# Patient Record
Sex: MALE | Race: ASIAN | Employment: OTHER | URBAN - METROPOLITAN AREA
[De-identification: names, ages, dates, MRNs, and addresses within clinical notes are randomized per-mention and may not be internally consistent; named-entity substitution may affect disease eponyms.]

---

## 2017-08-15 ENCOUNTER — OFFICE VISIT (OUTPATIENT)
Dept: URGENT CARE | Facility: URGENT CARE | Age: 72
End: 2017-08-15
Payer: MEDICARE

## 2017-08-15 ENCOUNTER — TRANSFERRED RECORDS (OUTPATIENT)
Dept: HEALTH INFORMATION MANAGEMENT | Facility: CLINIC | Age: 72
End: 2017-08-15

## 2017-08-15 VITALS
SYSTOLIC BLOOD PRESSURE: 136 MMHG | WEIGHT: 143.6 LBS | BODY MASS INDEX: 24.65 KG/M2 | HEART RATE: 72 BPM | TEMPERATURE: 97.7 F | OXYGEN SATURATION: 97 % | DIASTOLIC BLOOD PRESSURE: 70 MMHG

## 2017-08-15 DIAGNOSIS — R55 SYNCOPE, UNSPECIFIED SYNCOPE TYPE: Primary | ICD-10-CM

## 2017-08-15 PROCEDURE — 93000 ELECTROCARDIOGRAM COMPLETE: CPT | Performed by: FAMILY MEDICINE

## 2017-08-15 PROCEDURE — 99214 OFFICE O/P EST MOD 30 MIN: CPT | Performed by: FAMILY MEDICINE

## 2017-08-15 NOTE — MR AVS SNAPSHOT
After Visit Summary   8/15/2017    Jessica Healy    MRN: [id number]           Patient Information     Date Of Birth          1945        Visit Information        Provider Department      8/15/2017 7:55 PM Harris Linn MD [facility]        Today's Diagnoses     Syncope, unspecified syncope type    -  1       Follow-ups after your visit        Your next 10 appointments already scheduled     Aug 23, 2017  9:40 AM CDT   [facility] Visit with [facility]o Maciel [facility]   [facility]    [address] [place] [id number] [phone]           Bring a current list of meds and any records pertaining to this visit. For Physicals, please bring immunization records and any forms needing to be filled out. Please arrive 10 minutes early to complete paperwork.              Who to contact     If you have questions or need follow up information about today's clinic visit or your schedule please contact [facility] directly at [phone].  Normal or non-critical lab and imaging results will be communicated to you by MyChart, letter or phone within 4 business days after the clinic has received the results. If you do not hear from us within 7 days, please contact the clinic through [employer] or phone. If you have a critical or abnormal lab result, we will notify you by phone as soon as possible.  Submit refill requests through [facility] [facility] or call your pharmacy and they will forward the refill request to us. Please allow 3 business days for your refill to be completed.          Additional Information About Your Visit        MyChart Information     [facility] gives you secure access to your electronic health record. If you see a primary care provider, you can also send messages to your care team and make appointments. If you have questions, please call your primary care clinic.  If you do not have a primary care provider,  please call [phone] and they will assist you.        Care EveryWhere ID     This is your Care EveryWhere ID. This could be used by other organizations to access your [place] medical records  [id number]        Your Vitals Were     Pulse Temperature Pulse Oximetry BMI (Body Mass Index)          72 97.7  F (36.5  C) (Oral) 97% 24.65 kg/m2         Blood Pressure from Last 3 Encounters:   08/15/17 136/70   11/30/16 168/85   10/25/16 117/65    Weight from Last 3 Encounters:   08/15/17 143 lb 9.6 oz (65.1 kg)   11/30/16 142 lb (64.4 kg)   10/25/16 140 lb (63.5 kg)              We Performed the Following     EKG 12-lead complete w/read - Clinics        Primary Care Provider Office Phone # Fax #    Christiano Maciel [phone]       [address]  [facility] [address]        Equal Access to Services     [facility]: Hadii aad ku hadasho Soomaali, waaxda luqadaha, qaybta kaalmada adeegyada, waxay idiin hayaan adeeg kharash la'tona . So [facility] [phone].    ATENCIÓN: Si habla español, tiene a poole disposición servicios gratuitos de asistencia lingüística. Llame al [phone].    We comply with applicable federal civil rights laws and [place] laws. We do not discriminate on the basis of race, color, national origin, age, disability sex, sexual orientation or gender identity.            Thank you!     Thank you for choosing [facility]  for your care. Our goal is always to provide you with excellent care. Hearing back from our patients is one way we can continue to improve our services. Please take a few minutes to complete the written survey that you may receive in the mail after your visit with us. Thank you!             Your Updated Medication List - Protect others around you: Learn how to safely use, store and throw away your medicines at www.disposemymeds.org.          This list is accurate as of: 8/15/17  9:05 PM.  Always use your most recent med list.                   Brand  Name Dispense Instructions for use Diagnosis    acetaminophen 500 MG tablet    TYLENOL     Take 500-1,000 mg by mouth every 6 hours as needed for mild pain        atorvastatin 80 MG tablet    LIPITOR    90 tablet    Take 1 tablet (80 mg) by mouth daily    Hyperlipidemia LDL goal <70       carvedilol 3.125 MG tablet    COREG    180 tablet    Take 1 tablet (3.125 mg) by mouth 2 times daily (with meals)    Essential hypertension with goal blood pressure less than 130/80       clopidogrel 75 MG tablet    PLAVIX    90 tablet    Take 1 tablet (75 mg) by mouth daily    Cerebrovascular accident (CVA), unspecified mechanism (H)       ezetimibe 10 MG tablet    ZETIA    90 tablet    Take 1 tablet (10 mg) by mouth daily    Hyperlipidemia LDL goal <70       FISH OIL PO           isosorbide mononitrate 30 MG 24 hr tablet    IMDUR    360 tablet    Take 4 tablets (120 mg) by mouth daily    Essential hypertension with goal blood pressure less than 130/80       lisinopril 20 MG tablet    PRINIVIL/ZESTRIL    90 tablet    Take 1 tablet (20 mg) by mouth daily    Essential hypertension with goal blood pressure less than 130/80       mometasone 50 MCG/ACT spray    NASONEX    1 Box    Spray 2 sprays into both nostrils daily    Seasonal allergic rhinitis       nitroGLYcerin 0.4 MG sublingual tablet    NITROSTAT    25 tablet    PLACE 1 TABLET UNDER THE TONGUE AT THE ONSET OF CHEST PAIN. MAY REPEAT EVERY 5 MINUTES AS NEEDED FOR A TOTAL OF 3 DOSES.    Coronary artery disease involving coronary bypass graft of native heart with angina pectoris (H)       order for DME     1 each    Home Blood pressure monitor machine    Hypertension goal BP (blood pressure) < 130/80

## 2017-08-16 NOTE — NURSING NOTE
"Initial /70 (BP Location: Left arm, Patient Position: Chair)  Pulse 72  Temp 97.7  F (36.5  C) (Oral)  Wt 143 lb 9.6 oz (65.1 kg)  SpO2 97%  BMI 24.65 kg/m2 Estimated body mass index is 24.65 kg/(m^2) as calculated from the following:    Height as of 10/25/16: 5' 4\" (1.626 m).    Weight as of this encounter: 143 lb 9.6 oz (65.1 kg). .    "

## 2017-08-16 NOTE — PROGRESS NOTES
"    Some of this note was populated by a medical assistant.     SUBJECTIVE:                                                    Jessica Healy is a 72 year old male who presents to clinic today for the following health issues:    Syncope       Duration: x2 today syncopal spells with brief 5 minute period of confusion thereafter.     Description (location/character/radiation): fainted 10am and 12pm    Intensity:  6-7/10      Accompanying signs and symptoms: neck and shoulder pain, dizziness, \"blacks out\"when blood pressure gets too low      History (similar episodes/previous evaluation): similar symptoms with low  BP but no fainting previously     Precipitating or alleviating factors: None    Therapies tried and outcome: None       One other  episode of fainting within the last year. High blood pressure at that time and feeling dizziness prior to that fall.   Denies hx of seizure  Denies recent medication changes.   Denies concussion or head injury.   Did have a TIA years ago   CABG in 2006 denies previous cardiac stent however.   Did take 2 ASA tablets earlier today. Noted he is only plavix as well.     Problem list and histories reviewed & adjusted, as indicated.  Additional history: as documented    Patient Active Problem List   Diagnosis     Advanced directives, counseling/discussion     CAD (coronary artery disease)     Hypertension goal BP (blood pressure) < 130/80     Hyperlipidemia LDL goal <70     Cerebrovascular accident (CVA), unspecified mechanism (H)     CKD (chronic kidney disease) stage 3, GFR 30-59 ml/min     Kidney cyst, acquired     Past Surgical History:   Procedure Laterality Date     SURGICAL HISTORY OF -       CABG 2006       Social History   Substance Use Topics     Smoking status: Never Smoker     Smokeless tobacco: Never Used     Alcohol use No     Family History   Problem Relation Age of Onset     Family History Negative       no known specifics         Current Outpatient Prescriptions "   Medication Sig Dispense Refill     Omega-3 Fatty Acids (FISH OIL PO)        clopidogrel (PLAVIX) 75 MG tablet Take 1 tablet (75 mg) by mouth daily 90 tablet 3     carvedilol (COREG) 3.125 MG tablet Take 1 tablet (3.125 mg) by mouth 2 times daily (with meals) 180 tablet 3     atorvastatin (LIPITOR) 80 MG tablet Take 1 tablet (80 mg) by mouth daily 90 tablet 3     isosorbide mononitrate (IMDUR) 30 MG 24 hr tablet Take 4 tablets (120 mg) by mouth daily 360 tablet 3     lisinopril (PRINIVIL,ZESTRIL) 20 MG tablet Take 1 tablet (20 mg) by mouth daily 90 tablet 3     ezetimibe (ZETIA) 10 MG tablet Take 1 tablet (10 mg) by mouth daily 90 tablet 3     acetaminophen (TYLENOL) 500 MG tablet Take 500-1,000 mg by mouth every 6 hours as needed for mild pain       mometasone (NASONEX) 50 MCG/ACT nasal spray Spray 2 sprays into both nostrils daily 1 Box 1     ORDER FOR [facility] Blood pressure monitor machine 1 each 0     nitroglycerin (NITROSTAT) 0.4 MG SL tablet PLACE 1 TABLET UNDER THE TONGUE AT THE ONSET OF CHEST PAIN. MAY REPEAT EVERY 5 MINUTES AS NEEDED FOR A TOTAL OF 3 DOSES. (Patient not taking: Reported on 8/15/2017) 25 tablet 3     Allergies   Allergen Reactions     Nkda [No Known Drug Allergies]      Seasonal Allergies          Reviewed and updated as needed this visit by clinical staffTobacco  Allergies  Meds       Reviewed and updated as needed this visit by Provider         ROS:  Constitutional, HEENT, cardiovascular, pulmonary, gi and gu systems are negative, except as otherwise noted.      OBJECTIVE:   /70 (BP Location: Left arm, Patient Position: Chair)  Pulse 72  Temp 97.7  F (36.5  C) (Oral)  Wt 143 lb 9.6 oz (65.1 kg)  SpO2 97%  BMI 24.65 kg/m2  Body mass index is 24.65 kg/(m^2).  GENERAL: healthy, alert and no distress  NECK: no adenopathy, no asymmetry, masses, or scars and thyroid normal to palpation  RESP: lungs clear to auscultation - no rales, rhonchi or wheezes  CV: regular rate and rhythm,  normal S1 S2, no S3 or S4, no murmur, click or rub, no peripheral edema and peripheral pulses strong  ABDOMEN: soft, nontender, no hepatosplenomegaly, no masses and bowel sounds normal  MS: no gross musculoskeletal defects noted, no edema    Diagnostic Test Results:  none     ASSESSMENT/PLAN:       ICD-10-CM    1. Syncope, unspecified syncope type R55 EKG 12-lead complete w/read - Clinics    -consider atrial fibrillation related syncope with LAE on EKG.     Advised he go to ER and his family will take him directly to [facility] for further evaluation.   I feel he would benefit from a stat troponin test especially in light of vague new onset neck pain symptoms that may be cardiac in nature.   Exam is reassuring however.   24 observation with echo and cards consult may be a good idea.   Patient educational/instructional material provided including reasons for follow-up   The patient indicates understanding of these issues and agrees with the plan and will take him directly to [facility].   Harris Linn MD      [facility]

## 2017-08-23 ENCOUNTER — OFFICE VISIT (OUTPATIENT)
Dept: FAMILY MEDICINE | Facility: CLINIC | Age: 72
End: 2017-08-23
Payer: MEDICARE

## 2017-08-23 VITALS
DIASTOLIC BLOOD PRESSURE: 80 MMHG | OXYGEN SATURATION: 100 % | WEIGHT: 142.8 LBS | HEIGHT: 64 IN | TEMPERATURE: 97.2 F | SYSTOLIC BLOOD PRESSURE: 165 MMHG | HEART RATE: 65 BPM | BODY MASS INDEX: 24.38 KG/M2

## 2017-08-23 DIAGNOSIS — I10 HYPERTENSION GOAL BP (BLOOD PRESSURE) < 130/80: Primary | ICD-10-CM

## 2017-08-23 PROCEDURE — 99214 OFFICE O/P EST MOD 30 MIN: CPT | Performed by: FAMILY MEDICINE

## 2017-08-23 RX ORDER — LISINOPRIL 10 MG/1
10 TABLET ORAL 2 TIMES DAILY
Qty: 180 TABLET | Refills: 1 | Status: SHIPPED | OUTPATIENT
Start: 2017-08-23 | End: 2019-04-10

## 2017-08-23 NOTE — PATIENT INSTRUCTIONS
How to contact your care team providers:    [facility]  [phone]  Pharmacy [phone]    KATHY Winters Dr., Dr., PA-C, Dr., PA-C    Team RN: Kofi DODD    Clinic hours  M-Th 7am-7pm   Fri 7am-5pm.   Urgent care M-F 11am-9pm,   Sat/sun 9am-5pm.  Pharmacy M-F 8:00am-8pm Sat/sun 9am-5pm.     All password changes, disabled accounts, or ID changes in [facility]MyHealth will be done by our Access Services Department.   If you need help with your account or password, call: [phone]. Clinic staff no longer has the ability to change passwords.

## 2017-08-23 NOTE — MR AVS SNAPSHOT
After Visit Summary   8/23/2017    Jessica Healy    MRN: [id number]           Patient Information     Date Of Birth          1945        Visit Information        Provider Department      8/23/2017 9:40 AM Christiano Maciel MD [facility]        Today's Diagnoses     Hypertension goal BP (blood pressure) < 130/80    -  1      Care Instructions    How to contact your care team providers:    [facility]  [phone]  Pharmacy [phone]    KATHY Winters Dr., Dr., PA-C, Dr., PA-C    Team RN: Kofi DUVALL and Kamilah DODD    Clinic hours  M-Th 7am-7pm   Fri 7am-5pm.   Urgent care M-F 11am-9pm,   Sat/sun 9am-5pm.  Pharmacy M-F 8:00am-8pm Sat/sun 9am-5pm.     All password changes, disabled accounts, or ID changes in [employer]MyHealth will be done by our Access Services Department.   If you need help with your account or password, call: [phone]. Clinic staff no longer has the ability to change passwords.                         Follow-ups after your visit        Who to contact     If you have questions or need follow up information about today's clinic visit or your schedule please contact [facility] directly at [phone].  Normal or non-critical lab and imaging results will be communicated to you by [employer], letter or phone within 4 business days after the clinic has received the results. If you do not hear from us within 7 days, please contact the clinic through [employer] or phone. If you have a critical or abnormal lab result, we will notify you by phone as soon as possible.  Submit refill requests through [employer] or call your pharmacy and they will forward the refill request to us. Please allow 3 business days for your refill to be completed.          Additional Information About Your Visit        [employer]  "Information     [facility] gives you secure access to your electronic health record. If you see a primary care provider, you can also send messages to your care team and make appointments. If you have questions, please call your primary care clinic.  If you do not have a primary care provider, please call [phone] and they will assist you.        Care EveryWhere ID     This is your Care EveryWhere ID. This could be used by other organizations to access your [place] medical records  [id number]        Your Vitals Were     Pulse Temperature Height Pulse Oximetry BMI (Body Mass Index)       65 97.2  F (36.2  C) (Oral) 5' 4\" (1.626 m) 100% 24.51 kg/m2        Blood Pressure from Last 3 Encounters:   08/23/17 165/80   08/15/17 136/70   11/30/16 168/85    Weight from Last 3 Encounters:   08/23/17 142 lb 12.8 oz (64.8 kg)   08/15/17 143 lb 9.6 oz (65.1 kg)   11/30/16 142 lb (64.4 kg)              Today, you had the following     No orders found for display         Today's Medication Changes          These changes are accurate as of: 8/23/17 10:11 AM.  If you have any questions, ask your nurse or doctor.               These medicines have changed or have updated prescriptions.        Dose/Directions    lisinopril 10 MG tablet   Commonly known as:  PRINIVIL/ZESTRIL   This may have changed:    - medication strength  - how much to take  - when to take this   Used for:  Hypertension goal BP (blood pressure) < 130/80   Changed by:  Christiano Maciel MD        Dose:  10 mg   Take 1 tablet (10 mg) by mouth 2 times daily   Quantity:  180 tablet   Refills:  1         Stop taking these medicines if you haven't already. Please contact your care team if you have questions.     mometasone 50 MCG/ACT spray   Commonly known as:  NASONEX   Stopped by:  Christiano Maciel MD                Where to get your medicines      These medications were sent to [facility], [place] - [address] [facility]  [address], [facility] [place] [address] "     Phone:  [phone]     lisinopril 10 MG tablet                Primary Care Provider Office Phone # Fax #    Christiano Maciel [phone]       [address]  [facility] [address]        Equal Access to Services     APRIL BOB : Hadii aad ku hadasho Soomaali, waaxda luqadaha, qaybta kaalmada adeegyada, waxay idiin hayaan adeeg khkelli ladoreensharon dorman. So [facility] [phone].    ATENCIÓN: Si habla español, tiene a poole disposición servicios gratuitos de asistencia lingüística. Llame al [phone].    We comply with applicable federal civil rights laws and [place] laws. We do not discriminate on the basis of race, color, national origin, age, disability sex, sexual orientation or gender identity.            Thank you!     Thank you for choosing [facility]  for your care. Our goal is always to provide you with excellent care. Hearing back from our patients is one way we can continue to improve our services. Please take a few minutes to complete the written survey that you may receive in the mail after your visit with us. Thank you!             Your Updated Medication List - Protect others around you: Learn how to safely use, store and throw away your medicines at www.disposemymeds.org.          This list is accurate as of: 8/23/17 10:11 AM.  Always use your most recent med list.                   Brand Name Dispense Instructions for use Diagnosis    acetaminophen 500 MG tablet    TYLENOL     Take 500-1,000 mg by mouth every 6 hours as needed for mild pain        atorvastatin 80 MG tablet    LIPITOR    90 tablet    Take 1 tablet (80 mg) by mouth daily    Hyperlipidemia LDL goal <70       carvedilol 3.125 MG tablet    COREG    180 tablet    Take 1 tablet (3.125 mg) by mouth 2 times daily (with meals)    Essential hypertension with goal blood pressure less than 130/80       clopidogrel 75 MG tablet    PLAVIX    90 tablet    Take 1 tablet (75 mg) by mouth daily    Cerebrovascular accident  (CVA), unspecified mechanism (H)       ezetimibe 10 MG tablet    ZETIA    90 tablet    Take 1 tablet (10 mg) by mouth daily    Hyperlipidemia LDL goal <70       FISH OIL PO           isosorbide mononitrate 30 MG 24 hr tablet    IMDUR    360 tablet    Take 4 tablets (120 mg) by mouth daily    Essential hypertension with goal blood pressure less than 130/80       lisinopril 10 MG tablet    PRINIVIL/ZESTRIL    180 tablet    Take 1 tablet (10 mg) by mouth 2 times daily    Hypertension goal BP (blood pressure) < 130/80       nitroGLYcerin 0.4 MG sublingual tablet    NITROSTAT    25 tablet    PLACE 1 TABLET UNDER THE TONGUE AT THE ONSET OF CHEST PAIN. MAY REPEAT EVERY 5 MINUTES AS NEEDED FOR A TOTAL OF 3 DOSES.    Coronary artery disease involving coronary bypass graft of native heart with angina pectoris (H)       order for DME     1 each    Home Blood pressure monitor machine    Hypertension goal BP (blood pressure) < 130/80

## 2017-08-23 NOTE — PROGRESS NOTES
"  SUBJECTIVE:   Jessica Healy is a 72 year old male who presents to clinic today for the following health issues:      Hypertension Follow-up      Outpatient blood pressures are being checked at home.  Results are 80's/50's.    Low Salt Diet: low salt      Amount of exercise or physical activity: 3-4 days/week for an average of 45-60 minutes    Problems taking medications regularly: No    Medication side effects: fatigue/headache/shoulder pain - patient states from blood pressure med.    Diet: low salt and low fat/cholesterol      ED/ Followup:    Facility:  [facility]  Date of visit: 8/16  Reason for visit: syncope/ low blood pressure  Current Status: better       Problem list and histories reviewed & adjusted, as indicated.  Additional history: as documented    Patient Active Problem List   Diagnosis     Advanced directives, counseling/discussion     CAD (coronary artery disease)     Hypertension goal BP (blood pressure) < 130/80     Hyperlipidemia LDL goal <70     Cerebrovascular accident (CVA), unspecified mechanism (H)     CKD (chronic kidney disease) stage 3, GFR 30-59 ml/min     Kidney cyst, acquired     Past Surgical History:   Procedure Laterality Date     SURGICAL HISTORY OF -       CABG 2006       Social History   Substance Use Topics     Smoking status: Never Smoker     Smokeless tobacco: Never Used     Alcohol use No     Family History   Problem Relation Age of Onset     Family History Negative Other      no known specifics             Reviewed and updated as needed this visit by clinical staffTobacco  Allergies  Meds  Med Hx  Surg Hx  Fam Hx  Soc Hx      Reviewed and updated as needed this visit by Provider         ROS:  Constitutional, HEENT, cardiovascular, pulmonary, GI, , musculoskeletal, neuro, skin, endocrine and psych systems are negative, except as otherwise noted.      OBJECTIVE:   /80  Pulse 65  Temp 97.2  F (36.2  C) (Oral)  Ht 5' 4\" (1.626 m)  Wt 142 lb 12.8 oz (64.8 kg)  SpO2 " 100%  BMI 24.51 kg/m2  Body mass index is 24.51 kg/(m^2).  GENERAL: healthy, alert and no distress  NECK: no adenopathy, no asymmetry, masses, or scars and thyroid normal to palpation  RESP: lungs clear to auscultation - no rales, rhonchi or wheezes  CV: regular rate and rhythm, normal S1 S2, no S3 or S4, no murmur, click or rub, no peripheral edema and peripheral pulses strong  ABDOMEN: soft, nontender, no hepatosplenomegaly, no masses and bowel sounds normal  MS: no gross musculoskeletal defects noted, no edema    Diagnostic Test Results:  none     ASSESSMENT/PLAN:     1. Hypertension goal BP (blood pressure) < 130/80  Not controlled. Patient stated his bp goes low after taking lisinopril 20 mg daily. Patient has been taking 10 mg daily. bp elevated today, will trial 10 mg twice daily. RTC in 1 month for bp recheck. Patient will monitor bp's.  - lisinopril (PRINIVIL/ZESTRIL) 10 MG tablet; Take 1 tablet (10 mg) by mouth 2 times daily  Dispense: 180 tablet; Refill: 1    Regular exercise  See Patient Instructions    Christiano Maciel MD, MD  [facility]

## 2017-09-06 ENCOUNTER — DOCUMENTATION ONLY (OUTPATIENT)
Dept: VASCULAR SURGERY | Facility: CLINIC | Age: 72
End: 2017-09-06

## 2017-09-06 DIAGNOSIS — Z13.6 ENCOUNTER FOR ABDOMINAL AORTIC ANEURYSM (AAA) SCREENING: Primary | ICD-10-CM

## 2017-10-25 DIAGNOSIS — I10 ESSENTIAL HYPERTENSION WITH GOAL BLOOD PRESSURE LESS THAN 130/80: ICD-10-CM

## 2017-10-25 DIAGNOSIS — I25.709 CORONARY ARTERY DISEASE INVOLVING CORONARY BYPASS GRAFT OF NATIVE HEART WITH ANGINA PECTORIS (H): ICD-10-CM

## 2017-10-25 DIAGNOSIS — E78.5 HYPERLIPIDEMIA LDL GOAL <70: ICD-10-CM

## 2017-10-25 DIAGNOSIS — I63.9 CEREBROVASCULAR ACCIDENT (CVA), UNSPECIFIED MECHANISM (H): ICD-10-CM

## 2017-10-25 NOTE — LETTER
October 30, 2017      Jessica Healy  [address]  [place] [facility] [address]        Dear Jessica Healy,      The refill request made by your pharmacy was received at the clinic.     Signed Prescriptions:                        Disp   Refills    clopidogrel (PLAVIX) 75 MG tablet          30 tab*0        Sig: TAKE 1 TABLET BY MOUTH ONCE DAILY // IB HNUB NOJ IB           LUB  Authorizing Provider: AYO SPARKS  Ordering User: ISSA NIELSEN    carvedilol (COREG) 3.125 MG tablet         60 tab*0        Sig: TAKE 1 TABLET TWICE DAILY WITH MEALS // 1 ZAUG NOJ 1           LUB, 1 HNUB NOJ 2 ZAUG NROG MOV  Authorizing Provider: AYO SPARKS  Ordering User: ISSA NIELSEN    atorvastatin (LIPITOR) 80 MG tablet        30 tab*0        Sig: TAKE 1 TABLET BY MOUTH DAILY FOR CHOLESTEROL // IB           HNUB NOJ 1 LUB SHAHRAM NTSHAV MUAJ ROJ  Authorizing Provider: AYO SPARKS  Ordering User: ISSA NIELSEN    ezetimibe (ZETIA) 10 MG tablet             30 tab*0        Sig: TAKE 1 TABLET BY MOUTH DAILY FOR CHOLESTEROL // IB           HNUB NOJ 1 LUB SHAHRAM NTSHAV MUAJ ROJ  Authorizing Provider: AYO SPARKS  Ordering User: ISSA NIELSEN    isosorbide mononitrate (IMDUR) 30 MG 24 hr*120 ta*0        Sig: TAKE 4 TABLETS (120 MG) BY MOUTH DAILY // IB HNUB NOJ           4 LUB  Authorizing Provider: AYO SPARKS  Ordering User: ISSA NIELSEN      At this time you are due in the clinic for a follow up appointment with fasting labs. A one time jalil refill has been sent to your pharmacy.       Please call your clinic to make an appointment with your provider before you run out of medication. This will prevent a delay in your next month's refill.    [facility] [phone]  [facility]- [phone]  [facility] [phone]    For your convenience we also offer online appointment scheduling at [email or web address] or [email or web address].     We invite you to refill your next prescription at a  [facility] or take advantage of our prescription mail service.      Sincerely,      [facility] with Dr. Maciel

## 2017-10-26 RX ORDER — NITROGLYCERIN 0.4 MG/1
TABLET SUBLINGUAL
Qty: 25 TABLET | Refills: 3 | Status: SHIPPED | OUTPATIENT
Start: 2017-10-26 | End: 2019-04-10

## 2017-10-26 NOTE — TELEPHONE ENCOUNTER
Last Written Prescription Date: 10/25/16  Last Fill Quantity: 25,  # refills: 3   Last Office Visit with [facility] or [facility] prescribing provider: 10/25/16. Next appointment: None, message sent to pharmacy for patient to make an appt                                             Refilled per protocol    Darling Drew RN  Cardiology Care Coordinator  [facility]  Phone: [phone]

## 2017-10-27 RX ORDER — CARVEDILOL 3.12 MG/1
TABLET ORAL
Qty: 60 TABLET | Refills: 0 | Status: SHIPPED | OUTPATIENT
Start: 2017-10-27 | End: 2017-11-21

## 2017-10-27 RX ORDER — ATORVASTATIN CALCIUM 80 MG/1
TABLET, FILM COATED ORAL
Qty: 30 TABLET | Refills: 0 | Status: SHIPPED | OUTPATIENT
Start: 2017-10-27 | End: 2017-11-21

## 2017-10-27 RX ORDER — EZETIMIBE 10 MG/1
TABLET ORAL
Qty: 30 TABLET | Refills: 0 | Status: SHIPPED | OUTPATIENT
Start: 2017-10-27 | End: 2017-11-21

## 2017-10-27 RX ORDER — ISOSORBIDE MONONITRATE 30 MG/1
TABLET, EXTENDED RELEASE ORAL
Qty: 120 TABLET | Refills: 0 | Status: SHIPPED | OUTPATIENT
Start: 2017-10-27 | End: 2017-11-21

## 2017-10-27 RX ORDER — CLOPIDOGREL BISULFATE 75 MG/1
TABLET ORAL
Qty: 30 TABLET | Refills: 0 | Status: SHIPPED | OUTPATIENT
Start: 2017-10-27 | End: 2017-11-21

## 2017-10-27 NOTE — TELEPHONE ENCOUNTER
Medication is being filled for 1 time refill only due to:  Patient needs to be seen because due for fasting labs and ov.   send letter.  Rohini Weiner RN

## 2017-10-30 NOTE — TELEPHONE ENCOUNTER
Letter mailed to pt's home address to call our appt line to schedule an appt with fasting labs.  Ayaz Healy,  [facility]

## 2017-11-21 DIAGNOSIS — I63.9 CEREBROVASCULAR ACCIDENT (CVA), UNSPECIFIED MECHANISM (H): ICD-10-CM

## 2017-11-21 DIAGNOSIS — E78.5 HYPERLIPIDEMIA LDL GOAL <70: ICD-10-CM

## 2017-11-21 DIAGNOSIS — I10 ESSENTIAL HYPERTENSION WITH GOAL BLOOD PRESSURE LESS THAN 130/80: ICD-10-CM

## 2017-11-22 NOTE — TELEPHONE ENCOUNTER
Routing refill request to provider for review/approval because:  Yumiko given x1 and patient did not follow up, please advise  Rohini Weiner RN

## 2017-11-24 RX ORDER — CLOPIDOGREL BISULFATE 75 MG/1
TABLET ORAL
Qty: 30 TABLET | Refills: 0 | Status: SHIPPED | OUTPATIENT
Start: 2017-11-24 | End: 2019-04-10

## 2017-11-24 RX ORDER — CARVEDILOL 3.12 MG/1
TABLET ORAL
Qty: 60 TABLET | Refills: 0 | Status: SHIPPED | OUTPATIENT
Start: 2017-11-24 | End: 2019-04-10

## 2017-11-24 RX ORDER — ISOSORBIDE MONONITRATE 30 MG/1
TABLET, EXTENDED RELEASE ORAL
Qty: 120 TABLET | Refills: 0 | Status: SHIPPED | OUTPATIENT
Start: 2017-11-24 | End: 2019-04-10

## 2017-11-24 RX ORDER — EZETIMIBE 10 MG/1
TABLET ORAL
Qty: 30 TABLET | Refills: 0 | Status: SHIPPED | OUTPATIENT
Start: 2017-11-24 | End: 2019-04-10

## 2017-11-24 RX ORDER — ATORVASTATIN CALCIUM 80 MG/1
TABLET, FILM COATED ORAL
Qty: 30 TABLET | Refills: 0 | Status: SHIPPED | OUTPATIENT
Start: 2017-11-24 | End: 2019-04-10

## 2018-06-23 NOTE — NURSING NOTE
"Chief Complaint   Patient presents with     Hospital F/U     [facility]     Hypertension       Initial /90 (BP Location: Left arm, Patient Position: Chair, Cuff Size: Adult Regular)  Pulse 65  Temp 97.2  F (36.2  C) (Oral)  Ht 5' 4\" (1.626 m)  Wt 142 lb 12.8 oz (64.8 kg)  SpO2 100%  BMI 24.51 kg/m2 Estimated body mass index is 24.51 kg/(m^2) as calculated from the following:    Height as of this encounter: 5' 4\" (1.626 m).    Weight as of this encounter: 142 lb 12.8 oz (64.8 kg).  Medication Reconciliation: complete     Jocelyn Steinberg MA      "
Alert and oriented, no focal deficits, no motor or sensory deficits.

## 2019-04-10 ENCOUNTER — OFFICE VISIT (OUTPATIENT)
Dept: FAMILY MEDICINE | Facility: CLINIC | Age: 74
End: 2019-04-10
Payer: MEDICARE

## 2019-04-10 VITALS
BODY MASS INDEX: 24.41 KG/M2 | WEIGHT: 143 LBS | DIASTOLIC BLOOD PRESSURE: 98 MMHG | HEIGHT: 64 IN | OXYGEN SATURATION: 100 % | RESPIRATION RATE: 16 BRPM | HEART RATE: 67 BPM | TEMPERATURE: 98 F | SYSTOLIC BLOOD PRESSURE: 174 MMHG

## 2019-04-10 DIAGNOSIS — Z12.11 SCREEN FOR COLON CANCER: ICD-10-CM

## 2019-04-10 DIAGNOSIS — I63.9 CEREBROVASCULAR ACCIDENT (CVA), UNSPECIFIED MECHANISM (H): ICD-10-CM

## 2019-04-10 DIAGNOSIS — E78.5 HYPERLIPIDEMIA LDL GOAL <70: ICD-10-CM

## 2019-04-10 DIAGNOSIS — I25.709 CORONARY ARTERY DISEASE INVOLVING CORONARY BYPASS GRAFT OF NATIVE HEART WITH ANGINA PECTORIS (H): ICD-10-CM

## 2019-04-10 DIAGNOSIS — I10 ESSENTIAL HYPERTENSION WITH GOAL BLOOD PRESSURE LESS THAN 130/80: ICD-10-CM

## 2019-04-10 DIAGNOSIS — Z12.5 SCREENING FOR PROSTATE CANCER: ICD-10-CM

## 2019-04-10 DIAGNOSIS — M10.072 ACUTE IDIOPATHIC GOUT OF LEFT FOOT: Primary | ICD-10-CM

## 2019-04-10 PROCEDURE — 99214 OFFICE O/P EST MOD 30 MIN: CPT | Performed by: FAMILY MEDICINE

## 2019-04-10 RX ORDER — CLOPIDOGREL BISULFATE 75 MG/1
TABLET ORAL
Qty: 90 TABLET | Refills: 3 | Status: SHIPPED | OUTPATIENT
Start: 2019-04-10 | End: 2020-07-14

## 2019-04-10 RX ORDER — ATORVASTATIN CALCIUM 80 MG/1
TABLET, FILM COATED ORAL
Qty: 90 TABLET | Refills: 3 | Status: SHIPPED | OUTPATIENT
Start: 2019-04-10 | End: 2020-06-30

## 2019-04-10 RX ORDER — ISOSORBIDE MONONITRATE 30 MG/1
TABLET, EXTENDED RELEASE ORAL
Qty: 360 TABLET | Refills: 1 | Status: SHIPPED | OUTPATIENT
Start: 2019-04-10 | End: 2020-06-30

## 2019-04-10 RX ORDER — CARVEDILOL 3.12 MG/1
TABLET ORAL
Qty: 180 TABLET | Refills: 1 | Status: SHIPPED | OUTPATIENT
Start: 2019-04-10 | End: 2020-03-09

## 2019-04-10 RX ORDER — PREDNISONE 20 MG/1
TABLET ORAL
Qty: 19 TABLET | Refills: 0 | Status: SHIPPED | OUTPATIENT
Start: 2019-04-10 | End: 2019-04-25

## 2019-04-10 RX ORDER — NITROGLYCERIN 0.4 MG/1
TABLET SUBLINGUAL
Qty: 25 TABLET | Refills: 3 | Status: SHIPPED | OUTPATIENT
Start: 2019-04-10 | End: 2020-07-24

## 2019-04-10 RX ORDER — EZETIMIBE 10 MG/1
TABLET ORAL
Qty: 90 TABLET | Refills: 3 | Status: SHIPPED | OUTPATIENT
Start: 2019-04-10 | End: 2020-06-30

## 2019-04-10 RX ORDER — LISINOPRIL 10 MG/1
10 TABLET ORAL 2 TIMES DAILY
Qty: 180 TABLET | Refills: 1 | Status: SHIPPED | OUTPATIENT
Start: 2019-04-10 | End: 2020-07-14

## 2019-04-10 ASSESSMENT — MIFFLIN-ST. JEOR: SCORE: 1314.64

## 2019-04-10 NOTE — PATIENT INSTRUCTIONS
At [facility], we strive to deliver an exceptional experience to you, every time we see you.  If you receive a survey in the mail, please send us back your thoughts. We really do value your feedback.    Based on your medical history, these are the current health maintenance/preventive care services that you are due for (some may have been done at this visit.)  Health Maintenance Due   Topic Date Due     ZOSTER IMMUNIZATION (1 of 2) 06/18/1997     MEDICARE ANNUAL WELLNESS VISIT  12/30/2012     PNEUMOCOCCAL IMMUNIZATION 65+ LOW/MEDIUM RISK (2 of 2 - PCV13) 09/25/2015     ADVANCE DIRECTIVE PLANNING Q5 YRS  12/30/2016     FIT Q1 YR  11/02/2017     FALL RISK ASSESSMENT  08/23/2018     PHQ-2 Q1 YR  08/23/2018     INFLUENZA VACCINE (1) 09/01/2018         Suggested websites for health information:  Www.Pull.Letsmake : Up to date and easily searchable information on multiple topics.  Www.[facility].gov : medication info, interactive tutorials, watch real surgeries online  Www.familydoctor.org : good info from the Academy of Family Physicians  Www.cdc.gov : public health info, travel advisories, epidemics (H1N1)  Www.aap.org : children's health info, normal development, vaccinations  Www.health.[facility].[place].us : [place] dept of health, public health issues in [place], N1N1    Your care team:                            Family Medicine Internal Medicine   MD Gatito Lewis MD Shantel Branch-Fleming, MD Katya Georgiev PA-C Nam Ho, MD Pediatrics   KATHY Cartagena, SHELBIE Cui APRN CNP   MD Cee Dumas MD Deborah Mielke, MD Kim Thein, APRN CNP      Clinic hours: Monday - Thursday 7 am-7 pm; Fridays 7 am-5 pm.   Urgent care: Monday - Friday 11 am-9 pm; Saturday and Sunday 9 am-5 pm.  Pharmacy : Monday -Thursday 8 am-8 pm; Friday 8 am-6 pm; Saturday and Sunday 9 am-5 pm.     Clinic: [phone]   Pharmacy: [phone]

## 2019-04-10 NOTE — PROGRESS NOTES
"  SUBJECTIVE:   Jessica Healy is a 73 year old male who presents to clinic today for the following   health issues:    Gout/ single inflamed joint   Onset: 1 month     Description:   Location: big toe and foot - bilateral  Joint Swelling: YES  Redness: YES  Pain: YES    Intensity: moderate    Progression of Symptoms:  worsening    Accompanying Signs & Symptoms:  Fevers: no     History:   Trauma to the area: no   Previous history of gout: YES   Recent illness:  no     Precipitating factors:   Diet-rich in purine: no  Alcohol use: no   Diuretic use: no     Alleviating factors:  None     Therapies Tried and outcome: tylenol     Additional history: as documented    Reviewed  and updated as needed this visit by clinical staff         Reviewed and updated as needed this visit by Provider         Patient Active Problem List   Diagnosis     Advanced directives, counseling/discussion     CAD (coronary artery disease)     Hypertension goal BP (blood pressure) < 130/80     Hyperlipidemia LDL goal <70     Cerebrovascular accident (CVA), unspecified mechanism (H)     CKD (chronic kidney disease) stage 3, GFR 30-59 ml/min (H)     Kidney cyst, acquired     Past Surgical History:   Procedure Laterality Date     SURGICAL HISTORY OF -       CABG 2006       Social History     Tobacco Use     Smoking status: Never Smoker     Smokeless tobacco: Never Used   Substance Use Topics     Alcohol use: No     Family History   Problem Relation Age of Onset     Family History Negative Other         no known specifics           ROS:  Constitutional, HEENT, cardiovascular, pulmonary, GI, , musculoskeletal, neuro, skin, endocrine and psych systems are negative, except as otherwise noted.    OBJECTIVE:     BP (!) 174/98 (BP Location: Right arm, Patient Position: Chair, Cuff Size: Adult Regular)   Pulse 67   Temp 98  F (36.7  C) (Oral)   Resp 16   Ht 1.626 m (5' 4\")   Wt 64.9 kg (143 lb)   SpO2 100%   BMI 24.55 kg/m    Body mass index is 24.55 " kg/m .  GENERAL: healthy, alert and no distress  NECK: no adenopathy, no asymmetry, masses, or scars and thyroid normal to palpation  RESP: lungs clear to auscultation - no rales, rhonchi or wheezes  CV: regular rate and rhythm, normal S1 S2, no S3 or S4, no murmur, click or rub, no peripheral edema and peripheral pulses strong  ABDOMEN: soft, nontender, no hepatosplenomegaly, no masses and bowel sounds normal  MS: tenderness around left 1st MTP joint with tophi formations  Diagnostic Test Results:  none     ASSESSMENT/PLAN:     1. Acute idiopathic gout of left foot  Severe gout. Treat with oral steroid taper at this time. Will start Allopurinol when able to. Recheck in 2 weeks. Worrisome for renal decline since patient has been not taking his medications for over 2 years.  - predniSONE (DELTASONE) 20 MG tablet; Take 60 mg by mouth daily for 3 days, THEN 40 mg daily for 3 days, THEN 20 mg daily for 3 days, THEN 10 mg daily for 3 days.  Dispense: 19 tablet; Refill: 0    2. Essential hypertension with goal blood pressure less than 130/80  Not controlled. Restart medications. Recheck labs.  - carvedilol (COREG) 3.125 MG tablet; TAKE 1 TABLET TWICE DAILY WITH MEALS // 1 ZAUG NOJ 1 LUB, 1 HNUB NOJ 2 ZAUG NROG MOV PAB SHAHRAM NTSHAV SIAB/LUB PLAWV  Dispense: 180 tablet; Refill: 1  - isosorbide mononitrate (IMDUR) 30 MG 24 hr tablet; TAKE 4 TABLETS (120 MG) BY MOUTH DAILY // IB HNUB NOJ 4 LUB PAB SHAHRAM LUB PLAWV  Dispense: 360 tablet; Refill: 1  - lisinopril (PRINIVIL/ZESTRIL) 10 MG tablet; Take 1 tablet (10 mg) by mouth 2 times daily  Dispense: 180 tablet; Refill: 1  - Comprehensive metabolic panel (BMP + Alb, Alk Phos, ALT, AST, Total. Bili, TP); Future  - Albumin Random Urine Quantitative with Creat Ratio; Future    3. Hyperlipidemia LDL goal <70  Likely not controlled. Restart atorvastatin.  - atorvastatin (LIPITOR) 80 MG tablet; TAKE 1 TABLET BY MOUTH DAILY FOR CHOLESTEROL // IB HNUB NOJ 1 LUB SHAHRAM NTSHAV MUAJ ROJ  Dispense:  90 tablet; Refill: 3  - ezetimibe (ZETIA) 10 MG tablet; TAKE 1 TABLET BY MOUTH DAILY FOR CHOLESTEROL // IB HNUB NOJ 1 LUB SHAHRAM NTSHAV MUAJ ROJ  Dispense: 90 tablet; Refill: 3  - Comprehensive metabolic panel (BMP + Alb, Alk Phos, ALT, AST, Total. Bili, TP); Future  - Lipid Profile (Chol, Trig, HDL, LDL calc); Future    4. Cerebrovascular accident (CVA), unspecified mechanism (H)    - clopidogrel (PLAVIX) 75 MG tablet; TAKE 1 TABLET BY MOUTH ONCE DAILY// IB HNUB NOJ IB LUB PAB KOM NTSHAV TXHOB NYEEM.  Dispense: 90 tablet; Refill: 3    5. 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 MAY REPEAT EVERY 5 MINUTES AS NEEDED FOR A TOTAL OF 3 DOSES.  Dispense: 25 tablet; Refill: 3    6. Screen for colon cancer    - Fecal colorectal cancer screen FIT - Future (S+30); Future    7. Screening for prostate cancer    - PSA, screen; Future    Regular exercise  See Patient Instructions    Christiano Maciel MD, MD  [facility]

## 2019-04-15 DIAGNOSIS — I10 ESSENTIAL HYPERTENSION WITH GOAL BLOOD PRESSURE LESS THAN 130/80: ICD-10-CM

## 2019-04-15 DIAGNOSIS — E78.5 HYPERLIPIDEMIA LDL GOAL <70: ICD-10-CM

## 2019-04-15 DIAGNOSIS — Z12.5 SCREENING FOR PROSTATE CANCER: ICD-10-CM

## 2019-04-15 LAB
ALBUMIN SERPL-MCNC: 3.9 G/DL (ref 3.4–5)
ALP SERPL-CCNC: 70 U/L (ref 40–150)
ALT SERPL W P-5'-P-CCNC: 17 U/L (ref 0–70)
ANION GAP SERPL CALCULATED.3IONS-SCNC: 8 MMOL/L (ref 3–14)
AST SERPL W P-5'-P-CCNC: 18 U/L (ref 0–45)
BILIRUB SERPL-MCNC: 0.7 MG/DL (ref 0.2–1.3)
BUN SERPL-MCNC: 35 MG/DL (ref 7–30)
CALCIUM SERPL-MCNC: 9.6 MG/DL (ref 8.5–10.1)
CHLORIDE SERPL-SCNC: 108 MMOL/L (ref 94–109)
CHOLEST SERPL-MCNC: 373 MG/DL
CO2 SERPL-SCNC: 26 MMOL/L (ref 20–32)
CREAT SERPL-MCNC: 2.04 MG/DL (ref 0.66–1.25)
CREAT UR-MCNC: 222 MG/DL
GFR SERPL CREATININE-BSD FRML MDRD: 32 ML/MIN/{1.73_M2}
GLUCOSE SERPL-MCNC: 83 MG/DL (ref 70–99)
HDLC SERPL-MCNC: 40 MG/DL
LDLC SERPL CALC-MCNC: 305 MG/DL
MICROALBUMIN UR-MCNC: 12 MG/L
MICROALBUMIN/CREAT UR: 5.23 MG/G CR (ref 0–17)
NONHDLC SERPL-MCNC: 333 MG/DL
POTASSIUM SERPL-SCNC: 3.8 MMOL/L (ref 3.4–5.3)
PROT SERPL-MCNC: 7.4 G/DL (ref 6.8–8.8)
PSA SERPL-ACNC: 3.83 UG/L (ref 0–4)
SODIUM SERPL-SCNC: 142 MMOL/L (ref 133–144)
TRIGL SERPL-MCNC: 138 MG/DL

## 2019-04-15 PROCEDURE — 80053 COMPREHEN METABOLIC PANEL: CPT | Performed by: FAMILY MEDICINE

## 2019-04-15 PROCEDURE — 82043 UR ALBUMIN QUANTITATIVE: CPT | Performed by: FAMILY MEDICINE

## 2019-04-15 PROCEDURE — G0103 PSA SCREENING: HCPCS | Performed by: FAMILY MEDICINE

## 2019-04-15 PROCEDURE — 80061 LIPID PANEL: CPT | Performed by: FAMILY MEDICINE

## 2019-04-15 PROCEDURE — 36415 COLL VENOUS BLD VENIPUNCTURE: CPT | Performed by: FAMILY MEDICINE

## 2019-04-15 NOTE — LETTER
April 16, 2019      Jessica Healy  [address] [place] [address]      Dear Jessica,     Your cholesterol is very high and not at goal. Your kidney test is abnormal. Please follow up in 1 month to recheck blood pressure and labs. Please take your current medications.     Sincerely,   Christiano Maciel MD     Resulted Orders   PSA, screen   Result Value Ref Range    PSA 3.83 0 - 4 ug/L      Comment:      Assay Method:  Chemiluminescence using Siemens Vista analyzer   Lipid Profile (Chol, Trig, HDL, LDL calc)   Result Value Ref Range    Cholesterol 373 (H) <200 mg/dL      Comment:      Desirable:       <200 mg/dl    Triglycerides 138 <150 mg/dL      Comment:      Fasting specimen    HDL Cholesterol 40 >39 mg/dL    LDL Cholesterol Calculated 305 (H) <100 mg/dL      Comment:      Above desirable:  100-129 mg/dl  Borderline High:  130-159 mg/dL  High:             160-189 mg/dL  Very high:       >189 mg/dl      Non HDL Cholesterol 333 (H) <130 mg/dL      Comment:      Above Desirable:  130-159 mg/dl  Borderline high:  160-189 mg/dl  High:             190-219 mg/dl  Very high:       >219 mg/dl     Albumin Random Urine Quantitative with Creat Ratio   Result Value Ref Range    Creatinine Urine 222 mg/dL    Albumin Urine mg/L 12 mg/L    Albumin Urine mg/g Cr 5.23 0 - 17 mg/g Cr   Comprehensive metabolic panel (BMP + Alb, Alk Phos, ALT, AST, Total. Bili, TP)   Result Value Ref Range    Sodium 142 133 - 144 mmol/L    Potassium 3.8 3.4 - 5.3 mmol/L    Chloride 108 94 - 109 mmol/L    Carbon Dioxide 26 20 - 32 mmol/L    Anion Gap 8 3 - 14 mmol/L    Glucose 83 70 - 99 mg/dL      Comment:      Fasting specimen    Urea Nitrogen 35 (H) 7 - 30 mg/dL    Creatinine 2.04 (H) 0.66 - 1.25 mg/dL    GFR Estimate 32 (L) >60 mL/min/[1.73_m2]      Comment:      Non  GFR Calc  Starting 12/18/2018, serum creatinine based estimated GFR (eGFR) will be   calculated using the Chronic Kidney Disease Epidemiology Collaboration    (CKD-EPI) equation.      GFR Estimate If Black 37 (L) >60 mL/min/[1.73_m2]      Comment:       GFR Calc  Starting 12/18/2018, serum creatinine based estimated GFR (eGFR) will be   calculated using the Chronic Kidney Disease Epidemiology Collaboration   (CKD-EPI) equation.      Calcium 9.6 8.5 - 10.1 mg/dL    Bilirubin Total 0.7 0.2 - 1.3 mg/dL    Albumin 3.9 3.4 - 5.0 g/dL    Protein Total 7.4 6.8 - 8.8 g/dL    Alkaline Phosphatase 70 40 - 150 U/L    ALT 17 0 - 70 U/L    AST 18 0 - 45 U/L

## 2019-04-25 ENCOUNTER — OFFICE VISIT (OUTPATIENT)
Dept: FAMILY MEDICINE | Facility: CLINIC | Age: 74
End: 2019-04-25
Payer: MEDICARE

## 2019-04-25 VITALS
BODY MASS INDEX: 23.73 KG/M2 | DIASTOLIC BLOOD PRESSURE: 70 MMHG | HEIGHT: 64 IN | TEMPERATURE: 97.7 F | RESPIRATION RATE: 16 BRPM | HEART RATE: 71 BPM | SYSTOLIC BLOOD PRESSURE: 140 MMHG | OXYGEN SATURATION: 96 % | WEIGHT: 139 LBS

## 2019-04-25 DIAGNOSIS — I10 HYPERTENSION GOAL BP (BLOOD PRESSURE) < 130/80: Primary | ICD-10-CM

## 2019-04-25 DIAGNOSIS — E78.5 HYPERLIPIDEMIA LDL GOAL <70: ICD-10-CM

## 2019-04-25 DIAGNOSIS — M10.072 ACUTE IDIOPATHIC GOUT OF LEFT FOOT: ICD-10-CM

## 2019-04-25 PROCEDURE — 99214 OFFICE O/P EST MOD 30 MIN: CPT | Performed by: FAMILY MEDICINE

## 2019-04-25 RX ORDER — ALLOPURINOL 100 MG/1
50 TABLET ORAL DAILY
Qty: 45 TABLET | Refills: 3 | Status: SHIPPED | OUTPATIENT
Start: 2019-04-25 | End: 2020-07-14

## 2019-04-25 ASSESSMENT — PAIN SCALES - GENERAL: PAINLEVEL: MILD PAIN (2)

## 2019-04-25 ASSESSMENT — MIFFLIN-ST. JEOR: SCORE: 1296.5

## 2019-04-25 NOTE — PATIENT INSTRUCTIONS
At [facility], we strive to deliver an exceptional experience to you, every time we see you.  If you receive a survey in the mail, please send us back your thoughts. We really do value your feedback.    Based on your medical history, these are the current health maintenance/preventive care services that you are due for (some may have been done at this visit.)  Health Maintenance Due   Topic Date Due     ZOSTER IMMUNIZATION (1 of 2) 06/18/1997     MEDICARE ANNUAL WELLNESS VISIT  12/30/2012     PNEUMOCOCCAL IMMUNIZATION 65+ LOW/MEDIUM RISK (2 of 2 - PCV13) 09/25/2015     ADVANCE DIRECTIVE PLANNING Q5 YRS  12/30/2016     FIT Q1 YR  11/02/2017     INFLUENZA VACCINE (1) 09/01/2018         Suggested websites for health information:  Www.PaxVax : Up to date and easily searchable information on multiple topics.  Www.[employer].gov : medication info, interactive tutorials, watch real surgeries online  Www.familydoctor.org : good info from the Academy of Family Physicians  Www.cdc.gov : public health info, travel advisories, epidemics (H1N1)  Www.aap.org : children's health info, normal development, vaccinations  Www.health.[facility].[place].us : [place] dept of health, public health issues in [place], N1N1    Your care team:                            Family Medicine Internal Medicine   MD Gatito Lewis MD Shantel Branch-Fleming, MD Katya Georgiev PA-C Nam Ho, MD Pediatrics   KATHY Cartagena, MD Cee Corbett CNP, MD Deborah Mielke, MD Kim Thein, APRN CNP      Clinic hours: Monday - Thursday 7 am-7 pm; Fridays 7 am-5 pm.   Urgent care: Monday - Friday 11 am-9 pm; Saturday and Sunday 9 am-5 pm.  Pharmacy : Monday -Thursday 8 am-8 pm; Friday 8 am-6 pm; Saturday and Sunday 9 am-5 pm.     Clinic: [phone]   Pharmacy: [phone]

## 2019-04-25 NOTE — PROGRESS NOTES
SUBJECTIVE:   Jessica Healy is a 73 year old male who presents to clinic today for the following   health issues:      Hyperlipidemia Follow-Up      Rate your low fat/cholesterol diet?: good    Taking statin?  Yes, muscle aches, possibly from other cause    Other lipid medications/supplements?:  none    Hypertension Follow-up      Outpatient blood pressures are being checked at home.  Results are 140-150/80.    Low Salt Diet: low salt      Amount of exercise or physical activity: 4 days/week for an average of greater than 60 minutes    Problems taking medications regularly: No    Medication side effects: none    Diet: low salt and low fat/cholesterol      Gout  Duration: Follow up   Description   Location: foot - left  Joint Swelling: YES, improving but still has some white spots on big toe.  Redness: YES  Pain intensity:  mild  Accompanying signs and symptoms: white bumps on big toe  History  Previous history of gout: YES   Trauma to the area: no   Precipitating factors:   Alcohol usage: none  Diuretic use: no   Recent illness: no   Therapies tried and outcome: finished prednisone, tylenol        Additional history: as documented    Reviewed  and updated as needed this visit by clinical staff  Tobacco  Allergies  Meds  Med Hx  Surg Hx  Fam Hx  Soc Hx        Reviewed and updated as needed this visit by Provider         Patient Active Problem List   Diagnosis     Advanced directives, counseling/discussion     CAD (coronary artery disease)     Hypertension goal BP (blood pressure) < 130/80     Hyperlipidemia LDL goal <70     Cerebrovascular accident (CVA), unspecified mechanism (H)     CKD (chronic kidney disease) stage 3, GFR 30-59 ml/min (H)     Kidney cyst, acquired     Past Surgical History:   Procedure Laterality Date     SURGICAL HISTORY OF -       CABG 2006       Social History     Tobacco Use     Smoking status: Never Smoker     Smokeless tobacco: Never Used   Substance Use Topics     Alcohol use: No      "Family History   Problem Relation Age of Onset     Family History Negative Other         no known specifics           ROS:  Constitutional, HEENT, cardiovascular, pulmonary, GI, , musculoskeletal, neuro, skin, endocrine and psych systems are negative, except as otherwise noted.    OBJECTIVE:     /70   Pulse 71   Temp 97.7  F (36.5  C) (Oral)   Resp 16   Ht 1.626 m (5' 4\")   Wt 63 kg (139 lb)   SpO2 96%   BMI 23.86 kg/m    Body mass index is 23.86 kg/m .  GENERAL: healthy, alert and no distress  NECK: no adenopathy, no asymmetry, masses, or scars and thyroid normal to palpation  RESP: lungs clear to auscultation - no rales, rhonchi or wheezes  CV: regular rate and rhythm, normal S1 S2, no S3 or S4, no murmur, click or rub, no peripheral edema and peripheral pulses strong  ABDOMEN: soft, nontender, no hepatosplenomegaly, no masses and bowel sounds normal  MS: no gross musculoskeletal defects noted, no edema    Diagnostic Test Results:  none     ASSESSMENT/PLAN:     1. Hypertension goal BP (blood pressure) < 130/80  Improving. Continue with current medications. Recheck in 3 months. No changes today.  - Comprehensive metabolic panel (BMP + Alb, Alk Phos, ALT, AST, Total. Bili, TP); Future    2. Acute idiopathic gout of left foot  Start low dose allopurinol due to renal function. Recheck at next visit.  - Uric acid; Future  - allopurinol (ZYLOPRIM) 100 MG tablet; Take 0.5 tablets (50 mg) by mouth daily For gout.  Dispense: 45 tablet; Refill: 3    3. Hyperlipidemia LDL goal <70  Continue with high-dose atorvastatin. Recheck lab-only visit in 1 month.  - Comprehensive metabolic panel (BMP + Alb, Alk Phos, ALT, AST, Total. Bili, TP); Future  - Lipid Profile (Chol, Trig, HDL, LDL calc); Future    See Patient Instructions    Christiano Maciel MD, MD  [facility]      "

## 2019-04-29 PROCEDURE — 82274 ASSAY TEST FOR BLOOD FECAL: CPT | Performed by: FAMILY MEDICINE

## 2019-05-04 DIAGNOSIS — Z12.11 SCREEN FOR COLON CANCER: ICD-10-CM

## 2019-05-04 LAB — HEMOCCULT STL QL IA: POSITIVE

## 2019-07-03 DIAGNOSIS — E78.5 HYPERLIPIDEMIA LDL GOAL <70: ICD-10-CM

## 2019-07-03 DIAGNOSIS — M10.072 ACUTE IDIOPATHIC GOUT OF LEFT FOOT: ICD-10-CM

## 2019-07-03 DIAGNOSIS — I10 HYPERTENSION GOAL BP (BLOOD PRESSURE) < 130/80: ICD-10-CM

## 2019-07-03 LAB
ALBUMIN SERPL-MCNC: 3.3 G/DL (ref 3.4–5)
ALP SERPL-CCNC: 72 U/L (ref 40–150)
ALT SERPL W P-5'-P-CCNC: 18 U/L (ref 0–70)
ANION GAP SERPL CALCULATED.3IONS-SCNC: 8 MMOL/L (ref 3–14)
AST SERPL W P-5'-P-CCNC: 19 U/L (ref 0–45)
BILIRUB SERPL-MCNC: 0.6 MG/DL (ref 0.2–1.3)
BUN SERPL-MCNC: 50 MG/DL (ref 7–30)
CALCIUM SERPL-MCNC: 8.8 MG/DL (ref 8.5–10.1)
CHLORIDE SERPL-SCNC: 111 MMOL/L (ref 94–109)
CHOLEST SERPL-MCNC: 271 MG/DL
CO2 SERPL-SCNC: 22 MMOL/L (ref 20–32)
CREAT SERPL-MCNC: 2.07 MG/DL (ref 0.66–1.25)
GFR SERPL CREATININE-BSD FRML MDRD: 31 ML/MIN/{1.73_M2}
GLUCOSE SERPL-MCNC: 86 MG/DL (ref 70–99)
HDLC SERPL-MCNC: 51 MG/DL
LDLC SERPL CALC-MCNC: 202 MG/DL
NONHDLC SERPL-MCNC: 220 MG/DL
POTASSIUM SERPL-SCNC: 4 MMOL/L (ref 3.4–5.3)
PROT SERPL-MCNC: 6.4 G/DL (ref 6.8–8.8)
SODIUM SERPL-SCNC: 141 MMOL/L (ref 133–144)
TRIGL SERPL-MCNC: 90 MG/DL
URATE SERPL-MCNC: 8.9 MG/DL (ref 3.5–7.2)

## 2019-07-03 PROCEDURE — 80061 LIPID PANEL: CPT | Performed by: FAMILY MEDICINE

## 2019-07-03 PROCEDURE — 36415 COLL VENOUS BLD VENIPUNCTURE: CPT | Performed by: FAMILY MEDICINE

## 2019-07-03 PROCEDURE — 80053 COMPREHEN METABOLIC PANEL: CPT | Performed by: FAMILY MEDICINE

## 2019-07-03 PROCEDURE — 84550 ASSAY OF BLOOD/URIC ACID: CPT | Performed by: FAMILY MEDICINE

## 2019-07-04 NOTE — RESULT ENCOUNTER NOTE
Dear Jessica    Your test results are attached.     The cholesterol looks better. The kidney test is stable. The blood sugar is normal and you do not have diabetes. The uric acid is high. Dr. Maciel will review these results with you at your next visit.    Please contact me by [employer] if you have any questions about your labs or management.    Zehra Roldan MD

## 2019-08-25 ENCOUNTER — OFFICE VISIT (OUTPATIENT)
Dept: URGENT CARE | Facility: URGENT CARE | Age: 74
End: 2019-08-25
Payer: MEDICARE

## 2019-08-25 VITALS
SYSTOLIC BLOOD PRESSURE: 160 MMHG | TEMPERATURE: 97.7 F | WEIGHT: 142 LBS | DIASTOLIC BLOOD PRESSURE: 92 MMHG | OXYGEN SATURATION: 99 % | HEART RATE: 66 BPM | BODY MASS INDEX: 24.37 KG/M2 | RESPIRATION RATE: 18 BRPM

## 2019-08-25 DIAGNOSIS — B02.9 HERPES ZOSTER WITHOUT COMPLICATION: Primary | ICD-10-CM

## 2019-08-25 PROCEDURE — 99214 OFFICE O/P EST MOD 30 MIN: CPT | Performed by: NURSE PRACTITIONER

## 2019-08-25 RX ORDER — VALACYCLOVIR HYDROCHLORIDE 1 G/1
1000 TABLET, FILM COATED ORAL 3 TIMES DAILY
Qty: 21 TABLET | Refills: 0 | Status: SHIPPED | OUTPATIENT
Start: 2019-08-25 | End: 2019-09-17

## 2019-08-25 RX ORDER — LIDOCAINE 40 MG/G
CREAM TOPICAL
Qty: 30 G | Refills: 0 | Status: SHIPPED | OUTPATIENT
Start: 2019-08-25 | End: 2022-02-03

## 2019-08-25 ASSESSMENT — ENCOUNTER SYMPTOMS
DIARRHEA: 0
BLOOD IN STOOL: 0
CHILLS: 0
MYALGIAS: 1
APPETITE CHANGE: 0
FEVER: 0
FATIGUE: 0
ABDOMINAL DISTENTION: 0
RECTAL PAIN: 1
ANAL BLEEDING: 0
ACTIVITY CHANGE: 0
ABDOMINAL PAIN: 0
DYSURIA: 1
CONSTIPATION: 0

## 2019-08-25 NOTE — PATIENT INSTRUCTIONS
Patient Education     Shingles  Shingles is a viral infection caused by the same virus that causes chicken pox. Anyone who has had chicken pox may get shingles later in life. The virus stays in the body, but remains asleep (dormant). Shingles often occurs in older persons or persons with lowered immunity. But it can affect anyone at any age.  Shingles starts as a tingling patch of skin on one side of the body. Small, painful blisters may then appear. The rash rarely spreads to other parts of the body.  Exposure to shingles can't cause shingles. However, it can cause chicken pox in anyone who has not had chicken pox or has not been vaccinated. The contagious period ends when all blisters have crusted over, generally 1 to 2 weeks after the illness starts.  After the blisters heal, the affected skin may be sensitive or painful for weeks or months, gradually resolving over time. But, sometimes this can last longer and be permanent (called postherpetic neuralgia.)  Shingles vaccines are available. Vaccination can help prevent shingles or make it less painful. It is generally recommended for adults older than 50, even if you've had singles in the past. Talk with your healthcare provider about when to get vaccinated and which vaccine is best for you.  Home care    Medicines may be prescribed to help relieve pain. Take these medicines as directed. Ask your healthcare provider or pharmacist before using over-the-counter medicines for helping treat pain and itching.    In certain cases, antiviral medicines may be prescribed to reduce pain, shorten the illness, and prevent neuralgia. Take these medicines as directed.    Compresses made from a solution of cool water mixed with cornstarch or baking soda may help relieve pain and itching.     Gently wash skin daily with soap and water to help prevent infection. Be certain to rinse off all of the soap, which can be irritating.    Trim fingernails and try not to scratch.  Scratching the sores may leave scars.    Stay home from work or school until all blisters have formed a crust and you are no longer contagious.  Follow-up care  Follow up with your healthcare provider, or as directed.  When to seek medical advice    Fever of 100.4 F (38 C) or higher, or as directed by your healthcare provider    Affected skin is on the face or neck and any of the following occur:  ? Headache  ? Eye pain  ? Changes in vision  ? Sores near the eye  ? Weakness of facial muscles    Blisters occurring on new areas of the body    Pain, redness, or swelling of a joint    Signs of skin infection: colored drainage from the sores, warmth, increasing redness, fever, or increasing pain  Date Last Reviewed: 4/1/2018 2000-2018 [employer]. [address] [place] [address]. All rights reserved. This information is not intended as a substitute for professional medical care. Always follow your healthcare professional's instructions.           Patient Education     Shingles (Herpes Zoster)     Talk to your healthcare provider about the shingles vaccine.     Shingles is also called herpes zoster. It is a painful skin rash caused by the herpes zoster virus. This is the same virus that causes chickenpox. After a person has chickenpox, the virus remains inactive in the nerve cells. Years later, the virus can become active again and travel to the skin. Most people have shingles only once, but it is possible to have it more than once.  What are the risk factors for shingles?  Anyone who has ever had chickenpox can develop shingles. But your risk is greater if you:    Are 50 years of age or older    Have an illness that weakens your immune system, such as HIV/AIDS    Have cancer, especially Hodgkin disease or lymphoma    Take medicines that weaken your immune system  What are the symptoms of shingles?    The first sign of shingles is usually pain, burning, tingling, or itching on one part of your  face or body. You may also feel as if you have the flu, with fever and chills.    A red rash with small blisters appears within a few days. The rash may appear as follows:   ? The blisters can occur anywhere, but they re most common on the back, chest, or abdomen.  ? They usually appear on only one side of the body, spreading along the nerve pathway where the virus was inactive.   ? The rash can also form around an eye, along one side of the face or neck, or in the mouth.  ? In a few people, usually those with weakened immune systems, shingles appear on more than one part of the body at once.    After a few days, the blisters become dry and form a crust. The crust falls off in days to weeks. The blisters generally do not leave scars.  How is shingles treated?  For most people, shingles heals on its own in a few weeks. But treatment is recommended to help relieve pain, speed healing, and reduce the risk of complications. Antiviral medicines are prescribed within the first 72 hours of the appearance of the rash. To lessen symptoms:    Apply ice packs (wrapped in a thin towel) or cool compresses, or soak in a cool bath.    Use calamine lotion to calm itchy skin.    Ask your healthcare provider about over-the-counter pain relievers. If your pain is severe, your healthcare provider may prescribe stronger pain medicines.  What are the complications of shingles?  Shingles often goes away with no lasting effects. But some people have serious problems long after the blisters have healed:    Postherpetic neuralgia. This is the most common complication. It is severe nerve pain at the place where the rash used to be. It can last for months, or even years after you have had shingles. Medicines can be prescribed to help relieve the pain and improve quality of life.    Bacterial infection. Shingles blisters may become infected with bacteria. Antibiotic medicine is used to treat the infection.    Eye problems. A person with  shingles on the face should see his or her healthcare provider right away. Shingles can cause serious problems with vision, and even blindness.  Very rarely shingles can also lead to pneumonia, hearing problems, brain inflammation, or even death.   When to seek medical care  Contact your healthcare provider if you experience any of the following:    Symptoms that don t go away with treatment    A rash or blisters near your eye    Increased drainage, fever, or rash after treatment, or severe pain that doesn t go away   How can shingles be prevented?  You can only get shingles if you have had chickenpox in the past. Those who have never had chickenpox can get the virus from you. Although instead of developing shingles, the person may get chickenpox. Until your blisters form scabs, avoid contact with others, especially the following:    Pregnant women who have never had chickenpox or the vaccine    Infants who were born early (prematurely) or who had low weight at birth    People with weak immune system (for example, people receiving chemotherapy for cancer, people who have had organ transplants, or people with HIV infections)     The shingles vaccine  Shingles vaccines are available to help prevent shingles or make it less painful. Vaccination is recommended for adults 50 and older, even if you've had shingles in the past. Talk with your healthcare provider about the most appropriate time for you to get vaccinated, and which vaccine is best for you.   Date Last Reviewed: 10/1/2016    2708-1711 [employer]. [address] [place] [address], [place] [address]. All rights reserved. This information is not intended as a substitute for professional medical care. Always follow your healthcare professional's instructions.

## 2019-08-25 NOTE — PROGRESS NOTES
SUBJECTIVE:   Jessica Healy is a 74 year old male presenting with a chief complaint of   Chief Complaint   Patient presents with     rectum concern     plus leg concern       He is an established patient of [facility].    Rash    Onset of rash was 5 DAYS ago.   Course of illness is sudden onset and worsening with new lesions  Severity 9/10 on the numeric pain scale  Current and Associated symptoms: itching, burning, painful, red and blistering   Location of the rash: low back, into partial anal area, with pain down RIGHT leg  Previous history of a similar rash? No  Recent exposure history: none known  Denies exposure to: none known  Associated symptoms include: No airway concerns. Reports increased difficulty sitting due to lesions.   Treatment measures tried include: none      Review of Systems   Constitutional: Negative for activity change, appetite change, chills, fatigue and fever.   Gastrointestinal: Positive for rectal pain. Negative for abdominal distention, abdominal pain, anal bleeding, blood in stool, constipation and diarrhea.   Genitourinary: Positive for dysuria.   Musculoskeletal: Positive for myalgias.   Skin: Positive for rash.   All other systems reviewed and are negative.      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 45 tablet 3     atorvastatin (LIPITOR) 80 MG tablet  TAKE 1 TABLET BY MOUTH DAILY FOR CHOLESTEROL // IB HNUB NOJ 1 LUB SHAHRAM NTSHAV MUAJ ROJ 90 tablet 3     carvedilol (COREG) 3.125 MG tablet TAKE 1 TABLET TWICE DAILY WITH MEALS // 1 ZAUG NOJ 1 LUB, 1 HNUB NOJ 2 ZAUG NROG MOV PAB SHAHRAM NTSHAV SIAB/LUB PLAWV 180 tablet 1     clopidogrel (PLAVIX) 75 MG tablet TAKE 1 TABLET BY MOUTH ONCE DAILY// IB HNUB NOJ IB LUB PAB KOM NTSHAV TXHOB NYEEM. 90 tablet 3     ezetimibe (ZETIA) 10 MG tablet TAKE 1 TABLET BY MOUTH DAILY FOR CHOLESTEROL // IB HNUB NOJ 1 LUB SHAHRAM NTSHAV MUAJ ROJ 90 tablet 3     isosorbide mononitrate (IMDUR) 30 MG 24 hr tablet TAKE 4 TABLETS (120 MG) BY MOUTH DAILY // IB HNUB NOJ 4 LUB PAB SHAHRAM LUB PLAWV 360 tablet 1     lidocaine (LMX4) 4 % external cream Apply topically once as needed for mild pain 30 g 0     lisinopril (PRINIVIL/ZESTRIL) 10 MG tablet Take 1 tablet (10 mg) by mouth 2 times daily 180 tablet 1     nitroGLYcerin (NITROSTAT) 0.4 MG sublingual tablet PLACE 1 TABLET UNDER THE TONGUE AT THE ONSET OF CHEST PAIN. MAY REPEAT EVERY 5 MINUTES AS NEEDED FOR A TOTAL OF 3 DOSES. 25 tablet 3     Omega-3 Fatty Acids (FISH OIL PO)        ORDER FOR [facility] Home Blood pressure monitor machine 1 each 0     valACYclovir (VALTREX) 1000 mg tablet Take 1 tablet (1,000 mg) by mouth 3 times daily 21 tablet 0     Social History     Tobacco Use     Smoking status: Never Smoker     Smokeless tobacco: Never Used   Substance Use Topics     Alcohol use: No       OBJECTIVE  BP (!) 167/89 (BP Location: Left arm, Patient Position: Chair, Cuff Size: Adult Regular)   Pulse 66   Temp 97.7  F (36.5  C) (Oral)   Resp 18   Wt 64.4 kg (142 lb)   SpO2 99%   BMI 24.37 kg/m      Physical Exam   Constitutional: He is oriented to person, place, and time. No distress.   Cardiovascular: Normal rate, regular rhythm, normal heart sounds and intact distal pulses. Exam reveals no gallop and no friction rub.   No murmur heard.  Pulmonary/Chest: Effort normal and breath sounds normal. No respiratory  distress. He has no wheezes.   Musculoskeletal: Normal range of motion. He exhibits no edema or deformity.   RIGHT leg 'burning' sensation, full ROM, strength 5/5   Neurological: He is alert and oriented to person, place, and time.   Skin: Skin is warm. Capillary refill takes less than 2 seconds. Rash noted.   S2 dermatome noted with fluid filled, erythemic vesicles, extending along right anal area, with tenderness to light touch. Perineum spared. No lesions or erythema along right leg   Psychiatric: He has a normal mood and affect.       Labs:  No results found for this or any previous visit (from the past 24 hour(s)).      ASSESSMENT:    ICD-10-CM    1. Herpes zoster without complication B02.9 valACYclovir (VALTREX) 1000 mg tablet     lidocaine (LMX4) 4 % external cream        Medical Decision Making:    Differential Diagnosis:  Rash: Contact dermatitis  Zoster    Serious Comorbid Conditions:  Adult:  HTN and CKD    PLAN: Discussed with patient the treatment options and nature of herpes zoster. Advised lesions should be compressed/soaked with saline, topical antibiotic ointment to any infected lesions and monitor closely.     Lidocaine cream or Aloe Vera cream may help minor local pain. Discussed use of antiviral agents within 2-3 days of symptoms. However, given continued lesions present will treat. Prescription for valacyclovir (Valtrex) provided, and advised how it may shorten the course of acute symptoms and reduce the incidence of later neuralgia.    Briefly discussed postherpetic neuralgia is explained; this may occur especially in the elderly despite every attempt at prevention. Strongly encouraged follow up in 1 week with PCP for re-evaluation of symptoms. Education provided.Patient agreed to the plan of care with no further questions or concerns.     VEENA Moore, CNP      Patient Instructions       Patient Education     Shingles  Shingles is a viral infection caused by the same virus that  causes chicken pox. Anyone who has had chicken pox may get shingles later in life. The virus stays in the body, but remains asleep (dormant). Shingles often occurs in older persons or persons with lowered immunity. But it can affect anyone at any age.  Shingles starts as a tingling patch of skin on one side of the body. Small, painful blisters may then appear. The rash rarely spreads to other parts of the body.  Exposure to shingles can't cause shingles. However, it can cause chicken pox in anyone who has not had chicken pox or has not been vaccinated. The contagious period ends when all blisters have crusted over, generally 1 to 2 weeks after the illness starts.  After the blisters heal, the affected skin may be sensitive or painful for weeks or months, gradually resolving over time. But, sometimes this can last longer and be permanent (called postherpetic neuralgia.)  Shingles vaccines are available. Vaccination can help prevent shingles or make it less painful. It is generally recommended for adults older than 50, even if you've had singles in the past. Talk with your healthcare provider about when to get vaccinated and which vaccine is best for you.  Home care    Medicines may be prescribed to help relieve pain. Take these medicines as directed. Ask your healthcare provider or pharmacist before using over-the-counter medicines for helping treat pain and itching.    In certain cases, antiviral medicines may be prescribed to reduce pain, shorten the illness, and prevent neuralgia. Take these medicines as directed.    Compresses made from a solution of cool water mixed with cornstarch or baking soda may help relieve pain and itching.     Gently wash skin daily with soap and water to help prevent infection. Be certain to rinse off all of the soap, which can be irritating.    Trim fingernails and try not to scratch. Scratching the sores may leave scars.    Stay home from work or school until all blisters have formed  a crust and you are no longer contagious.  Follow-up care  Follow up with your healthcare provider, or as directed.  When to seek medical advice    Fever of 100.4 F (38 C) or higher, or as directed by your healthcare provider    Affected skin is on the face or neck and any of the following occur:  ? Headache  ? Eye pain  ? Changes in vision  ? Sores near the eye  ? Weakness of facial muscles    Blisters occurring on new areas of the body    Pain, redness, or swelling of a joint    Signs of skin infection: colored drainage from the sores, warmth, increasing redness, fever, or increasing pain  Date Last Reviewed: 4/1/2018 2000-2018 [employer]. [address] [place] [address], [place] [address]. All rights reserved. This information is not intended as a substitute for professional medical care. Always follow your healthcare professional's instructions.           Patient Education     Shingles (Herpes Zoster)     Talk to your healthcare provider about the shingles vaccine.     Shingles is also called herpes zoster. It is a painful skin rash caused by the herpes zoster virus. This is the same virus that causes chickenpox. After a person has chickenpox, the virus remains inactive in the nerve cells. Years later, the virus can become active again and travel to the skin. Most people have shingles only once, but it is possible to have it more than once.  What are the risk factors for shingles?  Anyone who has ever had chickenpox can develop shingles. But your risk is greater if you:    Are 50 years of age or older    Have an illness that weakens your immune system, such as HIV/AIDS    Have cancer, especially Hodgkin disease or lymphoma    Take medicines that weaken your immune system  What are the symptoms of shingles?    The first sign of shingles is usually pain, burning, tingling, or itching on one part of your face or body. You may also feel as if you have the flu, with fever and chills.    A red rash with small  blisters appears within a few days. The rash may appear as follows:   ? The blisters can occur anywhere, but they re most common on the back, chest, or abdomen.  ? They usually appear on only one side of the body, spreading along the nerve pathway where the virus was inactive.   ? The rash can also form around an eye, along one side of the face or neck, or in the mouth.  ? In a few people, usually those with weakened immune systems, shingles appear on more than one part of the body at once.    After a few days, the blisters become dry and form a crust. The crust falls off in days to weeks. The blisters generally do not leave scars.  How is shingles treated?  For most people, shingles heals on its own in a few weeks. But treatment is recommended to help relieve pain, speed healing, and reduce the risk of complications. Antiviral medicines are prescribed within the first 72 hours of the appearance of the rash. To lessen symptoms:    Apply ice packs (wrapped in a thin towel) or cool compresses, or soak in a cool bath.    Use calamine lotion to calm itchy skin.    Ask your healthcare provider about over-the-counter pain relievers. If your pain is severe, your healthcare provider may prescribe stronger pain medicines.  What are the complications of shingles?  Shingles often goes away with no lasting effects. But some people have serious problems long after the blisters have healed:    Postherpetic neuralgia. This is the most common complication. It is severe nerve pain at the place where the rash used to be. It can last for months, or even years after you have had shingles. Medicines can be prescribed to help relieve the pain and improve quality of life.    Bacterial infection. Shingles blisters may become infected with bacteria. Antibiotic medicine is used to treat the infection.    Eye problems. A person with shingles on the face should see his or her healthcare provider right away. Shingles can cause serious problems  with vision, and even blindness.  Very rarely shingles can also lead to pneumonia, hearing problems, brain inflammation, or even death.   When to seek medical care  Contact your healthcare provider if you experience any of the following:    Symptoms that don t go away with treatment    A rash or blisters near your eye    Increased drainage, fever, or rash after treatment, or severe pain that doesn t go away   How can shingles be prevented?  You can only get shingles if you have had chickenpox in the past. Those who have never had chickenpox can get the virus from you. Although instead of developing shingles, the person may get chickenpox. Until your blisters form scabs, avoid contact with others, especially the following:    Pregnant women who have never had chickenpox or the vaccine    Infants who were born early (prematurely) or who had low weight at birth    People with weak immune system (for example, people receiving chemotherapy for cancer, people who have had organ transplants, or people with HIV infections)     The shingles vaccine  Shingles vaccines are available to help prevent shingles or make it less painful. Vaccination is recommended for adults 50 and older, even if you've had shingles in the past. Talk with your healthcare provider about the most appropriate time for you to get vaccinated, and which vaccine is best for you.   Date Last Reviewed: 10/1/2016    4074-9540 [employer]. [address] [place] [address], [place] [address]. All rights reserved. This information is not intended as a substitute for professional medical care. Always follow your healthcare professional's instructions.

## 2019-08-29 ENCOUNTER — OFFICE VISIT (OUTPATIENT)
Dept: FAMILY MEDICINE | Facility: CLINIC | Age: 74
End: 2019-08-29
Payer: MEDICARE

## 2019-08-29 VITALS
TEMPERATURE: 98.2 F | DIASTOLIC BLOOD PRESSURE: 65 MMHG | WEIGHT: 143 LBS | SYSTOLIC BLOOD PRESSURE: 115 MMHG | HEIGHT: 64 IN | HEART RATE: 86 BPM | BODY MASS INDEX: 24.41 KG/M2 | RESPIRATION RATE: 16 BRPM | OXYGEN SATURATION: 97 %

## 2019-08-29 DIAGNOSIS — B02.9 HERPES ZOSTER WITHOUT COMPLICATION: ICD-10-CM

## 2019-08-29 DIAGNOSIS — I10 ESSENTIAL HYPERTENSION WITH GOAL BLOOD PRESSURE LESS THAN 130/80: Primary | ICD-10-CM

## 2019-08-29 DIAGNOSIS — E78.5 HYPERLIPIDEMIA LDL GOAL <70: ICD-10-CM

## 2019-08-29 PROCEDURE — 99213 OFFICE O/P EST LOW 20 MIN: CPT | Performed by: FAMILY MEDICINE

## 2019-08-29 ASSESSMENT — PAIN SCALES - GENERAL: PAINLEVEL: SEVERE PAIN (6)

## 2019-08-29 ASSESSMENT — MIFFLIN-ST. JEOR: SCORE: 1309.64

## 2019-08-29 NOTE — PATIENT INSTRUCTIONS
At [facility], we strive to deliver an exceptional experience to you, every time we see you.  If you receive a survey in the mail, please send us back your thoughts. We really do value your feedback.    Based on your medical history, these are the current health maintenance/preventive care services that you are due for (some may have been done at this visit.)  Health Maintenance Due   Topic Date Due     ZOSTER IMMUNIZATION (1 of 2) 06/18/1997     MEDICARE ANNUAL WELLNESS VISIT  12/30/2012     PNEUMOCOCCAL IMMUNIZATION 65+ LOW/MEDIUM RISK (2 of 2 - PCV13) 09/25/2015     ADVANCE CARE PLANNING  12/30/2016         Suggested websites for health information:  Www.[email or web address] : Up to date and easily searchable information on multiple topics.  Www.medlineplus.gov : medication info, interactive tutorials, watch real surgeries online  Www.familydoctor.org : good info from the Academy of Family Physicians  Www.cdc.gov : public health info, travel advisories, epidemics (H1N1)  Www.aap.org : children's health info, normal development, vaccinations  Www.health.[facility].[place].us : [place] dept of health, public health issues in [place], N1N1    Your care team:                            Family Medicine Internal Medicine   MD aGtito Lewis MD Shantel Branch-Fleming, MD Katya Georgiev PA-C Nam Ho, MD Pediatrics   KATHY Cartagena, MD Cee Corbett CNP, MD Deborah Mielke, MD Kim Thein, APRN [facility]      Clinic hours: Monday - Thursday 7 am-7 pm; Fridays 7 am-5 pm.   Urgent care: Monday - Friday 11 am-9 pm; Saturday and Sunday 9 am-5 pm.  Pharmacy : Monday -Thursday 8 am-8 pm; Friday 8 am-6 pm; Saturday and Sunday 9 am-5 pm.     Clinic: [phone]   Pharmacy: [phone]

## 2019-08-29 NOTE — PROGRESS NOTES
Subjective     Jessica Healy is a 74 year old male who presents to clinic today for the following health issues:    HPI   Hyperlipidemia Follow-Up      Are you having any of the following symptoms? (Select all that apply)  No complaints of shortness of breath, chest pain or pressure.  No increased sweating or nausea with activity.  No left-sided neck or arm pain.  No complaints of pain in calves when walking 1-2 blocks.    Are you regularly taking any medication or supplement to lower your cholesterol?   Yes- Atorvastatin     Are you having muscle aches or other side effects that you think could be caused by your cholesterol lowering medication?  No      Hypertension Follow-up      Do you check your blood pressure regularly outside of the clinic? Yes     Are you following a low salt diet? Yes    Are your blood pressures ever more than 140 on the top number (systolic) OR more   than 90 on the bottom number (diastolic), for example 140/90? Yes 170-180          Patient Active Problem List   Diagnosis     Advanced directives, counseling/discussion     CAD (coronary artery disease)     Hypertension goal BP (blood pressure) < 130/80     Hyperlipidemia LDL goal <70     Cerebrovascular accident (CVA), unspecified mechanism (H)     CKD (chronic kidney disease) stage 3, GFR 30-59 ml/min (H)     Kidney cyst, acquired     Past Surgical History:   Procedure Laterality Date     SURGICAL HISTORY OF -       CABG 2006       Social History     Tobacco Use     Smoking status: Never Smoker     Smokeless tobacco: Never Used   Substance Use Topics     Alcohol use: No     Family History   Problem Relation Age of Onset     Family History Negative Other         no known specifics           Patient diagnosed with buttock shingles and would like pain medication to help with pain.    Reviewed and updated as needed this visit by Provider         Review of Systems   ROS COMP: Constitutional, HEENT, cardiovascular, pulmonary, GI, ,  "musculoskeletal, neuro, skin, endocrine and psych systems are negative, except as otherwise noted.      Objective    /65 (BP Location: Left arm, Patient Position: Chair, Cuff Size: Adult Regular)   Pulse 86   Temp 98.2  F (36.8  C) (Oral)   Resp 16   Ht 1.626 m (5' 4\")   Wt 64.9 kg (143 lb)   SpO2 97%   BMI 24.55 kg/m    Body mass index is 24.55 kg/m .  Physical Exam   GENERAL: healthy, alert and no distress  NECK: no adenopathy, no asymmetry, masses, or scars and thyroid normal to palpation  RESP: lungs clear to auscultation - no rales, rhonchi or wheezes  CV: regular rate and rhythm, normal S1 S2, no S3 or S4, no murmur, click or rub, no peripheral edema and peripheral pulses strong  ABDOMEN: soft, nontender, no hepatosplenomegaly, no masses and bowel sounds normal  MS: no gross musculoskeletal defects noted, no edema    Diagnostic Test Results:  Labs reviewed in Epic        Assessment & Plan     1. Essential hypertension with goal blood pressure less than 130/80  Controlled.     2. Hyperlipidemia LDL goal <70  Not controlled. Not take both atorvastatin and Zetia daily. Patient will take these daily. Recheck at next visit in 2 months.    3. Herpes zoster without complication    - acetaminophen-codeine (TYLENOL #3) 300-30 MG tablet; Take 1-2 tablets by mouth every 6 hours as needed for severe pain  Dispense: 60 tablet; Refill: 1       See Patient Instructions    Return in about 3 months (around 11/29/2019) for Diabetes.    Christiano Maciel MD, MD  [facility]        "

## 2019-09-07 ENCOUNTER — TRANSFERRED RECORDS (OUTPATIENT)
Dept: HEALTH INFORMATION MANAGEMENT | Facility: CLINIC | Age: 74
End: 2019-09-07

## 2019-09-07 LAB
ALT SERPL-CCNC: <9 U/L (ref 0–45)
AST SERPL-CCNC: 16 U/L (ref 0–40)
CREAT SERPL-MCNC: 1.87 MG/DL (ref 0.7–1.3)
GFR SERPL CREATININE-BSD FRML MDRD: 36 ML/MIN/1.73M2
GLUCOSE SERPL-MCNC: 88 MG/DL (ref 70–125)
POTASSIUM SERPL-SCNC: 4.4 MMOL/L (ref 3.5–5)

## 2019-09-17 ENCOUNTER — OFFICE VISIT (OUTPATIENT)
Dept: FAMILY MEDICINE | Facility: CLINIC | Age: 74
End: 2019-09-17
Payer: MEDICARE

## 2019-09-17 VITALS
WEIGHT: 139 LBS | DIASTOLIC BLOOD PRESSURE: 67 MMHG | HEIGHT: 64 IN | RESPIRATION RATE: 16 BRPM | HEART RATE: 69 BPM | SYSTOLIC BLOOD PRESSURE: 123 MMHG | BODY MASS INDEX: 23.73 KG/M2 | TEMPERATURE: 97.8 F | OXYGEN SATURATION: 96 %

## 2019-09-17 DIAGNOSIS — L03.317 CELLULITIS OF BUTTOCK: ICD-10-CM

## 2019-09-17 DIAGNOSIS — K59.00 CONSTIPATION, UNSPECIFIED CONSTIPATION TYPE: ICD-10-CM

## 2019-09-17 DIAGNOSIS — B02.29 POSTHERPETIC NEURALGIA: Primary | ICD-10-CM

## 2019-09-17 DIAGNOSIS — M10.072 ACUTE IDIOPATHIC GOUT OF LEFT FOOT: ICD-10-CM

## 2019-09-17 PROCEDURE — 99214 OFFICE O/P EST MOD 30 MIN: CPT | Performed by: FAMILY MEDICINE

## 2019-09-17 RX ORDER — MUPIROCIN 20 MG/G
OINTMENT TOPICAL 3 TIMES DAILY
Qty: 22 G | Refills: 3 | Status: SHIPPED | OUTPATIENT
Start: 2019-09-17 | End: 2020-03-09

## 2019-09-17 RX ORDER — SENNOSIDES 8.6 MG
1 TABLET ORAL 2 TIMES DAILY
Qty: 60 TABLET | Refills: 11 | Status: SHIPPED | OUTPATIENT
Start: 2019-09-17 | End: 2022-02-03

## 2019-09-17 RX ORDER — CEPHALEXIN 500 MG/1
500 CAPSULE ORAL 3 TIMES DAILY
Qty: 30 CAPSULE | Refills: 0 | Status: SHIPPED | OUTPATIENT
Start: 2019-09-17 | End: 2020-03-09

## 2019-09-17 RX ORDER — GABAPENTIN 300 MG/1
300 CAPSULE ORAL 2 TIMES DAILY
Qty: 60 CAPSULE | Refills: 11 | Status: SHIPPED | OUTPATIENT
Start: 2019-09-17 | End: 2020-01-07

## 2019-09-17 RX ORDER — PREDNISONE 20 MG/1
TABLET ORAL
Qty: 20 TABLET | Refills: 0 | Status: SHIPPED | OUTPATIENT
Start: 2019-09-17 | End: 2019-10-01

## 2019-09-17 ASSESSMENT — PAIN SCALES - GENERAL: PAINLEVEL: MODERATE PAIN (5)

## 2019-09-17 ASSESSMENT — MIFFLIN-ST. JEOR: SCORE: 1291.5

## 2019-09-17 NOTE — PATIENT INSTRUCTIONS
At [facility], we strive to deliver an exceptional experience to you, every time we see you.  If you receive a survey in the mail, please send us back your thoughts. We really do value your feedback.    Based on your medical history, these are the current health maintenance/preventive care services that you are due for (some may have been done at this visit.)  Health Maintenance Due   Topic Date Due     ZOSTER IMMUNIZATION (1 of 2) 06/18/1997     MEDICARE ANNUAL WELLNESS VISIT  12/30/2012     PNEUMOCOCCAL IMMUNIZATION 65+ LOW/MEDIUM RISK (2 of 2 - PCV13) 09/25/2015     ADVANCE CARE PLANNING  12/30/2016     INFLUENZA VACCINE (1) 09/01/2019         Suggested websites for health information:  Www.[employer].org : Up to date and easily searchable information on multiple topics.  Www.medlineplus.gov : medication info, interactive tutorials, watch real surgeries online  Www.familydoctor.org : good info from the Academy of Family Physicians  Www.cdc.gov : public health info, travel advisories, epidemics (H1N1)  Www.aap.org : children's health info, normal development, vaccinations  Www.health.[facility].[place].us : [place] dept of health, public health issues in [place], N1N1    Your care team:                            Family Medicine Internal Medicine   MD Gatito Lewis MD Shantel Branch-Fleming, MD Katya Georgiev PA-C Nam Ho, MD Pediatrics   KATHY Cartagena, MD Cee Corbett CNP, MD Deborah Mielke, MD Kim Thein, APRN CNP      Clinic hours: Monday - Thursday 7 am-7 pm; Fridays 7 am-5 pm.   Urgent care: Monday - Friday 11 am-9 pm; Saturday and Sunday 9 am-5 pm.  Pharmacy : Monday -Thursday 8 am-8 pm; Friday 8 am-6 pm; Saturday and Sunday 9 am-5 pm.     Clinic: [phone]   Pharmacy: [phone]

## 2019-09-17 NOTE — PROGRESS NOTES
"Subjective     Jessica Healy is a 74 year old male who presents to clinic today for the following health issues:    HPI   Rash      Duration: end of August    Description  Location: rash on right buttock/rectum- recheck shingles  Itching: moderate    Intensity:  Moderate - severe    Accompanying signs and symptoms: pain and itch, numbness and tingle radiating from right hip down right leg    History (similar episodes/previous evaluation): None    Precipitating or alleviating factors:  New exposures:  None  Recent travel: no      Therapies tried and outcome: finished valtrex- no relief. Lidocaine cream- no relief.      Patient Active Problem List   Diagnosis     Advanced directives, counseling/discussion     CAD (coronary artery disease)     Hypertension goal BP (blood pressure) < 130/80     Hyperlipidemia LDL goal <70     Cerebrovascular accident (CVA), unspecified mechanism (H)     CKD (chronic kidney disease) stage 3, GFR 30-59 ml/min (H)     Kidney cyst, acquired     Past Surgical History:   Procedure Laterality Date     SURGICAL HISTORY OF -       CABG 2006       Social History     Tobacco Use     Smoking status: Never Smoker     Smokeless tobacco: Never Used   Substance Use Topics     Alcohol use: No     Family History   Problem Relation Age of Onset     Family History Negative Other         no known specifics           Reviewed and updated as needed this visit by Provider         Review of Systems   ROS COMP: Constitutional, HEENT, cardiovascular, pulmonary, GI, , musculoskeletal, neuro, skin, endocrine and psych systems are negative, except as otherwise noted.      Objective    /67 (BP Location: Left arm, Patient Position: Sitting, Cuff Size: Adult Large)   Pulse 69   Temp 97.8  F (36.6  C) (Oral)   Resp 16   Ht 1.626 m (5' 4\")   Wt 63 kg (139 lb)   SpO2 96%   BMI 23.86 kg/m    Body mass index is 23.86 kg/m .  Physical Exam   GENERAL: healthy, alert and no distress  NECK: no adenopathy, no " asymmetry, masses, or scars and thyroid normal to palpation  RESP: lungs clear to auscultation - no rales, rhonchi or wheezes  CV: regular rate and rhythm, normal S1 S2, no S3 or S4, no murmur, click or rub, no peripheral edema and peripheral pulses strong  ABDOMEN: soft, nontender, no hepatosplenomegaly, no masses and bowel sounds normal  MS: no gross musculoskeletal defects noted, no edema  SKIN: left buttock skin breakdown with weeping and surround redness  Diagnostic Test Results:  Labs reviewed in Epic        Assessment & Plan     1. Postherpetic neuralgia  Treat with gabapentin 300 mg BID due to CKD. Recheck in 2 weeks.  - gabapentin (NEURONTIN) 300 MG capsule; Take 1 capsule (300 mg) by mouth 2 times daily  Dispense: 60 capsule; Refill: 11    2. Cellulitis of buttock  Cover with both topical and oral abxs. RTC in 2 weeks for recheck.  - cephALEXin (KEFLEX) 500 MG capsule; Take 1 capsule (500 mg) by mouth 3 times daily  Dispense: 30 capsule; Refill: 0  - mupirocin (BACTROBAN) 2 % external ointment; Apply topically 3 times daily for 14 days  Dispense: 22 g; Refill: 3    3. Acute idiopathic gout of left foot  Right foot. Prednisone taper given. Recheck in 2 weeks. Start Allopurinol at that time.  - predniSONE (DELTASONE) 20 MG tablet; Take 3 tabs by mouth daily x 3 days, then 2 tabs daily x 3 days, then 1 tab daily x 3 days, then 1/2 tab daily x 3 days.  Dispense: 20 tablet; Refill: 0    4. Constipation, unspecified constipation type    - sennosides (SENOKOT) 8.6 MG tablet; Take 1 tablet by mouth 2 times daily  Dispense: 60 tablet; Refill: 11       Regular exercise  See Patient Instructions    Return in about 2 weeks (around 10/1/2019).    Christiano Maciel MD, MD  [facility]

## 2019-10-01 ENCOUNTER — OFFICE VISIT (OUTPATIENT)
Dept: FAMILY MEDICINE | Facility: CLINIC | Age: 74
End: 2019-10-01
Payer: MEDICARE

## 2019-10-01 VITALS
OXYGEN SATURATION: 98 % | SYSTOLIC BLOOD PRESSURE: 101 MMHG | HEART RATE: 63 BPM | HEIGHT: 64 IN | TEMPERATURE: 98 F | WEIGHT: 143 LBS | BODY MASS INDEX: 24.41 KG/M2 | RESPIRATION RATE: 12 BRPM | DIASTOLIC BLOOD PRESSURE: 63 MMHG

## 2019-10-01 DIAGNOSIS — K62.89 RECTAL PAIN: Primary | ICD-10-CM

## 2019-10-01 DIAGNOSIS — M10.072 ACUTE IDIOPATHIC GOUT OF LEFT FOOT: ICD-10-CM

## 2019-10-01 PROCEDURE — 99213 OFFICE O/P EST LOW 20 MIN: CPT | Performed by: FAMILY MEDICINE

## 2019-10-01 RX ORDER — PREDNISONE 20 MG/1
TABLET ORAL
Qty: 20 TABLET | Refills: 0 | Status: SHIPPED | OUTPATIENT
Start: 2019-10-01 | End: 2020-02-18

## 2019-10-01 ASSESSMENT — MIFFLIN-ST. JEOR: SCORE: 1309.64

## 2019-10-01 ASSESSMENT — PAIN SCALES - GENERAL: PAINLEVEL: MODERATE PAIN (5)

## 2019-10-01 NOTE — PROGRESS NOTES
"Cristina Healy is a 74 year old male who presents to clinic today for the following health issues:    HPI   Follow up shingles      Duration: end of August    Description (location/character/radiation): right buttock and rectum area, patient states buttock is better, but theres a bump/lump on one side of rectum area that is not getting better    Intensity:  moderate    Accompanying signs and symptoms: throbbing, burning, spasm lasting up to 1-2 seconds, comes every 10-20 minutes depending on what he is doing.    History (similar episodes/previous evaluation): None    Precipitating or alleviating factors: patient states pain increases and more frequently when sitting.     Therapies tried and outcome: finished all prescribed meds for shingles.       Patient Active Problem List   Diagnosis     Advanced directives, counseling/discussion     CAD (coronary artery disease)     Hypertension goal BP (blood pressure) < 130/80     Hyperlipidemia LDL goal <70     Cerebrovascular accident (CVA), unspecified mechanism (H)     CKD (chronic kidney disease) stage 3, GFR 30-59 ml/min (H)     Kidney cyst, acquired     Past Surgical History:   Procedure Laterality Date     SURGICAL HISTORY OF -       CABG 2006       Social History     Tobacco Use     Smoking status: Never Smoker     Smokeless tobacco: Never Used   Substance Use Topics     Alcohol use: No     Family History   Problem Relation Age of Onset     Family History Negative Other         no known specifics           Reviewed and updated as needed this visit by Provider         Review of Systems   ROS COMP: Constitutional, HEENT, cardiovascular, pulmonary, GI, , musculoskeletal, neuro, skin, endocrine and psych systems are negative, except as otherwise noted.      Objective    /63 (BP Location: Left arm, Patient Position: Sitting, Cuff Size: Adult Regular)   Pulse 63   Temp 98  F (36.7  C) (Oral)   Resp 12   Ht 1.626 m (5' 4\")   Wt 64.9 kg (143 lb)   " SpO2 98%   BMI 24.55 kg/m    Body mass index is 24.55 kg/m .  Physical Exam   GENERAL: healthy, alert and no distress  NECK: no adenopathy, no asymmetry, masses, or scars and thyroid normal to palpation  RESP: lungs clear to auscultation - no rales, rhonchi or wheezes  CV: regular rate and rhythm, normal S1 S2, no S3 or S4, no murmur, click or rub, no peripheral edema and peripheral pulses strong  ABDOMEN: soft, nontender, no hepatosplenomegaly, no masses and bowel sounds normal  MS: no gross musculoskeletal defects noted, no edema  Rectal: rectal tenderness lateral to rectum at 3'o'clock, possible firm mass?  Diagnostic Test Results:  Labs reviewed in Epic        Assessment & Plan     1. Rectal pain  Unclear etiology. Referred to Colorectal Surgery for further evaluation and recommendations.  - COLORECTAL SURGERY REFERRAL    2. Acute idiopathic gout of left foot  Patient would like refill to use as needed in the future. Okay with this.  - predniSONE (DELTASONE) 20 MG tablet; Take 3 tabs by mouth daily x 3 days, then 2 tabs daily x 3 days, then 1 tab daily x 3 days, then 1/2 tab daily x 3 days.  Dispense: 20 tablet; Refill: 0       See Patient Instructions    Return in about 2 weeks (around 10/15/2019) for as needed.    Christiano Maciel MD, MD  [facility]

## 2019-10-01 NOTE — PATIENT INSTRUCTIONS
At [facility], we strive to deliver an exceptional experience to you, every time we see you.  If you receive a survey in the mail, please send us back your thoughts. We really do value your feedback.    Based on your medical history, these are the current health maintenance/preventive care services that you are due for (some may have been done at this visit.)  Health Maintenance Due   Topic Date Due     ZOSTER IMMUNIZATION (1 of 2) 06/18/1997     MEDICARE ANNUAL WELLNESS VISIT  12/30/2012     PNEUMOCOCCAL IMMUNIZATION 65+ LOW/MEDIUM RISK (2 of 2 - PCV13) 09/25/2015     ADVANCE CARE PLANNING  12/30/2016     INFLUENZA VACCINE (1) 09/01/2019         Suggested websites for health information:  Www.[employer]org : Up to date and easily searchable information on multiple topics.  Www.medlineplus.gov : medication info, interactive tutorials, watch real surgeries online  Www.familydoctor.org : good info from the Academy of Family Physicians  Www.cdc.gov : public health info, travel advisories, epidemics (H1N1)  Www.aap.org : children's health info, normal development, vaccinations  Www.health.[facility].[place].us : [place] dept of health, public health issues in [place], N1N1    Your care team:                            Family Medicine Internal Medicine   MD Gatito Lewis MD Shantel Branch-Fleming, MD Katya Georgiev PA-C Nam Ho, MD Pediatrics   KATHY Cartagena, MD Cee Corbett CNP, MD Deborah Mielke, MD Kim Thein, APRN CNP      Clinic hours: Monday - Thursday 7 am-7 pm; Fridays 7 am-5 pm.   Urgent care: Monday - Friday 11 am-9 pm; Saturday and Sunday 9 am-5 pm.  Pharmacy : Monday -Thursday 8 am-8 pm; Friday 8 am-6 pm; Saturday and Sunday 9 am-5 pm.     Clinic: [phone]   Pharmacy: [phone]

## 2019-10-14 ENCOUNTER — TRANSFERRED RECORDS (OUTPATIENT)
Dept: HEALTH INFORMATION MANAGEMENT | Facility: CLINIC | Age: 74
End: 2019-10-14

## 2019-10-18 DIAGNOSIS — B02.9 HERPES ZOSTER WITHOUT COMPLICATION: ICD-10-CM

## 2019-10-18 NOTE — TELEPHONE ENCOUNTER
Requested Prescriptions   Pending Prescriptions Disp Refills     acetaminophen-codeine (TYLENOL #3) 300-30 MG tablet 60 tablet 1     Sig: Take 1-2 tablets by mouth every 6 hours as needed for severe pain       There is no refill protocol information for this order        Last Written Prescription Date:  8/29/19  Last Fill Quantity: 60,  # refills: 1   Last office visit: 10/1/2019 with prescribing provider:     Future Office Visit:      Routing refill request to provider for review/approval because:  Drug not on the [facility] or [facility] refill protocol or controlled substance

## 2019-10-18 NOTE — TELEPHONE ENCOUNTER
Not given for chronic pain syndrome    Routing refill request to provider for review/approval because:  Drug not on the [facility] refill protocol     Elyse Johnson RN

## 2020-01-07 ENCOUNTER — OFFICE VISIT (OUTPATIENT)
Dept: FAMILY MEDICINE | Facility: CLINIC | Age: 75
End: 2020-01-07
Payer: MEDICARE

## 2020-01-07 VITALS
RESPIRATION RATE: 16 BRPM | SYSTOLIC BLOOD PRESSURE: 152 MMHG | HEART RATE: 62 BPM | HEIGHT: 64 IN | WEIGHT: 136 LBS | DIASTOLIC BLOOD PRESSURE: 85 MMHG | OXYGEN SATURATION: 100 % | TEMPERATURE: 97.6 F | BODY MASS INDEX: 23.22 KG/M2

## 2020-01-07 DIAGNOSIS — B02.29 POSTHERPETIC NEURALGIA: ICD-10-CM

## 2020-01-07 PROCEDURE — 99214 OFFICE O/P EST MOD 30 MIN: CPT | Performed by: FAMILY MEDICINE

## 2020-01-07 RX ORDER — PREGABALIN 50 MG/1
50 CAPSULE ORAL 2 TIMES DAILY
Qty: 180 CAPSULE | Refills: 3 | Status: SHIPPED | OUTPATIENT
Start: 2020-01-07 | End: 2020-03-09

## 2020-01-07 ASSESSMENT — PAIN SCALES - GENERAL: PAINLEVEL: NO PAIN (0)

## 2020-01-07 ASSESSMENT — MIFFLIN-ST. JEOR: SCORE: 1277.89

## 2020-01-07 NOTE — PROGRESS NOTES
Subjective     Jessica Healy is a 74 year old male who presents to clinic today for the following health issues:    HPI       Concern - Follow up Shingles   Onset: end of August     Description:   Patient states that his shingles does not seem to be getting any better. Patient would like to know if there is anything else that he can try or do to improve his symptoms.     Intensity: moderate    Progression of Symptoms:  same    Accompanying Signs & Symptoms:   throbbing, burning, spasm    Previous history of similar problem:   Unable to urinate properly     Precipitating factors:   Worsened by: sitting     Alleviating factors:  Improved by:none     Therapies Tried and outcome: Keflex       Patient Active Problem List   Diagnosis     Advanced directives, counseling/discussion     CAD (coronary artery disease)     Hypertension goal BP (blood pressure) < 130/80     Hyperlipidemia LDL goal <70     Cerebrovascular accident (CVA), unspecified mechanism (H)     CKD (chronic kidney disease) stage 3, GFR 30-59 ml/min (H)     Kidney cyst, acquired     Past Surgical History:   Procedure Laterality Date     SURGICAL HISTORY OF -       CABG 2006       Social History     Tobacco Use     Smoking status: Never Smoker     Smokeless tobacco: Never Used   Substance Use Topics     Alcohol use: No     Family History   Problem Relation Age of Onset     Family History Negative Other         no known specifics         Current Outpatient Medications   Medication Sig Dispense Refill     acetaminophen (TYLENOL) 500 MG tablet Take 500-1,000 mg by mouth every 6 hours as needed for mild pain       acetaminophen-codeine (TYLENOL #3) 300-30 MG tablet Take 1-2 tablets by mouth every 6 hours as needed for severe pain 60 tablet 1     allopurinol (ZYLOPRIM) 100 MG tablet Take 0.5 tablets (50 mg) by mouth daily For gout. 45 tablet 3     atorvastatin (LIPITOR) 80 MG tablet TAKE 1 TABLET BY MOUTH DAILY FOR CHOLESTEROL // IB HNUB NOJ 1 CARMEN WEEMSU NTSHAV MUAJ  ROJ 90 tablet 3     carvedilol (COREG) 3.125 MG tablet TAKE 1 TABLET TWICE DAILY WITH MEALS // 1 ZAUG NOJ 1 LUB, 1 HNUB NOJ 2 ZAUG NROG MOV PAB SHAHRAM NTSHAV SIAB/LUB PLAWV 180 tablet 1     cephALEXin (KEFLEX) 500 MG capsule Take 1 capsule (500 mg) by mouth 3 times daily 30 capsule 0     clopidogrel (PLAVIX) 75 MG tablet TAKE 1 TABLET BY MOUTH ONCE DAILY// IB HNUB NOJ IB LUB PAB KOM NTSHAV TXHOB NYEEM. 90 tablet 3     ezetimibe (ZETIA) 10 MG tablet TAKE 1 TABLET BY MOUTH DAILY FOR CHOLESTEROL // IB HNUB NOJ 1 LUB SHAHRAM NTSHAV MUAJ ROJ 90 tablet 3     isosorbide mononitrate (IMDUR) 30 MG 24 hr tablet TAKE 4 TABLETS (120 MG) BY MOUTH DAILY // IB HNUB NOJ 4 LUB PAB SHAHRAM LUB PLAWV 360 tablet 1     lidocaine (LMX4) 4 % external cream Apply topically once as needed for mild pain 30 g 0     lisinopril (PRINIVIL/ZESTRIL) 10 MG tablet Take 1 tablet (10 mg) by mouth 2 times daily 180 tablet 1     nitroGLYcerin (NITROSTAT) 0.4 MG sublingual tablet PLACE 1 TABLET UNDER THE TONGUE AT THE ONSET OF CHEST PAIN. MAY REPEAT EVERY 5 MINUTES AS NEEDED FOR A TOTAL OF 3 DOSES. 25 tablet 3     Omega-3 Fatty Acids (FISH OIL PO)        ORDER FOR [facility] Home Blood pressure monitor machine 1 each 0     predniSONE (DELTASONE) 20 MG tablet Take 3 tabs by mouth daily x 3 days, then 2 tabs daily x 3 days, then 1 tab daily x 3 days, then 1/2 tab daily x 3 days. 20 tablet 0     pregabalin (LYRICA) 50 MG capsule Take 1 capsule (50 mg) by mouth 2 times daily 180 capsule 3     sennosides (SENOKOT) 8.6 MG tablet Take 1 tablet by mouth 2 times daily 60 tablet 11     BP Readings from Last 3 Encounters:   01/07/20 (!) 152/85   10/01/19 101/63   09/17/19 123/67    Wt Readings from Last 3 Encounters:   01/07/20 61.7 kg (136 lb)   10/01/19 64.9 kg (143 lb)   09/17/19 63 kg (139 lb)                  Reviewed and updated as needed this visit by Provider         Review of Systems   ROS COMP: Constitutional, HEENT, cardiovascular, pulmonary, GI, , musculoskeletal,  "neuro, skin, endocrine and psych systems are negative, except as otherwise noted.      Objective    BP (!) 152/85   Pulse 62   Temp 97.6  F (36.4  C) (Oral)   Resp 16   Ht 1.626 m (5' 4\")   Wt 61.7 kg (136 lb)   SpO2 100%   BMI 23.34 kg/m    Body mass index is 23.34 kg/m .  Physical Exam   GENERAL: healthy, alert and no distress  NECK: no adenopathy, no asymmetry, masses, or scars and thyroid normal to palpation  RESP: lungs clear to auscultation - no rales, rhonchi or wheezes  CV: regular rate and rhythm, normal S1 S2, no S3 or S4, no murmur, click or rub, no peripheral edema and peripheral pulses strong  ABDOMEN: soft, nontender, no hepatosplenomegaly, no masses and bowel sounds normal  MS: no gross musculoskeletal defects noted, no edema    Diagnostic Test Results:  Labs reviewed in Epic        Assessment & Plan     1. Postherpetic neuralgia  Will trial Lyrica to help with pain. Patient stated gabapentin did not help.  - pregabalin (LYRICA) 50 MG capsule; Take 1 capsule (50 mg) by mouth 2 times daily  Dispense: 180 capsule; Refill: 3       See Patient Instructions    Return in about 4 weeks (around 2/4/2020) for pain.    Christiano Maciel MD, MD  [facility]        "

## 2020-01-07 NOTE — PATIENT INSTRUCTIONS
At [facility], we strive to deliver an exceptional experience to you, every time we see you.  If you receive a survey in the mail, please send us back your thoughts. We really do value your feedback.    Based on your medical history, these are the current health maintenance/preventive care services that you are due for (some may have been done at this visit.)  Health Maintenance Due   Topic Date Due     ZOSTER IMMUNIZATION (1 of 2) 06/18/1997     MEDICARE ANNUAL WELLNESS VISIT  12/30/2012     PNEUMOCOCCAL IMMUNIZATION 65+ LOW/MEDIUM RISK (2 of 2 - PCV13) 09/25/2015     ADVANCE CARE PLANNING  12/30/2016     PHQ-2  01/01/2020         Suggested websites for health information:  Www.[employer] : Up to date and easily searchable information on multiple topics.  Www.medlineplus.gov : medication info, interactive tutorials, watch real surgeries online  Www.familydoctor.org : good info from the Academy of Family Physicians  Www.cdc.gov : public health info, travel advisories, epidemics (H1N1)  Www.aap.org : children's health info, normal development, vaccinations  Www.health.[facility].[place].us : [place] dept of health, public health issues in [place], N1N1    Your care team:                            Family Medicine Internal Medicine   MD Gatito Lewis MD Shantel Branch-Fleming, MD Katya Georgiev PA-C Nam Ho, MD Pediatrics   KATHY Cartagena, MD Cee Corbett CNP, MD Deborah Mielke, MD Kim Thein, APRN CNP      Clinic hours: Monday - Thursday 7 am-7 pm; Fridays 7 am-5 pm.   Urgent care: Monday - Friday 11 am-9 pm; Saturday and Sunday 9 am-5 pm.  Pharmacy : Monday -Thursday 8 am-8 pm; Friday 8 am-6 pm; Saturday and Sunday 9 am-5 pm.     Clinic: [phone]   Pharmacy: [phone]

## 2020-02-18 ENCOUNTER — OFFICE VISIT (OUTPATIENT)
Dept: FAMILY MEDICINE | Facility: CLINIC | Age: 75
End: 2020-02-18
Payer: MEDICARE

## 2020-02-18 VITALS
RESPIRATION RATE: 18 BRPM | OXYGEN SATURATION: 100 % | HEIGHT: 64 IN | BODY MASS INDEX: 23.22 KG/M2 | WEIGHT: 136 LBS | TEMPERATURE: 97.9 F | DIASTOLIC BLOOD PRESSURE: 87 MMHG | HEART RATE: 68 BPM | SYSTOLIC BLOOD PRESSURE: 162 MMHG

## 2020-02-18 DIAGNOSIS — I25.709 CORONARY ARTERY DISEASE INVOLVING CORONARY BYPASS GRAFT OF NATIVE HEART WITH ANGINA PECTORIS (H): ICD-10-CM

## 2020-02-18 DIAGNOSIS — R30.0 DYSURIA: ICD-10-CM

## 2020-02-18 DIAGNOSIS — R10.2 PELVIC PAIN IN MALE: Primary | ICD-10-CM

## 2020-02-18 DIAGNOSIS — M10.072 ACUTE IDIOPATHIC GOUT OF LEFT FOOT: ICD-10-CM

## 2020-02-18 DIAGNOSIS — N18.30 CKD (CHRONIC KIDNEY DISEASE) STAGE 3, GFR 30-59 ML/MIN (H): ICD-10-CM

## 2020-02-18 LAB
ANION GAP SERPL CALCULATED.3IONS-SCNC: 6 MMOL/L (ref 3–14)
BUN SERPL-MCNC: 28 MG/DL (ref 7–30)
CALCIUM SERPL-MCNC: 9.3 MG/DL (ref 8.5–10.1)
CHLORIDE SERPL-SCNC: 113 MMOL/L (ref 94–109)
CO2 SERPL-SCNC: 24 MMOL/L (ref 20–32)
CREAT SERPL-MCNC: 1.77 MG/DL (ref 0.66–1.25)
GFR SERPL CREATININE-BSD FRML MDRD: 37 ML/MIN/{1.73_M2}
GLUCOSE SERPL-MCNC: 99 MG/DL (ref 70–99)
POTASSIUM SERPL-SCNC: 3.9 MMOL/L (ref 3.4–5.3)
SODIUM SERPL-SCNC: 143 MMOL/L (ref 133–144)

## 2020-02-18 PROCEDURE — 36415 COLL VENOUS BLD VENIPUNCTURE: CPT | Performed by: FAMILY MEDICINE

## 2020-02-18 PROCEDURE — 99214 OFFICE O/P EST MOD 30 MIN: CPT | Performed by: FAMILY MEDICINE

## 2020-02-18 PROCEDURE — 80048 BASIC METABOLIC PNL TOTAL CA: CPT | Performed by: FAMILY MEDICINE

## 2020-02-18 RX ORDER — PREDNISONE 20 MG/1
TABLET ORAL
Qty: 20 TABLET | Refills: 3 | Status: SHIPPED | OUTPATIENT
Start: 2020-02-18 | End: 2020-11-19

## 2020-02-18 ASSESSMENT — PAIN SCALES - GENERAL: PAINLEVEL: SEVERE PAIN (7)

## 2020-02-18 ASSESSMENT — MIFFLIN-ST. JEOR: SCORE: 1277.89

## 2020-02-18 NOTE — PROGRESS NOTES
Subjective     Jessica Healy is a 74 year old male who presents to clinic today for the following health issues:    HPI   Shingles Follow up - Same pain    Gout - Medication refill, requesting a medication for when flares up he can take prn    Kidneys - Lab results follow up    Patient continues to have burning pain intermittently of right buttock and pelvic area. Lyrica not really helping per patient. No changes to bm's. Patient does c/o dysuria. No fever.     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)     Past Surgical History:   Procedure Laterality Date     SURGICAL HISTORY OF -       CABG 2006       Social History     Tobacco Use     Smoking status: Never Smoker     Smokeless tobacco: Never Used   Substance Use Topics     Alcohol use: No     Family History   Problem Relation Age of Onset     Family History Negative Other         no known specifics         Current Outpatient Medications   Medication Sig Dispense Refill     acetaminophen (TYLENOL) 500 MG tablet Take 500-1,000 mg by mouth every 6 hours as needed for mild pain       acetaminophen-codeine (TYLENOL #3) 300-30 MG tablet Take 1-2 tablets by mouth every 6 hours as needed for severe pain 60 tablet 1     allopurinol (ZYLOPRIM) 100 MG tablet Take 0.5 tablets (50 mg) by mouth daily For gout. 45 tablet 3     atorvastatin (LIPITOR) 80 MG tablet TAKE 1 TABLET BY MOUTH DAILY FOR CHOLESTEROL // IB HNUB NOJ 1 LUB SHAHRAM NTSHAV MUAJ ROJ 90 tablet 3     carvedilol (COREG) 3.125 MG tablet TAKE 1 TABLET TWICE DAILY WITH MEALS // 1 ZAUG NOJ 1 LUB, 1 HNUB NOJ 2 ZAUG  NROG MOV PAB SHAHRAM NTSHAV SIAB/LUB PLAWV 180 tablet 1     cephALEXin (KEFLEX) 500 MG capsule Take 1 capsule (500 mg) by mouth 3 times daily 30 capsule 0     clopidogrel (PLAVIX) 75 MG tablet TAKE 1 TABLET BY MOUTH ONCE DAILY// IB HNUB NOJ IB LUB PAB KOM NTSHAV TXHOB NYEEM. 90 tablet 3     ezetimibe (ZETIA) 10 MG tablet TAKE 1 TABLET BY MOUTH DAILY FOR CHOLESTEROL // IB HNUB NOJ 1 LUB SHAHRAM NTSHAV MUAJ ROJ 90 tablet 3     isosorbide mononitrate (IMDUR) 30 MG 24 hr tablet TAKE 4 TABLETS (120 MG) BY MOUTH DAILY // IB HNUB NOJ 4 LUB PAB SHAHRAM LUB PLAWV 360 tablet 1     lidocaine (LMX4) 4 % external cream Apply topically once as needed for mild pain 30 g 0     lisinopril (PRINIVIL/ZESTRIL) 10 MG tablet Take 1 tablet (10 mg) by mouth 2 times daily 180 tablet 1     nitroGLYcerin (NITROSTAT) 0.4 MG sublingual tablet PLACE 1 TABLET UNDER THE TONGUE AT THE ONSET OF CHEST PAIN. MAY REPEAT EVERY 5 MINUTES AS NEEDED FOR A TOTAL OF 3 DOSES. 25 tablet 3     Omega-3 Fatty Acids (FISH OIL PO)        ORDER FOR [facility] Blood pressure monitor machine 1 each 0     predniSONE (DELTASONE) 20 MG tablet Take 3 tabs by mouth daily x 3 days, then 2 tabs daily x 3 days, then 1 tab daily x 3 days, then 1/2 tab daily x 3 days. 20 tablet 3     pregabalin (LYRICA) 50 MG capsule Take 1 capsule (50 mg) by mouth 2 times daily 180 capsule 3     sennosides (SENOKOT) 8.6 MG tablet Take 1 tablet by mouth 2 times daily 60 tablet 11     Allergies   Allergen Reactions     Nkda [No Known Drug Allergies]      Seasonal Allergies      BP Readings from Last 3 Encounters:   02/18/20 (!) 162/87   01/07/20 (!) 152/85   10/01/19 101/63    Wt Readings from Last 3 Encounters:   02/18/20 61.7 kg (136 lb)   01/07/20 61.7 kg (136 lb)   10/01/19 64.9 kg (143 lb)                    Reviewed and updated as needed this visit by Provider         Review of Systems   ROS COMP: Constitutional, HEENT, cardiovascular, pulmonary, GI, , musculoskeletal, neuro, skin, endocrine and psych  "systems are negative, except as otherwise noted.      Objective    BP (!) 162/87 (BP Location: Left arm, Patient Position: Chair, Cuff Size: Adult Regular)   Pulse 68   Temp 97.9  F (36.6  C) (Oral)   Resp 18   Ht 1.626 m (5' 4\")   Wt 61.7 kg (136 lb)   SpO2 100%   BMI 23.34 kg/m    Body mass index is 23.34 kg/m .  Physical Exam   GENERAL: healthy, alert and no distress  NECK: no adenopathy, no asymmetry, masses, or scars and thyroid normal to palpation  RESP: lungs clear to auscultation - no rales, rhonchi or wheezes  CV: regular rate and rhythm, normal S1 S2, no S3 or S4, no murmur, click or rub, no peripheral edema and peripheral pulses strong  ABDOMEN: soft, nontender, no hepatosplenomegaly, no masses and bowel sounds normal  MS: no gross musculoskeletal defects noted, no edema    Diagnostic Test Results:  Labs reviewed in Epic        Assessment & Plan     1. Pelvic pain in male  Likely from shingles of right buttock but continues to have pain after few months. Patient may have chronic pain from this. Continue with Lyrica. CT scan ordered to make sure we are not missing anything such as occult cancer, abscess.  - CT Abdomen Pelvis w/o Contrast; Future    2. 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    3. CKD (chronic kidney disease) stage 3, GFR 30-59 ml/min (H)  Stable. Recheck. Monitor.  - Basic metabolic panel    4. Acute idiopathic gout of left foot  Prednisone given to use as needed for flares.   - predniSONE (DELTASONE) 20 MG tablet; Take 3 tabs by mouth daily x 3 days, then 2 tabs daily x 3 days, then 1 tab daily x 3 days, then 1/2 tab daily x 3 days.  Dispense: 20 tablet; Refill: 3    5. Dysuria  R/o UTI.  - UA with Microscopic reflex to Culture       Regular exercise  See Patient Instructions    Return in about 6 months (around 8/18/2020) for BP Recheck.    Christiano Maciel MD, MD  [facility]    "

## 2020-02-18 NOTE — PATIENT INSTRUCTIONS
At [facility], we strive to deliver an exceptional experience to you, every time we see you.  If you receive a survey in the mail, please send us back your thoughts. We really do value your feedback.    Based on your medical history, these are the current health maintenance/preventive care services that you are due for (some may have been done at this visit.)  Health Maintenance Due   Topic Date Due     ZOSTER IMMUNIZATION (1 of 2) 06/18/1997     MEDICARE ANNUAL WELLNESS VISIT  12/30/2012     PNEUMOCOCCAL IMMUNIZATION 65+ LOW/MEDIUM RISK (2 of 2 - PCV13) 09/25/2015     ADVANCE CARE PLANNING  12/30/2016     PHQ-2  01/01/2020         Suggested websites for health information:  Www.[employer] : Up to date and easily searchable information on multiple topics.  Www.medlineplus.gov : medication info, interactive tutorials, watch real surgeries online  Www.familydoctor.org : good info from the Academy of Family Physicians  Www.cdc.gov : public health info, travel advisories, epidemics (H1N1)  Www.aap.org : children's health info, normal development, vaccinations  Www.health.[facility].[place].us : [place] dept of health, public health issues in [place], N1N1    Your care team:                            Family Medicine Internal Medicine   MD Gatito Lewis MD Shantel Branch-Fleming, MD Katya Georgiev PA-C Nam Ho, MD Pediatrics   KATHY Cartagena, MD Cee Corbett CNP, MD Deborah Mielke, MD Kim Thein, APRN CNP      Clinic hours: Monday - Thursday 7 am-7 pm; Fridays 7 am-5 pm.   Urgent care: Monday - Friday 11 am-9 pm; Saturday and Sunday 9 am-5 pm.  Pharmacy : Monday -Thursday 8 am-8 pm; Friday 8 am-6 pm; Saturday and Sunday 9 am-5 pm.     Clinic: [phone]   Pharmacy: [phone]

## 2020-02-19 DIAGNOSIS — R30.0 DYSURIA: ICD-10-CM

## 2020-02-19 LAB
ALBUMIN UR-MCNC: NEGATIVE MG/DL
APPEARANCE UR: CLEAR
BACTERIA #/AREA URNS HPF: ABNORMAL /HPF
BILIRUB UR QL STRIP: NEGATIVE
COLOR UR AUTO: YELLOW
GLUCOSE UR STRIP-MCNC: NEGATIVE MG/DL
HGB UR QL STRIP: NEGATIVE
KETONES UR STRIP-MCNC: NEGATIVE MG/DL
LEUKOCYTE ESTERASE UR QL STRIP: NEGATIVE
MUCOUS THREADS #/AREA URNS LPF: PRESENT /LPF
NITRATE UR QL: NEGATIVE
NON-SQ EPI CELLS #/AREA URNS LPF: ABNORMAL /LPF
PH UR STRIP: 6 PH (ref 5–7)
RBC #/AREA URNS AUTO: ABNORMAL /HPF
SOURCE: ABNORMAL
SP GR UR STRIP: 1.01 (ref 1–1.03)
UROBILINOGEN UR STRIP-ACNC: 0.2 EU/DL (ref 0.2–1)
WBC #/AREA URNS AUTO: ABNORMAL /HPF

## 2020-02-19 PROCEDURE — 81001 URINALYSIS AUTO W/SCOPE: CPT | Performed by: FAMILY MEDICINE

## 2020-02-25 ENCOUNTER — ANCILLARY PROCEDURE (OUTPATIENT)
Dept: CT IMAGING | Facility: CLINIC | Age: 75
End: 2020-02-25
Attending: FAMILY MEDICINE
Payer: MEDICARE

## 2020-02-25 DIAGNOSIS — R10.2 PELVIC PAIN IN MALE: ICD-10-CM

## 2020-02-25 PROCEDURE — 74176 CT ABD & PELVIS W/O CONTRAST: CPT | Performed by: RADIOLOGY

## 2020-03-06 LAB — EJECTION FRACTION: 60 %

## 2020-03-07 LAB
CREAT SERPL-MCNC: 1.68 MG/DL (ref 0.7–1.3)
GFR SERPL CREATININE-BSD FRML MDRD: 40 ML/MIN/1.73M2
GLUCOSE SERPL-MCNC: 105 MG/DL (ref 70–125)
INR PPP: 0.98 (ref 0.9–1.1)
POTASSIUM SERPL-SCNC: 4.1 MMOL/L (ref 3.5–5)

## 2020-03-09 ENCOUNTER — ALLIED HEALTH/NURSE VISIT (OUTPATIENT)
Dept: PHARMACY | Facility: CLINIC | Age: 75
End: 2020-03-09
Payer: MEDICAID

## 2020-03-09 DIAGNOSIS — Z79.899 POLYPHARMACY: ICD-10-CM

## 2020-03-09 DIAGNOSIS — I63.9 CEREBROVASCULAR ACCIDENT (CVA), UNSPECIFIED MECHANISM (H): ICD-10-CM

## 2020-03-09 DIAGNOSIS — E78.5 HYPERLIPIDEMIA LDL GOAL <70: ICD-10-CM

## 2020-03-09 DIAGNOSIS — I25.709 CORONARY ARTERY DISEASE INVOLVING CORONARY BYPASS GRAFT OF NATIVE HEART WITH ANGINA PECTORIS (H): ICD-10-CM

## 2020-03-09 DIAGNOSIS — N18.30 CKD (CHRONIC KIDNEY DISEASE) STAGE 3, GFR 30-59 ML/MIN (H): ICD-10-CM

## 2020-03-09 DIAGNOSIS — I10 HYPERTENSION GOAL BP (BLOOD PRESSURE) < 130/80: Primary | ICD-10-CM

## 2020-03-09 DIAGNOSIS — M10.9 GOUT: ICD-10-CM

## 2020-03-09 PROCEDURE — 99605 MTMS BY PHARM NP 15 MIN: CPT | Performed by: PHARMACIST

## 2020-03-09 PROCEDURE — 99607 MTMS BY PHARM ADDL 15 MIN: CPT | Performed by: PHARMACIST

## 2020-03-09 RX ORDER — METOPROLOL TARTRATE 25 MG/1
25 TABLET, FILM COATED ORAL 2 TIMES DAILY
COMMUNITY
End: 2022-02-16

## 2020-03-09 RX ORDER — PREGABALIN 50 MG/1
50 CAPSULE ORAL 2 TIMES DAILY
COMMUNITY
End: 2020-03-19

## 2020-03-09 NOTE — Clinical Note
Suggestions/questions for Dr. Maciel:  -Unsure if he's taking lisinopril or clopidogrel - he doesn't think that he is.   -Unsure if he's taking Tylenol #3, Lyrica, fish oil, lidocaine cream, prednisone, Senna-S    1. Patient may benefit from MTM as there are several medications I'm unsure if he's taking, please order if you feel appropriate going forward (seen today for transitions of care only, but his insurance covers MTM), and remind patient to bring in pill bottles.     2. Instructed patient to bring pill bottles into next appointment with Dr. Maciel.    3. Continue metoprolol 25 mg twice daily - Dr. Maciel can prescribe this going forward.    Thanks!  Safia Veliz, PharmD  Medication Therapy Management Pharmacist  [phone]

## 2020-03-09 NOTE — PROGRESS NOTES
MTM ENCOUNTER  SUBJECTIVE/OBJECTIVE:                           Jessica Healy is a 74 year old male called for a transitions of care visit. He was discharged from [facility] on 3/7/20 for chest pain (ACS ruled out), Hypertensive urgency.  was present during today's visit.     Chief Complaint: none, has new BP medication.     Allergies/ADRs: Reviewed in Epic  Tobacco:  reports that he has never smoked. He has never used smokeless tobacco.  Alcohol: none  PMH: Reviewed in Epic    Medication Adherence/Access:  He only tells me he is taking allopurinol, ezetimibe, (atorvastatin, I think), isisorbide mononitrate, metoprolol tartrate, and acetaminophen as needed. He doesn't think he's taking the rest.     Hypertension/Hx CABG/CAD/Hx Stroke:   metoprolol tartrate 25 mg twice daily.  Isosorbide 120 mg daily   Nitroglycerin SL as needed    Unsure if he's taking lisinopril 10 mg twice daily or clopidogrel - he doesn't think that he is. He has picked up metoprolol.  He wonders if he should continue metoprolol when this prescription is done, it doesn't have refills.   Patient reports no current medication side effects.  Prior to admission, pt hadn't been taking carvedilol d/t dizziness.   BP Readings from Last 3 Encounters:   02/18/20 (!) 162/87   01/07/20 (!) 152/85   10/01/19 101/63     Hyperlipidemia:   Ezetimibe 10 mg daily   Atorvastatin 80 mg daily     Pt reports no significant myalgias or other side effects.  The ASCVD Risk score ([place] MARK Jr., et al., 2013) failed to calculate for the following reasons:    The patient has a prior MI or stroke diagnosis  Recent Labs   Lab Test 07/03/19  0754 04/15/19  0825  10/14/14  0808 12/30/11  1027   CHOL 271* 373*   < > 339* 365*   HDL 51 40   < > 28* 30*   * 305*   < > 252* 306*   TRIG 90 138   < > 296* 150   CHOLHDLRATIO  --   --   --  12.1* 12.3*    < > = values in this interval not displayed.     CKD Stage 3:    GFR Estimate   Date Value Ref Range Status    02/18/2020 37 (L) >60 mL/min/[1.73_m2] Final     Comment:     Non  GFR Calc  Starting 12/18/2018, serum creatinine based estimated GFR (eGFR) will be   calculated using the Chronic Kidney Disease Epidemiology Collaboration   (CKD-EPI) equation.     09/07/2019 36 (A) >60 ml/min/1.73m2 Final   07/03/2019 31 (L) >60 mL/min/[1.73_m2] Final     Comment:     Non  GFR Calc  Starting 12/18/2018, serum creatinine based estimated GFR (eGFR) will be   calculated using the Chronic Kidney Disease Epidemiology Collaboration   (CKD-EPI) equation.       Gout:  Allopurinol 50 mg daily (takes 1/2 of a 100 mg tablet)    Pt reports no current pain concerns. Pt is experiencing the following medication side effects: none.   Uric Acid   Date Value Ref Range Status   07/03/2019 8.9 (H) 3.5 - 7.2 mg/dL Final   ]    Today's Vitals: There were no vitals taken for this visit.-phone visit      ASSESSMENT:                              Medication Adherence: unknown, needs improvement - see plan    Hypertension/Hx CABG/CAD/Hx Stroke: Needs improvement. See plan.  Pt would benefit from being on clopidogrel.     Hyperlipidemia: Stable. I'm fairly confident he is taking atorvastatin. D/t LDL of 202, pt would benefit from both ezetimibe and atorvastatin, as prescribed.     CKD Stage 3:  Stable.    Gout: Needs improvement. Unsure if pt has predisone on hand.    PLAN:                          Post Discharge Medication Reconciliation Status: discharge medications reconciled, continue medications without change.    Suggestions/questions for Dr. Maciel:  -Unsure if he's taking lisinopril or clopidogrel - he doesn't think that he is.   -Unsure if he's taking Tylenol #3, Lyrica, fish oil, lidocaine cream, prednisone, Senna-S    1. Patient may benefit from MTM as there are several medications I'm unsure if he's taking, please order if you feel appropriate going forward (seen today for transitions of care only, but his insurance  covers MTM), and remind patient to bring in pill bottles.     2. Instructed patient to bring pill bottles into next appointment with Dr. Maciel.    3. Continue metoprolol 25 mg twice daily - Dr. Maciel can prescribe this going forward.     I spent 30 minutes with this patient today. All changes were made via collaborative practice agreement with Christiano Maciel. A copy of the visit note was provided to the patient's primary care provider.    Will follow up, per PCP recommendation - he has MTM coverage, ACO.    The patient declined a summary of these recommendations.     Safia Veliz, PharmD  Medication Therapy Management Pharmacist  [phone]

## 2020-03-16 ENCOUNTER — HOSPITAL ENCOUNTER (OUTPATIENT)
Dept: NUCLEAR MEDICINE | Facility: HOSPITAL | Age: 75
Discharge: HOME | End: 2020-03-16
Attending: HOSPITALIST

## 2020-03-16 ENCOUNTER — TRANSFERRED RECORDS (OUTPATIENT)
Dept: HEALTH INFORMATION MANAGEMENT | Facility: CLINIC | Age: 75
End: 2020-03-16

## 2020-03-16 ENCOUNTER — HOSPITAL ENCOUNTER (OUTPATIENT)
Dept: CARDIOLOGY | Facility: HOSPITAL | Age: 75
Discharge: HOME | End: 2020-03-16
Attending: HOSPITALIST

## 2020-03-16 DIAGNOSIS — R07.9 CHEST PAIN, UNSPECIFIED TYPE: ICD-10-CM

## 2020-03-16 DIAGNOSIS — I25.119 CORONARY ARTERY DISEASE INVOLVING NATIVE HEART WITH ANGINA PECTORIS, UNSPECIFIED VESSEL OR LESION TYPE (H): ICD-10-CM

## 2020-03-16 LAB
CV STRESS CURRENT BP HE: NORMAL
CV STRESS CURRENT HR HE: 64
CV STRESS CURRENT HR HE: 67
CV STRESS CURRENT HR HE: 73
CV STRESS CURRENT HR HE: 74
CV STRESS CURRENT HR HE: 74
CV STRESS CURRENT HR HE: 75
CV STRESS CURRENT HR HE: 75
CV STRESS CURRENT HR HE: 77
CV STRESS CURRENT HR HE: 78
CV STRESS CURRENT HR HE: 78
CV STRESS CURRENT HR HE: 79
CV STRESS CURRENT HR HE: 80
CV STRESS CURRENT HR HE: 81
CV STRESS CURRENT HR HE: 81
CV STRESS CURRENT HR HE: 84
CV STRESS CURRENT HR HE: 84
CV STRESS CURRENT HR HE: 85
CV STRESS CURRENT HR HE: 86
CV STRESS CURRENT HR HE: 87
CV STRESS DEVIATION TIME HE: NORMAL
CV STRESS ECHO PERCENT HR HE: NORMAL
CV STRESS EXERCISE STAGE HE: NORMAL
CV STRESS FINAL RESTING BP HE: NORMAL
CV STRESS FINAL RESTING HR HE: 74
CV STRESS MAX HR HE: 87
CV STRESS MAX TREADMILL GRADE HE: 0
CV STRESS MAX TREADMILL SPEED HE: 0
CV STRESS PEAK DIA BP HE: NORMAL
CV STRESS PEAK SYS BP HE: NORMAL
CV STRESS PHASE HE: NORMAL
CV STRESS PROTOCOL HE: NORMAL
CV STRESS RESTING PT POSITION HE: NORMAL
CV STRESS RESTING PT POSITION HE: NORMAL
CV STRESS ST DEVIATION AMOUNT HE: NORMAL
CV STRESS ST DEVIATION ELEVATION HE: NORMAL
CV STRESS ST EVELATION AMOUNT HE: NORMAL
CV STRESS TEST TYPE HE: NORMAL
CV STRESS TOTAL STAGE TIME MIN 1 HE: NORMAL
EJECTION FRACTION: 47 %
NUC REST EJECTION FRACTION: 47 %
RATE PRESSURE PRODUCT: NORMAL
STRESS ECHO BASELINE DIASTOLIC HE: 100
STRESS ECHO BASELINE HR: 64
STRESS ECHO BASELINE SYSTOLIC BP: 201
STRESS ECHO CALCULATED PERCENT HR: 59 %
STRESS ECHO LAST STRESS DIASTOLIC BP: 79
STRESS ECHO LAST STRESS HR: 86
STRESS ECHO LAST STRESS SYSTOLIC BP: 138
STRESS ECHO TARGET HR: 148

## 2020-03-17 ENCOUNTER — COMMUNICATION - HEALTHEAST (OUTPATIENT)
Dept: CARDIOLOGY | Facility: CLINIC | Age: 75
End: 2020-03-17

## 2020-03-19 ENCOUNTER — VIRTUAL VISIT (OUTPATIENT)
Dept: FAMILY MEDICINE | Facility: CLINIC | Age: 75
End: 2020-03-19
Payer: MEDICARE

## 2020-03-19 DIAGNOSIS — B02.29 POSTHERPETIC NEURALGIA: Primary | ICD-10-CM

## 2020-03-19 DIAGNOSIS — R94.39 ABNORMAL CARDIOVASCULAR STRESS TEST: ICD-10-CM

## 2020-03-19 PROCEDURE — 99207 ZZC NO BILLABLE SERVICE THIS VISIT: CPT | Performed by: FAMILY MEDICINE

## 2020-03-19 RX ORDER — AMITRIPTYLINE HYDROCHLORIDE 10 MG/1
10 TABLET ORAL AT BEDTIME
Qty: 90 TABLET | Refills: 3 | Status: SHIPPED | OUTPATIENT
Start: 2020-03-19 | End: 2022-02-16

## 2020-03-19 NOTE — PROGRESS NOTES
"Jessica Healy is a 74 year old male who is being evaluated via a billable telephone visit.      The patient has been notified of following:     \"This telephone visit will be conducted via a call between you and your physician/provider. We have found that certain health care needs can be provided without the need for a physical exam.  This service lets us provide the care you need with a short phone conversation.  If a prescription is necessary we can send it directly to your pharmacy.  If lab work is needed we can place an order for that and you can then stop by our lab to have the test done at a later time.    If during the course of the call the physician/provider feels a telephone visit is not appropriate, you will not be charged for this service.\"     Jessica Healy complains of  No chief complaint on file.      I have reviewed and updated the patient's Past Medical History, Social History, Family History and Medication List.    ALLERGIES  Nkda [no known drug allergies] and Seasonal allergies    Additional provider notes: patient continues to have right buttock pain despite trying gabapentin and Lyrica. Recent CT scan showed no concerning findings where he has pain. Patient has history of CAD and recently see in ER. A cardiac stress test was done and was abnormal. Denies any chest pain at this time.    Assessment/Plan:  1. Postherpetic neuralgia  Trial TCA at bedtime to see if this helps. Discussed potential side effects. Patient will let us know how he is doing in 1 month.  - amitriptyline (ELAVIL) 10 MG tablet; Take 1 tablet (10 mg) by mouth At Bedtime  Dispense: 90 tablet; Refill: 3    2. Abnormal cardiovascular stress test  Referred to Cardiology for evaluation and recommendations.  - CARDIOLOGY EVAL ADULT REFERRAL; Future    Phone call duration:  22 minutes    Christiano Maciel MD, MD      "

## 2020-04-03 ENCOUNTER — COMMUNICATION - HEALTHEAST (OUTPATIENT)
Dept: CARDIOLOGY | Facility: CLINIC | Age: 75
End: 2020-04-03

## 2020-04-06 ENCOUNTER — OFFICE VISIT - HEALTHEAST (OUTPATIENT)
Dept: CARDIOLOGY | Facility: CLINIC | Age: 75
End: 2020-04-06

## 2020-04-06 ENCOUNTER — TRANSFERRED RECORDS (OUTPATIENT)
Dept: HEALTH INFORMATION MANAGEMENT | Facility: CLINIC | Age: 75
End: 2020-04-06

## 2020-04-06 DIAGNOSIS — I25.83 CORONARY ARTERY DISEASE DUE TO LIPID RICH PLAQUE: ICD-10-CM

## 2020-04-06 DIAGNOSIS — E78.5 DYSLIPIDEMIA, GOAL LDL BELOW 70: ICD-10-CM

## 2020-04-06 DIAGNOSIS — I25.10 CORONARY ARTERY DISEASE DUE TO LIPID RICH PLAQUE: ICD-10-CM

## 2020-04-06 DIAGNOSIS — R94.39 ABNORMAL NUCLEAR STRESS TEST: ICD-10-CM

## 2020-04-06 DIAGNOSIS — N18.30 CKD (CHRONIC KIDNEY DISEASE) STAGE 3, GFR 30-59 ML/MIN (H): ICD-10-CM

## 2020-05-15 ENCOUNTER — TELEPHONE (OUTPATIENT)
Dept: CARDIOLOGY | Facility: CLINIC | Age: 75
End: 2020-05-15

## 2020-05-15 NOTE — TELEPHONE ENCOUNTER
Tried to contact pt regarding appt on 6/1 with Dr. Cowart. Pt is new and needs to conver appt to video visit.  unable to leave message to return call due to mail box full.    Maricruz Bliss CMA......May 15, 2020     11:42 AM

## 2020-06-12 DIAGNOSIS — I25.709 CORONARY ARTERY DISEASE INVOLVING CORONARY BYPASS GRAFT OF NATIVE HEART WITH ANGINA PECTORIS (H): Primary | ICD-10-CM

## 2020-06-15 NOTE — TELEPHONE ENCOUNTER
Routing refill request to provider for review/approval because:  Drug not active on patient's medication list  BP fails [facility] refill protocol    Priscilla Harry RN  [facility]

## 2020-06-16 RX ORDER — CARVEDILOL 3.12 MG/1
TABLET ORAL
Qty: 180 TABLET | Refills: 3 | Status: SHIPPED | OUTPATIENT
Start: 2020-06-16 | End: 2020-07-10

## 2020-06-26 DIAGNOSIS — I63.9 CEREBROVASCULAR ACCIDENT (CVA), UNSPECIFIED MECHANISM (H): ICD-10-CM

## 2020-06-26 DIAGNOSIS — I10 ESSENTIAL HYPERTENSION WITH GOAL BLOOD PRESSURE LESS THAN 130/80: ICD-10-CM

## 2020-06-26 DIAGNOSIS — E78.5 HYPERLIPIDEMIA LDL GOAL <70: ICD-10-CM

## 2020-06-30 NOTE — TELEPHONE ENCOUNTER
Team, please contact patient and inform needs visit and labs, for medication check and refills.  Verify if will have enough medications to last till visit.  Route encounter back to refill pool when complete.    Elyse Johnson RN  [facility]

## 2020-06-30 NOTE — TELEPHONE ENCOUNTER
This writer attempted to contact patient on 06/30/20      Reason for call ( see RNs message below) and left message.      If patient calls back:   1st floor [place] Care Team (MA/TC) called. Inform patient that someone from the team will contact them, document that pt called and route to care team.         Jocelyn Steinberg MA

## 2020-07-02 NOTE — TELEPHONE ENCOUNTER
This writer attempted to contact patient on 07/02/20      Reason for call need appointment and left detailed message.      If patient calls back:   1st floor [place] [place] Care Team (MA/TC) called. Inform patient that someone from the team will contact them, document that pt called and route to care team.         Jocelyn Steinberg MA

## 2020-07-08 ENCOUNTER — TELEPHONE (OUTPATIENT)
Dept: PHARMACY | Facility: CLINIC | Age: 75
End: 2020-07-08

## 2020-07-08 NOTE — TELEPHONE ENCOUNTER
Second attempt, busy signal again.    Safia Veliz, PharmD  Medication Therapy Management Pharmacist  [phone]

## 2020-07-08 NOTE — TELEPHONE ENCOUNTER
Called to schedule MTM appointment. Intepreter was used.  No answer/busy signal, unable to leave voicemail.    Safia Veliz, PharmD  Medication Therapy Management Pharmacist  [phone]

## 2020-07-10 DIAGNOSIS — M10.072 ACUTE IDIOPATHIC GOUT OF LEFT FOOT: ICD-10-CM

## 2020-07-10 DIAGNOSIS — I25.709 CORONARY ARTERY DISEASE INVOLVING CORONARY BYPASS GRAFT OF NATIVE HEART WITH ANGINA PECTORIS (H): ICD-10-CM

## 2020-07-10 DIAGNOSIS — I63.9 CEREBROVASCULAR ACCIDENT (CVA), UNSPECIFIED MECHANISM (H): ICD-10-CM

## 2020-07-10 DIAGNOSIS — I10 ESSENTIAL HYPERTENSION WITH GOAL BLOOD PRESSURE LESS THAN 130/80: ICD-10-CM

## 2020-07-10 NOTE — TELEPHONE ENCOUNTER
Reason for Call:  Medication or medication refill:    Do you use a [facility]?  Name of the pharmacy and phone number for the current request:      Name of the medication requested: allopurinol (ZYLOPRIM) 100 MG tablet, carvedilol (COREG) 3.125 MG tablet, clopidogrel (PLAVIX) 75 MG tablet, lisinopril (PRINIVIL/ZESTRIL) 10 MG tablet    Other request: Pt calling for he did remember sending these requests into the Pharmacy a week ago but they did not reach the Provider.  He would like to see if he can get these refilled as soon as possible.    Can we leave a detailed message on this number? YES    Phone number patient can be reached at: Home number on file [phone] (home)    Best Time: anytime    Call taken on 7/10/2020 at 11:15 AM by Nasir Donahue

## 2020-07-14 RX ORDER — CLOPIDOGREL BISULFATE 75 MG/1
TABLET ORAL
Qty: 90 TABLET | Refills: 3 | Status: SHIPPED | OUTPATIENT
Start: 2020-07-14 | End: 2022-02-03

## 2020-07-14 RX ORDER — ALLOPURINOL 100 MG/1
50 TABLET ORAL DAILY
Qty: 45 TABLET | Refills: 3 | Status: SHIPPED | OUTPATIENT
Start: 2020-07-14 | End: 2021-07-25

## 2020-07-14 RX ORDER — LISINOPRIL 10 MG/1
10 TABLET ORAL 2 TIMES DAILY
Qty: 180 TABLET | Refills: 1 | Status: SHIPPED | OUTPATIENT
Start: 2020-07-14 | End: 2020-07-22

## 2020-07-14 RX ORDER — CARVEDILOL 3.12 MG/1
TABLET ORAL
Qty: 180 TABLET | Refills: 3 | Status: SHIPPED | OUTPATIENT
Start: 2020-07-14 | End: 2022-02-16

## 2020-07-14 NOTE — TELEPHONE ENCOUNTER
Coreg- rx sent 6/16/20 - should check with pharmacy for refills.     Routing refill request to provider for review/approval because:  Labs out of range:  Creatinine - Lisinopril   Labs not current:  Uric Acid/ CBC- Allopurinol, Hbg/plt - Plavix      Kamilah May RN  [facility]

## 2020-07-15 DIAGNOSIS — E78.5 HYPERLIPIDEMIA LDL GOAL <70: ICD-10-CM

## 2020-07-15 DIAGNOSIS — I10 ESSENTIAL HYPERTENSION WITH GOAL BLOOD PRESSURE LESS THAN 130/80: ICD-10-CM

## 2020-07-15 RX ORDER — ATORVASTATIN CALCIUM 80 MG/1
TABLET, FILM COATED ORAL
Qty: 90 TABLET | Refills: 0 | Status: SHIPPED | OUTPATIENT
Start: 2020-07-15 | End: 2020-08-24

## 2020-07-15 RX ORDER — EZETIMIBE 10 MG/1
TABLET ORAL
Qty: 90 TABLET | Refills: 0 | Status: SHIPPED | OUTPATIENT
Start: 2020-07-15 | End: 2020-08-24

## 2020-07-15 RX ORDER — CLOPIDOGREL BISULFATE 75 MG/1
TABLET ORAL
Qty: 90 TABLET | Refills: 0 | Status: SHIPPED | OUTPATIENT
Start: 2020-07-15 | End: 2022-02-14

## 2020-07-15 RX ORDER — ISOSORBIDE MONONITRATE 30 MG/1
TABLET, EXTENDED RELEASE ORAL
Qty: 360 TABLET | Refills: 0 | Status: SHIPPED | OUTPATIENT
Start: 2020-07-15 | End: 2020-08-24

## 2020-07-15 RX ORDER — LISINOPRIL 10 MG/1
TABLET ORAL
Qty: 180 TABLET | Refills: 0 | Status: SHIPPED | OUTPATIENT
Start: 2020-07-15 | End: 2021-06-23

## 2020-07-15 NOTE — TELEPHONE ENCOUNTER
This writer attempted to contact patient on 07/15/20      Reason for call need appointment  and left detailed message.      If patient calls back:   Schedule Office Visit appointment within 2 business days with PCP, document that pt called and close encounter         Jocelyn Steinberg MA

## 2020-07-16 RX ORDER — ATORVASTATIN CALCIUM 80 MG/1
TABLET, FILM COATED ORAL
Qty: 90 TABLET | Refills: 0 | OUTPATIENT
Start: 2020-07-16

## 2020-07-16 RX ORDER — EZETIMIBE 10 MG/1
TABLET ORAL
Qty: 90 TABLET | Refills: 0 | OUTPATIENT
Start: 2020-07-16

## 2020-07-16 RX ORDER — ISOSORBIDE MONONITRATE 30 MG/1
TABLET, EXTENDED RELEASE ORAL
Qty: 360 TABLET | Refills: 0 | OUTPATIENT
Start: 2020-07-16

## 2020-07-16 NOTE — TELEPHONE ENCOUNTER
Routing refill request to provider for review/approval because:  Labs not current:  Lipid, LDL  BP fails protocol.    Stacie Richardson RN, [facility] Triage

## 2020-07-22 DIAGNOSIS — I10 ESSENTIAL HYPERTENSION WITH GOAL BLOOD PRESSURE LESS THAN 130/80: ICD-10-CM

## 2020-07-22 DIAGNOSIS — E78.5 HYPERLIPIDEMIA LDL GOAL <70: ICD-10-CM

## 2020-07-22 DIAGNOSIS — I25.709 CORONARY ARTERY DISEASE INVOLVING CORONARY BYPASS GRAFT OF NATIVE HEART WITH ANGINA PECTORIS (H): ICD-10-CM

## 2020-07-22 RX ORDER — EZETIMIBE 10 MG/1
TABLET ORAL
Qty: 90 TABLET | Refills: 0
Start: 2020-07-22

## 2020-07-22 RX ORDER — ISOSORBIDE MONONITRATE 30 MG/1
TABLET, EXTENDED RELEASE ORAL
Qty: 360 TABLET | Refills: 0
Start: 2020-07-22

## 2020-07-22 RX ORDER — LISINOPRIL 10 MG/1
10 TABLET ORAL 2 TIMES DAILY
Qty: 180 TABLET | Refills: 0
Start: 2020-07-22

## 2020-07-22 NOTE — TELEPHONE ENCOUNTER
All medications were sent to pharmacy on 7/15/20 for a 90 day supply except Nitroglycerine.         Kevan Gillespie RN, BSN, PHN

## 2020-07-22 NOTE — TELEPHONE ENCOUNTER
.Reason for call:  Medication   If this is a refill request, has the caller requested the refill from the pharmacy already? No  Will the patient be using a [facility]? No  Name of the pharmacy and phone number for the current request[facility]    Name of the medication requested: isorbide montitrate 30mg, ezetimibe 10mg, lisinopril/lipids, nitroglycerin 0.4 mg    Other request: fill script    Phone number to reach patient:  Home number on file [phone] (home)    Best Time:  anytime    Can we leave a detailed message on this number?  YES    Travel screening: Negative

## 2020-07-24 RX ORDER — NITROGLYCERIN 0.4 MG/1
TABLET SUBLINGUAL
Qty: 25 TABLET | Refills: 3 | Status: SHIPPED | OUTPATIENT
Start: 2020-07-24 | End: 2022-03-29

## 2020-07-26 DIAGNOSIS — E78.5 HYPERLIPIDEMIA LDL GOAL <70: ICD-10-CM

## 2020-07-29 RX ORDER — ATORVASTATIN CALCIUM 80 MG/1
TABLET, FILM COATED ORAL
Qty: 90 TABLET | Refills: 0 | OUTPATIENT
Start: 2020-07-29

## 2020-08-21 ENCOUNTER — TELEPHONE (OUTPATIENT)
Dept: FAMILY MEDICINE | Facility: CLINIC | Age: 75
End: 2020-08-21

## 2020-08-21 DIAGNOSIS — E78.5 HYPERLIPIDEMIA LDL GOAL <70: ICD-10-CM

## 2020-08-21 DIAGNOSIS — I10 ESSENTIAL HYPERTENSION WITH GOAL BLOOD PRESSURE LESS THAN 130/80: ICD-10-CM

## 2020-08-21 RX ORDER — ISOSORBIDE MONONITRATE 30 MG/1
TABLET, EXTENDED RELEASE ORAL
Qty: 360 TABLET | Refills: 0
Start: 2020-08-21

## 2020-08-21 RX ORDER — EZETIMIBE 10 MG/1
TABLET ORAL
Qty: 90 TABLET | Refills: 0
Start: 2020-08-21

## 2020-08-21 RX ORDER — ATORVASTATIN CALCIUM 80 MG/1
TABLET, FILM COATED ORAL
Qty: 90 TABLET | Refills: 0
Start: 2020-08-21

## 2020-08-21 NOTE — TELEPHONE ENCOUNTER
90 day supply with 1 refills sent on 7/15/20. Should have refills on file at pharmacy.      Kevan Gillespie RN, BSN, PHN

## 2020-08-21 NOTE — TELEPHONE ENCOUNTER
Reason for Call:  Medication or medication refill:    Do you use a [facility]?  No  Name of the pharmacy and phone number for the current request:  [id number] - [place], [place] - [address] [place] [address]     Name of the medication requested: atorvastatin (LIPITOR) 80 MG tablet and ezetimibe (ZETIA) 10 MG tablet and isosorbide mononitrate (IMDUR) 30 MG 24 hr tablet          Other request:     Can we leave a detailed message on this number? YES    Phone number patient can be reached at: Cell number on file:    Telephone Information:   Mobile [phone]   Mobile        Best Time: Any    Call taken on 8/21/2020 at 2:28 PM by Modesto Waite

## 2020-08-24 RX ORDER — ATORVASTATIN CALCIUM 80 MG/1
TABLET, FILM COATED ORAL
Qty: 90 TABLET | Refills: 0 | Status: SHIPPED | OUTPATIENT
Start: 2020-08-24 | End: 2022-02-14

## 2020-08-24 RX ORDER — ISOSORBIDE MONONITRATE 30 MG/1
TABLET, EXTENDED RELEASE ORAL
Qty: 360 TABLET | Refills: 0 | Status: SHIPPED | OUTPATIENT
Start: 2020-08-24 | End: 2022-02-03

## 2020-08-24 RX ORDER — EZETIMIBE 10 MG/1
TABLET ORAL
Qty: 90 TABLET | Refills: 0 | Status: SHIPPED | OUTPATIENT
Start: 2020-08-24 | End: 2022-02-14

## 2020-08-24 NOTE — TELEPHONE ENCOUNTER
Routing refill request to provider for review/approval because: Atorvastatin, ezetimibe  No LDL on file in past 12 months     Routing refill request to provider for review/approval because:  isosorbide   Blood pressure not under 140/90 in past 12 months     Sravani Da Silva RN

## 2020-11-17 DIAGNOSIS — M10.072 ACUTE IDIOPATHIC GOUT OF LEFT FOOT: ICD-10-CM

## 2020-11-17 NOTE — TELEPHONE ENCOUNTER
Reason for Call:  Medication or medication refill:    Do you use a [facility]?  Name of the pharmacy and phone number for the current request:  [id number] - [place], [place] - [address] [place] [address]     Name of the medication requested: predniSONE (DELTASONE) 20 MG tablet    Other request: Completely out of medication for a couple weeks     Can we leave a detailed message on this number? YES    Phone number patient can be reached at: Home number on file [phone] (home)    Best Time: Any    Call taken on 11/17/2020 at 9:48 AM by Elmer Whitman

## 2020-11-19 RX ORDER — PREDNISONE 20 MG/1
TABLET ORAL
Qty: 20 TABLET | Refills: 3 | Status: SHIPPED | OUTPATIENT
Start: 2020-11-19 | End: 2021-03-02

## 2020-11-19 NOTE — TELEPHONE ENCOUNTER
Routing refill request to provider for review/approval because:  Drug not on the [facility] refill protocol     Elyse Johnson RN  [facility]

## 2021-01-26 ENCOUNTER — COMMUNICATION - HEALTHEAST (OUTPATIENT)
Dept: CARDIOLOGY | Facility: CLINIC | Age: 76
End: 2021-01-26

## 2021-01-27 ENCOUNTER — AMBULATORY - HEALTHEAST (OUTPATIENT)
Dept: CARDIOLOGY | Facility: CLINIC | Age: 76
End: 2021-01-27

## 2021-01-27 DIAGNOSIS — N18.30 CKD (CHRONIC KIDNEY DISEASE) STAGE 3, GFR 30-59 ML/MIN (H): ICD-10-CM

## 2021-01-27 LAB
ALBUMIN SERPL-MCNC: 3.1 G/DL (ref 3.5–5)
ANION GAP SERPL CALCULATED.3IONS-SCNC: 7 MMOL/L (ref 5–18)
BUN SERPL-MCNC: 24 MG/DL (ref 8–28)
CALCIUM SERPL-MCNC: 8.3 MG/DL (ref 8.5–10.5)
CHLORIDE BLD-SCNC: 109 MMOL/L (ref 98–107)
CO2 SERPL-SCNC: 26 MMOL/L (ref 22–31)
CREAT SERPL-MCNC: 1.66 MG/DL (ref 0.7–1.3)
CREAT SERPL-MCNC: 1.66 MG/DL (ref 0.7–1.3)
GFR SERPL CREATININE-BSD FRML MDRD: 41 ML/MIN/1.73M2
GFR SERPL CREATININE-BSD FRML MDRD: 41 ML/MIN/1.73M2
GLUCOSE BLD-MCNC: 85 MG/DL (ref 70–125)
GLUCOSE SERPL-MCNC: 85 MG/DL (ref 70–125)
PHOSPHATE SERPL-MCNC: 3 MG/DL (ref 2.5–4.5)
POTASSIUM BLD-SCNC: 4.1 MMOL/L (ref 3.5–5)
POTASSIUM SERPL-SCNC: 4.1 MMOL/L (ref 3.5–5)
SODIUM SERPL-SCNC: 142 MMOL/L (ref 136–145)

## 2021-02-02 ENCOUNTER — COMMUNICATION - HEALTHEAST (OUTPATIENT)
Dept: CARDIOLOGY | Facility: CLINIC | Age: 76
End: 2021-02-02

## 2021-02-03 ENCOUNTER — TRANSFERRED RECORDS (OUTPATIENT)
Dept: HEALTH INFORMATION MANAGEMENT | Facility: CLINIC | Age: 76
End: 2021-02-03

## 2021-02-03 ENCOUNTER — OFFICE VISIT - HEALTHEAST (OUTPATIENT)
Dept: CARDIOLOGY | Facility: CLINIC | Age: 76
End: 2021-02-03

## 2021-02-03 DIAGNOSIS — E78.5 DYSLIPIDEMIA, GOAL LDL BELOW 70: ICD-10-CM

## 2021-02-03 DIAGNOSIS — I25.118 CORONARY ARTERY DISEASE OF NATIVE ARTERY OF NATIVE HEART WITH STABLE ANGINA PECTORIS (H): ICD-10-CM

## 2021-02-03 DIAGNOSIS — N18.30 CKD (CHRONIC KIDNEY DISEASE) STAGE 3, GFR 30-59 ML/MIN (H): ICD-10-CM

## 2021-02-03 LAB — LDLC SERPL CALC-MCNC: 166 MG/DL

## 2021-02-03 ASSESSMENT — MIFFLIN-ST. JEOR: SCORE: 1268.36

## 2021-02-05 ENCOUNTER — AMBULATORY - HEALTHEAST (OUTPATIENT)
Dept: CARDIOLOGY | Facility: CLINIC | Age: 76
End: 2021-02-05

## 2021-02-05 DIAGNOSIS — I25.118 CORONARY ARTERY DISEASE OF NATIVE ARTERY OF NATIVE HEART WITH STABLE ANGINA PECTORIS (H): ICD-10-CM

## 2021-02-05 DIAGNOSIS — E78.5 DYSLIPIDEMIA, GOAL LDL BELOW 70: ICD-10-CM

## 2021-03-02 DIAGNOSIS — M10.072 ACUTE IDIOPATHIC GOUT OF LEFT FOOT: ICD-10-CM

## 2021-03-02 RX ORDER — PREDNISONE 20 MG/1
TABLET ORAL
Qty: 20 TABLET | Refills: 3 | Status: SHIPPED | OUTPATIENT
Start: 2021-03-02 | End: 2022-02-16

## 2021-03-02 NOTE — TELEPHONE ENCOUNTER
Pharmacy calling for a response for predniSONE (DELTASONE) 20 MG tablet   . Patient is in pain and asked pharmacy to call again for this refill.  ANA CRISTINA Lovell

## 2021-03-02 NOTE — TELEPHONE ENCOUNTER
Routing refill request to provider for review/approval because:  Drug not on the [facility] refill protocol     Stacie MURPHYN, RN, CPN

## 2021-04-14 ENCOUNTER — TELEPHONE (OUTPATIENT)
Dept: FAMILY MEDICINE | Facility: CLINIC | Age: 76
End: 2021-04-14

## 2021-04-14 NOTE — TELEPHONE ENCOUNTER
Patient Quality Outreach      Summary:    Patient has the following on his problem list/HM:   Hypertension   Last three blood pressure readings:  BP Readings from Last 3 Encounters:   02/18/20 (!) 162/87   01/07/20 (!) 152/85   10/01/19 101/63     Blood pressure: Failed    HTN Guidelines:  ? 139/89     Patient is due/failing the following:   Hypertension follow-up visit    Type of outreach:    na    Questions for provider review:    None                                                                                                                                          Chart routed to Care Team.

## 2021-05-10 NOTE — TELEPHONE ENCOUNTER
A Next [employer] message has been sent for BP follow up, patient voice message is full.  .Raza Adair Patient Registration

## 2021-06-05 VITALS
HEIGHT: 64 IN | SYSTOLIC BLOOD PRESSURE: 110 MMHG | WEIGHT: 135 LBS | BODY MASS INDEX: 23.05 KG/M2 | RESPIRATION RATE: 16 BRPM | OXYGEN SATURATION: 99 % | HEART RATE: 64 BPM | DIASTOLIC BLOOD PRESSURE: 60 MMHG

## 2021-06-06 NOTE — TELEPHONE ENCOUNTER
With the help of [employer]  ID # [id number], call placed to patient.  Unable to reach patient. Via home/mobile number listed. Called EC and left message to have patient return call to discuss to writers' phone number. Also, placed additional call to alternate emergency contact, no answer no capability to leave VM. RENETTA,Rn

## 2021-06-06 NOTE — TELEPHONE ENCOUNTER
----- Message from Chris Gallardo DO sent at 3/17/2020  9:04 AM CDT -----  Regarding: FW: Abnormal Stress Testing  Can we this patient into [facility].  New consultation for high risk abnormal stress test.    ----- Message -----  From: Joan Ferris MD  Sent: 3/17/2020   7:59 AM CDT  To: Chris Gallardo DO  Subject: RE: Abnormal Stress Testing                      Yes, could your schedulers get him in to rapid access clinic if possible?  Thanks!  -Joan  ----- Message -----  From: Chris Gallardo DO  Sent: 3/16/2020   1:37 PM CDT  To: Joan Ferris MD  Subject: Abnormal Stress Testing                          Dr. Ferris,     Patient has a large area of ischemia on stress testing with high risk findings.  Let me know if you need any assistance in getting patient into seeing cardiology.      Thanks,     Pranav

## 2021-06-07 NOTE — TELEPHONE ENCOUNTER
with the help of [facility]  Jocelyn, through language/ phone service, PC placed to patient. Alexander pt phone number and rang both times, without answer and no voicemail. Called 1st E[facility], rang no answer, no VM. CAlled 2nd EC Tea, and able to LM. RENETTA,RN

## 2021-06-07 NOTE — TELEPHONE ENCOUNTER
With the help of [facility]  ID #[id number], call placed to patient and EC's. Left message on EC-Aleena Healy.RENETTA,RN

## 2021-06-07 NOTE — TELEPHONE ENCOUNTER
With the help of [employer]  called all 3 numbers listed for the patient. Search of Care Everywhere for additional numbers, none. Reached daughter Aleena via phone. Informed of recommendation from Cardiology for [facility] appt. Daughter agreeable. Call by 1330 today to schedule. States that her dad is just at home, and doing fine. Gave daughter phone number to the clinic at [phone] if any questions or to speak with a nurse. Also informed daughter that writer has been trying to reach out for a week or more, and sent a letter. So, if letter arrives it was regarding this conversation. Verbalized understanding. RENETTA,RN

## 2021-06-07 NOTE — TELEPHONE ENCOUNTER
Updated Dr. Vazquez in clinic who agreed to complete appt via phone visit which will be initiated by contacting Aleena, who will serve as , and will then conference in her dad.      Return call to Aleena with update - understanding verbalized of plan for phone visit and process.  mg

## 2021-06-07 NOTE — TELEPHONE ENCOUNTER
Left message for patient's daughter Aleena requesting call back to complete Wellness Screen for patient prior to appt today @ 1250.  mg

## 2021-06-07 NOTE — TELEPHONE ENCOUNTER
Attempted calling patient for wellness screen - no answer and no VM - kept getting disconnected.  [phone] number is not reachable.  [phone] not in service  [phone] no answer  [phone] no answer      Per appointment note:  Please call isabel Flood at [phone] with any questions.  Attempted several times - no answer.  Left detailed message with the clinic direct number for return call x3 for wellness screen.

## 2021-06-07 NOTE — PATIENT INSTRUCTIONS - HE
Mr. Healy,    It was a pleasure to participate in a phone visit with you today.    You will see Dr. Vazquez in 4 months after a new lipid profile and renal function blood test.     Please call us if you have any questions or concerns about your heart.      Anthony Vazquez M.D.

## 2021-06-07 NOTE — TELEPHONE ENCOUNTER
Wellness Screening Tool  Symptom Screening:  Do you have one of the following new symptoms:    Fever or reported chills?  No    A new cough (started within the past 14 days)?  No    Shortness of breath (started within the past 14 days) ?  No     Nausea, vomiting, or diarrhea?  No    Within the past 3 weeks, have you been exposed to someone with a known positive illness below:    COVID-19 (known or suspected)?  No    Chicken pox?  No - had shingles 4-5mo ago    Mealses?  No    Pertussis?  No    Patient notified of visitor restrictions: Yes  Patient's appointment status: Aleena now requesting phone visit due to coronavirus pandemic - informed Aleena that Dr. Vazquez would be updated for recommendations - understanding verbalized.  mg

## 2021-06-07 NOTE — TELEPHONE ENCOUNTER
High priority staff message sent to [facility]  to call and arrange appt ASAP. Await to see when scheduled. RENETTA,Rn

## 2021-06-07 NOTE — PROGRESS NOTES
Review of Systems - History obtained from the patient  General ROS: positive for  - weight loss  Psychological ROS: negative  Ophthalmic ROS: negative  ENT ROS: negative  Hematological and Lymphatic ROS: negative  Respiratory ROS: positive for - cough and shortness of breath  Cardiovascular ROS: positive for - palpitations and shortness of breath with activity  Gastrointestinal ROS: negative  Genito-Urinary ROS: positive for - urinary frequency  Musculoskeletal ROS: positive for - muscle pain  Neurological ROS: negative  Dermatological ROS: positive for poor wound healing

## 2021-06-14 NOTE — TELEPHONE ENCOUNTER

## 2021-06-14 NOTE — TELEPHONE ENCOUNTER

## 2021-06-14 NOTE — PATIENT INSTRUCTIONS - HE
Wa,    It was a pleasure to see you today at [facility].    My nurse or I will call the  service with the LDL cholesterol results.    You will see Dr. Vazquez in one year.     Please call us if you have any questions or concerns about your heart.      Anthony Vazquez M.D.  [facility]       Normal

## 2021-06-20 NOTE — LETTER
Letter by Cameron Neff RN at      Author: Cameron Neff RN Service: -- Author Type: --    Filed:  Encounter Date: 3/17/2020 Status: (Other)       Kunal Healy  74 year old male  1945   [address] [place] [address]  [phone] (M)                April 1, 2020     Dear Mr. Kunal Healy,     Good day. Hope you are doing well. I am a nurse at the [facility]. I have been reaching out to you and your Emergency contacts listed to discuss your stress test. I have not been able to reach you, and no return calls. We feel that it is IMPORTANT that we speak with you at the Cardiology Clinic. Your stress test results have been reviewed at the request of your medical doctors office. Your stress test is ABNORMAL and needs URGENT follow-up discussion. We would like to arrange you with an appointment in our clinic as SOON AS POSSIBLE. Please call the clinic at [phone] to arrange.    Look forward to hearing from you,    Cameron Neff, RN    CV Nurse Clinician   [facility]    [phone]

## 2021-06-23 DIAGNOSIS — I10 ESSENTIAL HYPERTENSION WITH GOAL BLOOD PRESSURE LESS THAN 130/80: ICD-10-CM

## 2021-06-23 RX ORDER — LISINOPRIL 10 MG/1
10 TABLET ORAL 2 TIMES DAILY
Qty: 60 TABLET | Refills: 0 | Status: SHIPPED | OUTPATIENT
Start: 2021-06-23 | End: 2021-07-20

## 2021-06-23 NOTE — TELEPHONE ENCOUNTER
"Requested Prescriptions   Pending Prescriptions Disp Refills    lisinopril (ZESTRIL) 10 MG tablet 180 tablet 0     Sig: Take 1 tablet (10 mg) by mouth 2 times daily       ACE Inhibitors (Including Combos) Protocol Failed - 6/23/2021  9:02 AM        Failed - Recent (12 mo) or future (30 days) visit within the authorizing provider's specialty     Patient has had an office visit with the authorizing provider or a provider within the authorizing providers department within the previous 12 mos or has a future within next 30 days. See \"Patient Info\" tab in inbasket, or \"Choose Columns\" in Meds & Orders section of the refill encounter.              Failed - Normal serum creatinine on file in past 12 months     Recent Labs   Lab Test 01/27/21   CR 1.66*       Ok to refill medication if creatinine is low          Passed - Blood pressure under 140/90 in past 12 months     BP Readings from Last 3 Encounters:   02/03/21 110/60   02/18/20 (!) 162/87   01/07/20 (!) 152/85                 Passed - Medication is active on med list        Passed - Patient is age 18 or older        Passed - Normal serum potassium on file in past 12 months     Recent Labs   Lab Test 01/27/21   POTASSIUM 4.1                  "

## 2021-06-28 NOTE — PROGRESS NOTES
"Progress Notes by Kristopher Vazquez MD at 4/6/2020 12:50 PM     Author: Kristopher Vazquez MD Service: -- Author Type: Physician    Filed: 4/6/2020  1:45 PM Encounter Date: 4/6/2020 Status: Signed    : Kristopher Vazquez MD (Physician)           The patient has been notified of following:     \"This telephone visit will be conducted via a call between you and your physician/provider. We have found that certain health care needs can be provided without the need for a physical exam.  This service lets us provide the care you need with a phone conversation.  If a prescription is necessary we can send it directly to your pharmacy.  If lab work is needed we can place an order for that and you can then stop by our lab to have the test done at a later time. If during the course of the call the physician/provider feels a telephone visit is not appropriate, you will not be charged for this service.\" Verbal consent has been obtained for this service by a care team member:         HEART CARE PHONE ENCOUNTER        The patient has chosen to have the visit conducted as a telephone visit, to reduce risk of exposure given the current status of Coronavirus in our community. This telephone visit is being conducted via a call between the patient and physician/provider. Health care needs are being provided without a physical exam.     Assessment/Recommendations   Assessment:      1. Abnormal nuclear stress test     2. Coronary artery disease due to lipid rich plaque     3. Dyslipidemia, goal LDL below 70  Lipid Profile   4. CKD (chronic kidney disease) stage 3, GFR 30-59 ml/min (H)  Renal function panel       Plan:    1.  Continue current cardiovascular medical therapy.  2.  He does not wish to repeat coronary angiography at this time; this is supported by the prior findings of a high risk stress test in 2014 with subsequent angiography (documented below) that showed that his anatomy was not suitable for percutaneous intervention " "at that time.  In addition, the patient has infrequent use of nitroglycerin which he takes for \"fast heart rate\" and not typical angina pectoris.  3.  I do not recommend further stress testing as he has had high risk findings since 2014 and his angiogram then did not reveal any treatable stenoses.   4.  He has not followed up with a cardiologist since 2016 and would like to do so.  His demographic record indicated that he lived in [place] and I offered that he could see you my [facility] physician colleagues at the [place] or [place] locations.  However, the patient and his daughters told me that he moved to [place] in August 2019, therefore, I updated his demographic information and offered to see him at our [facility] clinic in [place] in 4 months.      Follow Up Plan: Follow up in 4 months with Dr. Vazquez after the testing noted above.  I have reviewed the note as documented.  This accurately captures the substance of my conversation with the patient.    Total time of call between patient and provider was 36 minutes     Start Time: 1257   Stop Time: 1333       History of Present Illness/Subjective    Kunal Healy is a 74 y.o. male who is being evaluated via a billable telephone visit.      I have reviewed and updated the patient's Past Medical History, Social History, Family History and Medication List. I have also reviewed the updated Complete Review of Systems, as is documented elsewhere today by our Certified Medical Assistant.    I had a phone visit with the patient today and was assisted by 2 of his daughters as it was his intention to have them provide interpretation and not use a professional , which was offered to him.  We reviewed his history of heart disease going back to his coronary bypass surgery 14 years ago in [facility].      He went to the [facility] after his 2014 angio at the [facility] and they also advised no stents at that time.    He " "states he was getting dizzy from taking carvedilol and stopped using it earlier this year.  His blood pressure became elevated and he noticed that his heart rate was rapid.  He would take nitroglycerin for the rapid heartbeat.  He was hospitalized at [facility] for these symptoms in March.  He does not describe typical angina pectoris, that is, he does not experience chest, throat, jaw or arm tightness or discomfort.  The last time he used nitroglycerin was 3 weeks ago when he took it for a \"rapid heartbeat\".    The patient states that he is as active as he wishes to be and does not feel limited at this time.    We discussed the role of repeat coronary angiography and all of his and his daughter's questions were answered.  I explained to them that potential complications of an angiogram could include kidney failure, stroke, and myocardial infarction.    We discussed the importance of proper lipid maintenance.       Physical Examination not performed given phone encounter Review of Systems                                                Medical History  Surgical History Family History Social History   Past Medical History:   Diagnosis Date   ? Cerebrovascular accident (CVA), unspecified mechanism (H) 10/25/2016   ? CKD (chronic kidney disease) stage 3, GFR 30-59 ml/min (H) 10/26/2016   ? Coronary artery disease due to lipid rich plaque 12/30/2011    he sees a doctor in [place], [place] in 2020   ? Dyslipidemia, goal LDL below 70 4/27/2012   ? Essential hypertension 12/30/2011   ? History of ischemic cardiomyopathy 10/11/2016    preop CABG LV systolic function by LV gram was 45% with inferior akinesis at that time   ? History of stroke 10/25/2016   ? Hypertensive urgency 10/10/2016   ? Kidney cyst, acquired 12/16/2016   ? LUCINA (renal artery stenosis) (H) 01/30/2008    not documented in CareEverywhere notes    Past Surgical History:   Procedure Laterality Date   ? CARDIAC CATHETERIZATION  2006    in [place]   ? CARDIAC " CATHETERIZATION  11/21/2014    by Dr. Sepulveda at [facility], no PCI was done: LMCA 90%, mid %, prox LCX 90%, prox %, SVG's to OM2 and RPDA 100%, LIMA to LAD patent with L to R collaterals   ? CORONARY ARTERY BYPASS GRAFT  12/22/2006    LIMA to LAD, vein graft to OM2, vein graft to PDA. This was complicated by postop afib. Done in [place], [place]    Family History   Problem Relation Age of Onset   ? Kidney disease Mother     Social History     Socioeconomic History   ? Marital status:      Spouse name: Not on file   ? Number of children: 6   ? Years of education: Not on file   ? Highest education level: Not on file   Occupational History     Employer: RETIRED   Social Needs   ? Financial resource strain: Not on file   ? Food insecurity     Worry: Not on file     Inability: Not on file   ? Transportation needs     Medical: Not on file     Non-medical: Not on file   Tobacco Use   ? Smoking status: Never Smoker   ? Smokeless tobacco: Never Used   Substance and Sexual Activity   ? Alcohol use: Not Currently   ? Drug use: Never   ? Sexual activity: Not on file   Lifestyle   ? Physical activity     Days per week: Not on file     Minutes per session: Not on file   ? Stress: Not on file   Relationships   ? Social connections     Talks on phone: Not on file     Gets together: Not on file     Attends [employer] service: Not on file     Active member of club or organization: Not on file     Attends meetings of clubs or organizations: Not on file     Relationship status: Not on file   ? Intimate partner violence     Fear of current or ex partner: Not on file     Emotionally abused: Not on file     Physically abused: Not on file     Forced sexual activity: Not on file   Other Topics Concern   ? Not on file   Social History Narrative    He is .          Medications  Allergies   Current Outpatient Medications   Medication Sig Dispense Refill   ? acetaminophen (TYLENOL) 500 MG tablet Take 500-1,000 mg by  mouth every 6 (six) hours as needed for pain.     ? allopurinoL (ZYLOPRIM) 100 MG tablet Take 50 mg by mouth daily.     ? amitriptyline (ELAVIL) 10 MG tablet Take 10 mg by mouth.     ? atorvastatin (LIPITOR) 80 MG tablet Take 80 mg by mouth daily.     ? clopidogreL (PLAVIX) 75 mg tablet Take 75 mg by mouth daily.     ? ezetimibe (ZETIA) 10 mg tablet Take 10 mg by mouth daily.     ? isosorbide mononitrate (IMDUR) 30 MG 24 hr tablet Take 120 mg by mouth daily.     ? lisinopriL (PRINIVIL,ZESTRIL) 10 MG tablet Take 10 mg by mouth 2 (two) times a day.     ? metoprolol tartrate (LOPRESSOR) 25 MG tablet Take 1 tablet (25 mg total) by mouth 2 (two) times a day. 60 tablet 0   ? acetaminophen-codeine (TYLENOL-CODEINE #3) 300-30 mg per tablet Take 1-2 tablets by mouth every 6 (six) hours as needed for pain.     ? aspirin-calcium carbonate 81 mg-300 mg calcium(777 mg) Tab Take by mouth.     ? docosahexaenoic acid/epa (FISH OIL ORAL) Take 1 capsule by mouth daily.     ? lidocaine (LMX) 4 % cream Apply 1 application topically daily as needed (mild pain).     ? nitroglycerin (NITROSTAT) 0.4 MG SL tablet Place 0.4 mg under the tongue every 5 (five) minutes as needed for chest pain.     ? pregabalin (LYRICA) 50 MG capsule Take 50 mg by mouth 2 (two) times a day.      ? senna (SENOKOT) 8.6 mg tablet Take 1 tablet by mouth 2 (two) times a day.       No current facility-administered medications for this visit.     No Known Allergies     Cardiac Testing:    EKG: Sinus rhythm with prior inferior infarction per my personal review.    Echocardiogram:   Results for orders placed during the hospital encounter of 03/06/20   Echo Complete [ECH10] 03/06/2020    Narrative   No previous study for comparison.    Normal left ventricular size. Mild to moderate left ventricular   hypertrophy with more prominent thickening of the left ventricular septum.    Left ventricle ejection fraction is normal. The calculated left   ventricular ejection fraction  is 60%.    Normal right ventricular size and systolic function.    Mild aortic regurgitation.    Mild mitral regurgitation.    Mild enlargement of the aortic root and ascending aorta.          Cardiac Catheterization at [facility]:     Coronary angiography performed on 11/21/2014 for positive pharmacologic nuclear stress test revealing a large amount of lateral ischemia revealed:     Left main 90% lesion.   LAD chronically occluded in the mid-portion.   Circumflex 90% proximal stenosis.   RCA proximal .   VIKI to LAD patent with collaterals filling the right SOLE and possible OM branches.   SVG to OM2 .   SVG to PDA      Dr. Fritz, who performed the procedure, did not feel that percutaneous revascularization was feasible and that target vessels for repeat bypass were also not available. Maximizing medical therapy was recommended.        Results for orders placed during the hospital encounter of 03/16/20   NM Pharmacologic Stress Test [RER531] 03/16/2020    Narrative ??  The nuclear stress test is abnormal.  ??  There is a medium sized area of moderate ischemia in the inferior and   inferolateral segment(s) of the left ventricle.  ??  There is a large area of moderate ischemia in the entire anterior and   anterolateral segment(s) of the left ventricle.  ??  There is a medium sized area of a moderate degree of nontransmural   infarction in the inferior and inferolateral segment(s) of the left   ventricle.  ??  Left ventricular function is mildly reduced.  ??  The left ventricular ejection fraction at rest is 47%.  ??  The patient is at a high risk of future cardiac ischemic events.  ??  There is no prior study for comparison.         Imaging:    Nm Pharmacologic Stress Test    Result Date: 3/16/2020  ??  The nuclear stress test is abnormal. ??  There is a medium sized area of moderate ischemia in the inferior and inferolateral segment(s) of the left ventricle. ??  There is a large area of moderate ischemia in  the entire anterior and anterolateral segment(s) of the left ventricle. ??  There is a medium sized area of a moderate degree of nontransmural infarction in the inferior and inferolateral segment(s) of the left ventricle. ??  Left ventricular function is mildly reduced. ??  The left ventricular ejection fraction at rest is 47%. ??  The patient is at a high risk of future cardiac ischemic events. ??  There is no prior study for comparison.        Lab Results    Chemistry/lipid CBC Cardiac Enzymes/BNP/TSH/INR   Lab Results   Component Value Date    CREATININE 1.68 (H) 03/07/2020    BUN 21 03/07/2020    K 4.1 03/07/2020     03/07/2020     (H) 03/07/2020    CO2 24 03/07/2020    Lab Results   Component Value Date    WBC 5.9 03/06/2020    HGB 12.9 (L) 03/06/2020    HCT 38.5 (L) 03/06/2020    MCV 92 03/06/2020     03/06/2020    Lab Results   Component Value Date    TROPONINI 0.03 03/06/2020     (H) 03/06/2020    INR 0.98 03/06/2020          Kristopher Vazquez  Noninvasive Cardiologist  [facility]  [phone]

## 2021-06-30 NOTE — PROGRESS NOTES
Progress Notes by Kristopher Vazquez MD at 2/3/2021 11:10 AM     Author: Kristopher Vazquez MD Service: -- Author Type: Physician    Filed: 2/3/2021 12:04 PM Encounter Date: 2/3/2021 Status: Signed    : Kristopher Vazquez MD (Physician)             Assessment:    1. Coronary artery disease of native artery of native heart with stable angina pectoris (H)  clopidogreL (PLAVIX) 75 mg tablet    isosorbide mononitrate (IMDUR) 120 MG 24 hr tablet    nitroglycerin (NITROSTAT) 0.4 MG SL tablet   2. Dyslipidemia, goal LDL below 70  LDL Cholesterol, Direct    atorvastatin (LIPITOR) 80 MG tablet    ezetimibe (ZETIA) 10 mg tablet   3. CKD (chronic kidney disease) stage 3, GFR 30-59 ml/min         Plan:    1. Continue current cardiovascular medical therapy.  2. We will call the  service with the results of today's LDL measurement.  3. Return to see Dr. Vazquez in 1 year.    An After Visit Summary was printed and given to the patient.    Subjective:    Kunal Healy returned for a planned follow up visit.    His prior and first visit with me was a telephone visit last spring. Our visit today in the clinic was facilitated by a professional Scali  on the telephone line.    Wa tells me that he only uses nitroglycerin when he exerts himself strenuously.  This is infrequent and the nitro is always effective.     He asked for refills of all of his cardiovascular medications today and they were provided.  I did change his isosorbide mononitrate from four 30 mg tablets to a single 120 mg tablet each day.    We reviewed how his renal function is stable.  He has not had a lipid profile checked in nearly a year and agreed to have a direct LDL measured today.    He shared that some mornings he may feel weak or lightheaded if his blood pressures between 90 and 110 systolic.  He felt that way this morning and states he drank some juice and then felt better.    Past Medical History:    Patient Active Problem List   Diagnosis    ? Coronary artery disease of native artery of native heart with stable angina pectoris (H)   ? CKD (chronic kidney disease) stage 3, GFR 30-59 ml/min   ? Dyslipidemia, goal LDL below 70   ? Essential hypertension       Past Medical History:   Diagnosis Date   ? Cerebrovascular accident (CVA), unspecified mechanism (H) 10/25/2016   ? CKD (chronic kidney disease) stage 3, GFR 30-59 ml/min 10/26/2016   ? Coronary artery disease due to lipid rich plaque 12/30/2011    he transferred his care from the P team in [facility] to the [facility] team in [facility] 2021   ? Dyslipidemia, goal LDL below 70 04/27/2012   ? Essential hypertension 12/30/2011   ? History of ischemic cardiomyopathy 10/11/2016    preop CABG LV systolic function by LV gram was 45% with inferior akinesis at that time   ? Hypertensive urgency 10/10/2016   ? Kidney cyst, acquired 12/16/2016       Past Surgical History:   Procedure Laterality Date   ? CARDIAC CATHETERIZATION  2006    in [place]   ? CARDIAC CATHETERIZATION  11/21/2014    by Dr. Sepulveda at [facility], no PCI was done: LMCA 90%, mid %, prox LCX 90%, prox %, SVG's to OM2 and RPDA 100%, LIMA to LAD patent with L to R collaterals   ? CORONARY ARTERY BYPASS GRAFT  12/22/2006    LIMA to LAD, vein graft to OM2, vein graft to PDA. This was complicated by postop afib. Done in [place], [place]        reports that he has never smoked. He has never used smokeless tobacco. He reports previous alcohol use. He reports that he does not use drugs.  Social History     Socioeconomic History   ? Marital status:      Spouse name: Not on file   ? Number of children: 6   ? Years of education: Not on file   ? Highest education level: Not on file   Occupational History     Employer: RETIRED   Social Needs   ? Financial resource strain: Not on file   ? Food insecurity     Worry: Not on file     Inability: Not on file   ? Transportation needs     Medical: Not on file     Non-medical: Not on  file   Tobacco Use   ? Smoking status: Never Smoker   ? Smokeless tobacco: Never Used   Substance and Sexual Activity   ? Alcohol use: Not Currently   ? Drug use: Never   ? Sexual activity: Not on file   Lifestyle   ? Physical activity     Days per week: Not on file     Minutes per session: Not on file   ? Stress: Not on file   Relationships   ? Social connections     Talks on phone: Not on file     Gets together: Not on file     Attends [employer] service: Not on file     Active member of club or organization: Not on file     Attends meetings of clubs or organizations: Not on file     Relationship status: Not on file   ? Intimate partner violence     Fear of current or ex partner: Not on file     Emotionally abused: Not on file     Physically abused: Not on file     Forced sexual activity: Not on file   Other Topics Concern   ? Not on file   Social History Narrative    He is .       Family History   Problem Relation Age of Onset   ? Kidney disease Mother        Outpatient Encounter Medications as of 2/3/2021   Medication Sig Dispense Refill   ? acetaminophen (TYLENOL) 500 MG tablet Take 500-1,000 mg by mouth every 6 (six) hours as needed for pain.     ? acetaminophen-codeine (TYLENOL-CODEINE #3) 300-30 mg per tablet Take 1-2 tablets by mouth every 6 (six) hours as needed for pain.     ? allopurinoL (ZYLOPRIM) 100 MG tablet Take 50 mg by mouth daily.     ? aspirin 81 mg chewable tablet Chew 81 mg daily.     ? atorvastatin (LIPITOR) 80 MG tablet Take 1 tablet (80 mg total) by mouth daily. 90 tablet 3   ? carvediloL (COREG) 3.125 MG tablet Take 3.125 mg by mouth 2 (two) times a day with meals.     ? cephalexin (KEFLEX) 500 MG capsule Take 500 mg by mouth 3 (three) times a day.     ? clopidogreL (PLAVIX) 75 mg tablet Take 1 tablet (75 mg total) by mouth daily. 90 tablet 3   ? ezetimibe (ZETIA) 10 mg tablet Take 1 tablet (10 mg total) by mouth daily. 90 tablet 3   ? FLUAD QUAD 2020-21,65Y UP,,PF, 60 mcg (15 mcg x  "4)/0.5 mL Syrg PHARMACY ADMINISTERED     ? gabapentin (NEURONTIN) 300 MG capsule Take 300 mg by mouth 2 (two) times a day.     ? isosorbide mononitrate (IMDUR) 120 MG 24 hr tablet Take 1 tablet (120 mg total) by mouth daily. 90 tablet 3   ? lidocaine (LMX) 4 % cream Apply 1 application topically daily as needed (mild pain).     ? lisinopriL (PRINIVIL,ZESTRIL) 10 MG tablet Take 10 mg by mouth 2 (two) times a day.     ? nitroglycerin (NITROSTAT) 0.4 MG SL tablet Place 1 tablet (0.4 mg total) under the tongue every 5 (five) minutes as needed for chest pain. 25 tablet 2   ? senna (SENOKOT) 8.6 mg tablet Take 1 tablet by mouth 2 (two) times a day.       No facility-administered encounter medications on file as of 2/3/2021.        Review of Systems:     General: Weight Loss  Eyes: WNL  Ears/Nose/Throat: WNL  Lungs: WNL  Heart: Irregular Heartbeat  Stomach: Constipation, Heartburn  Bladder: WNL  Muscle/Joints: WNL  Skin: WNL  Nervous System: WNL  Mental Health: WNL     Blood: WNL    Objective:     /60 (Patient Site: Left Arm, Patient Position: Sitting, Cuff Size: Adult Regular)   Pulse 64   Resp 16   Ht 5' 4\" (1.626 m)   Wt 135 lb (61.2 kg)   SpO2 99%   BMI 23.17 kg/m    Wt Readings from Last 5 Encounters:   02/03/21 135 lb (61.2 kg)   03/07/20 132 lb 9.6 oz (60.1 kg)   09/07/19 142 lb (64.4 kg)        Physical Exam:    GENERAL APPEARANCE: alert, no apparent distress  EYES: no scleral icterus  NECK: no carotid bruits or adenopathy, jugular venous pressure is less than 5 cm  CHEST: symmetric, the lungs are clear to auscultation  CARDIOVASCULAR: regular rhythm without murmur or gallop  EXTREMITIES: no cyanosis, clubbing or edema  SKIN: no xanthelasma  NEUROLOGIC: normal gait and coordination  PSYCHIATRIC: mood and affect are normal    Cardiac Testing:    Echocardiogram:   Results for orders placed during the hospital encounter of 03/06/20   Echo Complete [ECH10] 03/06/2020    Narrative   No previous study for " comparison.    Normal left ventricular size. Mild to moderate left ventricular   hypertrophy with more prominent thickening of the left ventricular septum.    Left ventricle ejection fraction is normal. The calculated left   ventricular ejection fraction is 60%.    Normal right ventricular size and systolic function.    Mild aortic regurgitation.    Mild mitral regurgitation.    Mild enlargement of the aortic root and ascending aorta.           Results for orders placed during the hospital encounter of 03/16/20   NM Pharmacologic Stress Test [ITR103] 03/16/2020    Narrative ??  The nuclear stress test is abnormal.  ??  There is a medium sized area of moderate ischemia in the inferior and   inferolateral segment(s) of the left ventricle.  ??  There is a large area of moderate ischemia in the entire anterior and   anterolateral segment(s) of the left ventricle.  ??  There is a medium sized area of a moderate degree of nontransmural   infarction in the inferior and inferolateral segment(s) of the left   ventricle.  ??  Left ventricular function is mildly reduced.  ??  The left ventricular ejection fraction at rest is 47%.  ??  The patient is at a high risk of future cardiac ischemic events.  ??  There is no prior study for comparison.         Imaging:    No results found.    Lab Results:    Lab Results   Component Value Date    CREATININE 1.66 (H) 01/27/2021    BUN 24 01/27/2021     01/27/2021    K 4.1 01/27/2021     (H) 01/27/2021    CO2 26 01/27/2021     BNP (pg/mL)   Date Value   03/06/2020 104 (H)     Creatinine (mg/dL)   Date Value   01/27/2021 1.66 (H)   03/07/2020 1.68 (H)   03/06/2020 1.63 (H)   09/07/2019 1.87 (H)     Lab Results   Component Value Date    WBC 5.9 03/06/2020    HGB 12.9 (L) 03/06/2020    HCT 38.5 (L) 03/06/2020    MCV 92 03/06/2020     03/06/2020           Much or all of the text in this note was generated through the use of the Dragon Dictate voice-to-text software. Errors  in spelling or words which seem out of context are unintentional. Sound alike errors, in particular, may have escaped editing.

## 2021-07-04 NOTE — ADDENDUM NOTE
Addendum Note by Brianna Thao RN at 2/5/2021  8:18 AM     Author: Brianna Thao RN Service: -- Author Type: Registered Nurse    Filed: 2/5/2021  9:28 AM Encounter Date: 2/5/2021 Status: Signed    : Brianna Thao RN (Registered Nurse)    Addended by: BRIANNA THAO on: 2/5/2021 09:28 AM        Modules accepted: Orders

## 2021-07-20 DIAGNOSIS — I10 ESSENTIAL HYPERTENSION WITH GOAL BLOOD PRESSURE LESS THAN 130/80: ICD-10-CM

## 2021-07-20 RX ORDER — LISINOPRIL 10 MG/1
10 TABLET ORAL 2 TIMES DAILY
Qty: 60 TABLET | Refills: 0 | Status: SHIPPED | OUTPATIENT
Start: 2021-07-20 | End: 2021-08-19

## 2021-07-20 NOTE — TELEPHONE ENCOUNTER
"Requested Prescriptions   Pending Prescriptions Disp Refills     lisinopril (ZESTRIL) 10 MG tablet 60 tablet 0     Sig: Take 1 tablet (10 mg) by mouth 2 times daily Labs due. Please follow up in clinic for next refill.       ACE Inhibitors (Including Combos) Protocol Failed - 7/20/2021  8:04 AM        Failed - Recent (12 mo) or future (30 days) visit within the authorizing provider's specialty     Patient has had an office visit with the authorizing provider or a provider within the authorizing providers department within the previous 12 mos or has a future within next 30 days. See \"Patient Info\" tab in inbasket, or \"Choose Columns\" in Meds & Orders section of the refill encounter.              Failed - Normal serum creatinine on file in past 12 months     Recent Labs   Lab Test 01/27/21  0857   CR 1.66*       Ok to refill medication if creatinine is low          Passed - Blood pressure under 140/90 in past 12 months     BP Readings from Last 3 Encounters:   02/03/21 110/60   02/18/20 (!) 162/87   01/07/20 (!) 152/85                 Passed - Medication is active on med list        Passed - Patient is age 18 or older        Passed - Normal serum potassium on file in past 12 months     Recent Labs   Lab Test 01/27/21  0857   POTASSIUM 4.1                Darling MURPHYN, RN    "

## 2021-07-23 DIAGNOSIS — M10.072 ACUTE IDIOPATHIC GOUT OF LEFT FOOT: ICD-10-CM

## 2021-07-23 NOTE — TELEPHONE ENCOUNTER
"Requested Prescriptions   Pending Prescriptions Disp Refills    allopurinol (ZYLOPRIM) 100 MG tablet 45 tablet 3     Sig: Take 0.5 tablets (50 mg) by mouth daily For gout.        Gout Agents Protocol Failed - 7/23/2021 12:30 PM        Failed - CBC on file in past 12 months     Recent Labs   Lab Test 03/06/20  0821   WBC 5.9   RBC 4.18*   HGB 12.9*   HCT 38.5*                    Failed - ALT on file in past 12 months     Recent Labs   Lab Test 09/07/19  0946   ALT <9             Failed - Has Uric Acid on file in past 12 months and value is less than 6     Recent Labs   Lab Test 07/03/19  0754   URIC 8.9*     If level is 6mg/dL or greater, ok to refill one time and refer to provider.           Failed - Recent (12 mo) or future (30 days) visit within the authorizing provider's specialty     Patient has had an office visit with the authorizing provider or a provider within the authorizing providers department within the previous 12 mos or has a future within next 30 days. See \"Patient Info\" tab in inbasket, or \"Choose Columns\" in Meds & Orders section of the refill encounter.              Failed - Normal serum creatinine on file in the past 12 months     Recent Labs   Lab Test 01/27/21  0857   CR 1.66*       Ok to refill medication if creatinine is low          Passed - Medication is active on med list        Passed - Patient is age 18 or older              "

## 2021-07-25 RX ORDER — ALLOPURINOL 100 MG/1
50 TABLET ORAL DAILY
Qty: 45 TABLET | Refills: 3 | Status: SHIPPED | OUTPATIENT
Start: 2021-07-25 | End: 2022-02-16

## 2021-08-19 DIAGNOSIS — I10 ESSENTIAL HYPERTENSION WITH GOAL BLOOD PRESSURE LESS THAN 130/80: ICD-10-CM

## 2021-08-19 RX ORDER — LISINOPRIL 10 MG/1
10 TABLET ORAL 2 TIMES DAILY
Qty: 60 TABLET | Refills: 0 | Status: SHIPPED | OUTPATIENT
Start: 2021-08-19 | End: 2021-09-24

## 2021-08-19 NOTE — TELEPHONE ENCOUNTER
Routing refill request to provider for review/approval because:  Yumiko given x1 and patient did not follow up, please advise  Rohini Weiner BSN, RN

## 2021-09-24 DIAGNOSIS — I10 ESSENTIAL HYPERTENSION WITH GOAL BLOOD PRESSURE LESS THAN 130/80: ICD-10-CM

## 2021-09-24 RX ORDER — LISINOPRIL 10 MG/1
TABLET ORAL
Qty: 180 TABLET | Refills: 0 | Status: SHIPPED | OUTPATIENT
Start: 2021-09-24 | End: 2022-03-29

## 2021-09-24 NOTE — TELEPHONE ENCOUNTER
Routing refill request to provider for review/approval because:  Labs out of range:  Creatinine    Darling Brewster BSN, RN

## 2022-01-29 ENCOUNTER — LAB (OUTPATIENT)
Dept: LAB | Facility: CLINIC | Age: 77
End: 2022-01-29
Payer: MEDICARE

## 2022-01-29 DIAGNOSIS — I25.118 CORONARY ARTERY DISEASE OF NATIVE ARTERY OF NATIVE HEART WITH STABLE ANGINA PECTORIS (H): ICD-10-CM

## 2022-01-29 DIAGNOSIS — I10 HYPERTENSION GOAL BP (BLOOD PRESSURE) < 130/80: ICD-10-CM

## 2022-01-29 DIAGNOSIS — I25.709 CORONARY ARTERY DISEASE INVOLVING CORONARY BYPASS GRAFT OF NATIVE HEART WITH ANGINA PECTORIS (H): ICD-10-CM

## 2022-01-29 DIAGNOSIS — I25.10 CORONARY ARTERY DISEASE INVOLVING NATIVE CORONARY ARTERY OF NATIVE HEART WITHOUT ANGINA PECTORIS: ICD-10-CM

## 2022-01-29 DIAGNOSIS — E78.5 DYSLIPIDEMIA, GOAL LDL BELOW 70: ICD-10-CM

## 2022-01-29 PROCEDURE — 82248 BILIRUBIN DIRECT: CPT

## 2022-01-29 PROCEDURE — 80061 LIPID PANEL: CPT

## 2022-01-29 PROCEDURE — 36415 COLL VENOUS BLD VENIPUNCTURE: CPT

## 2022-01-29 PROCEDURE — 80053 COMPREHEN METABOLIC PANEL: CPT

## 2022-01-31 LAB
ALBUMIN SERPL-MCNC: 3.7 G/DL (ref 3.4–5)
ALP SERPL-CCNC: 65 U/L (ref 40–150)
ALT SERPL W P-5'-P-CCNC: 25 U/L (ref 0–70)
AST SERPL W P-5'-P-CCNC: 20 U/L (ref 0–45)
BILIRUB DIRECT SERPL-MCNC: 0.1 MG/DL (ref 0–0.2)
BILIRUB SERPL-MCNC: 0.6 MG/DL (ref 0.2–1.3)
CHOLEST SERPL-MCNC: 210 MG/DL
FASTING STATUS PATIENT QL REPORTED: YES
HDLC SERPL-MCNC: 46 MG/DL
LDLC SERPL CALC-MCNC: 145 MG/DL
NONHDLC SERPL-MCNC: 164 MG/DL
PROT SERPL-MCNC: 6.4 G/DL (ref 6.8–8.8)
TRIGL SERPL-MCNC: 94 MG/DL

## 2022-02-03 ENCOUNTER — OFFICE VISIT (OUTPATIENT)
Dept: CARDIOLOGY | Facility: CLINIC | Age: 77
End: 2022-02-03
Payer: MEDICARE

## 2022-02-03 VITALS
DIASTOLIC BLOOD PRESSURE: 72 MMHG | BODY MASS INDEX: 24.03 KG/M2 | SYSTOLIC BLOOD PRESSURE: 138 MMHG | RESPIRATION RATE: 16 BRPM | WEIGHT: 140 LBS | HEART RATE: 58 BPM

## 2022-02-03 DIAGNOSIS — I10 HYPERTENSION GOAL BP (BLOOD PRESSURE) < 130/80: ICD-10-CM

## 2022-02-03 DIAGNOSIS — I25.709 CORONARY ARTERY DISEASE INVOLVING CORONARY BYPASS GRAFT OF NATIVE HEART WITH ANGINA PECTORIS (H): ICD-10-CM

## 2022-02-03 DIAGNOSIS — I25.10 CORONARY ARTERY DISEASE INVOLVING NATIVE CORONARY ARTERY OF NATIVE HEART WITHOUT ANGINA PECTORIS: Primary | ICD-10-CM

## 2022-02-03 LAB
ANION GAP SERPL CALCULATED.3IONS-SCNC: 4 MMOL/L (ref 3–14)
BUN SERPL-MCNC: 40 MG/DL (ref 7–30)
CALCIUM SERPL-MCNC: 9 MG/DL (ref 8.5–10.1)
CHLORIDE BLD-SCNC: 117 MMOL/L (ref 94–109)
CO2 SERPL-SCNC: 22 MMOL/L (ref 20–32)
CREAT SERPL-MCNC: 2.01 MG/DL (ref 0.66–1.25)
GFR SERPL CREATININE-BSD FRML MDRD: 34 ML/MIN/1.73M2
GLUCOSE BLD-MCNC: 93 MG/DL (ref 70–99)
POTASSIUM BLD-SCNC: 4.1 MMOL/L (ref 3.4–5.3)
SODIUM SERPL-SCNC: 143 MMOL/L (ref 133–144)

## 2022-02-03 PROCEDURE — 99213 OFFICE O/P EST LOW 20 MIN: CPT | Performed by: INTERNAL MEDICINE

## 2022-02-03 RX ORDER — ISOSORBIDE MONONITRATE 120 MG/1
120 TABLET, EXTENDED RELEASE ORAL DAILY
COMMUNITY
Start: 2021-02-03 | End: 2022-02-14

## 2022-02-03 NOTE — LETTER
2/3/2022    [place]  [address]  [place] [address]    RE: Jessica Healy       Dear Colleague,     I had the pleasure of seeing Jessica Healy in the [facility].      [facility]  [phone]        Assessment/Recommendations   Patient with known coronary artery disease, status post bypass surgery who is doing well.  He had very high lipids and his LDL cholesterols dropped by more than 50% going from 3 oh 5-145.  He is on a maximal dose of atorvastatin plus Zetia.  His blood pressure is at goal and he takes an antiplatelet agent.    He is not having any anginal symptoms and he remains active.    I have not change him his medications.  His renal function has not been normal so we will not add a basic metabolic panel to his blood that was drawn 5 days ago and call him with results.    He will call if he has any change in his functional capacity or new symptoms and if not we will see him back in 1 year.    Thank you for allowing us to participate in his care.       History of Present Illness/Subjective    Mr. Jessica Healy is a 76 year old male with known coronary artery disease, status post bypass surgery.  He also has known hyperlipidemia but has had dramatic reduction in his LDL cholesterol from 305-145.  He takes our atorvastatin plus Zetia.    He denies chest discomfort, orthopnea, paroxysmal nocturnal dyspnea, and gets mild intermittent peripheral edema.  No chest pains.    .       Physical Examination Review of Systems   /72 (BP Location: Left arm, Patient Position: Sitting, Cuff Size: Adult Regular)   Pulse 58   Resp 16   Wt 63.5 kg (140 lb)   BMI 24.03 kg/m    Body mass index is 24.03 kg/m .  Wt Readings from Last 3 Encounters:   02/03/22 63.5 kg (140 lb)   02/03/21 61.2 kg (135 lb)   02/18/20 61.7 kg (136 lb)     General Appearance:   Alert, cooperative and in no acute distress.   ENT/Mouth: Patient wearing a mask.      EYES:  no scleral icterus,  normal conjunctivae   Neck: JVP normal. No Hepatojugular reflux. Thyroid not visualized.   Chest/Lungs:   Lungs are clear to auscultation, equal chest wall expansion.   Cardiovascular:   S1, S2 with 1/6 systolic murmur , no clicks or rubs. Brachial, radial  pulses are intact and symetric. No carotid bruits noted   Abdomen:  Nontender. BS+.    Extremities: No cyanosis, clubbing or edema   Skin: no xanthelasma, warm.    Neurologic: normal arm movement bilateral, no tremors     Psychiatric: Appropriate affect.      Enc Vitals  BP: 138/72  Pulse: 58  Resp: 16  Weight: 63.5 kg (140 lb) (With Shoes)  Height:  (Not Taken)                                           Medical History  Surgical History Family History Social History   Past Medical History:   Diagnosis Date     CAD (coronary artery disease) 12/30/2011     Cerebrovascular accident (CVA), unspecified mechanism (H) 10/25/2016     CKD (chronic kidney disease) stage 3, GFR 30-59 ml/min (H) 10/26/2016     Coronary artery disease due to lipid rich plaque 12/30/2011    he transferred his care from the P team in [facility] to the [facility] team in 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 He also said he does not like to take too many medications. Our RN spoke with him via  and hopefully has  convinced him to take it daily; we will recheck his lipid profile in 3 months    Past Surgical History:   Procedure Laterality Date     BYPASS GRAFT ARTERY CORONARY  12/22/2006    GARZA to LAD, vein graft to OM2, vein graft to PDA. This was complicated by postop afib. Done in [place], [place]     CARDIAC CATHETERIZATION  2006    in [place]     CARDIAC CATHETERIZATION  11/21/2014    by Dr. Sepulveda at [facility], no PCI was done: LMCA 90%, mid %, prox LCX 90%, prox %, SVG's to OM2 and RPDA 100%, LIMA to LAD patent with L to R collaterals     SURGICAL HISTORY OF -       CABG 2006    Family History   Problem Relation Age of Onset     Family History Negative Other         no known specifics     Kidney Disease Mother     Social History     Socioeconomic History     Marital status:      Spouse name: Not on file     Number of children: 6     Years of education: Not on file     Highest education level: Not on file   Occupational History     Not on file   Tobacco Use     Smoking status: Never Smoker     Smokeless tobacco: Never Used   Substance and Sexual Activity     Alcohol use: Not Currently     Drug use: Never     Sexual activity: Never     Partners: Female   Other Topics Concern     Parent/sibling w/ CABG, MI or angioplasty before 65F 55M? No   Social History Narrative    He is .     Social Determinants of Health     Financial Resource Strain: Not on file   Food Insecurity: Not on file   Transportation Needs: Not on file   Physical Activity: Not on file   Stress: Not on file   Social Connections: Not on file   Intimate Partner Violence: Not on file   Housing Stability: Not on file          Medications  Allergies   Current Outpatient Medications   Medication Sig Dispense Refill     acetaminophen (TYLENOL) 500 MG tablet Take 500-1,000 mg by mouth every 6 hours as needed for mild pain       allopurinol (ZYLOPRIM) 100 MG tablet Take 0.5 tablets (50 mg) by mouth daily For gout. 45 tablet 3      amitriptyline (ELAVIL) 10 MG tablet Take 1 tablet (10 mg) by mouth At Bedtime 90 tablet 3     atorvastatin (LIPITOR) 80 MG tablet TAKE ONE TABLET BY MOUTH ONCE DAILY FOR CHOLESTEROL. 90 tablet 0     carvedilol (COREG) 3.125 MG tablet TAKE 1 TABLET BY MOUTH TWICE DAILY WITH MEALS 180 tablet 3     clopidogrel (PLAVIX) 75 MG tablet TAKE ONE TABLET BY MOUTH ONCE DAILY. 90 tablet 0     ezetimibe (ZETIA) 10 MG tablet TAKE 1 TABLET BY MOUTH DAILY FOR CHOLESTERIK 90 tablet 0     isosorbide mononitrate CR (IMDUR) 120 MG 24 HR ER tablet Take 120 mg by mouth daily       lisinopril (ZESTRIL) 10 MG tablet TAKE 1 TABLET BY MOUTH TWICE DAILY. LABS DUE, PLEASE FOLLOW UP IN CLINIC FOR NEXT REFILL 180 tablet 0     metoprolol tartrate (LOPRESSOR) 25 MG tablet Take 25 mg by mouth 2 times daily       nitroGLYcerin (NITROSTAT) 0.4 MG sublingual tablet PLACE 1 TABLET UNDER THE TONGUE AT THE ONSET OF CHEST PAIN. MAY REPEAT EVERY 5 MINUTES AS NEEDED FOR A TOTAL OF 3 DOSES. 25 tablet 3     ORDER FOR [facility] Home Blood pressure monitor machine 1 each 0     predniSONE (DELTASONE) 20 MG tablet Take 3 tabs by mouth daily x 3 days, then 2 tabs daily x 3 days, then 1 tab daily x 3 days, then 1/2 tab daily x 3 days. 20 tablet 3    Allergies   Allergen Reactions     Nkda [No Known Drug Allergies]      Seasonal Allergies          Lab Results    Chemistry/lipid CBC Cardiac Enzymes/BNP/TSH/INR   Lab Results   Component Value Date    CHOL 210 (H) 01/29/2022    HDL 46 01/29/2022    TRIG 94 01/29/2022    BUN 24 01/27/2021     01/27/2021    CO2 26 01/27/2021    Lab Results   Component Value Date    WBC 5.9 03/06/2020    HGB 12.9 (L) 03/06/2020    HCT 38.5 (L) 03/06/2020    MCV 92 03/06/2020     03/06/2020    Lab Results   Component Value Date    TROPONINI 0.03 03/06/2020     (H) 03/06/2020    TSH 0.60 11/30/2016    INR 0.98 03/07/2020                Thank you for allowing me to participate in the care of your patient.      Sincerely,      Gilberto Cordon MD     [facility]  cc:   No referring provider defined for this encounter.

## 2022-02-03 NOTE — PROGRESS NOTES
[facility]  [phone]        Assessment/Recommendations   Patient with known coronary artery disease, status post bypass surgery who is doing well.  He had very high lipids and his LDL cholesterols dropped by more than 50% going from 3 oh 5-145.  He is on a maximal dose of atorvastatin plus Zetia.  His blood pressure is at goal and he takes an antiplatelet agent.    He is not having any anginal symptoms and he remains active.    I have not change him his medications.  His renal function has not been normal so we will not add a basic metabolic panel to his blood that was drawn 5 days ago and call him with results.    He will call if he has any change in his functional capacity or new symptoms and if not we will see him back in 1 year.    Thank you for allowing us to participate in his care.       History of Present Illness/Subjective    Mr. Jessica Healy is a 76 year old male with known coronary artery disease, status post bypass surgery.  He also has known hyperlipidemia but has had dramatic reduction in his LDL cholesterol from 305-145.  He takes our atorvastatin plus Zetia.    He denies chest discomfort, orthopnea, paroxysmal nocturnal dyspnea, and gets mild intermittent peripheral edema.  No chest pains.    .       Physical Examination Review of Systems   /72 (BP Location: Left arm, Patient Position: Sitting, Cuff Size: Adult Regular)   Pulse 58   Resp 16   Wt 63.5 kg (140 lb)   BMI 24.03 kg/m    Body mass index is 24.03 kg/m .  Wt Readings from Last 3 Encounters:   02/03/22 63.5 kg (140 lb)   02/03/21 61.2 kg (135 lb)   02/18/20 61.7 kg (136 lb)     General Appearance:   Alert, cooperative and in no acute distress.   ENT/Mouth: Patient wearing a mask.      EYES:  no scleral icterus, normal conjunctivae   Neck: JVP normal. No Hepatojugular reflux. Thyroid not visualized.   Chest/Lungs:   Lungs are clear to auscultation, equal chest wall expansion.   Cardiovascular:   S1, S2 with  1/6 systolic murmur , no clicks or rubs. Brachial, radial  pulses are intact and symetric. No carotid bruits noted   Abdomen:  Nontender. BS+.    Extremities: No cyanosis, clubbing or edema   Skin: no xanthelasma, warm.    Neurologic: normal arm movement bilateral, no tremors     Psychiatric: Appropriate affect.      Enc Vitals  BP: 138/72  Pulse: 58  Resp: 16  Weight: 63.5 kg (140 lb) (With Shoes)  Height:  (Not Taken)                                           Medical History  Surgical History Family History Social History   Past Medical History:   Diagnosis Date     CAD (coronary artery disease) 12/30/2011     Cerebrovascular accident (CVA), unspecified mechanism (H) 10/25/2016     CKD (chronic kidney disease) stage 3, GFR 30-59 ml/min (H) 10/26/2016     Coronary artery disease due to lipid rich plaque 12/30/2011    he transferred his care from the P team in [facility] to the [facility] team in [place] and 2021     CVA (cerebral vascular accident) (H) 10/16    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 He also said he does not like to take too many medications. Our RN spoke with him via  and hopefully has convinced him to take it daily; we will recheck his lipid profile in 3 months    Past Surgical History:   Procedure Laterality Date     BYPASS GRAFT ARTERY CORONARY  12/22/2006    GARZA to LAD, vein graft  to OM2, vein graft to PDA. This was complicated by postop afib. Done in [place], [place]     CARDIAC CATHETERIZATION  2006    in [place]     CARDIAC CATHETERIZATION  11/21/2014    by Dr. Sepulveda at [facility], no PCI was done: LMCA 90%, mid %, prox LCX 90%, prox %, SVG's to OM2 and RPDA 100%, LIMA to LAD patent with L to R collaterals     SURGICAL HISTORY OF -       CABG 2006    Family History   Problem Relation Age of Onset     Family History Negative Other         no known specifics     Kidney Disease Mother     Social History     Socioeconomic History     Marital status:      Spouse name: Not on file     Number of children: 6     Years of education: Not on file     Highest education level: Not on file   Occupational History     Not on file   Tobacco Use     Smoking status: Never Smoker     Smokeless tobacco: Never Used   Substance and Sexual Activity     Alcohol use: Not Currently     Drug use: Never     Sexual activity: Never     Partners: Female   Other Topics Concern     Parent/sibling w/ CABG, MI or angioplasty before 65F 55M? No   Social History Narrative    He is .     Social Determinants of Health     Financial Resource Strain: Not on file   Food Insecurity: Not on file   Transportation Needs: Not on file   Physical Activity: Not on file   Stress: Not on file   Social Connections: Not on file   Intimate Partner Violence: Not on file   Housing Stability: Not on file          Medications  Allergies   Current Outpatient Medications   Medication Sig Dispense Refill     acetaminophen (TYLENOL) 500 MG tablet Take 500-1,000 mg by mouth every 6 hours as needed for mild pain       allopurinol (ZYLOPRIM) 100 MG tablet Take 0.5 tablets (50 mg) by mouth daily For gout. 45 tablet 3     amitriptyline (ELAVIL) 10 MG tablet Take 1 tablet (10 mg) by mouth At Bedtime 90 tablet 3     atorvastatin (LIPITOR) 80 MG tablet TAKE ONE TABLET BY MOUTH ONCE DAILY FOR CHOLESTEROL. 90 tablet 0     carvedilol  (COREG) 3.125 MG tablet TAKE 1 TABLET BY MOUTH TWICE DAILY WITH MEALS 180 tablet 3     clopidogrel (PLAVIX) 75 MG tablet TAKE ONE TABLET BY MOUTH ONCE DAILY. 90 tablet 0     ezetimibe (ZETIA) 10 MG tablet TAKE 1 TABLET BY MOUTH DAILY FOR CHOLESTERIK 90 tablet 0     isosorbide mononitrate CR (IMDUR) 120 MG 24 HR ER tablet Take 120 mg by mouth daily       lisinopril (ZESTRIL) 10 MG tablet TAKE 1 TABLET BY MOUTH TWICE DAILY. LABS DUE, PLEASE FOLLOW UP IN CLINIC FOR NEXT REFILL 180 tablet 0     metoprolol tartrate (LOPRESSOR) 25 MG tablet Take 25 mg by mouth 2 times daily       nitroGLYcerin (NITROSTAT) 0.4 MG sublingual tablet PLACE 1 TABLET UNDER THE TONGUE AT THE ONSET OF CHEST PAIN. MAY REPEAT EVERY 5 MINUTES AS NEEDED FOR A TOTAL OF 3 DOSES. 25 tablet 3     ORDER FOR [facility] Home Blood pressure monitor machine 1 each 0     predniSONE (DELTASONE) 20 MG tablet Take 3 tabs by mouth daily x 3 days, then 2 tabs daily x 3 days, then 1 tab daily x 3 days, then 1/2 tab daily x 3 days. 20 tablet 3    Allergies   Allergen Reactions     Nkda [No Known Drug Allergies]      Seasonal Allergies          Lab Results    Chemistry/lipid CBC Cardiac Enzymes/BNP/TSH/INR   Lab Results   Component Value Date    CHOL 210 (H) 01/29/2022    HDL 46 01/29/2022    TRIG 94 01/29/2022    BUN 24 01/27/2021     01/27/2021    CO2 26 01/27/2021    Lab Results   Component Value Date    WBC 5.9 03/06/2020    HGB 12.9 (L) 03/06/2020    HCT 38.5 (L) 03/06/2020    MCV 92 03/06/2020     03/06/2020    Lab Results   Component Value Date    TROPONINI 0.03 03/06/2020     (H) 03/06/2020    TSH 0.60 11/30/2016    INR 0.98 03/07/2020

## 2022-02-04 ENCOUNTER — APPOINTMENT (OUTPATIENT)
Dept: INTERPRETER SERVICES | Facility: CLINIC | Age: 77
End: 2022-02-04
Payer: MEDICARE

## 2022-02-16 DIAGNOSIS — N18.30 CKD (CHRONIC KIDNEY DISEASE) STAGE 3, GFR 30-59 ML/MIN (H): Primary | ICD-10-CM

## 2022-02-16 RX ORDER — TAMSULOSIN HYDROCHLORIDE 0.4 MG/1
0.4 CAPSULE ORAL DAILY
COMMUNITY
End: 2023-02-10

## 2022-03-15 ENCOUNTER — APPOINTMENT (OUTPATIENT)
Dept: MRI IMAGING | Facility: HOSPITAL | Age: 77
DRG: 069 | End: 2022-03-15
Attending: INTERNAL MEDICINE
Payer: MEDICARE

## 2022-03-15 ENCOUNTER — APPOINTMENT (OUTPATIENT)
Dept: CT IMAGING | Facility: HOSPITAL | Age: 77
DRG: 069 | End: 2022-03-15
Attending: STUDENT IN AN ORGANIZED HEALTH CARE EDUCATION/TRAINING PROGRAM
Payer: MEDICARE

## 2022-03-15 ENCOUNTER — HOSPITAL ENCOUNTER (INPATIENT)
Facility: HOSPITAL | Age: 77
LOS: 2 days | Discharge: HOME | DRG: 069 | End: 2022-03-17
Attending: EMERGENCY MEDICINE | Admitting: INTERNAL MEDICINE
Payer: MEDICARE

## 2022-03-15 DIAGNOSIS — G45.9 TIA (TRANSIENT ISCHEMIC ATTACK): Primary | ICD-10-CM

## 2022-03-15 DIAGNOSIS — R29.90 STROKE-LIKE SYMPTOMS: ICD-10-CM

## 2022-03-15 LAB
ANION GAP SERPL CALCULATED.3IONS-SCNC: 7 MMOL/L (ref 5–18)
APTT PPP: 33 SECONDS (ref 22–38)
BASOPHILS # BLD AUTO: 0 10E3/UL (ref 0–0.2)
BASOPHILS NFR BLD AUTO: 1 %
BUN SERPL-MCNC: 29 MG/DL (ref 8–28)
CALCIUM SERPL-MCNC: 8.4 MG/DL (ref 8.5–10.5)
CHLORIDE BLD-SCNC: 106 MMOL/L (ref 98–107)
CHOLEST SERPL-MCNC: 179 MG/DL
CHOLEST SERPL-MCNC: 181 MG/DL
CO2 SERPL-SCNC: 22 MMOL/L (ref 22–31)
CREAT BLD-MCNC: 2.2 MG/DL (ref 0.7–1.3)
CREAT SERPL-MCNC: 2.16 MG/DL (ref 0.7–1.3)
EOSINOPHIL # BLD AUTO: 0.1 10E3/UL (ref 0–0.7)
EOSINOPHIL NFR BLD AUTO: 1 %
ERYTHROCYTE [DISTWIDTH] IN BLOOD BY AUTOMATED COUNT: 12.3 % (ref 10–15)
GFR SERPL CREATININE-BSD FRML MDRD: 31 ML/MIN/1.73M2
GFR SERPL CREATININE-BSD FRML MDRD: 31 ML/MIN/1.73M2
GLUCOSE BLD-MCNC: 199 MG/DL (ref 70–125)
GLUCOSE BLDC GLUCOMTR-MCNC: 207 MG/DL (ref 70–99)
GLUCOSE BLDC GLUCOMTR-MCNC: 89 MG/DL (ref 70–99)
HBA1C MFR BLD: 5.6 %
HCT VFR BLD AUTO: 37.3 % (ref 40–53)
HDLC SERPL-MCNC: 34 MG/DL
HDLC SERPL-MCNC: 34 MG/DL
HGB BLD-MCNC: 12 G/DL (ref 13.3–17.7)
HOLD SPECIMEN: NORMAL
HOLD SPECIMEN: NORMAL
IMM GRANULOCYTES # BLD: 0 10E3/UL
IMM GRANULOCYTES NFR BLD: 0 %
INR PPP: 1.05 (ref 0.85–1.15)
LDLC SERPL CALC-MCNC: 124 MG/DL
LDLC SERPL CALC-MCNC: 126 MG/DL
LYMPHOCYTES # BLD AUTO: 0.8 10E3/UL (ref 0.8–5.3)
LYMPHOCYTES NFR BLD AUTO: 16 %
MCH RBC QN AUTO: 31 PG (ref 26.5–33)
MCHC RBC AUTO-ENTMCNC: 32.2 G/DL (ref 31.5–36.5)
MCV RBC AUTO: 96 FL (ref 78–100)
MONOCYTES # BLD AUTO: 0.3 10E3/UL (ref 0–1.3)
MONOCYTES NFR BLD AUTO: 6 %
NEUTROPHILS # BLD AUTO: 3.8 10E3/UL (ref 1.6–8.3)
NEUTROPHILS NFR BLD AUTO: 76 %
NRBC # BLD AUTO: 0 10E3/UL
NRBC BLD AUTO-RTO: 0 /100
PLATELET # BLD AUTO: 160 10E3/UL (ref 150–450)
POTASSIUM BLD-SCNC: 3.7 MMOL/L (ref 3.5–5)
RBC # BLD AUTO: 3.87 10E6/UL (ref 4.4–5.9)
SARS-COV-2 RNA RESP QL NAA+PROBE: NEGATIVE
SODIUM SERPL-SCNC: 135 MMOL/L (ref 136–145)
TRIGL SERPL-MCNC: 105 MG/DL
TRIGL SERPL-MCNC: 105 MG/DL
TROPONIN I SERPL-MCNC: 0.02 NG/ML (ref 0–0.29)
TROPONIN I SERPL-MCNC: 0.02 NG/ML (ref 0–0.29)
WBC # BLD AUTO: 5 10E3/UL (ref 4–11)

## 2022-03-15 PROCEDURE — 85730 THROMBOPLASTIN TIME PARTIAL: CPT | Performed by: STUDENT IN AN ORGANIZED HEALTH CARE EDUCATION/TRAINING PROGRAM

## 2022-03-15 PROCEDURE — C9803 HOPD COVID-19 SPEC COLLECT: HCPCS

## 2022-03-15 PROCEDURE — 210N000001 HC R&B IMCU HEART CARE

## 2022-03-15 PROCEDURE — 85610 PROTHROMBIN TIME: CPT | Performed by: STUDENT IN AN ORGANIZED HEALTH CARE EDUCATION/TRAINING PROGRAM

## 2022-03-15 PROCEDURE — 99285 EMERGENCY DEPT VISIT HI MDM: CPT | Mod: 25

## 2022-03-15 PROCEDURE — 36415 COLL VENOUS BLD VENIPUNCTURE: CPT | Performed by: STUDENT IN AN ORGANIZED HEALTH CARE EDUCATION/TRAINING PROGRAM

## 2022-03-15 PROCEDURE — 70553 MRI BRAIN STEM W/O & W/DYE: CPT

## 2022-03-15 PROCEDURE — 84484 ASSAY OF TROPONIN QUANT: CPT | Performed by: INTERNAL MEDICINE

## 2022-03-15 PROCEDURE — G0426 INPT/ED TELECONSULT50: HCPCS | Mod: G0 | Performed by: PSYCHIATRY & NEUROLOGY

## 2022-03-15 PROCEDURE — 99223 1ST HOSP IP/OBS HIGH 75: CPT | Mod: AI | Performed by: INTERNAL MEDICINE

## 2022-03-15 PROCEDURE — 250N000013 HC RX MED GY IP 250 OP 250 PS 637: Performed by: INTERNAL MEDICINE

## 2022-03-15 PROCEDURE — 36415 COLL VENOUS BLD VENIPUNCTURE: CPT | Performed by: INTERNAL MEDICINE

## 2022-03-15 PROCEDURE — 83036 HEMOGLOBIN GLYCOSYLATED A1C: CPT | Performed by: EMERGENCY MEDICINE

## 2022-03-15 PROCEDURE — 84484 ASSAY OF TROPONIN QUANT: CPT | Mod: 91 | Performed by: STUDENT IN AN ORGANIZED HEALTH CARE EDUCATION/TRAINING PROGRAM

## 2022-03-15 PROCEDURE — 70496 CT ANGIOGRAPHY HEAD: CPT

## 2022-03-15 PROCEDURE — 82565 ASSAY OF CREATININE: CPT

## 2022-03-15 PROCEDURE — 255N000002 HC RX 255 OP 636: Performed by: INTERNAL MEDICINE

## 2022-03-15 PROCEDURE — 93005 ELECTROCARDIOGRAM TRACING: CPT | Performed by: STUDENT IN AN ORGANIZED HEALTH CARE EDUCATION/TRAINING PROGRAM

## 2022-03-15 PROCEDURE — 250N000011 HC RX IP 250 OP 636: Performed by: EMERGENCY MEDICINE

## 2022-03-15 PROCEDURE — 80048 BASIC METABOLIC PNL TOTAL CA: CPT | Performed by: STUDENT IN AN ORGANIZED HEALTH CARE EDUCATION/TRAINING PROGRAM

## 2022-03-15 PROCEDURE — 80061 LIPID PANEL: CPT | Performed by: EMERGENCY MEDICINE

## 2022-03-15 PROCEDURE — 87635 SARS-COV-2 COVID-19 AMP PRB: CPT | Performed by: EMERGENCY MEDICINE

## 2022-03-15 PROCEDURE — 85025 COMPLETE CBC W/AUTO DIFF WBC: CPT | Performed by: STUDENT IN AN ORGANIZED HEALTH CARE EDUCATION/TRAINING PROGRAM

## 2022-03-15 PROCEDURE — 250N000013 HC RX MED GY IP 250 OP 250 PS 637: Performed by: EMERGENCY MEDICINE

## 2022-03-15 PROCEDURE — A9585 GADOBUTROL INJECTION: HCPCS | Performed by: INTERNAL MEDICINE

## 2022-03-15 RX ORDER — ASPIRIN 81 MG/1
81 TABLET ORAL DAILY
Status: ON HOLD | COMMUNITY
End: 2022-03-17

## 2022-03-15 RX ORDER — ACETAMINOPHEN 325 MG/1
650-975 TABLET ORAL EVERY 6 HOURS PRN
Status: DISCONTINUED | OUTPATIENT
Start: 2022-03-15 | End: 2022-03-17 | Stop reason: HOSPADM

## 2022-03-15 RX ORDER — CLOPIDOGREL BISULFATE 75 MG/1
300 TABLET ORAL ONCE
Status: COMPLETED | OUTPATIENT
Start: 2022-03-15 | End: 2022-03-15

## 2022-03-15 RX ORDER — IOPAMIDOL 755 MG/ML
75 INJECTION, SOLUTION INTRAVASCULAR ONCE
Status: COMPLETED | OUTPATIENT
Start: 2022-03-15 | End: 2022-03-15

## 2022-03-15 RX ORDER — ASPIRIN 325 MG
325 TABLET ORAL ONCE
Status: COMPLETED | OUTPATIENT
Start: 2022-03-15 | End: 2022-03-15

## 2022-03-15 RX ORDER — ATORVASTATIN CALCIUM 40 MG/1
80 TABLET, FILM COATED ORAL DAILY
Status: DISCONTINUED | OUTPATIENT
Start: 2022-03-16 | End: 2022-03-17 | Stop reason: HOSPADM

## 2022-03-15 RX ORDER — HYDRALAZINE HYDROCHLORIDE 20 MG/ML
10 INJECTION INTRAMUSCULAR; INTRAVENOUS EVERY 6 HOURS PRN
Status: DISCONTINUED | OUTPATIENT
Start: 2022-03-15 | End: 2022-03-15

## 2022-03-15 RX ORDER — GADOBUTROL 604.72 MG/ML
7 INJECTION INTRAVENOUS ONCE
Status: COMPLETED | OUTPATIENT
Start: 2022-03-15 | End: 2022-03-15

## 2022-03-15 RX ORDER — ISOSORBIDE MONONITRATE 60 MG/1
120 TABLET, EXTENDED RELEASE ORAL DAILY
Status: DISCONTINUED | OUTPATIENT
Start: 2022-03-16 | End: 2022-03-17 | Stop reason: HOSPADM

## 2022-03-15 RX ORDER — ALLOPURINOL 100 MG/1
50 TABLET ORAL DAILY
COMMUNITY
End: 2023-01-16

## 2022-03-15 RX ORDER — CLOPIDOGREL BISULFATE 75 MG/1
75 TABLET ORAL DAILY
Status: DISCONTINUED | OUTPATIENT
Start: 2022-03-16 | End: 2022-03-17 | Stop reason: HOSPADM

## 2022-03-15 RX ORDER — LIDOCAINE 40 MG/G
CREAM TOPICAL
Status: DISCONTINUED | OUTPATIENT
Start: 2022-03-15 | End: 2022-03-17 | Stop reason: HOSPADM

## 2022-03-15 RX ORDER — EZETIMIBE 10 MG/1
10 TABLET ORAL AT BEDTIME
Status: DISCONTINUED | OUTPATIENT
Start: 2022-03-16 | End: 2022-03-17 | Stop reason: HOSPADM

## 2022-03-15 RX ORDER — HYDRALAZINE HYDROCHLORIDE 20 MG/ML
10 INJECTION INTRAMUSCULAR; INTRAVENOUS EVERY 6 HOURS PRN
Status: DISCONTINUED | OUTPATIENT
Start: 2022-03-15 | End: 2022-03-17 | Stop reason: HOSPADM

## 2022-03-15 RX ORDER — ASPIRIN 81 MG/1
324 TABLET, CHEWABLE ORAL DAILY
Status: DISCONTINUED | OUTPATIENT
Start: 2022-03-16 | End: 2022-03-17 | Stop reason: HOSPADM

## 2022-03-15 RX ORDER — ASPIRIN 81 MG/1
81 TABLET, CHEWABLE ORAL DAILY
Status: DISCONTINUED | OUTPATIENT
Start: 2022-03-16 | End: 2022-03-15

## 2022-03-15 RX ADMIN — IOPAMIDOL 75 ML: 755 INJECTION, SOLUTION INTRAVENOUS at 19:49

## 2022-03-15 RX ADMIN — GADOBUTROL 7 ML: 604.72 INJECTION INTRAVENOUS at 21:25

## 2022-03-15 RX ADMIN — ASPIRIN 325 MG: 325 TABLET ORAL at 21:54

## 2022-03-15 RX ADMIN — CLOPIDOGREL BISULFATE 300 MG: 75 TABLET ORAL at 21:54

## 2022-03-15 RX ADMIN — ACETAMINOPHEN 650 MG: 325 TABLET ORAL at 23:09

## 2022-03-15 ASSESSMENT — ACTIVITIES OF DAILY LIVING (ADL)
ADLS_ACUITY_SCORE: 12

## 2022-03-16 ENCOUNTER — APPOINTMENT (OUTPATIENT)
Dept: PHYSICAL THERAPY | Facility: HOSPITAL | Age: 77
DRG: 069 | End: 2022-03-16
Attending: INTERNAL MEDICINE
Payer: MEDICARE

## 2022-03-16 ENCOUNTER — APPOINTMENT (OUTPATIENT)
Dept: SPEECH THERAPY | Facility: HOSPITAL | Age: 77
DRG: 069 | End: 2022-03-16
Attending: INTERNAL MEDICINE
Payer: MEDICARE

## 2022-03-16 ENCOUNTER — APPOINTMENT (OUTPATIENT)
Dept: OCCUPATIONAL THERAPY | Facility: HOSPITAL | Age: 77
DRG: 069 | End: 2022-03-16
Attending: INTERNAL MEDICINE
Payer: MEDICARE

## 2022-03-16 ENCOUNTER — APPOINTMENT (OUTPATIENT)
Dept: CARDIOLOGY | Facility: HOSPITAL | Age: 77
DRG: 069 | End: 2022-03-16
Attending: INTERNAL MEDICINE
Payer: MEDICARE

## 2022-03-16 LAB
ATRIAL RATE - MUSE: 70 BPM
DIASTOLIC BLOOD PRESSURE - MUSE: 79 MMHG
ERYTHROCYTE [DISTWIDTH] IN BLOOD BY AUTOMATED COUNT: 12.3 % (ref 10–15)
GLUCOSE BLDC GLUCOMTR-MCNC: 112 MG/DL (ref 70–99)
GLUCOSE BLDC GLUCOMTR-MCNC: 75 MG/DL (ref 70–99)
GLUCOSE BLDC GLUCOMTR-MCNC: 78 MG/DL (ref 70–99)
GLUCOSE BLDC GLUCOMTR-MCNC: 96 MG/DL (ref 70–99)
GLUCOSE BLDC GLUCOMTR-MCNC: 99 MG/DL (ref 70–99)
HCT VFR BLD AUTO: 34.9 % (ref 40–53)
HGB BLD-MCNC: 11.7 G/DL (ref 13.3–17.7)
HOLD SPECIMEN: NORMAL
INTERPRETATION ECG - MUSE: NORMAL
LVEF ECHO: NORMAL
MCH RBC QN AUTO: 32.1 PG (ref 26.5–33)
MCHC RBC AUTO-ENTMCNC: 33.5 G/DL (ref 31.5–36.5)
MCV RBC AUTO: 96 FL (ref 78–100)
P AXIS - MUSE: 57 DEGREES
PA ADP BLD-ACNC: 184 PRU
PLATELET # BLD AUTO: 148 10E3/UL (ref 150–450)
PR INTERVAL - MUSE: 228 MS
QRS DURATION - MUSE: 96 MS
QT - MUSE: 410 MS
QTC - MUSE: 442 MS
R AXIS - MUSE: -36 DEGREES
RBC # BLD AUTO: 3.65 10E6/UL (ref 4.4–5.9)
SYSTOLIC BLOOD PRESSURE - MUSE: 155 MMHG
T AXIS - MUSE: 80 DEGREES
VENTRICULAR RATE- MUSE: 70 BPM
WBC # BLD AUTO: 3.7 10E3/UL (ref 4–11)

## 2022-03-16 PROCEDURE — 97535 SELF CARE MNGMENT TRAINING: CPT | Mod: GO

## 2022-03-16 PROCEDURE — 36415 COLL VENOUS BLD VENIPUNCTURE: CPT | Performed by: STUDENT IN AN ORGANIZED HEALTH CARE EDUCATION/TRAINING PROGRAM

## 2022-03-16 PROCEDURE — 36415 COLL VENOUS BLD VENIPUNCTURE: CPT | Performed by: INTERNAL MEDICINE

## 2022-03-16 PROCEDURE — 85027 COMPLETE CBC AUTOMATED: CPT | Performed by: INTERNAL MEDICINE

## 2022-03-16 PROCEDURE — 97530 THERAPEUTIC ACTIVITIES: CPT | Mod: GP

## 2022-03-16 PROCEDURE — 255N000002 HC RX 255 OP 636: Performed by: HOSPITALIST

## 2022-03-16 PROCEDURE — 97165 OT EVAL LOW COMPLEX 30 MIN: CPT | Mod: GO

## 2022-03-16 PROCEDURE — 97161 PT EVAL LOW COMPLEX 20 MIN: CPT | Mod: GP

## 2022-03-16 PROCEDURE — 97116 GAIT TRAINING THERAPY: CPT | Mod: GP

## 2022-03-16 PROCEDURE — 99233 SBSQ HOSP IP/OBS HIGH 50: CPT | Performed by: HOSPITALIST

## 2022-03-16 PROCEDURE — 93306 TTE W/DOPPLER COMPLETE: CPT | Mod: 26 | Performed by: GENERAL ACUTE CARE HOSPITAL

## 2022-03-16 PROCEDURE — 210N000001 HC R&B IMCU HEART CARE

## 2022-03-16 PROCEDURE — 85576 BLOOD PLATELET AGGREGATION: CPT

## 2022-03-16 PROCEDURE — 99223 1ST HOSP IP/OBS HIGH 75: CPT | Performed by: PSYCHIATRY & NEUROLOGY

## 2022-03-16 PROCEDURE — 250N000011 HC RX IP 250 OP 636: Performed by: INTERNAL MEDICINE

## 2022-03-16 PROCEDURE — 999N000226 HC STATISTIC SLP IP EVAL DEFER

## 2022-03-16 PROCEDURE — 250N000013 HC RX MED GY IP 250 OP 250 PS 637: Performed by: INTERNAL MEDICINE

## 2022-03-16 RX ORDER — LANOLIN ALCOHOL/MO/W.PET/CERES
3 CREAM (GRAM) TOPICAL
Status: DISCONTINUED | OUTPATIENT
Start: 2022-03-16 | End: 2022-03-17 | Stop reason: HOSPADM

## 2022-03-16 RX ADMIN — HYDRALAZINE HYDROCHLORIDE 10 MG: 20 INJECTION INTRAMUSCULAR; INTRAVENOUS at 20:07

## 2022-03-16 RX ADMIN — CLOPIDOGREL BISULFATE 75 MG: 75 TABLET ORAL at 09:04

## 2022-03-16 RX ADMIN — ASPIRIN 81 MG CHEWABLE TABLET 324 MG: 81 TABLET CHEWABLE at 09:04

## 2022-03-16 RX ADMIN — ACETAMINOPHEN 650 MG: 325 TABLET ORAL at 23:51

## 2022-03-16 RX ADMIN — HYDRALAZINE HYDROCHLORIDE 10 MG: 20 INJECTION INTRAMUSCULAR; INTRAVENOUS at 13:40

## 2022-03-16 RX ADMIN — EZETIMIBE 10 MG: 10 TABLET ORAL at 23:51

## 2022-03-16 RX ADMIN — ISOSORBIDE MONONITRATE 120 MG: 60 TABLET, EXTENDED RELEASE ORAL at 09:04

## 2022-03-16 RX ADMIN — ATORVASTATIN CALCIUM 80 MG: 40 TABLET, FILM COATED ORAL at 09:04

## 2022-03-16 RX ADMIN — PERFLUTREN 3 ML: 6.52 INJECTION, SUSPENSION INTRAVENOUS at 11:30

## 2022-03-16 ASSESSMENT — ACTIVITIES OF DAILY LIVING (ADL)
DEPENDENT_IADLS:: TRANSPORTATION
ADLS_ACUITY_SCORE: 4
ADLS_ACUITY_SCORE: 4
ADLS_ACUITY_SCORE: 6
ADLS_ACUITY_SCORE: 6
CONCENTRATING,_REMEMBERING_OR_MAKING_DECISIONS_DIFFICULTY: NO
ADLS_ACUITY_SCORE: 12
ADLS_ACUITY_SCORE: 4
ADLS_ACUITY_SCORE: 12
ADLS_ACUITY_SCORE: 6
ADLS_ACUITY_SCORE: 6
ADLS_ACUITY_SCORE: 4
DOING_ERRANDS_INDEPENDENTLY_DIFFICULTY: NO
WEAR_GLASSES_OR_BLIND: YES
ADLS_ACUITY_SCORE: 6
CHANGE_IN_FUNCTIONAL_STATUS_SINCE_ONSET_OF_CURRENT_ILLNESS/INJURY: NO
ADLS_ACUITY_SCORE: 6
ADLS_ACUITY_SCORE: 4
ADLS_ACUITY_SCORE: 4
IADL_COMMENTS: INDEP. ALL ADLS
ADLS_ACUITY_SCORE: 4
WALKING_OR_CLIMBING_STAIRS_DIFFICULTY: NO
VISION_MANAGEMENT: GLASSES
ADLS_ACUITY_SCORE: 4
ADLS_ACUITY_SCORE: 6
ADLS_ACUITY_SCORE: 6
ADLS_ACUITY_SCORE: 4
ADLS_ACUITY_SCORE: 4
FALL_HISTORY_WITHIN_LAST_SIX_MONTHS: NO
ADLS_ACUITY_SCORE: 4
ADLS_ACUITY_SCORE: 6
DRESSING/BATHING_DIFFICULTY: NO
ADLS_ACUITY_SCORE: 6
DIFFICULTY_EATING/SWALLOWING: NO
TOILETING_ISSUES: NO
ADLS_ACUITY_SCORE: 4

## 2022-03-16 NOTE — PROGRESS NOTES
03/16/22 1131   Quick Adds   Type of Visit Initial Occupational Therapy Evaluation       Present   (family available )   Living Environment   People in Home spouse   Current Living Arrangements house   Living Environment Comments 2 level, family working on having pt stay on 1 level    Self-Care   Current Activity Tolerance good   Equipment Currently Used at Home none   Instrumental Activities of Daily Living (IADL)   IADL Comments indep. all ADLs    General Information   Onset of Illness/Injury or Date of Surgery 03/15/22   Patient/Family Therapy Goal Statement (OT) none stated   Cognitive Status Examination   Affect/Mental Status (Cognitive) WFL  (seems WFL,  )   Follows Commands WFL   Visual Perception   Visual Impairment/Limitations   (no glasses during eval. session)   Range of Motion Comprehensive   General Range of Motion no range of motion deficits identified   Strength Comprehensive (MMT)   General Manual Muscle Testing (MMT) Assessment upper extremity strength deficits identified   Upper Extremity (Manual Muscle Testing)   Upper Extremity: Manual Muscle Testing (MMT) right UE strength is WNL;left shoulder strength deficit   Comment, MMT: Upper Extremity slightly weak L shldr   Coordination   Upper Extremity Coordination No deficits were identified   Fine Motor Coordination WFL   Bed Mobility   Bed Mobility supine-sit;sit-supine   Supine-Sit [place] (Bed Mobility) independent   Sit-Supine [place] (Bed Mobility) independent   Transfers   Transfers bed-chair transfer;toilet transfer   Transfer Skill: Bed to Chair/Chair to Bed   Bed-Chair Frio (Transfers) Supervision, amb. In wright SBA/CGA , no walker/cane      Assistive Device (Bed-Chair Transfers)   (none)   Toilet Transfer   Type (Toilet Transfer) sit-stand;stand-sit   [place] Level (Toilet Transfer) modified independence   Assistive Device (Toilet Transfer) grab bars/safety frame   Balance   Balance Assessment  standing balance: dynamic   Balance Comments WFL   Lower Body Dressing Assessment/Training   [place] Level (Lower Body Dressing) independent   Comment, (Lower Body Dressing) socks don/doff   Grooming Assessment/Training   [place] Level (Grooming) set up;supervision   Position (Grooming) sink side   Comment, (Grooming) washed hands   Toileting   [place] Level (Toileting) supervision   Comment, (Toileting) all toileting   Clinical Impression   Criteria for Skilled Therapeutic Interventions Met (OT) Yes, treatment indicated   OT Diagnosis decreased ADLs   OT Problem List-Impairments impacting ADL mobility   Assessment of Occupational Performance 1-3 Performance Deficits   Identified Performance Deficits g/h, drsg, trsfs   Planned Therapy Interventions (OT) ADL retraining;transfer training   Clinical Decision Making Complexity (OT) low complexity   Anticipated Equipment Needs Upon Discharge (OT)   (grab bars in bathroom )   Risk & Benefits of therapy have been explained patient;participants included;evaluation/treatment results reviewed   OT Discharge Planning   OT Discharge Recommendation (DC Rec) home with assist   OT Rationale for DC Rec rec. d/c home w/ assist from family as needed   Total Evaluation Time (Minutes)   Total Evaluation Time (Minutes) 5   OT Goals   Therapy Frequency (OT) One time eval and treatment   OT Predicated Duration/Target Date for Goal Attainment 03/16/22   OT Goals Hygiene/Grooming;Transfers;Lower Body Dressing   OT: Hygiene/Grooming supervision/stand-by assist   OT: Lower Body Dressing Independent   OT: Transfer Supervision/stand-by assist

## 2022-03-16 NOTE — ED NOTES
"[facility] ED Handoff Report    ED Chief Complaint: Stroke symptoms    ED Diagnosis:  (R29.90) Stroke-like symptoms  Comment:   Plan:        PMH:    Past Medical History:   Diagnosis Date     CAD (coronary artery disease) 12/30/2011     Cerebrovascular accident (CVA), unspecified mechanism (H) 10/25/2016     CKD (chronic kidney disease) stage 3, GFR 30-59 ml/min (H) 10/26/2016     Coronary artery disease due to lipid rich plaque 12/30/2011    he transferred his care from the P team in [facility] to the [email or web address] team in [facility] 2021     CVA (cerebral vascular accident) (H) 10/16    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 He also said he does not like to take too many medications. Our RN spoke with him via  and hopefully has convinced him to take it daily; we will recheck his lipid profile in 3 months        Code Status:  Full Code     Falls Risk: Yes Band: Applied    Current Living Situation/Residence: lives with a significant other     Elimination Status: Continent: Yes     Activity Level: SBA w/ walker    Patients Preferred Language:  Other: Hmong     Needed: Yes    Vital Signs:  BP (!) 155/78   Pulse 71   Resp 21   Ht 1.626 m (5' 4\")   Wt 62.6 kg (138 lb)   SpO2 94%   BMI 23.69 kg/m       Cardiac Rhythm: NSR    Pain Score: 0/10    Is the Patient Confused:  No    Last Food " or Drink: 03/15/22    Focused Assessment:  Pt is a/o x4. Pt gets up with 1 assist using cane. Pt in no distress. Pt is on room air. Neuro consult in am. Trop will be sent prior to coming to unit. Denies chest pain.      Tests Performed: Done: Labs and Imaging    Treatments Provided:  CT scan, MRI    Family Dynamics/Concerns: No    Family Updated On Visitor Policy: Yes    Plan of Care Communicated to Family: Yes    Who Was Updated about Plan of Care: Wife    Belongings Checklist Done and Signed by Patient: Yes    Medications sent with patient:     Covid: asymptomatic , pending result    Additional Information:     RN: Rosendo Pettit   3/15/2022 10:18 PM

## 2022-03-16 NOTE — PHARMACY-ADMISSION MEDICATION HISTORY
Pharmacy Note - Admission Medication History    Pertinent Provider Information: Patient does not know the names of his medications that he takes at home, only what they are used for. He states he takes medication for gout, hypertension, cholesterol and his heart. Med list is based on recent fill history      ______________________________________________________________________    Prior To Admission (PTA) med list completed and updated in EMR.       PTA Med List   Medication Sig Note Last Dose     acetaminophen (TYLENOL) 500 MG tablet Take 500-1,000 mg by mouth every 6 hours as needed for mild pain  Unknown at Unknown time     allopurinol (ZYLOPRIM) 100 MG tablet Take 50 mg by mouth daily 3/15/2022: Last fill was 11/9/21 for a 3 month supply Past Week at Unknown time     aspirin 81 MG EC tablet Take 81 mg by mouth daily  3/15/2022 at Unknown time     atorvastatin (LIPITOR) 80 MG tablet TAKE 1 TABLET(80 MG) BY MOUTH DAILY 3/15/2022: Last fill was 11/9/21 for a 3 month supply 3/15/2022 at Unknown time     clopidogrel (PLAVIX) 75 MG tablet TAKE 1 TABLET(75 MG) BY MOUTH DAILY 3/15/2022: Last fill was 11/9/21 for a 3 month supply 3/15/2022 at Unknown time     ezetimibe (ZETIA) 10 MG tablet TAKE 1 TABLET(10 MG) BY MOUTH DAILY 3/15/2022: Last fill was 11/9/21 for a 3 month supply 3/15/2022 at Unknown time     isosorbide mononitrate CR (IMDUR) 120 MG 24 HR ER tablet TAKE 1 TABLET(120 MG) BY MOUTH DAILY 3/15/2022: Last fill was 11/9/21 for a 3 month supply 3/15/2022 at Unknown time     lisinopril (ZESTRIL) 10 MG tablet TAKE 1 TABLET BY MOUTH TWICE DAILY. LABS DUE, PLEASE FOLLOW UP IN CLINIC FOR NEXT REFILL 3/15/2022: Last fill was 8/20/21 for a 3 month supply 3/15/2022 at Unknown time     nitroGLYcerin (NITROSTAT) 0.4 MG sublingual tablet PLACE 1 TABLET UNDER THE TONGUE AT THE ONSET OF CHEST PAIN. MAY REPEAT EVERY 5 MINUTES AS NEEDED FOR A TOTAL OF 3 DOSES.  Unknown at Unknown time     tamsulosin (FLOMAX) 0.4 MG capsule Take  0.4 mg by mouth daily 3/15/2022: Last fill was 11/9/21 for a 3 month supply 3/15/2022 at Unknown time       Information source(s): Patient and CareEverywhere/SureScripts  Method of interview communication: in-person    Summary of Changes to PTA Med List  New: allopurinol, aspirin  Discontinued: none  Changed: none    Patient was asked about OTC/herbal products specifically.  PTA med list reflects this.    In the past week, patient estimated taking medication this percent of the time:  50-90% due to illness and other.    Allergies were reviewed, assessed, and updated with the patient.      Patient does not use any multi-dose medications prior to admission.    The information provided in this note is only as accurate as the sources available at the time of the update(s).    Thank you for the opportunity to participate in the care of this patient.    Cindy Warren  3/15/2022 9:00 PM

## 2022-03-16 NOTE — H&P
[facility]    History and Physical - Hospitalist Service       Date of Admission:  3/15/2022    Assessment & Plan      Jessica Healy is a 76 year old male with a history of prior CVA, CAD status post CABG, hypertension, hyperlipidemia and stage III CKD admitted on 3/15/2022 due to suspected ischemic stroke after presenting with right lower extremity weakness, difficulty walking and right facial weakness.     CT angiogram of the head and neck showed moderate atherosclerotic changes in the ICA on the left, mild to moderate on the right, moderate to marked atherosclerotic changes supraclinoid ICA on the left, mild to moderate on the right, mild to moderate areas of narrowings throughout basilar artery, short segment moderate to marked narrowing at the distal left P2, poorly opacified distal branches of left posterior circulation, less than 50% narrowing proximal ICAs bilaterally as well as moderate to marked narrowing at the origin/proximal aspect of right vertebral artery and Moderate to marked narrowing at the very distal aspect of right vertebral artery.    Thrombolytic therapy was not administered as patient presented outside of therapeutic window.  Stroke neurology service recommended dual antiplatelet therapy with aspirin 325 mg and Plavix 75 mg daily, statin therapy with plan to consider platelet assay due to suspected resistance to antiplatelet therapy, obtain brain MRI and 30-day event monitor on discharge.      Suspected ischemic CVA versus TIA  Recurrent CVA  LDL is 126.  Glucose 199.  Aspirin 324 mg daily  Plavix 300 mg x 1 given in the ER  Atorvastatin 80 mg daily  Ezetimibe 10 mg daily  Follow-up P2Y12 assay   Consider switching to either Brillinta or Cilostazol if P2Y12 assay is also in the therapeutic range.   Frequent neuro checks  Permissive hypertension for now  Follow-up lipid function assay for suspected resistance to antiplatelet therapy  Follow-up hemoglobin A1c  Follow-up  brain MRI  Follow-up echocardiogram   Follow-up neurology consult  PT/OT/speech therapy  30-day cardiac event monitor at discharge    CAD  S/p CABG  Aspirin 81 mg daily  Plavix 75 mg daily  Ezetimibe 10 mg daily  Atorvastatin 80 mg daily  Continue PTA Imdur    Hypertension  Hold PTA lisinopril for now for permissive hypertension  IV hydralazine as needed    Hyperlipidemia  Resume PTA atorvastatin and ezetimibe    Stage III CKD  Creatinine is stable around baseline of 2.01  Monitor BMP  Avoid nephrotoxins       Diet: NPO for Medical/Clinical Reasons Except for: No Exceptions    DVT Prophylaxis: Pneumatic Compression Devices  Ahmadi Catheter: Not present  Central Lines: None  Cardiac Monitoring: ACTIVE order. Indication: Stroke, acute (48 hours)  Code Status: Full Code    Clinically Significant Risk Factors Present on Admission          # Hypocalcemia: Ca = 8.4 mg/dL (Ref range: 8.5 - 10.5 mg/dL) and/or iCa = N/A on admission, will replace as needed      # Platelet Defect: home medication list includes an antiplatelet medication       Disposition Plan   Expected Discharge:    Anticipated discharge location:  Awaiting care coordination huddle  Delays:    Stroke neurology recommendation, brain MRI,       The patient's care was discussed with the Patient and Patient's Family.  [id number] assisted with translation.  Reinaldo Rock MD  Hospitalist Service  [facility]  Securely message with the Vocera Web Console (learn more here)  Text page via [employer] Paging/Directory         ______________________________________________________________________    Chief Complaint   Left lower extremity weakness, difficulty walking and left facial weakness      History is obtained from the patient    History of Present Illness   Jessica Healy is a 76 year old male with a history of prior CVA, CAD status post CABG, hypertension, hyperlipidemia and stage III CKD who presented to the ER due to right lower  extremity weakness, difficulty walking and right facial weakness.     He was in his usual state of health until this afternoon when he developed sudden onset of right-sided leg weakness associated with speech difficulty, difficulty walking and facial weakness.  He denies loss of consciousness, visual changes, nausea, vomiting, vertigo, however, he endorsed gait instability associated with numbness in the right lower and upper extremities.  He was taking Plavix 75 mg daily and atorvastatin 80 mg daily prior to admission.    Initial blood pressure in the ER was 139/68, pulse 73, respiratory rate 18, and oxygen saturation of 98% on room air.  Significant laboratory findings include sodium 135, creatinine 2.16 and hemoglobin 12.0.  He had right facial droop on exam.  CT angiogram of the head and neck showed moderate atherosclerotic changes in the ICA on the left, mild to moderate on the right, moderate to marked atherosclerotic changes supraclinoid ICA on the left, mild to moderate on the right, mild to moderate areas of narrowings throughout basilar artery, short segment moderate to marked narrowing at the distal left P2, poorly opacified distal branches of left posterior circulation, less than 50% narrowing proximal ICAs bilaterally as well as moderate to marked narrowing at the origin/proximal aspect of right vertebral artery and Moderate to marked narrowing at the very distal aspect of right vertebral artery.    Thrombolytics was not administered as patient presented outside of therapeutic window.  Stroke neurology service recommended dual antiplatelet therapy with aspirin 325 mg and Plavix 75 mg daily, statin therapy with plan to obtain brain MRI and 30-day event monitor on discharge.      Review of Systems    The 10 point Review of Systems is negative other than noted in the HPI or here.    Past Medical History    I have reviewed this patient's medical history and updated it with pertinent information if needed.    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 He also said he does not like to take too many medications. Our RN spoke with him via  and hopefully has convinced him to take it daily; we will recheck his lipid profile in 3 months       Past Surgical History   I have reviewed this patient's surgical history and updated it with pertinent information if needed.  Past Surgical History:   Procedure Laterality Date     BYPASS GRAFT ARTERY CORONARY  12/22/2006    GARZA to LAD, vein graft to OM2, vein graft to PDA. This was complicated by postop afib. Done in [place], [place]     CARDIAC CATHETERIZATION  2006    in [place]     CARDIAC CATHETERIZATION  11/21/2014    by Dr. Sepulveda at [facility], no PCI was done: LMCA 90%, mid %, prox LCX 90%, prox %, SVG's to OM2 and RPDA 100%, LIMA to LAD patent with L to R collaterals     SURGICAL HISTORY OF -       CABG 2006        Social History   I have reviewed this patient's social history and updated it with pertinent information if needed.  Social History     Tobacco Use     Smoking status: Never Smoker     Smokeless tobacco: Never Used   Substance Use Topics     Alcohol use: Not Currently     Drug use: Never       Family History   I have reviewed this patient's family history and updated it with pertinent information if needed.  Family History   Problem Relation Age of Onset     Family History Negative Other         no known specifics     Kidney Disease Mother        Prior to Admission Medications   Prior to Admission Medications   Prescriptions Last Dose Informant Patient Reported? Taking?   ORDER FOR DME   No No   Sig: Home Blood pressure monitor machine   acetaminophen (TYLENOL) 500 MG tablet Unknown at Unknown time  Yes Yes   Sig: Take 500-1,000 mg by mouth every 6 hours as needed for mild pain   allopurinol (ZYLOPRIM) 100 MG tablet Past Week at Unknown time  Yes Yes   Sig: Take 50 mg by mouth daily   aspirin 81 MG EC tablet 3/15/2022 at Unknown time  Yes Yes   Sig: Take 81 mg by mouth daily   atorvastatin (LIPITOR) 80 MG tablet 3/15/2022 at Unknown time  No Yes   Sig: TAKE 1 TABLET(80 MG) BY MOUTH DAILY   clopidogrel (PLAVIX) 75 MG tablet 3/15/2022 at Unknown time  No Yes   Sig: TAKE 1 TABLET(75 MG) BY MOUTH DAILY   ezetimibe (ZETIA) 10 MG tablet 3/15/2022 at Unknown time  No Yes   Sig: TAKE 1 TABLET(10 MG) BY MOUTH DAILY   isosorbide mononitrate CR (IMDUR) 120 MG 24 HR ER tablet 3/15/2022 at Unknown time  No Yes   Sig: TAKE 1 TABLET(120 MG) BY MOUTH DAILY   lisinopril (ZESTRIL) 10 MG tablet 3/15/2022 at Unknown time  No Yes   Sig: TAKE 1 TABLET BY MOUTH TWICE DAILY. LABS DUE, PLEASE FOLLOW UP IN CLINIC FOR NEXT REFILL   nitroGLYcerin (NITROSTAT) 0.4 MG sublingual tablet Unknown at Unknown time  No Yes   Sig: PLACE 1 TABLET UNDER THE TONGUE AT THE ONSET OF CHEST PAIN. MAY REPEAT EVERY 5 MINUTES AS NEEDED FOR A TOTAL OF 3  DOSES.   tamsulosin (FLOMAX) 0.4 MG capsule 3/15/2022 at Unknown time  Yes Yes   Sig: Take 0.4 mg by mouth daily      Facility-Administered Medications: None     Allergies   Allergies   Allergen Reactions     Nkda [No Known Drug Allergies]      Seasonal Allergies        Physical Exam   Vital Signs: Temp: 97.8  F (36.6  C) Temp src: Oral BP: (!) 152/76 Pulse: 61   Resp: 18 SpO2: 97 % O2 Device: None (Room air)    Weight: 135 lbs 11.2 oz    Constitutional: awake, alert, cooperative, no apparent distress, and appears stated age  Eyes: Lids and lashes normal, pupils equal, round and reactive to light, extra ocular muscles intact, sclera clear, conjunctiva normal  ENT: Normocephalic, without obvious abnormality, atraumatic, sinuses nontender on palpation, external ears without lesions, oral pharynx with moist mucous membranes, tonsils without erythema or exudates, gums normal and good dentition.  Hematologic / Lymphatic: no cervical lymphadenopathy  Respiratory: No increased work of breathing, good air exchange, clear to auscultation bilaterally, no crackles or wheezing  Cardiovascular: Normal apical impulse, regular rate and rhythm, normal S1 and S2, no S3 or S4, and no murmur noted  GI: No scars, normal bowel sounds, soft, non-distended, non-tender, no masses palpated, no hepatosplenomegally  Skin: no bruising or bleeding  Musculoskeletal: There is no redness, warmth, or swelling of the joints.  Full range of motion noted.  Motor strength is 5 out of 5 all extremities bilaterally.  Tone is normal.  Neurologic: Awake, alert, oriented to name, place and time.  Mild left facial droop.  Motor is 4 out of 5 bilaterally globally.  Dysarthric  Neuropsychiatric: General: normal, calm and normal eye contact    Data   Data reviewed today: I reviewed all medications, new labs and imaging results over the last 24 hours. I personally reviewed the CT angiogram of the head and neck image(s) showing Findings listed above.    Recent  Results (from the past 24 hour(s))   CTA Head Neck with Contrast    Narrative    EXAM: CTA  HEAD NECK WITH CONTRAST  LOCATION: [facility]  DATE/TIME: 3/15/2022 7:29 PM    INDICATION: Speech difficulty, imbalance  COMPARISON: None  CONTRAST: isovue 370 75ml  TECHNIQUE: Head and neck CT angiogram with IV contrast. Noncontrast head CT followed by axial helical CT images of the head and neck vessels obtained during the arterial phase of intravenous contrast administration. Axial 2D reconstructed images and   multiplanar 3D MIP reconstructed images of the head and neck vessels were performed by the technologist. Dose reduction techniques were used. All stenosis measurements made according to NASCET criteria unless otherwise specified.    FINDINGS:   NONCONTRAST HEAD CT:   INTRACRANIAL CONTENTS: No intracranial hemorrhage, extraaxial collection, or mass effect.  Mild to moderate chronic encephalomalacia left occipital lobe. Small chronic infarctions left cerebellum and anterior aspect of basal ganglia bilaterally. Mild   presumed chronic small vessel ischemic changes. Mild generalized volume loss. No hydrocephalus.     VISUALIZED ORBITS/SINUSES/MASTOIDS: No intraorbital abnormality. Mild mucosal thickening scattered about the paranasal sinuses. No middle ear or mastoid effusion.    BONES/SOFT TISSUES: No acute abnormality.    HEAD CTA:  ANTERIOR CIRCULATION: Moderate atherosclerotic changes cavernous ICA on the left, mild to moderate on the right. Moderate to marked atherosclerotic changes supraclinoid ICA on the left, mild to moderate on the right. Hypoplastic left A1. Fetal origin   right posterior cerebral artery.    POSTERIOR CIRCULATION: Mild to moderate areas of narrowings throughout basilar artery. Short segment moderate to marked narrowing at the distal left P2. Poorly opacified distal branches of left posterior circulation. Mild atherosclerotic changes right   posterior circulation.  Balanced vertebral arteries supply a normal basilar artery.     DURAL VENOUS SINUSES: Faintly opacified.    NECK CTA:  RIGHT CAROTID: Less than 50% narrowing.    LEFT CAROTID: Less than 50% narrowing.    VERTEBRAL ARTERIES: Proximal aspect of left vertebral artery is not visualized. The artery is opacified in its mid and distal portion, from approximately C5-C6 level distally. Moderate areas of narrowing in its distal aspect. Moderate to marked narrowing   at the origin/proximal aspect of right vertebral artery. Moderate to marked narrowing at the very distal aspect of right vertebral artery. Balanced vertebral arteries.    AORTIC ARCH: Classic aortic arch anatomy with no significant stenosis at the origin of the great vessels.    NONVASCULAR STRUCTURES: 15 mm nodule left thyroid gland; ultrasound recommended, if not done previously.      Impression    IMPRESSION:   HEAD CT:  1.  No hemorrhage, mass, or mass effect.  2.  Mild to moderate chronic encephalomalacia left occipital lobe. Superimposed small area of subacute ischemia would be difficult to exclude.  3.  Small chronic infarctions left cerebellum and anterior aspect of basal ganglia bilaterally.    HEAD CTA:   1.  Moderate atherosclerotic changes cavernous ICA on the left, mild to moderate on the right.  2.  Moderate to marked atherosclerotic changes supraclinoid ICA on the left, mild to moderate on the right.  3.  Mild to moderate areas of narrowings throughout basilar artery.  4.  Short segment moderate to marked narrowing at the distal left P2.  5.  Poorly opacified distal branches of left posterior circulation.  6.  Mild atherosclerotic changes right posterior circulation.    NECK CTA:  1.  Less than 50% narrowing proximal ICAs bilaterally.  2.  Proximal aspect of left vertebral artery is not visualized. The artery is opacified in its mid and distal portion, from approximately C5-C6 level distally. Moderate areas of narrowing in its distal aspect.  3.   Moderate to marked narrowing at the origin/proximal aspect of right vertebral artery.  4.  Moderate to marked narrowing at the very distal aspect of right vertebral artery.    CT Results were called to Dr. Maurer at 3/15/2022 7:37 PM.  CTA Results were called to Dr. Maurer at 3/15/2022 7:47 PM.   MR Brain w/o & w Contrast    Narrative    EXAM: MR BRAIN W/O and W CONTRAST  LOCATION: [facility]  DATE/TIME: 3/15/2022 9:10 PM    INDICATION: Altered mental status. Left lower extremity weakness. Left facial weakness.  COMPARISON: Head CT same day.  CONTRAST: 7 mL Gadavist.  TECHNIQUE: Routine multiplanar multisequence head MRI without and with intravenous contrast.    FINDINGS:  INTRACRANIAL CONTENTS: No acute or subacute infarct. No mass, acute hemorrhage, or extra-axial fluid collections. Patchy and confluent nonspecific T2/FLAIR hyperintensities within the cerebral white matter most consistent with moderate chronic   microvascular ischemic change. Chronic encephalomalacia in the medial aspect of the left occipital lobe again noted consistent with a chronic ischemic infarct. Mild generalized cerebral atrophy. No hydrocephalus. Normal position of the cerebellar   tonsils. No pathologic contrast enhancement.    SELLA: No abnormality accounting for technique.    OSSEOUS STRUCTURES/SOFT TISSUES: Normal marrow signal. The major intracranial vascular flow voids are maintained.     ORBITS: No abnormality accounting for technique.     SINUSES/MASTOIDS: No paranasal sinus mucosal disease. No middle ear or mastoid effusion.       Impression    IMPRESSION:  1.  Probable chronic ischemic infarct in the left occipital lobe again noted.  2.  No acute intracranial process.  3.  Generalized brain atrophy and presumed microvascular ischemic changes as detailed above.

## 2022-03-16 NOTE — PROGRESS NOTES
Speech-Language Pathology  Received orders for Speech Pathology consult d/t stroke-like symptoms. Chart reviewed and consulted with RN. Per report pt symptoms have improved and did not include dysphagia or aphasia. She stated that he is oriented, no slurred speech, no confusion, and no s/s aspiration or respiratory concerns. Will complete order. If pt has a change in condition please feel free to consult at that time.

## 2022-03-16 NOTE — PROGRESS NOTES
03/16/22 1300       Present yes   Language Hmong   Living Environment   People in Home spouse   Current Living Arrangements apartment   Home Accessibility no concerns   Living Environment Comments Per pt, lives in public housing, elevator available.   Self-Care   Usual Activity Tolerance good   Current Activity Tolerance good   Equipment Currently Used at Home none   Fall history within last six months no   General Information   Onset of Illness/Injury or Date of Surgery 03/15/22   Referring Physician Dr. Joan Ferris.   Patient/Family Therapy Goals Statement (PT) To go home.   Pertinent History of Current Problem (include personal factors and/or comorbidities that impact the POC) From chart: Jessica Healy is a(n) 76 year old male with CAD/CABG, prior strokes (L occipital, bl cerebellar) who presented on 3/15/2022 with new onset dysarthria, gait difficulty. NCCT with old known infarcts, no acute findings; CTA with known L vert origin occlusion, multifocal intracranial stenoses (vs MRA report from 2016). On exam, he had R facial weakness but was able to ambulate with cane (baseline) & no other overt focal neuro deficits. TNK not pursued due to very mild deficits.   Existing Precautions/Restrictions no known precautions/restrictions   Weight-Bearing Status - LLE full weight-bearing   Weight-Bearing Status - RLE full weight-bearing   Heart Disease Risk Factors Medical history   General Observations Male supine in bed.   Cognition   Affect/Mental Status (Cognition) WNL   Orientation Status (Cognition) oriented x 4   Cognitive Status Comments Pleasant, cooperative.   Pain Assessment   Patient Currently in Pain No   Integumentary/Edema   Integumentary/Edema Comments Not formally assessed.   Posture    Posture Not impaired   Range of Motion (ROM)   ROM Comment Grossly WFL   Strength (Manual Muscle Testing)   Strength Comments Grossly WFL, possible mild R-sided weakness compared to L but still  functional.   Bed Mobility   Comment, (Bed Mobility) IND. /86 after sitting, RN informed. Remeasured to 164/80. Per RN, OK to proceed with session.   Transfers   Comment, (Transfers) Sit-stand IND   Gait/Stairs (Locomotion)   Comment, (Gait/Stairs) Ambulated x 250 ft with no AD. Steady gait. Up and down 5 steps with L rail, SBA. Safe and steady. Upon returning, /90. RN immediately informed and session ended.   Balance   Balance no deficits were identified   Sensory Examination   Sensory Perception patient reports no sensory changes   Coordination   Coordination no deficits were identified   Muscle Tone   Muscle Tone no deficits were identified   Clinical Impression   Criteria for Skilled Therapeutic Intervention Yes, treatment indicated   PT Diagnosis (PT) Impaired response to physical activity   Influenced by the following impairments Medical   Functional limitations due to impairments Ambulation, stairs   Clinical Presentation (PT Evaluation Complexity) Stable/Uncomplicated   Clinical Presentation Rationale Clinician judgment   Clinical Decision Making (Complexity) low complexity   Planned Therapy Interventions (PT) gait training   Risk & Benefits of therapy have been explained patient   PT Discharge Planning   PT Discharge Recommendation (DC Rec) home;home with assist   PT Rationale for DC Rec Pt mobilizing at/near baseline, but became extremely hypertensive from moderate activity. Will benefit from one more session to assess vitals response to activity.   PT Brief overview of current status IND with mobility, but BP up to 209/90 with ambulation. RN alerted, session ended.   Physical Therapy Goals   PT Frequency Daily   PT Predicated Duration/Target Date for Goal Attainment 03/23/22   PT Goals Gait;Cardiac Phase 1   PT: Gait Independent;Greater than 200 feet   PT: Perform aerobic activity with stable cardiovascular response continuous;10 minutes;ambulation

## 2022-03-16 NOTE — PLAN OF CARE
Occupational Therapy Discharge Summary    Reason for therapy discharge:    All goals and outcomes met, no further needs identified.    Progress towards therapy goal(s). See goals on Care Plan in [facility] electronic health record for goal details.  Goals met    Therapy recommendation(s):    No further therapy is recommended.    Goal Outcome Evaluation:             Mary Reyes, OTR/KAYLEY  3/16/2022

## 2022-03-16 NOTE — PROVIDER NOTIFICATION
Text paged admitting MD about increasing right sided numbness. @0025    0040 - no response, paged [facility] Hospitalist.     0100 - Dr Ibrahim and Dr Rock both came up to see the pt. No new orders   no

## 2022-03-16 NOTE — PLAN OF CARE
Problem: Adjustment to Illness (Stroke, Ischemic/Transient Ischemic Attack)  Goal: Optimal Coping  Outcome: Ongoing, Progressing     Problem: Cerebral Tissue Perfusion (Stroke, Ischemic/Transient Ischemic Attack)  Goal: Optimal Cerebral Tissue Perfusion  Outcome: Ongoing, Progressing  Intervention: Protect and Optimize Cerebral Perfusion  Flowsheets (Taken 3/16/2022 1415)  Cerebral Perfusion Promotion: blood pressure monitored      Pt. Scored a 1 on their NIHSS today. Pt. Is anxious to go  home. PRN hydralazine was given for a BP of 208/90  BP came down to 163/79 after. Dr. Ferris aware. NSR with 1st degree AV Block.

## 2022-03-16 NOTE — CONSULTS
This is a neurological consultation    76-year-old admitted to hospital 3/15/2022      Chief complaint  Right-sided weakness acute onset  More of an ataxia  MRI scan negative for acute stroke      HPI  76-year-old presented to the ER on 3/15/2022 with symptoms of right lower extremity weakness difficulty walking and some right facial weakness.    Patient denied any loss of consciousness no visual changes did not have any nausea or vomiting or spinning type sensation.    He was already taking Plavix and atorvastatin    Patient was originally evaluated by stroke code team and was outside the window for thrombolytics    Patient was on dual antiplatelet medication aspirin dose increased to 325    Patient stated that he had trouble with walking seem to sway to either side when I interviewed him it seemed like it was not just one side was weak  Had more of an ataxia      Past medical history  CVA  History of left vertebral artery occlusion  History of intracranial stenosis multiple  CAD/CABG  Hypertension  Hyperlipidemia  CKD 3      Habits  Non-smoker  Does not drink alcohol    Family history  Mother with kidney disease      Work-up  MRI head 3/15/2022  1.  Probable chronic ischemic infarct in the left occipital lobe again noted.  2.  No acute intracranial process.  3.  Generalized brain atrophy and presumed microvascular ischemic changes as detailed above.  HEAD CT: 3/15/2022  1.  No hemorrhage, mass, or mass effect.  2.  Mild to moderate chronic encephalomalacia left occipital lobe. Superimposed small area of subacute ischemia would be difficult to exclude.  3.  Small chronic infarctions left cerebellum and anterior aspect of basal ganglia bilaterally.  HEAD CTA: 3/15/2022  1.  Moderate atherosclerotic changes cavernous ICA on the left, mild to moderate on the right.  2.  Moderate to marked atherosclerotic changes supraclinoid ICA on the left, mild to moderate on the right.  3.  Mild to moderate areas of narrowings  throughout basilar artery.  4.  Short segment moderate to marked narrowing at the distal left P2.  5.  Poorly opacified distal branches of left posterior circulation.  6.  Mild atherosclerotic changes right posterior circulation.  NECK CTA: 3/15/2022  1.  Less than 50% narrowing proximal ICAs bilaterally.  2.  Proximal aspect of left vertebral artery is not visualized. The artery is opacified in its mid and distal portion, from approximately C5-C6 level distally. Moderate areas of narrowing in its distal aspect.  3.  Moderate to marked narrowing at the origin/proximal aspect of right vertebral artery.  4.  Moderate to marked narrowing at the very distal aspect of right vertebral artery.  Troponin on admission 0.02  HDL 34/  Hemoglobin A1c 5.6%  COVID-19 negative on admission  Echo 3/16/2022, 65-70% ejection fraction, mild left atrial enlargement,      Labs  Sodium 135 potassium 3.7  BUN 29 creatinine 2.16  Glucose 199  White blood count 3.7, hemoglobin 11.7, platelets 140,000      Exam  Blood pressure 163/79, pulse 81, temperature 98.4  Blood pressure range 125//90  Pulse range 60-73  T-max 98.1    Review of systems pertinent positive negatives  Currently no headache no chest pain or shortness of breath no nausea vomiting no diarrhea no fever chills    No actual diplopia dysarthria dysphagia  [place] that both sides are may be weak in his legs were bad and his balance was bad  Other reports were that it was more right-sided    Seems to be improved since admission    Otherwise review of systems negative      General exam per primary MD  Alert and attentive  HEENT with facial droop but no signs of trauma  Lungs clear  Heart rate regular  Abdomen soft  Symmetrical pulses      Neurologic exam  Alert and attentive  Through family members seems to have no aphasia  No neglect  Memory recall okay    Cranial nerves II through XII  No ophthalmoplegia  No nystagmus  Visual fields intact  Face relatively symmetrical  although his smile is more of a snarl  Speech sounds clear in his own language to family members    Upper extremities   No drift  No tremor  Finger-nose okay    Lower extremities  Raises his legs off the bed wiggles his toes    Gait  Standby assistance was able to ambulate to bathroom and back        Assessment/plan      1.   New increased weakness right face/right leg (ataxia)        MRI scan negative for acute stroke        Significant vascular risk factors below        Question concern for exacerbation of underlying chronic disease by decreased perfusion      2.  Intracranial atherosclerosis       Moderate atherosclerotic changes cavernous ICA on the left, mild to moderate on the right.       Moderate to marked atherosclerotic changes supraclinoid ICA on the left, mild to moderate on the right.       Mild to moderate areas of narrowings throughout basilar artery.       Short segment moderate to marked narrowing at the distal left P2.       Poorly opacified distal branches of left posterior circulation.        3.    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 The artery is opacified in its mid and distal portion, from approximately C5-C6 level distally.           Moderate areas of narrowing in its distal aspect.    4.     Right vertebral artery stenosis multifocal          Moderate to marked narrowing at the origin/proximal aspect of right vertebral artery.          Moderate to marked narrowing at the very distal aspect of right vertebral artery.     5.    Vascular risk factors for stroke         previous stroke/intracranial atherosclerosis/vertebral artery disease in the neck/hypertension/hyperlipidemia/coronary artery disease/CKD      Diagnosis  Fluctuating CNS ischemia  Multiple vascular risk factors as above    Patient may have had decreased perfusion which exacerbated the above vascular difficulties that he has  More of a  prolonged TIA symptom  Try to maintain proper hydration    Plan  Aspirin 325 mg daily  Plavix 75 mg daily  Lipitor 80 mg daily    Risk factor reduction per primary MD      PT OT/swallow eval  Disposition per primary MD    No new neurologic recommendations    70 minutes total care time today greater than 50% face-to-face patient care team discussing and evaluating the above.

## 2022-03-16 NOTE — CONSULTS
[facility]    Stroke Consult Note    Reason for Consult: Stroke Code     Chief Complaint: Stroke Symptoms    HPI  76M HTN, HLD, Ischemic Stroke, CAD s/p CABG. Patient presenting with sudden-onset slurred speech and trouble walking, coodination and impaired balance. In ED was also noted to have a right facial droop.   /68    Imaging Findings  Results for orders placed or performed during the hospital encounter of 03/15/22   CTA Head Neck with Contrast    Impression    IMPRESSION:   HEAD CT:  1.  No hemorrhage, mass, or mass effect.  2.  Mild to moderate chronic encephalomalacia left occipital lobe. Superimposed small area of subacute ischemia would be difficult to exclude.  3.  Small chronic infarctions left cerebellum and anterior aspect of basal ganglia bilaterally.    HEAD CTA:   1.  Moderate atherosclerotic changes cavernous ICA on the left, mild to moderate on the right.  2.  Moderate to marked atherosclerotic changes supraclinoid ICA on the left, mild to moderate on the right.  3.  Mild to moderate areas of narrowings throughout basilar artery.  4.  Short segment moderate to marked narrowing at the distal left P2.  5.  Poorly opacified distal branches of left posterior circulation.  6.  Mild atherosclerotic changes right posterior circulation.    NECK CTA:  1.  Less than 50% narrowing proximal ICAs bilaterally.  2.  Proximal aspect of left vertebral artery is not visualized. The artery is opacified in its mid and distal portion, from approximately C5-C6 level distally. Moderate areas of narrowing in its distal aspect.  3.  Moderate to marked narrowing at the origin/proximal aspect of right vertebral artery.  4.  Moderate to marked narrowing at the very distal aspect of right vertebral artery.    CT Results were called to Dr. Maurer at 3/15/2022 7:37 PM.  CTA Results were called to Dr. Maurer at 3/15/2022 7:47 PM.     Thrombolytic Treatment   Not given due to  "minor/isolated/quickly resolving symptoms.    Endovascular Treatment  Not initiated due to absence of proximal vessel occlusion    Impression   Acute ischemic stroke of Posterior Circulation due to large-artery atherosclerosis    Given extensive hx of CV risk factors, presenting symptoms are concerning for posterior circulation stroke. However, NIHSS is low and current symptoms are not dramatically different from patient's baseline functional status. TNK was therefore not administered.      Recommendations  - Neurochecks and Vital Signs every Q4H   - Daily aspirin 325 mg for secondary stroke prevention  - Plavix (clopidogrel) 300 mg PO loading dose x 1 --> Load once now   - Check P2Y12 assay in the AM (ordered)   - If not therapeutic then may consider switching to either Brillinta or Cilostazol.   - Continue DAPT for 21 days  - Statin: Home Dose, if not on then start Lipitor 40mg daily  - MRI Brain with and without contrast  - TTE (with Bubble Study if age 60 yrs or less)  - 24-hour Telemetry  - Bedside Glucose Monitoring  - A1c, Lipid Panel  - PT/OT/SLP  - Stroke Education  - Euthermia, Euglycemia  - 30d Cardiac Event Monitor at discharge.     Patient Follow-up      Thank you for this consult. We will continue to follow.     The Stroke Staff is Dr. Huntley.    Jessee Hernandez MD  Vascular Neurology Fellow  To page me or covering stroke neurology team member, click here: AMCOM   Choose \"On Call\" tab at top, then search dropdown box for \"Neurology Adult\", select location, press Enter, then look for stroke/neuro ICU/telestroke.    ______________________________________________________    Clinically Significant Risk Factors Present on Admission          # Hypocalcemia: Ca = 8.4 mg/dL (Ref range: 8.5 - 10.5 mg/dL) and/or iCa = N/A on admission, will replace as needed     # Platelet Defect: home medication list includes an antiplatelet medication    # CKD, Stage 3a (GFR 45-59): Will monitor and treat as " appropriate  - last Cr =  2.16 mg/dL (Ref range: 0.70 - 1.30 mg/dL); 2.2 mg/dL (Ref range: 0.7 - 1.3 mg/dL)  - last GFR = 31 mL/min/1.73m2 (Ref range: >60 mL/min/1.73m2); 31 mL/min/1.73m2 (Ref range: >60 mL/min/1.73m2)     Past Medical History   Past Medical History:   Diagnosis Date     CAD (coronary artery disease) 12/30/2011     Cerebrovascular accident (CVA), unspecified mechanism (H) 10/25/2016     CKD (chronic kidney disease) stage 3, GFR 30-59 ml/min (H) 10/26/2016     Coronary artery disease due to lipid rich plaque 12/30/2011    he transferred his care from the [facility] team in [facility] to the [facility] team in [place] and 2021     CVA (cerebral vascular accident) (H) 10/16    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 He also said he does not like to take too many medications. Our RN spoke with him via  and hopefully has convinced him to take it daily; we will recheck his lipid profile in 3 months     Past Surgical History   Past Surgical History:   Procedure Laterality Date     BYPASS GRAFT ARTERY CORONARY  12/22/2006    GARZA to LAD, vein graft to OM2, vein graft to PDA. This was complicated by postop afib. Done in [place], [place]     CARDIAC CATHETERIZATION  2006    in [place]     CARDIAC CATHETERIZATION  11/21/2014    by Dr. Sepulveda at [facility], no PCI was done: LMCA 90%, mid %, prox LCX  90%, prox %, SVG's to OM2 and RPDA 100%, LIMA to LAD patent with L to R collaterals     SURGICAL HISTORY OF -       CABG 2006     Medications   Home Meds  Prior to Admission medications    Medication Sig Start Date End Date Taking? Authorizing Provider   acetaminophen (TYLENOL) 500 MG tablet Take 500-1,000 mg by mouth every 6 hours as needed for mild pain    Reported, Patient   atorvastatin (LIPITOR) 80 MG tablet TAKE 1 TABLET(80 MG) BY MOUTH DAILY 2/14/22   Gilberto Cordon MD   clopidogrel (PLAVIX) 75 MG tablet TAKE 1 TABLET(75 MG) BY MOUTH DAILY 2/14/22   Gilberto Cordon MD   ezetimibe (ZETIA) 10 MG tablet TAKE 1 TABLET(10 MG) BY MOUTH DAILY 2/14/22   Gilberto Cordon MD   isosorbide mononitrate CR (IMDUR) 120 MG 24 HR ER tablet TAKE 1 TABLET(120 MG) BY MOUTH DAILY 2/14/22   Gilberto Cordon MD   lisinopril (ZESTRIL) 10 MG tablet TAKE 1 TABLET BY MOUTH TWICE DAILY. LABS DUE, PLEASE FOLLOW UP IN CLINIC FOR NEXT REFILL 9/24/21   Christiano Maciel MD   nitroGLYcerin (NITROSTAT) 0.4 MG sublingual tablet PLACE 1 TABLET UNDER THE TONGUE AT THE ONSET OF CHEST PAIN. MAY REPEAT EVERY 5 MINUTES AS NEEDED FOR A TOTAL OF 3 DOSES. 7/24/20   Christiano Maciel MD   ORDER FOR [facility] Home Blood pressure monitor machine 4/27/12   Christiano Maciel MD   tamsulosin (FLOMAX) 0.4 MG capsule Take 0.4 mg by mouth daily    Reported, Patient       Scheduled Meds      Infusion Meds      PRN Meds      Allergies   Allergies   Allergen Reactions     Nkda [No Known Drug Allergies]      Seasonal Allergies      Family History   Family History   Problem Relation Age of Onset     Family History Negative Other         no known specifics     Kidney Disease Mother      Social History   Social History     Tobacco Use     Smoking status: Never Smoker     Smokeless tobacco: Never Used   Substance Use Topics     Alcohol use: Not Currently     Drug use: Never       Review of Systems   The 5 point Review of Systems is negative other than noted in the HPI or here.         PHYSICAL EXAMINATION  Pulse:  [73] 73  Resp:  [18] 18  BP: (139)/(68) 139/68  SpO2:  [98 %] 98 %     General Exam  General:  patient lying in bed with flat affect    HEENT:  normocephalic/atraumatic  Cardio:  RRR  Pulmonary:  no respiratory distress  Abdomen:  soft, non-tender, non-distended  Extremities:  no edema  Skin:  intact     Neuro Exam  Mental Status:  alert, oriented x 3, follows commands, speech clear and fluent, naming and repetition normal  Cranial Nerves:  visual fields intact (tested by nurse), EOMI with normal smooth pursuit, facial sensation intact and, hearing not formally tested but intact to conversation, Right facial droop. No dysarthria  Motor:  able to move all limbs antigravity spontaneously with no signs of hemiparesis observed, no pronator drift, Mild right UE drift   Reflexes:  unable to test (telestroke)  Sensory:  light touch sensation intact and symmetric throughout upper and lower extremities (assessed by nurse), no extinction on double simultaneous stimulation (assessed by nurse)  Coordination:  rapid alternating movements symmetric, mild ataxia on RUE  Station/Gait:  walks with a cota, narrow steps, able to turn    Dysphagia Screen  Per Nursing    Stroke Scales    NIHSS  Interval baseline (03/15/22 1945)   Interval Comments     1a. Level of Consciousness 0-->Alert, keenly responsive   1b. LOC Questions 0-->Answers both questions correctly   1c. LOC Commands 0-->Performs both tasks correctly   2.   Best Gaze 0-->Normal   3.   Visual 0-->No visual loss   4.   Facial Palsy 1-->Minor paralysis (flattened nasolabial fold, asymmetry on smiling)   5a. Motor Arm, Left 0-->No drift, limb holds 90 (or 45) degrees for full 10 secs   5b. Motor Arm, Right 1-->Drift, limb holds 90 (or 45) degrees, but drifts down before full 10 secs, does not hit bed or other support   6a. Motor Leg, Left 0-->No drift, leg holds 30 degree position for full 5 secs   6b. Motor Leg, right 0-->No drift, leg  holds 30 degree position for full 5 secs   7.   Limb Ataxia 1-->Present in one limb   8.   Sensory 0-->Normal, no sensory loss   9.   Best Language 0-->No aphasia, normal   10. Dysarthria 0-->Normal   11. Extinction and Inattention  0-->No abnormality   Total 3 (03/15/22 1945)       Modified Cornelius Score (Pre-morbid)  2-Slight disability; unable to carry out all previous activities, but able to look after own affairs    Imaging  I personally reviewed all imaging; relevant findings per HPI.     Lab Results Data   CBC  Recent Labs   Lab 03/15/22  1937   WBC 5.0   RBC 3.87*   HGB 12.0*   HCT 37.3*        Basic Metabolic Panel    Recent Labs   Lab 03/15/22  1937 03/15/22  1922   *  --    POTASSIUM 3.7  --    CHLORIDE 106  --    CO2 22  --    BUN 29*  --    CR 2.16*  2.2*  --    * 207*   ANISH 8.4*  --      Liver Panel  No results for input(s): PROTTOTAL, ALBUMIN, BILITOTAL, ALKPHOS, AST, ALT, BILIDIRECT in the last 168 hours.  INR    Recent Labs   Lab Test 03/15/22  1937 03/07/20  0000 03/06/20  0821   INR 1.05 0.98 0.98      Lipid Profile    Recent Labs   Lab Test 01/29/22  1207 02/03/21  1203 07/03/19  0754 04/15/19  0825 01/19/16  1026 10/14/14  0808   CHOL 210*  --  271* 373*   < > 339*   HDL 46  --  51 40   < > 28*   * 166* 202* 305*   < > 252*   TRIG 94  --  90 138   < > 296*   CHOLHDLRATIO  --   --   --   --   --  12.1*    < > = values in this interval not displayed.     A1C  No lab results found.  Troponin I  No results for input(s): TROPONIN, GHTROP in the last 168 hours.       Stroke Code Data Data   Stroke Code Data  (for stroke code with tele)  Stroke code activated 03/15/22   1923   First stroke provider response 03/15/22   1923   Video start time 03/15/22   1935   Video end time 03/15/22   2004   Last known normal 03/15/22   1600   Time of discovery  (or onset of symptoms)  03/15/22   1800   Head CT read by Stroke Neuro Dr/Provider 03/15/22       Was stroke code de-escalated? Yes  03/15/22 2005           Telestroke Service Details  Type of service telemedicine diagnostic assessment of acute neurological changes   Reason telemedicine is appropriate patient requires assessment with a specialist for diagnosis and treatment of neurological symptoms   Mode of transmission secure interactive audio and video communication per Caroline   Originating site (patient location) [facility]    Distant site (provider location) [facility]

## 2022-03-16 NOTE — PROGRESS NOTES
[facility]    Medicine Progress Note - Hospitalist Service       Date of Admission:  3/15/2022    Assessment & Plan            Jessica Healy is a 76 year old male admitted on 3/15/2022. He has history of CVA, CAD status post CABG, hypertension, hyperlipidemia and stage III CKD and presented for evaluation of right-sided weakness and difficulty walking. Hospital Day: 2     #Right sided weakness  #Likely TIA  Patient presented with acute onset of right lower extremity and facial weakness and difficulty walking.   CT and MRI showed no change.  He does have some encephalomalacia of the left medial occipital lobe from a chronic infarct and difficult to exclude that there could be an acute component within this.  Symptoms have resolved here.  Could be TIA, has burden of intracranial vascular disease and microvascular ischemic changes.  Continue stroke protocol  Permissive hypertension  Blood pressure spike during activity today concerning that symptoms may continue to fluctuate  Home baby aspirin increased to 325 mg daily  Continue home Plavix and max dose atorvastatin  Cleared by PT, OT, and speech.  Patient wants to continue outpatient PT which he has been participating in.  Echo unremarkable.  Some discussion of 30-day event monitor however without clear new ischemia on MRI, no evidence of embolic phenomena, evident intracranial vascular disease to explain stroke and TIA, I feel this might be low yield.  Checking P2Y12 assay to rule out Plavix resistance. This result likely will not come back during hospital stay and can be followed up as outpatient.    #Hx CAD s/p CABG  Home ASA (dose increased), statin, Plavix, Zetia, Imdur    #Dyslipidemia  , HDL 34 despite maximum dose atorvastatin and Zetia  Outpatient followup.    #HTN  Permissive hypertension now given here for stroke concerns  Continue home Imdur  Holding home lisinopril, patient tells me he only takes this as needed at home  anyway    #CKD 3  Cr at his baseline  Got contrast yesterday, follow Cr    #BPH, holding home Flomax  #Elevated random blood glucose, A1c checked 5.6 normal       Diet: Room Service  Combination Diet Low Saturated Fat Na <2400mg Diet    DVT Prophylaxis: Moderate risk. SCDs   Ahmadi Catheter: Not present  Central Lines: None  Code Status: Full Code      Disposition Plan   Disposition: Home tomorrow  Discharge barriers: fluctuating BP, monitor Cr  Medically ready to discharge today: No  Estimated discharge date: 03/18/2022     The patient's care was discussed with the Patient and Patient's Family.    Joan Ferris MD  Hospitalist Service  [facility]  Text page via [employer] Paging/Directory      Clinically Significant Risk Factors Present on Admission          # Hypocalcemia: Ca = 8.4 mg/dL (Ref range: 8.5 - 10.5 mg/dL) and/or iCa = N/A on admission, will replace as needed      # Platelet Defect: home medication list includes an antiplatelet medication       ____________        Physical Exam   Vital Signs: Temp: 98.4  F (36.9  C) Temp src: Oral BP: (!) 163/79 Pulse: 61   Resp: 20 SpO2: 96 % O2 Device: None (Room air)    Weight: 137 lbs 12.8 oz  General: in no apparent distress, non-toxic and alert male lying in hospital bed oriented x3  HEENT: Head normocephalic atraumatic, oral mucosa moist. Sclerae anicteric  CV: Regular rhythm, normal rate, no murmurs  Resp: No wheezes, no rales or rhonchi, no focal consolidations  GI: Belly soft, nondistended, nontender, bowel sounds present  Skin: No rashes or lesions  Extremities: No peripheral edema  Psych: Normal affect, mood euthymic  Neuro: Strength 5/5 BUE and BLE, cranial nerves CNII-XII intact, subtle decreased R forehead crease at rest but symmetric when he lifts eyebrows      Data   Recent Results (from the past 24 hour(s))   Glucose by meter    Collection Time: 03/15/22  7:22 PM   Result Value Ref Range    GLUCOSE BY METER POCT 207 (H) 70 - 99 mg/dL    Basic metabolic panel    Collection Time: 03/15/22  7:37 PM   Result Value Ref Range    Sodium 135 (L) 136 - 145 mmol/L    Potassium 3.7 3.5 - 5.0 mmol/L    Chloride 106 98 - 107 mmol/L    Carbon Dioxide (CO2) 22 22 - 31 mmol/L    Anion Gap 7 5 - 18 mmol/L    Urea Nitrogen 29 (H) 8 - 28 mg/dL    Creatinine 2.16 (H) 0.70 - 1.30 mg/dL    Calcium 8.4 (L) 8.5 - 10.5 mg/dL    Glucose 199 (H) 70 - 125 mg/dL    GFR Estimate 31 (L) >60 mL/min/1.73m2   INR    Collection Time: 03/15/22  7:37 PM   Result Value Ref Range    INR 1.05 0.85 - 1.15   Partial thromboplastin time    Collection Time: 03/15/22  7:37 PM   Result Value Ref Range    aPTT 33 22 - 38 Seconds   Troponin I    Collection Time: 03/15/22  7:37 PM   Result Value Ref Range    Troponin I 0.02 0.00 - 0.29 ng/mL   CBC with platelets and differential    Collection Time: 03/15/22  7:37 PM   Result Value Ref Range    WBC Count 5.0 4.0 - 11.0 10e3/uL    RBC Count 3.87 (L) 4.40 - 5.90 10e6/uL    Hemoglobin 12.0 (L) 13.3 - 17.7 g/dL    Hematocrit 37.3 (L) 40.0 - 53.0 %    MCV 96 78 - 100 fL    MCH 31.0 26.5 - 33.0 pg    MCHC 32.2 31.5 - 36.5 g/dL    RDW 12.3 10.0 - 15.0 %    Platelet Count 160 150 - 450 10e3/uL    % Neutrophils 76 %    % Lymphocytes 16 %    % Monocytes 6 %    % Eosinophils 1 %    % Basophils 1 %    % Immature Granulocytes 0 %    NRBCs per 100 WBC 0 <1 /100    Absolute Neutrophils 3.8 1.6 - 8.3 10e3/uL    Absolute Lymphocytes 0.8 0.8 - 5.3 10e3/uL    Absolute Monocytes 0.3 0.0 - 1.3 10e3/uL    Absolute Eosinophils 0.1 0.0 - 0.7 10e3/uL    Absolute Basophils 0.0 0.0 - 0.2 10e3/uL    Absolute Immature Granulocytes 0.0 <=0.4 10e3/uL    Absolute NRBCs 0.0 10e3/uL   Extra Red Top Tube    Collection Time: 03/15/22  7:37 PM   Result Value Ref Range    Hold Specimen JIC    Creatinine POCT    Collection Time: 03/15/22  7:37 PM   Result Value Ref Range    Creatinine POCT 2.2 (H) 0.7 - 1.3 mg/dL    GFR, ESTIMATED POCT 31 (L) >60 mL/min/1.73m2   Extra Green Top  (Lithium Heparin) Tube    Collection Time: 03/15/22  7:37 PM   Result Value Ref Range    Hold Specimen JIC    Hemoglobin A1c    Collection Time: 03/15/22  7:37 PM   Result Value Ref Range    Hemoglobin A1C 5.6 <=5.6 %   Lipid Profile    Collection Time: 03/15/22  7:37 PM   Result Value Ref Range    Cholesterol 181 <=199 mg/dL    Triglycerides 105 <=149 mg/dL    Direct Measure HDL 34 (L) >=40 mg/dL    LDL Cholesterol Calculated 126 <=129 mg/dL   Lipid panel reflex to direct LDL: Non-fasting    Collection Time: 03/15/22  7:37 PM   Result Value Ref Range    Cholesterol 179 <=199 mg/dL    Triglycerides 105 <=149 mg/dL    Direct Measure HDL 34 (L) >=40 mg/dL    LDL Cholesterol Calculated 124 <=129 mg/dL   ECG 12-LEAD WITH MUSE (LHE)    Collection Time: 03/15/22  8:23 PM   Result Value Ref Range    Systolic Blood Pressure 155 mmHg    Diastolic Blood Pressure 79 mmHg    Ventricular Rate 70 BPM    Atrial Rate 70 BPM    SD Interval 228 ms    QRS Duration 96 ms     ms    QTc 442 ms    P Axis 57 degrees    R AXIS -36 degrees    T Axis 80 degrees    Interpretation ECG       Sinus rhythm with 1st degree A-V block  Left axis deviation  Incomplete right bundle branch block  Inferior infarct , age undetermined  Abnormal ECG  When compared with ECG of 06-MAR-2020 15:10,  Incomplete right bundle branch block is now Present  Inferior infarct is now Present  Confirmed by SEE ED PROVIDER NOTE FOR, ECG INTERPRETATION ([id number]),  CAMRON QUISPE ([id number]) on 3/16/2022 2:36:05 PM     Asymptomatic COVID-19 Virus (Coronavirus) by PCR Nasopharyngeal    Collection Time: 03/15/22  8:25 PM    Specimen: Nasopharyngeal; Swab   Result Value Ref Range    SARS CoV2 PCR Negative Negative   Troponin I    Collection Time: 03/15/22 10:26 PM   Result Value Ref Range    Troponin I 0.02 0.00 - 0.29 ng/mL   Glucose by meter    Collection Time: 03/15/22 11:06 PM   Result Value Ref Range    GLUCOSE BY METER POCT 89 70 - 99 mg/dL   CBC with platelets     Collection Time: 03/16/22  5:23 AM   Result Value Ref Range    WBC Count 3.7 (L) 4.0 - 11.0 10e3/uL    RBC Count 3.65 (L) 4.40 - 5.90 10e6/uL    Hemoglobin 11.7 (L) 13.3 - 17.7 g/dL    Hematocrit 34.9 (L) 40.0 - 53.0 %    MCV 96 78 - 100 fL    MCH 32.1 26.5 - 33.0 pg    MCHC 33.5 31.5 - 36.5 g/dL    RDW 12.3 10.0 - 15.0 %    Platelet Count 148 (L) 150 - 450 10e3/uL   Glucose by meter    Collection Time: 03/16/22  6:55 AM   Result Value Ref Range    GLUCOSE BY METER POCT 75 70 - 99 mg/dL   Glucose by meter    Collection Time: 03/16/22  8:38 AM   Result Value Ref Range    GLUCOSE BY METER POCT 78 70 - 99 mg/dL   Echocardiogram Complete    Collection Time: 03/16/22 11:20 AM   Result Value Ref Range    LVEF  65-70%    Glucose by meter    Collection Time: 03/16/22 11:30 AM   Result Value Ref Range    GLUCOSE BY METER POCT 112 (H) 70 - 99 mg/dL     ____________  Interval History   Data reviewed today: I reviewed all medications, new labs and imaging results over the last 24 hours. I personally reviewed no images or EKG's today.  Patient states symptoms have resolved, asking to go home  Await Neuro, PT, OT input  Potentially home later today  I dont feel strongly that he needs Holter monitor with lack of acute stroke on MRI, but defer to Neuro    Update, patient had severe elevation of blood pressure shortly after working with PT, discussed with patient and agreeable to being monitored another night.  Concerned he is at risk for recurrent symptoms and would end up being readmitted anyway.

## 2022-03-16 NOTE — CONSULTS
Care Management Initial Consult    General Information  Assessment completed with: Children, Patient, Daughter Ed  Type of CM/SW Visit: Initial Assessment    Primary Care Provider verified and updated as needed: Yes   Readmission within the last 30 days: no previous admission in last 30 days      Reason for Consult: discharge planning  Advance Care Planning: Advance Care Planning Reviewed: no concerns identified          Communication Assessment  Patient's communication style: spoken language (non-English) (Hmong Speaking)    Hearing Difficulty or Deaf: no   Wear Glasses or Blind: yes    Cognitive  Cognitive/Neuro/Behavioral: WDL  Level of Consciousness: alert  Arousal Level: opens eyes spontaneously  Orientation: oriented x 4  Mood/Behavior: cooperative, calm  Best Language: 0 - No aphasia  Speech: fluent, clear, logical    Living Environment:   People in home: child(miguel), adult     Current living Arrangements: house      Able to return to prior arrangements: yes       Family/Social Support:  Care provided by: self  Provides care for: no one  Marital Status:   Children          Description of Support System: Supportive, Involved    Support Assessment: Adequate family and caregiver support    Current Resources:   Patient receiving home care services: No     Community Resources: None  Equipment currently used at home: walker, rolling  Supplies currently used at home: None    Employment/Financial:  Employment Status: retired     Employment/ Comments: No  Financial Concerns: No concerns identified   Referral to Financial Worker: No       Lifestyle & Psychosocial Needs:  Social Determinants of Health     Tobacco Use: Low Risk      Smoking Tobacco Use: Never Smoker     Smokeless Tobacco Use: Never Used   Alcohol Use: Not on file   Financial Resource Strain: Not on file   Food Insecurity: Not on file   Transportation Needs: Not on file   Physical Activity: Not on file   Stress: Not on file    Social Connections: Not on file   Intimate Partner Violence: Not on file   Depression: Not at risk     PHQ-2 Score: 0   Housing Stability: Not on file       Functional Status:  Prior to admission patient needed assistance:   Dependent ADLs:: Independent  Dependent IADLs:: Transportation       Mental Health Status:  Mental Health Status: No Current Concerns       Chemical Dependency Status:  Chemical Dependency Status: No Current Concerns             Values/Beliefs:  Spiritual, Cultural Beliefs, Druze Practices, Values that affect care: no               Additional Information:  Met with patient and daughter in room to introduce care manager role and discuss discharge planning. Daughter translated for patient. From home with son in single family home; independent at baseline except for transport. Has walker available for use at home. Goal to return home at discharge. Family to transport. Care manager to follow.     Pema Kearns RN

## 2022-03-16 NOTE — ED PROVIDER NOTES
EMERGENCY DEPARTMENT ENCOUNTER      NAME: Jessica Healy  AGE: 76 year old male  YOB: 1945  MRN: [id number]  EVALUATION DATE & TIME: 3/15/2022  7:25 PM    PCP: Christiano Maciel    ED PROVIDER: Dimitrios Maurer M.D.      Chief Complaint   Patient presents with     Stroke Symptoms       FINAL IMPRESSION:  1. Stroke-like symptoms        ED COURSE & MEDICAL DECISION MAKIN year old male presents to the Emergency Department for evaluation of ataxia, speech difficulty.  He was assessed in triage and stroke code was initiated.  Spoke immediately with the on-call consultant at the [facility].  Patient was expedited to CT head and neck imaging.  Return to the emergency department.  There is full evaluation revealed an NIH score of 3.  Given that he was at about 4 hours at that point with NIH of 3, was recommended against thrombolytics and stroke code was deescalated.   stroke evaluation was continued.  He was given full dose aspirin and Plavix.  Additional labs so far generally unremarkable.  Will be sent for MRI with and without contrast.  Patient will be admitted for continued stroke evaluation.  Discussed case with hospitalist.    7:18 PM Provider called to triage for neuro assessment.   7:19 PM I met with the patient to gather history and to perform my initial exam. We discussed plans for the ED course, including diagnostic testing and treatment. PPE: N95 mask  7:21 PM Tier 1 stroke code initiated.   7:22 PM POC blood glucose 207.  7:25 PM Spoke with Dr. Hernandez from stroke neurology.   7:34 PM Spoke with [facility] and the non contrast CT was negative.  7:52 PM Spoke with [facility] about the contrast CT.   7:58 PM Reassessed the patient. Tele-stroke neuro is on the phone with the patient. Neuro recommends against TNK administration. They are recommending admission and MRI. Will de-escalate stroke code.   8:18 PM I spoke with Dr. Hernandez from neurology who recommends full dose  aspirin and platelet assay scheduled for tomorrow.   8:27 PM Spoke to Dr. Rock, hospitalist.   At the conclusion of the encounter I discussed the results of all of the tests and the disposition. The questions were answered. The patient or family acknowledged understanding and was agreeable with the care plan.     Critical Care  Performed by: Dimitrios Maurer MD  Authorized by: Dimitrios Maurer MD    Total critical care time: 30 minutes  Criticalcare time was exclusive of separately billable procedures and treating other patients.  Critical care was necessary to treat or prevent imminent or life-threatening deterioration of the following conditions: Acute stroke code within window for thrombolytic intervention  Critical care was time spent personally by me on the following activities: development of treatment plan with patient or surrogate, discussions with consultants, examination of patient, evaluation of patient's response totreatment, obtaining history from patient or surrogate, ordering and performing treatments and interventions, ordering and review of laboratory studies, ordering and review of radiographic studies and re-evaluation ofpatient's condition, this excludes any separately billable procedures.      MEDICATIONS GIVEN IN THE EMERGENCY:  Medications   aspirin (ASA) chewable tablet 81 mg (has no administration in time range)   acetaminophen (TYLENOL) tablet 650-975 mg (650 mg Oral Given 3/15/22 2309)   atorvastatin (LIPITOR) tablet 80 mg (has no administration in time range)   clopidogrel (PLAVIX) tablet 75 mg (has no administration in time range)   ezetimibe (ZETIA) tablet 10 mg (has no administration in time range)   isosorbide mononitrate (IMDUR) 24 hr tablet 120 mg (has no administration in time range)   lidocaine 1 % 0.1-1 mL (has no administration in time range)   lidocaine (LMX4) cream (has no administration in time range)   sodium chloride (PF) 0.9% PF flush 3 mL (3 mLs Intracatheter Given  "3/15/22 2309)   sodium chloride (PF) 0.9% PF flush 3 mL (has no administration in time range)   Medication Instructions - Avoid dextrose in IV solutions. (has no administration in time range)   medication instruction - No oral meds if patient didn't pass dysphagia screen (has no administration in time range)   iopamidol (ISOVUE-370) solution 75 mL (75 mLs Intravenous Given 3/15/22 1949)   clopidogrel (PLAVIX) tablet 300 mg (300 mg Oral Given 3/15/22 2154)   aspirin (ASA) tablet 325 mg (325 mg Oral Given 3/15/22 2154)   gadobutrol (GADAVIST) injection 7 mL (7 mLs Intravenous Given 3/15/22 2125)       NEW PRESCRIPTIONS STARTED AT TODAY'S ER VISIT  Current Discharge Medication List             =================================================================    HPI    Patient information was obtained from: patient     Use of : Yes ( Phone) - Language: Lauren Healy is a 76 year old male with a pertinent history of prior CVA, ischemic cardiomyopathy, CAD s/p CABG, HTN, HLD, CKD3, who presents to this ED by car for evaluation of slurred speech and bilateral leg weakness.     Patient presents with slurred speech which began around 4 pm today (3/15/2022; ~3 hours ago prior to initial presentation). The patient describes it as stumbling over his words. Wife noted the speech difficulty, as well as bilateral leg weakness, stumbling while waking. Wife states the patient's \"tongue was not flexible\".     REVIEW OF SYSTEMS   All systems reviewed and negative except as noted in HPI.    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 He also said he does not like to take too many medications. Our RN spoke with him via  and hopefully has convinced him to take it daily; we will recheck his lipid profile in 3 months       PAST SURGICAL HISTORY:  Past Surgical History:   Procedure Laterality Date     BYPASS GRAFT ARTERY CORONARY  12/22/2006    GARZA to LAD, vein graft to OM2, vein graft to PDA. This was complicated by postop afib. Done in [place], [place]     CARDIAC CATHETERIZATION  2006    in [place]     CARDIAC CATHETERIZATION  11/21/2014    by Dr. Sepulveda at [facility], no PCI was done: LMCA 90%, mid %, prox LCX 90%, prox %, SVG's to OM2 and RPDA 100%, LIMA to LAD patent with L to R collaterals     SURGICAL HISTORY OF -       CABG 2006           CURRENT MEDICATIONS:    Current Facility-Administered Medications   Medication     acetaminophen (TYLENOL) tablet 650-975 mg     [START ON 3/16/2022] aspirin (ASA) chewable tablet 81 mg     [START ON 3/16/2022] atorvastatin (LIPITOR) tablet 80 mg     [START ON 3/16/2022] clopidogrel (PLAVIX) tablet 75 mg     [START ON 3/16/2022] ezetimibe (ZETIA) tablet 10 mg     [START ON 3/16/2022] isosorbide mononitrate (IMDUR) 24 hr tablet 120 mg     lidocaine (LMX4) cream     lidocaine 1 % 0.1-1 mL     medication  "instruction - No oral meds if patient didn't pass dysphagia screen     Medication Instructions - Avoid dextrose in IV solutions.     sodium chloride (PF) 0.9% PF flush 3 mL     sodium chloride (PF) 0.9% PF flush 3 mL     Facility-Administered Medications Ordered in Other Encounters   Medication     technetium Tc 99m tetrofosmin 2UD study (MYOVIEW) radioisotope injection 3-42 mCi         ALLERGIES:  Allergies   Allergen Reactions     Nkda [No Known Drug Allergies]      Seasonal Allergies        FAMILY HISTORY:  Family History   Problem Relation Age of Onset     Family History Negative Other         no known specifics     Kidney Disease Mother        SOCIAL HISTORY:   Social History     Socioeconomic History     Marital status:      Spouse name: Not on file     Number of children: 6     Years of education: Not on file     Highest education level: Not on file   Occupational History     Not on file   Tobacco Use     Smoking status: Never Smoker     Smokeless tobacco: Never Used   Substance and Sexual Activity     Alcohol use: Not Currently     Drug use: Never     Sexual activity: Never     Partners: Female   Other Topics Concern     Parent/sibling w/ CABG, MI or angioplasty before 65F 55M? No   Social History Narrative    He is .     Social Determinants of Health     Financial Resource Strain: Not on file   Food Insecurity: Not on file   Transportation Needs: Not on file   Physical Activity: Not on file   Stress: Not on file   Social Connections: Not on file   Intimate Partner Violence: Not on file   Housing Stability: Not on file       VITALS:  BP (!) 161/77 (BP Location: Right arm)   Pulse 66   Temp 98.1  F (36.7  C) (Oral)   Resp 18   Ht 1.626 m (5' 4\")   Wt 61.6 kg (135 lb 11.2 oz)   SpO2 98%   BMI 23.29 kg/m      PHYSICAL EXAM    Constitutional: Well developed, Well nourished, NAD.  HENT: Normocephalic, Atraumatic. Neck Supple.  Eyes: EOMI, Conjunctiva normal.  Respiratory: Breathing " comfortably on room air. Speaks full sentences easily. Lungs clear to ascultation.  Cardiovascular: Normal heart rate, Regular rhythm. No peripheral edema.  Abdomen: Soft  Musculoskeletal: Good range of motion in all major joints. No major deformities noted.  Integument: Warm, Dry.  Neurologic: See below  Psychiatric: Cooperative. Affect appropriate.    National Institutes of Health Stroke Scale  Exam Interval: Baseline   Score    Level of consciousness: (0)   Alert, keenly responsive    LOC questions: (0)   Answers both questions correctly    LOC commands: (0)   Performs both tasks correctly    Best gaze: (0)   Normal    Visual: (0)   No visual loss    Facial palsy: (1)   Minor paralysis    Motor arm (left): (0)   No drift    Motor arm (right): (1)   Minor drift    Motor leg (left): (0)   No drift    Motor leg (right): (0)   No drift    Limb ataxia: (1)   Present in one limb    Sensory: (0)   Normal- no sensory loss    Best language: (0)   Normal- no aphasia    Dysarthria: (0)   Normal    Extinction and inattention: (0)   No abnormality        Total Score:  3        LAB:  All pertinent labs reviewed and interpreted.  Labs Ordered and Resulted from Time of ED Arrival to Time of ED Departure   GLUCOSE BY METER - Abnormal       Result Value    GLUCOSE BY METER POCT 207 (*)    BASIC METABOLIC PANEL - Abnormal    Sodium 135 (*)     Potassium 3.7      Chloride 106      Carbon Dioxide (CO2) 22      Anion Gap 7      Urea Nitrogen 29 (*)     Creatinine 2.16 (*)     Calcium 8.4 (*)     Glucose 199 (*)     GFR Estimate 31 (*)    CBC WITH PLATELETS AND DIFFERENTIAL - Abnormal    WBC Count 5.0      RBC Count 3.87 (*)     Hemoglobin 12.0 (*)     Hematocrit 37.3 (*)     MCV 96      MCH 31.0      MCHC 32.2      RDW 12.3      Platelet Count 160      % Neutrophils 76      % Lymphocytes 16      % Monocytes 6      % Eosinophils 1      % Basophils 1      % Immature Granulocytes 0      NRBCs per 100 WBC 0      Absolute Neutrophils 3.8       Absolute Lymphocytes 0.8      Absolute Monocytes 0.3      Absolute Eosinophils 0.1      Absolute Basophils 0.0      Absolute Immature Granulocytes 0.0      Absolute NRBCs 0.0     ISTAT CREATININE POCT - Abnormal    Creatinine POCT 2.2 (*)     GFR, ESTIMATED POCT 31 (*)    LIPID PROFILE - Abnormal    Cholesterol 181      Triglycerides 105      Direct Measure HDL 34 (*)     LDL Cholesterol Calculated 126     LIPID PROFILE - Abnormal    Cholesterol 179      Triglycerides 105      Direct Measure HDL 34 (*)     LDL Cholesterol Calculated 124     INR - Normal    INR 1.05     PARTIAL THROMBOPLASTIN TIME - Normal    aPTT 33     TROPONIN I - Normal    Troponin I 0.02     HEMOGLOBIN A1C - Normal    Hemoglobin A1C 5.6     COVID-19 VIRUS (CORONAVIRUS) BY PCR - Normal    SARS CoV2 PCR Negative     GLUCOSE MONITOR NURSING POCT   GLUCOSE MONITOR NURSING POCT   GLUCOSE MONITOR NURSING POCT       RADIOLOGY:  Reviewed all pertinent imaging. Please see official radiology report.  MR Brain w/o & w Contrast   Final Result   IMPRESSION:   1.  Probable chronic ischemic infarct in the left occipital lobe again noted.   2.  No acute intracranial process.   3.  Generalized brain atrophy and presumed microvascular ischemic changes as detailed above.      CTA Head Neck with Contrast   Final Result   IMPRESSION:    HEAD CT:   1.  No hemorrhage, mass, or mass effect.   2.  Mild to moderate chronic encephalomalacia left occipital lobe. Superimposed small area of subacute ischemia would be difficult to exclude.   3.  Small chronic infarctions left cerebellum and anterior aspect of basal ganglia bilaterally.      HEAD CTA:    1.  Moderate atherosclerotic changes cavernous ICA on the left, mild to moderate on the right.   2.  Moderate to marked atherosclerotic changes supraclinoid ICA on the left, mild to moderate on the right.   3.  Mild to moderate areas of narrowings throughout basilar artery.   4.  Short segment moderate to marked narrowing  at the distal left P2.   5.  Poorly opacified distal branches of left posterior circulation.   6.  Mild atherosclerotic changes right posterior circulation.      NECK CTA:   1.  Less than 50% narrowing proximal ICAs bilaterally.   2.  Proximal aspect of left vertebral artery is not visualized. The artery is opacified in its mid and distal portion, from approximately C5-C6 level distally. Moderate areas of narrowing in its distal aspect.   3.  Moderate to marked narrowing at the origin/proximal aspect of right vertebral artery.   4.  Moderate to marked narrowing at the very distal aspect of right vertebral artery.      CT Results were called to Dr. Maurer at 3/15/2022 7:37 PM.   CTA Results were called to Dr. Maurer at 3/15/2022 7:47 PM.           EKG:    Performed at: 2023    Impression: Sinus rhythm with first-degree AV block, left axis deviation, age-indeterminate inferior infarct pattern    Rate: 70  Rhythm: Sinus  Axis: Leftward  WV Interval: 228  QRS Interval: 96  QTc Interval: 442  ST Changes: None significant  Comparison: When compared to March 6, 2020, inferior infarct pattern is now present.    I have independently reviewed and interpreted the EKG(s) documented above.      I, Vinayak Rick, am serving as a scribe to document services personally performed by Dr. Dimitrios Maurer, based on my observation and the provider's statements to me. I, Dimitrios Maurer MD attest that Vinayak Rick is acting in a scribe capacity, has observed my performance of the services and has documented them in accordance with my direction.    Dimitrios Maurer M.D.  Emergency Medicine  [facility] EMERGENCY DEPARTMENT  [address] [place] [id number] [phone]  Dept: [phone]     Dimitrios Maurer MD  03/15/22 2702

## 2022-03-16 NOTE — ED NOTES
"Pt is a/o x4. Per pt, his speech has improved since the time he came to the hospital. Wife does notice some right sided facial droop/ changes. During neuro/tele assessment, pt was able to take a few steps using the cane. Wife stated that she carcamo noticed that pt is not able to take normal steps as he used to before, currently he is able to take small steps due to \"having heavy feet.\" Pt does not notice any changes in sensation in his extremities. Wife does noticed that pt is weaken since the symptoms started today. Pt also stated that now he does recall having a stroke about 3 years ago.   "

## 2022-03-16 NOTE — PLAN OF CARE
Goal Outcome Evaluation:    Plan of Care Reviewed With: patient     Overall Patient Progress: improving    Outcome Evaluation: pt with fluctuating numbness to the right side of the body.    Pt reporting mild pain/numbness to the right leg on admission to the floor. PRN tylenol given and effective. Using the urinal independently. Having fluctuating numbness to the entire right side of his body. MD seen and no new orders. NIHSS was a 2 for some right sided facial and right leg numbness. VSS on room air. Poor sleep overnight due to frequent checks and changes in condition. NPO but passed dysphagia screen, could possible upgrade diet today    Problem: Risk for Delirium  Goal: Improved Sleep  Outcome: Ongoing, Not Progressing     Problem: Plan of Care - These are the overarching goals to be used throughout the patient stay.    Goal: Plan of Care Review/Shift Note  Description: The Plan of Care Review/Shift note should be completed every shift.  The Outcome Evaluation is a brief statement about your assessment that the patient is improving, declining, or no change.  This information will be displayed automatically on your shift note.  Outcome: Ongoing, Progressing  Flowsheets (Taken 3/16/2022 0354)  Plan of Care Reviewed With: patient  Outcome Evaluation: pt with fluctuating numbness to the right side of the body.  Overall Patient Progress: improving  Goal: Readiness for Transition of Care  Outcome: Ongoing, Progressing  Intervention: Mutually Develop Transition Plan  Recent Flowsheet Documentation  Taken 3/16/2022 0100 by Padmini Alva, RN  Equipment Currently Used at Home: none     Problem: Adjustment to Illness (Stroke, Ischemic/Transient Ischemic Attack)  Goal: Optimal Coping  Outcome: Ongoing, Progressing     Problem: Cognitive Impairment (Stroke, Ischemic/Transient Ischemic Attack)  Goal: Optimal Cognitive Function  Outcome: Ongoing, Progressing     Problem: Communication Impairment (Stroke, Ischemic/Transient  Ischemic Attack)  Goal: Improved Communication Skills  Outcome: Ongoing, Progressing     Problem: Functional Ability Impaired (Stroke, Ischemic/Transient Ischemic Attack)  Goal: Optimal Functional Ability  Outcome: Ongoing, Progressing  Intervention: Optimize Functional Ability  Recent Flowsheet Documentation  Taken 3/16/2022 0305 by Padmini Alva, RN  Activity Management: activity adjusted per tolerance  Taken 3/15/2022 2305 by Padmini Alva, RN  Activity Management: activity adjusted per tolerance     Problem: Respiratory Compromise (Stroke, Ischemic/Transient Ischemic Attack)  Goal: Effective Oxygenation and Ventilation  Outcome: Ongoing, Progressing     Problem: Swallowing Impairment (Stroke, Ischemic/Transient Ischemic Attack)  Goal: Optimal Eating and Swallowing without Aspiration  Outcome: Ongoing, Progressing     Problem: Urinary Elimination Impaired (Stroke, Ischemic/Transient Ischemic Attack)  Goal: Effective Urinary Elimination  Outcome: Ongoing, Progressing

## 2022-03-17 ENCOUNTER — APPOINTMENT (OUTPATIENT)
Dept: PHYSICAL THERAPY | Facility: HOSPITAL | Age: 77
DRG: 069 | End: 2022-03-17
Payer: MEDICARE

## 2022-03-17 VITALS
WEIGHT: 130.6 LBS | HEIGHT: 64 IN | HEART RATE: 70 BPM | BODY MASS INDEX: 22.3 KG/M2 | RESPIRATION RATE: 20 BRPM | SYSTOLIC BLOOD PRESSURE: 137 MMHG | OXYGEN SATURATION: 97 % | TEMPERATURE: 98.5 F | DIASTOLIC BLOOD PRESSURE: 74 MMHG

## 2022-03-17 LAB
ANION GAP SERPL CALCULATED.3IONS-SCNC: 8 MMOL/L (ref 5–18)
BUN SERPL-MCNC: 15 MG/DL (ref 8–28)
CALCIUM SERPL-MCNC: 8.9 MG/DL (ref 8.5–10.5)
CHLORIDE BLD-SCNC: 112 MMOL/L (ref 98–107)
CO2 SERPL-SCNC: 22 MMOL/L (ref 22–31)
CREAT SERPL-MCNC: 1.32 MG/DL (ref 0.7–1.3)
GFR SERPL CREATININE-BSD FRML MDRD: 57 ML/MIN/1.73M2
GLUCOSE BLD-MCNC: 89 MG/DL (ref 70–125)
POTASSIUM BLD-SCNC: 3.7 MMOL/L (ref 3.5–5)
SODIUM SERPL-SCNC: 142 MMOL/L (ref 136–145)

## 2022-03-17 PROCEDURE — 99239 HOSP IP/OBS DSCHRG MGMT >30: CPT | Performed by: HOSPITALIST

## 2022-03-17 PROCEDURE — 80048 BASIC METABOLIC PNL TOTAL CA: CPT | Performed by: HOSPITALIST

## 2022-03-17 PROCEDURE — 97116 GAIT TRAINING THERAPY: CPT | Mod: GP

## 2022-03-17 PROCEDURE — 36415 COLL VENOUS BLD VENIPUNCTURE: CPT | Performed by: HOSPITALIST

## 2022-03-17 PROCEDURE — 250N000013 HC RX MED GY IP 250 OP 250 PS 637: Performed by: INTERNAL MEDICINE

## 2022-03-17 PROCEDURE — 250N000013 HC RX MED GY IP 250 OP 250 PS 637: Performed by: HOSPITALIST

## 2022-03-17 PROCEDURE — 99232 SBSQ HOSP IP/OBS MODERATE 35: CPT | Performed by: PSYCHIATRY & NEUROLOGY

## 2022-03-17 RX ORDER — ASPIRIN 325 MG
325 TABLET, DELAYED RELEASE (ENTERIC COATED) ORAL DAILY
Qty: 30 TABLET | Refills: 11 | Status: SHIPPED | OUTPATIENT
Start: 2022-03-17 | End: 2023-07-07

## 2022-03-17 RX ADMIN — ATORVASTATIN CALCIUM 80 MG: 40 TABLET, FILM COATED ORAL at 08:29

## 2022-03-17 RX ADMIN — ISOSORBIDE MONONITRATE 120 MG: 60 TABLET, EXTENDED RELEASE ORAL at 08:30

## 2022-03-17 RX ADMIN — CLOPIDOGREL BISULFATE 75 MG: 75 TABLET ORAL at 08:29

## 2022-03-17 RX ADMIN — MELATONIN TAB 3 MG 3 MG: 3 TAB at 04:42

## 2022-03-17 RX ADMIN — ASPIRIN 81 MG CHEWABLE TABLET 324 MG: 81 TABLET CHEWABLE at 08:29

## 2022-03-17 ASSESSMENT — ACTIVITIES OF DAILY LIVING (ADL)
ADLS_ACUITY_SCORE: 6

## 2022-03-17 NOTE — DISCHARGE SUMMARY
[facility] MEDICINE  DISCHARGE SUMMARY     Primary Care Physician: Christiano Maciel MD  Admission Date: 3/15/2022   Discharge Provider: Joan Ferris Discharge Date: 3/17/2022   Diet:   Active Diet and Nourishment Order   Procedures     Room Service     Combination Diet Low Saturated Fat Na <2400mg Diet     Diet       Code Status: Full Code   Activity: DCACTIVITY: Activity as tolerated        Condition at Discharge: Stable     REASON FOR PRESENTATION(See Admission Note for Details)   Right sided weakness    PRINCIPAL & ACTIVE DISCHARGE DIAGNOSES     Active Problems:    CAD (coronary artery disease)    Hypertension goal BP (blood pressure) < 130/80    Dyslipidemia    CKD (chronic kidney disease) stage 3, GFR 30-59 ml/min (H)    Stroke-like symptoms      PENDING LABS     Unresulted Labs Ordered in the Past 30 Days of this Admission     No orders found from 2/13/2022 to 3/16/2022.          PROCEDURES ( this hospitalization only)      none    RECOMMENDATIONS TO OUTPATIENT PROVIDER FOR F/U VISIT     Follow-up Appointments     Follow-up and recommended labs and tests      Follow up with primary care provider, Christiano Maciel MD, within 7 days for   hospital follow- up.  They can consider a blood pressure monitor for you   (ambulatory blood pressure monitor) to see if there are any blood pressure   patterns that need to be addressed.  Follow up with Neurology in about 3 months.             DISPOSITION     Home    SUMMARY OF HOSPITAL COURSE:      Jessica Healy is a 76 year old male admitted on 3/15/2022. He has history of CVA, CAD status post CABG, hypertension, hyperlipidemia and stage III CKD and presented for evaluation of right-sided weakness and difficulty walking. Hospital Day: 3     #Right sided weakness  #Likely TIA  Patient presented with acute onset of right lower extremity and facial weakness and difficulty walking.   CT and MRI showed no change.  He does have some encephalomalacia of the  left medial occipital lobe from a chronic infarct and difficult to exclude that there could be an acute component within this.  Symptoms resolved here.  Could be TIA, has burden of intracranial vascular disease and microvascular ischemic changes.  Home baby aspirin increased to 325 mg daily.  Continue home Plavix and max dose atorvastatin.  Echo unremarkable.  Checked P2Y12 assay to rule out Plavix resistance, within defined limits for patient on Plavix.  No evidence of A fib or flutter on 48 hours tele monitoring here. Some discussion of 30 day cardiac monitor, this could be pursued as outpatient if desired. I think it might be more immediately important to address his very labile blood pressures, see next problem.     #HTN  #Very labile blood pressures  Patient had BPs up to 208/90 after working with PT, and rapidly dropped to 163/79 with rest. Generally running 130s-160s/70s-80s however, so in the setting of likely acute stroke I don't think we should increase his BP meds. We have been holding his lisinopril and he can resume this at home. He already holds this as needed if his BP is running low, and should continue to do so. I think it might be useful to do ambulatory blood pressure monitoring on him, identify any trends that may help guide therapy.  I think the lability is due to his rather extensive intracerebral vascular disease.    #Dyslipidemia  , HDL 34 despite maximum dose atorvastatin and Zetia  Outpatient followup.     #CKD 3  Cr better than his baseline     Discharge Medications with Med changes:     Current Discharge Medication List      CONTINUE these medications which have CHANGED    Details   aspirin (ASA) 325 MG EC tablet Take 1 tablet (325 mg) by mouth daily  Qty: 30 tablet, Refills: 11    Associated Diagnoses: TIA (transient ischemic attack)         CONTINUE these medications which have NOT CHANGED    Details   acetaminophen (TYLENOL) 500 MG tablet Take 500-1,000 mg by mouth every 6 hours  as needed for mild pain      allopurinol (ZYLOPRIM) 100 MG tablet Take 50 mg by mouth daily      atorvastatin (LIPITOR) 80 MG tablet TAKE 1 TABLET(80 MG) BY MOUTH DAILY  Qty: 90 tablet, Refills: 1    Associated Diagnoses: Hyperlipidemia LDL goal <70      clopidogrel (PLAVIX) 75 MG tablet TAKE 1 TABLET(75 MG) BY MOUTH DAILY  Qty: 90 tablet, Refills: 0    Associated Diagnoses: Cerebrovascular accident (CVA), unspecified mechanism (H)      ezetimibe (ZETIA) 10 MG tablet TAKE 1 TABLET(10 MG) BY MOUTH DAILY  Qty: 90 tablet, Refills: 0    Associated Diagnoses: Hyperlipidemia LDL goal <70      isosorbide mononitrate CR (IMDUR) 120 MG 24 HR ER tablet TAKE 1 TABLET(120 MG) BY MOUTH DAILY  Qty: 90 tablet, Refills: 1    Associated Diagnoses: Coronary artery disease involving coronary bypass graft of native heart with angina pectoris (H)      lisinopril (ZESTRIL) 10 MG tablet TAKE 1 TABLET BY MOUTH TWICE DAILY. LABS DUE, PLEASE FOLLOW UP IN CLINIC FOR NEXT REFILL  Qty: 180 tablet, Refills: 0    Comments: **Patient requests 90 days supply**  Associated Diagnoses: Essential hypertension with goal blood pressure less than 130/80      nitroGLYcerin (NITROSTAT) 0.4 MG sublingual tablet PLACE 1 TABLET UNDER THE TONGUE AT THE ONSET OF CHEST PAIN. MAY REPEAT EVERY 5 MINUTES AS NEEDED FOR A TOTAL OF 3 DOSES.  Qty: 25 tablet, Refills: 3    Associated Diagnoses: Coronary artery disease involving coronary bypass graft of native heart with angina pectoris (H)      tamsulosin (FLOMAX) 0.4 MG capsule Take 0.4 mg by mouth daily      ORDER FOR [facility] [place] [place] Home Blood pressure monitor machine  Qty: 1 each, Refills: 0    Associated Diagnoses: Hypertension goal BP (blood pressure) < 130/80               Consults     NEUROLOGY IP CONSULT  SPEECH LANGUAGE PATH ADULT IP CONSULT  PHARMACY IP CONSULT  PHARMACY IP CONSULT  PHARMACY IP CONSULT  PHYSICAL THERAPY ADULT IP CONSULT  OCCUPATIONAL THERAPY ADULT IP CONSULT  REHAB ADMISSIONS LIAISON IP CONSULT  CARE  MANAGEMENT / SOCIAL WORK IP CONSULT  SMOKING CESSATION PROGRAM IP CONSULT      SIGNIFICANT IMAGING FINDINGS     Results for orders placed or performed during the hospital encounter of 03/15/22   CTA Head Neck with Contrast    Impression    IMPRESSION:   HEAD CT:  1.  No hemorrhage, mass, or mass effect.  2.  Mild to moderate chronic encephalomalacia left occipital lobe. Superimposed small area of subacute ischemia would be difficult to exclude.  3.  Small chronic infarctions left cerebellum and anterior aspect of basal ganglia bilaterally.    HEAD CTA:   1.  Moderate atherosclerotic changes cavernous ICA on the left, mild to moderate on the right.  2.  Moderate to marked atherosclerotic changes supraclinoid ICA on the left, mild to moderate on the right.  3.  Mild to moderate areas of narrowings throughout basilar artery.  4.  Short segment moderate to marked narrowing at the distal left P2.  5.  Poorly opacified distal branches of left posterior circulation.  6.  Mild atherosclerotic changes right posterior circulation.    NECK CTA:  1.  Less than 50% narrowing proximal ICAs bilaterally.  2.  Proximal aspect of left vertebral artery is not visualized. The artery is opacified in its mid and distal portion, from approximately C5-C6 level distally. Moderate areas of narrowing in its distal aspect.  3.  Moderate to marked narrowing at the origin/proximal aspect of right vertebral artery.  4.  Moderate to marked narrowing at the very distal aspect of right vertebral artery.    CT Results were called to Dr. Maurer at 3/15/2022 7:37 PM.  CTA Results were called to Dr. Maurer at 3/15/2022 7:47 PM.   MR Brain w/o & w Contrast    Impression    IMPRESSION:  1.  Probable chronic ischemic infarct in the left occipital lobe again noted.  2.  No acute intracranial process.  3.  Generalized brain atrophy and presumed microvascular ischemic changes as detailed above.   Echocardiogram Complete   Result Value Ref Range    LVEF   65-70%        SIGNIFICANT LABORATORY FINDINGS     See emr    Discharge Orders        Reason for your hospital stay    TIA (transient ischemic attack, or mini-stroke)     Follow-up and recommended labs and tests    Follow up with primary care provider, Christiano Maciel MD, within 7 days for hospital follow- up.  They can consider a blood pressure monitor for you (ambulatory blood pressure monitor) to see if there are any blood pressure patterns that need to be addressed.  Follow up with Neurology in about 3 months.     Activity    Your activity upon discharge: activity as tolerated     When to contact your care team    Call your primary doctor if you have any of the following: chest pain, shortness of breath, fever, chills, fainting, dizziness, or vomiting, or any other symptoms that are new or concerning to you.     Diet    Follow this diet upon discharge: Resume your regular diet       Examination   Physical Exam   Temp:  [98.4  F (36.9  C)-98.5  F (36.9  C)] 98.5  F (36.9  C)  Pulse:  [61-86] 70  Resp:  [18-20] 20  BP: (137-212)/(69-97) 137/74  SpO2:  [96 %-97 %] 97 %  Wt Readings from Last 1 Encounters:   03/17/22 59.2 kg (130 lb 9.6 oz)       General: in no apparent distress, non-toxic and alert male lying in hospital bed oriented x3  HEENT: Head normocephalic atraumatic, oral mucosa moist. Sclerae anicteric  Skin: No rashes or lesions  Extremities: No peripheral edema  Psych: Normal affect, mood euthymic  Neuro: Strength 5/5 BUE and BLE, cranial nerves CNII-XII intact, subtle decreased R forehead crease at rest but symmetric when he lifts eyebrows    Please see EMR for more detailed significant labs, imaging, consultant notes etc.    I, Joan Ferris MD, personally saw the patient today and spent greater than 30 minutes discharging this patient.    MD DEL [facility]    CC:Ho, Nam M

## 2022-03-17 NOTE — PLAN OF CARE
"  Problem: Plan of Care - These are the overarching goals to be used throughout the patient stay.    Goal: Plan of Care Review/Shift Note  Description: The Plan of Care Review/Shift note should be completed every shift.  The Outcome Evaluation is a brief statement about your assessment that the patient is improving, declining, or no change.  This information will be displayed automatically on your shift note.  Outcome: Ongoing, Progressing  Goal: Patient-Specific Goal (Individualized)  Description: You can add care plan individualizations to a care plan. Examples of Individualization might be:  \"Parent requests to be called daily at 9am for status\", \"I have a hard time hearing out of my right ear\", or \"Do not touch me to wake me up as it startles me\".  Outcome: Ongoing, Progressing  Goal: Absence of Hospital-Acquired Illness or Injury  Outcome: Ongoing, Progressing  Goal: Optimal Comfort and Wellbeing  Outcome: Ongoing, Progressing  Goal: Readiness for Transition of Care  Outcome: Ongoing, Progressing     Problem: Risk for Delirium  Goal: Optimal Coping  Outcome: Ongoing, Progressing  Goal: Improved Behavioral Control  Outcome: Ongoing, Progressing  Goal: Improved Attention and Thought Clarity  Outcome: Ongoing, Progressing  Goal: Improved Sleep  Outcome: Ongoing, Progressing     Problem: Adjustment to Illness (Stroke, Ischemic/Transient Ischemic Attack)  Goal: Optimal Coping  Outcome: Ongoing, Progressing     Problem: Bowel Elimination Impaired (Stroke, Ischemic/Transient Ischemic Attack)  Goal: Effective Bowel Elimination  Outcome: Ongoing, Progressing     Problem: Cerebral Tissue Perfusion (Stroke, Ischemic/Transient Ischemic Attack)  Goal: Optimal Cerebral Tissue Perfusion  Outcome: Ongoing, Progressing     Problem: Cognitive Impairment (Stroke, Ischemic/Transient Ischemic Attack)  Goal: Optimal Cognitive Function  Outcome: Ongoing, Progressing     Problem: Communication Impairment (Stroke, Ischemic/Transient " Ischemic Attack)  Goal: Improved Communication Skills  Outcome: Ongoing, Progressing     Problem: Functional Ability Impaired (Stroke, Ischemic/Transient Ischemic Attack)  Goal: Optimal Functional Ability  Outcome: Ongoing, Progressing     Problem: Respiratory Compromise (Stroke, Ischemic/Transient Ischemic Attack)  Goal: Effective Oxygenation and Ventilation  Outcome: Ongoing, Progressing     Problem: Sensorimotor Impairment (Stroke, Ischemic/Transient Ischemic Attack)  Goal: Improved Sensorimotor Function  Outcome: Ongoing, Progressing     Problem: Swallowing Impairment (Stroke, Ischemic/Transient Ischemic Attack)  Goal: Optimal Eating and Swallowing without Aspiration  Outcome: Ongoing, Progressing     Problem: Urinary Elimination Impaired (Stroke, Ischemic/Transient Ischemic Attack)  Goal: Effective Urinary Elimination  Outcome: Ongoing, Progressing   VS stable.  He denies any SOB and chest pain/ alert and oriented x 4.NIH score was 0.  Pt discharged to home, discharge instruction medication, and follow up appointment discussed with pt and family members. They denies any concerns and question about discharge. All belongs send with him. He transported by w/c.

## 2022-03-17 NOTE — PROGRESS NOTES
Care Management Discharge Note    Discharge Date: 03/17/2022     Discharge Disposition: Home    Discharge Services: None    Discharge DME: None    Discharge Transportation:  (Family)    Private pay costs discussed: Not applicable    PAS Confirmation Code:  (NA)  Patient/family educated on Medicare website which has current facility and service quality ratings: no    Education Provided on the Discharge Plan:    Persons Notified of Discharge Plans: Nursing;   Patient/Family in Agreement with the Plan: yes    Handoff Referral Completed: No    Additional Information:  Discharge to home with family support. No care management needs identified.     Basilia Diehl RN

## 2022-03-17 NOTE — PLAN OF CARE
Problem: Cognitive Impairment (Stroke, Ischemic/Transient Ischemic Attack)  Goal: Optimal Cognitive Function  Outcome: Ongoing, Progressing     Goal Outcome Evaluation: NIHSS scores of 0 every 4 hours. Pt endorses some pain in his head. PRN tylenol given with some relief. SB/NSR with HR in upper 50s low 60s. PRN hydralazine given x1 for high BP-came down nicely throughout the night with latest BP at 146/84. A/O. VSS. Will continue to monitor and notify MD of any changes.

## 2022-03-17 NOTE — PHARMACY-CONSULT NOTE
Pharmacy Consult to evaluate for medication related stroke core measures    Jessica Healy, 76 year old male admitted for Right sided weakness on 3/15/2022.    Thrombolytic was not given because of Time from onset contraindications, Clinical contraindications    VTE Prophylaxis SCDs /PCDs ordered on 3/15/22, as appropriate prior to end of hospital day 2. They were not documented as placed.    Antithrombotic: aspirin and clopidogrel started on 3/15/22, as appropriate by end of hospital day 2. Continue antithrombotic therapy on discharge to meet quality measures, unless contraindicated.    Anticoagulation if history of A-fib/flutter: Patient does not have history of A-fib/flutter - anticoagulation not required for medication related stroke core measures.     LDL Cholesterol Calculated   Date Value Ref Range Status   03/15/2022 126 <=129 mg/dL Final   03/15/2022 124 <=129 mg/dL Final   07/03/2019 202 (H) <100 mg/dL Final     Comment:     Above desirable:  100-129 mg/dl  Borderline High:  130-159 mg/dL  High:             160-189 mg/dL  Very high:       >189 mg/dl       LDL Cholesterol Direct   Date Value Ref Range Status   02/03/2021 166 (H) <=129 mg/dl Final       Patient's home statin, Lipitor (atorvastatin) restarted; continue statin on discharge to meet quality measures, unless contraindicated.     Recommendations: None at this time    Thank you for the consult.    Zonia Jeter, [facility] 3/17/2022 9:55 AM

## 2022-03-17 NOTE — PROGRESS NOTES
This is a neurological follow-up note    76-year-old admitted to hospital 3/15/2022      Chief complaint  Right-sided weakness acute onset  More of an ataxia  MRI scan negative for acute stroke (prolonged TIA)      HPI  76-year-old presented to the ER on 3/15/2022 with symptoms of right lower extremity weakness difficulty walking and some right facial weakness.    Patient denied any loss of consciousness no visual changes did not have any nausea or vomiting or spinning type sensation.    He was already taking Plavix and atorvastatin    Patient was originally evaluated by stroke code team and was outside the window for thrombolytics    Patient was on dual antiplatelet medication aspirin dose increased to 325    Patient stated that he had trouble with walking seem to sway to either side when I interviewed him it seemed like it was not just one side was weak  Had more of an ataxia      Past medical history  CVA  History of left vertebral artery occlusion  History of intracranial stenosis multiple  CAD/CABG  Hypertension  Hyperlipidemia  CKD 3      Habits  Non-smoker  Does not drink alcohol    Family history  Mother with kidney disease      Work-up  MRI head 3/15/2022  1.  Probable chronic ischemic infarct in the left occipital lobe again noted.  2.  No acute intracranial process.  3.  Generalized brain atrophy and presumed microvascular ischemic changes as detailed above.  HEAD CT: 3/15/2022  1.  No hemorrhage, mass, or mass effect.  2.  Mild to moderate chronic encephalomalacia left occipital lobe. Superimposed small area of subacute ischemia would be difficult to exclude.  3.  Small chronic infarctions left cerebellum and anterior aspect of basal ganglia bilaterally.  HEAD CTA: 3/15/2022  1.  Moderate atherosclerotic changes cavernous ICA on the left, mild to moderate on the right.  2.  Moderate to marked atherosclerotic changes supraclinoid ICA on the left, mild to moderate on the right.  3.  Mild to moderate areas  of narrowings throughout basilar artery.  4.  Short segment moderate to marked narrowing at the distal left P2.  5.  Poorly opacified distal branches of left posterior circulation.  6.  Mild atherosclerotic changes right posterior circulation.  NECK CTA: 3/15/2022  1.  Less than 50% narrowing proximal ICAs bilaterally.  2.  Proximal aspect of left vertebral artery is not visualized. The artery is opacified in its mid and distal portion, from approximately C5-C6 level distally. Moderate areas of narrowing in its distal aspect.  3.  Moderate to marked narrowing at the origin/proximal aspect of right vertebral artery.  4.  Moderate to marked narrowing at the very distal aspect of right vertebral artery.  Troponin on admission 0.02  HDL 34/  Hemoglobin A1c 5.6%  COVID-19 negative on admission  Echo 3/16/2022, 65-70% ejection fraction, mild left atrial enlargement,      Labs  Sodium 142 potassium 3.7  BUN 15 creatinine 1.32  Glucose 89  White blood count 3.7, hemoglobin 11.7, platelets 140,000-184,000      Exam  Blood pressure 137/74, pulse 70, temperature 98.5  Blood pressure range 146//97  Pulse range 60-86  T-max 98.5      Review of systems pertinent positive negatives  Currently no headache no chest pain or shortness of breath no nausea vomiting no diarrhea no fever chills    No actual diplopia dysarthria dysphagia  [place] that both sides are may be weak in his legs were bad and his balance was bad  Other reports were that it was more right-sided    Seems to be improved since admission    Otherwise review of systems negative      General exam per primary MD  Alert and attentive  HEENT with facial droop but no signs of trauma  Lungs clear  Heart rate regular  Abdomen soft  Symmetrical pulses      Neurologic exam  Alert and attentive  Through family members seems to have no aphasia  No neglect  Memory recall okay    Cranial nerves II through XII  No ophthalmoplegia  No nystagmus  Visual fields intact  Face  relatively symmetrical although his smile is more of a snarl  Speech sounds clear in his own language to family members    Upper extremities   No drift  No tremor  Finger-nose okay    Lower extremities  Raises his legs off the bed wiggles his toes    Gait  Standby assistance was able to ambulate to bathroom and back 3/16/2022  Was able to ambulate in the hallway today 3/17/2022  Neuro exam stable    Assessment/plan      1.   New increased weakness right face/right leg (ataxia)        MRI scan negative for acute stroke        Significant vascular risk factors below        Question concern for exacerbation of underlying chronic disease by decreased perfusion        (Question prolonged TIA)    2.  Intracranial atherosclerosis       Moderate atherosclerotic changes cavernous ICA on the left, mild to moderate on the right.       Moderate to marked atherosclerotic changes supraclinoid ICA on the left, mild to moderate on the right.       Mild to moderate areas of narrowings throughout basilar artery.       Short segment moderate to marked narrowing at the distal left P2.       Poorly opacified distal branches of left posterior circulation.        3.    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 The artery is opacified in its mid and distal portion, from approximately C5-C6 level distally.           Moderate areas of narrowing in its distal aspect.    4.     Right vertebral artery stenosis multifocal          Moderate to marked narrowing at the origin/proximal aspect of right vertebral artery.          Moderate to marked narrowing at the very distal aspect of right vertebral artery.     5.    Vascular risk factors for stroke         previous stroke/intracranial atherosclerosis/vertebral artery disease in the neck/hypertension/hyperlipidemia/coronary artery disease/CKD      Diagnosis  Fluctuating CNS ischemia (prolonged TIA)  Multiple  vascular risk factors as above    Patient may have had decreased perfusion which exacerbated the above vascular difficulties that he has  More of a prolonged TIA symptom  Try to maintain proper hydration    Plan  Aspirin 325 mg daily (previously on 81 mg)  Plavix 75 mg daily  Lipitor 80 mg daily    Risk factor reduction per primary MD  Outpatient Holter monitor looking for atrial fibrillation    Continue treatment above    Disposition per primary MD    30 minutes total care time today greater than 50% face-to-face patient care team discussing and evaluating the above.

## 2022-03-17 NOTE — PROGRESS NOTES
Care Management Follow Up    Length of Stay (days): 2    Expected Discharge Date: 03/17/2022       Concerns to be Addressed:   High blood pressure noted last PM  Patient plan of care discussed at interdisciplinary rounds: Yes    Anticipated Discharge Disposition:  Home      Anticipated Discharge Services:  None  Anticipated Discharge DME:  Has a walker at home.    Patient/family educated on Medicare website which has current facility and service quality ratings:  NA  Education Provided on the Discharge Plan:  Per team  Patient/Family in Agreement with the Plan:  yes    Referrals Placed by CM/SW:  None  Private pay costs discussed: Not applicable     Additional Information:  Patient home with his son in single family home. He is independent at baseline except for transportation. He has walker available for use at home. Goal to return home at discharge, family to transport. Therapy agrees with a home discharge, family support, but care manager to follow along.     Basilia Diehl RN

## 2022-03-18 ENCOUNTER — PATIENT OUTREACH (OUTPATIENT)
Dept: CARE COORDINATION | Facility: CLINIC | Age: 77
End: 2022-03-18
Payer: MEDICARE

## 2022-03-18 NOTE — PROGRESS NOTES
Clinic Care Coordination Contact  [facility]    Clinical Data: IP hand off for patient. Initial Care Coordinator Outreach due to TIA.    Outreach attempted x 1.  CC RN [facility]  services at [phone], option # 1, then #3.    Spoke with [facility]  who called patients phone number. There was no answer. Then CC RN got disconnected from first ong . CC RN called back and spoke to another [facility] , again there was no answer from patient and not able to leave a VM.     Plan: Care Coordinator will try to reach patient again in 1-2 business days.    Rosie Dennis RN, BSN, PHN Care Coordinator  [place], [place], and [facility] [place]   Phone: [phone]

## 2022-03-18 NOTE — LETTER
Dear Jessica,    I am a clinic care coordinator who works with Christiano Maciel MD, MD at [place]. I recently tried to call and was unable to reach you. Below is a description of clinic care coordination and how I can further assist you.      The clinic care coordination team is made up of a registered nurse,  and community health worker who understand the health care system. The goal of clinic care coordination is to help you manage your health and improve access to the health care system in the most efficient manner. The team can assist you in meeting your health care goals by providing education, coordinating services, strengthening the communication among your providers and supporting you with any resource needs.    Please feel free to contact the Community Health Worker at [phone]  with any questions or concerns. We are focused on providing you with the highest-quality healthcare experience possible and that all starts with you.     Sincerely,     Rosie Dennis RN, BSN, PHN Care Coordinator  [place], [place], and [place]

## 2022-03-18 NOTE — PLAN OF CARE
Physical Therapy Discharge Summary    Reason for therapy discharge:    Discharged to home.    Progress towards therapy goal(s). See goals on Care Plan in [facility] electronic health record for goal details.  Goals met    Therapy recommendation(s):    No further therapy is recommended.

## 2022-03-21 NOTE — PROGRESS NOTES
Clinic Care Coordination Contact  [facility]/Voicemail       Clinical Data: Care Coordinator Outreach  Outreach attempted x 2.  Left message on patient's voicemail with call back information and requested return call.  Plan: Care Coordinator will send care coordination introduction letter with care coordinator contact information and explanation of care coordination services via [employer]. Care Coordinator will do no further outreaches at this time.    Rosie Dennis RN, BSN, PHN Care Coordinator  [place], [place], and [place]   Phone: [phone]

## 2022-03-29 ENCOUNTER — OFFICE VISIT (OUTPATIENT)
Dept: FAMILY MEDICINE | Facility: CLINIC | Age: 77
End: 2022-03-29
Payer: MEDICARE

## 2022-03-29 ENCOUNTER — TELEPHONE (OUTPATIENT)
Dept: FAMILY MEDICINE | Facility: CLINIC | Age: 77
End: 2022-03-29

## 2022-03-29 ENCOUNTER — TELEPHONE (OUTPATIENT)
Dept: NURSING | Facility: CLINIC | Age: 77
End: 2022-03-29

## 2022-03-29 VITALS
HEART RATE: 62 BPM | WEIGHT: 134 LBS | BODY MASS INDEX: 22.88 KG/M2 | SYSTOLIC BLOOD PRESSURE: 138 MMHG | HEIGHT: 64 IN | TEMPERATURE: 97.4 F | DIASTOLIC BLOOD PRESSURE: 80 MMHG | RESPIRATION RATE: 18 BRPM | OXYGEN SATURATION: 99 %

## 2022-03-29 DIAGNOSIS — N18.30 STAGE 3 CHRONIC KIDNEY DISEASE, UNSPECIFIED WHETHER STAGE 3A OR 3B CKD (H): ICD-10-CM

## 2022-03-29 DIAGNOSIS — I10 ESSENTIAL HYPERTENSION WITH GOAL BLOOD PRESSURE LESS THAN 130/80: ICD-10-CM

## 2022-03-29 DIAGNOSIS — I10 HYPERTENSION GOAL BP (BLOOD PRESSURE) < 130/80: ICD-10-CM

## 2022-03-29 DIAGNOSIS — Z86.73 HISTORY OF CVA (CEREBROVASCULAR ACCIDENT): Primary | ICD-10-CM

## 2022-03-29 DIAGNOSIS — I25.709 CORONARY ARTERY DISEASE INVOLVING CORONARY BYPASS GRAFT OF NATIVE HEART WITH ANGINA PECTORIS (H): ICD-10-CM

## 2022-03-29 DIAGNOSIS — B02.29 POST HERPETIC NEURALGIA: ICD-10-CM

## 2022-03-29 DIAGNOSIS — E78.5 HYPERLIPIDEMIA LDL GOAL <70: ICD-10-CM

## 2022-03-29 DIAGNOSIS — G45.9 TIA (TRANSIENT ISCHEMIC ATTACK): ICD-10-CM

## 2022-03-29 PROCEDURE — 99495 TRANSJ CARE MGMT MOD F2F 14D: CPT | Performed by: NURSE PRACTITIONER

## 2022-03-29 RX ORDER — LISINOPRIL 10 MG/1
10 TABLET ORAL 2 TIMES DAILY
Qty: 180 TABLET | Refills: 3 | Status: SHIPPED | OUTPATIENT
Start: 2022-03-29 | End: 2023-02-10

## 2022-03-29 RX ORDER — EZETIMIBE 10 MG/1
TABLET ORAL
Qty: 90 TABLET | Refills: 3 | Status: SHIPPED | OUTPATIENT
Start: 2022-03-29 | End: 2023-02-10

## 2022-03-29 RX ORDER — GABAPENTIN 300 MG/1
300 CAPSULE ORAL 3 TIMES DAILY
Qty: 90 CAPSULE | Refills: 1 | Status: SHIPPED | OUTPATIENT
Start: 2022-03-29 | End: 2022-06-01

## 2022-03-29 RX ORDER — LIDOCAINE 50 MG/G
1 PATCH TOPICAL EVERY 24 HOURS
Qty: 30 PATCH | Refills: 3 | Status: SHIPPED | OUTPATIENT
Start: 2022-03-29 | End: 2023-02-10

## 2022-03-29 RX ORDER — NITROGLYCERIN 0.4 MG/1
TABLET SUBLINGUAL
Qty: 25 TABLET | Refills: 3 | Status: SHIPPED | OUTPATIENT
Start: 2022-03-29 | End: 2024-09-25

## 2022-03-29 RX ORDER — CLOPIDOGREL BISULFATE 75 MG/1
TABLET ORAL
Qty: 90 TABLET | Refills: 3 | Status: SHIPPED | OUTPATIENT
Start: 2022-03-29 | End: 2023-02-10

## 2022-03-29 ASSESSMENT — PAIN SCALES - GENERAL: PAINLEVEL: NO PAIN (0)

## 2022-03-29 NOTE — Clinical Note
Hi there,  See my note on this patient.  Can you please reach out to him this afternoon to do a medication reconciliation (with [facility] ).  He did not know what medications he was taking.  In particular, he reports one medication makes him feel faint but couldn't tell me which one.  I don't know how or if he is taking his lisinopril or Imdur.  Can you report back to me how he is taking his medications?  Thanks!  Luanne

## 2022-03-29 NOTE — PROGRESS NOTES
"  Assessment & Plan     TIA (transient ischemic attack)  Hospital follow up for this.    -blood pressure not well controlled per home readings and patient reports feeling faint after taking one of his medications.  He does not have the medication with him today and is unsure which one it is other than it is small and white and is meant for blood pressure, but only for as needed use.  I will have RNs reach out to patient this afternoon to clarify his medication regimen.  He shouldn't be on any \"as needed\" blood pressure medication and it is important he has consistent control of his blood pressure to avoid future cardiac or stroke events.  We did discuss regular exercise, healthy diet.  I offered physical therapy and occupational therapy referrals but he declined these services for now.     Hypertension goal BP (blood pressure) < 130/80  See above.  Further plan pending medication reconciliation when patient gets home.   - Home Blood Pressure Monitor Order for DME - ONLY FOR DME    Essential hypertension with goal blood pressure less than 130/80  As above.   - lisinopril (ZESTRIL) 10 MG tablet; Take 1 tablet (10 mg) by mouth 2 times daily    Post herpetic neuralgia  Trial of gabapentin for pain.  Had shingles in march of 2020 and still with pain.    - gabapentin (NEURONTIN) 300 MG capsule; Take 1 capsule (300 mg) by mouth 3 times daily  - lidocaine (LIDODERM) 5 % patch; Place 1 patch onto the skin every 24 hours To prevent lidocaine toxicity, patient should be patch free for 12 hrs daily.    Stage 3 chronic kidney disease, unspecified whether stage 3a or 3b CKD (H)  Stable as of last check.     History of CVA (cerebrovascular accident)  As above.   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  Refills for below " provided.  Patient does not use.   - nitroGLYcerin (NITROSTAT) 0.4 MG sublingual tablet; PLACE 1 TABLET UNDER THE TONGUE AT THE ONSET OF CHEST PAIN. MAY REPEAT EVERY 5 MINUTES AS NEEDED FOR A TOTAL OF 3 DOSES.    Hyperlipidemia LDL goal <70  Refilled.   - ezetimibe (ZETIA) 10 MG tablet; TAKE 1 TABLET(10 MG) BY MOUTH DAILY    Review of external notes as documented elsewhere in note  Diagnosis or treatment significantly limited by social determinants of health -    Ordering of each unique test  Prescription drug management  35 minutes spent on the date of the encounter doing chart review, history and exam, documentation and further activities per the note       Patient Instructions   -schedule with neurology.  You should see them in the next 2-3 months.           Return in about 2 weeks (around 4/12/2022) for Follow up, blood pressure, using a phone visit.    VEENA Michel CNP  [facility] ROBER Lopez is a 76 year old who presents for the following health issues  accompanied by his spouse and .over the phone    [facility]       Hospital Follow-up Visit:    Hospital/Nursing Home/ Rehab Facility: [facility]  Date of Admission: 3/15/22  Date of Discharge: 3/17/22  Reason(s) for Admission: TIA      Was your hospitalization related to COVID-19? No   Problems taking medications regularly:  None  Medication changes since discharge: None  Problems adhering to non-medication therapy:  None    Summary of hospitalization:  [facility] discharge summary reviewed  Diagnostic Tests/Treatments reviewed.  Follow up needed: neurology  Other Healthcare Providers Involved in Patient s Care:         Specialist appointment - neurology  Update since discharge: improved. Post Discharge Medication Reconciliation: unable to reconcile discharge medications due to pt unsure what he is taking at home.  .  Plan of care communicated with patient and  "family        States home readings are 170s SBP.    Denies headache, chest pain, dizziness, shortness of breath, weakness, or vision changes.    Lisinopril he is taking twice a day - tolerates this fine.   States a small white pill makes him feel like he is going to faint. States it is \"as needed\" for blood pressure.  Unsure what it is but states he will bring it next time.      History of strokes first diagnosed in 2016.  Did not have neurology follow up at that time.      Reports mild weakness.  Denies physical therapy referral.      Recent cardiology follow up in march 2022    Reports having had his COVID vaccine and booster.  Had done at a pharmacy.      Review of Systems   Constitutional, HEENT, cardiovascular, pulmonary, GI, , musculoskeletal, neuro, skin, endocrine and psych systems are negative, except as otherwise noted.      Objective    /80   Pulse 62   Temp 97.4  F (36.3  C) (Tympanic)   Resp 18   Ht 1.626 m (5' 4\")   Wt 60.8 kg (134 lb)   SpO2 99%   BMI 23.00 kg/m    Body mass index is 23 kg/m .  Physical Exam   GENERAL: healthy, alert and no distress  EYES: Eyes grossly normal to inspection, PERRL and conjunctivae and sclerae normal  HENT: ear canals and TM's normal, nose and mouth without ulcers or lesions  NECK: no adenopathy, no asymmetry, masses, or scars and thyroid normal to palpation  RESP: lungs clear to auscultation - no rales, rhonchi or wheezes  CV: regular rate and rhythm, normal S1 S2, no S3 or S4, no murmur, click or rub, no peripheral edema and peripheral pulses strong  ABDOMEN: soft, nontender, no hepatosplenomegaly, no masses and bowel sounds normal  MS: no gross musculoskeletal defects noted, no edema    Admission on 03/15/2022, Discharged on 03/17/2022   Component Date Value Ref Range Status     GLUCOSE BY METER POCT 03/15/2022 207 (A) 70 - 99 mg/dL Final     Sodium 03/15/2022 135 (A) 136 - 145 mmol/L Final     Potassium 03/15/2022 3.7  3.5 - 5.0 mmol/L Final     " Chloride 03/15/2022 106  98 - 107 mmol/L Final     Carbon Dioxide (CO2) 03/15/2022 22  22 - 31 mmol/L Final     Anion Gap 03/15/2022 7  5 - 18 mmol/L Final     Urea Nitrogen 03/15/2022 29 (A) 8 - 28 mg/dL Final     Creatinine 03/15/2022 2.16 (A) 0.70 - 1.30 mg/dL Final     Calcium 03/15/2022 8.4 (A) 8.5 - 10.5 mg/dL Final     Glucose 03/15/2022 199 (A) 70 - 125 mg/dL Final     GFR Estimate 03/15/2022 31 (A) >60 mL/min/1.73m2 Final    Effective December 21, 2021 eGFRcr in adults is calculated using the 2021 CKD-EPI creatinine equation which includes age and gender (Michael sands al., [employer], DOI: 10.1056/NTDIqz6308094)     INR 03/15/2022 1.05  0.85 - 1.15 Final     aPTT 03/15/2022 33  22 - 38 Seconds Final     Troponin I 03/15/2022 0.02  0.00 - 0.29 ng/mL Final     Systolic Blood Pressure 03/15/2022 155  mmHg Final     Diastolic Blood Pressure 03/15/2022 79  mmHg Final     Ventricular Rate 03/15/2022 70  BPM Final     Atrial Rate 03/15/2022 70  BPM Final     PA Interval 03/15/2022 228  ms Final     QRS Duration 03/15/2022 96  ms Final     QT 03/15/2022 410  ms Final     QTc 03/15/2022 442  ms Final     P Axis 03/15/2022 57  degrees Final     R AXIS 03/15/2022 -36  degrees Final     T Axis 03/15/2022 80  degrees Final     Interpretation ECG 03/15/2022    Final                    Value:Sinus rhythm with 1st degree A-V block  Left axis deviation  Incomplete right bundle branch block  Inferior infarct , age undetermined  Abnormal ECG  When compared with ECG of 06-MAR-2020 15:10,  Incomplete right bundle branch block is now Present  Inferior infarct is now Present  Confirmed by SEE ED PROVIDER NOTE FOR, ECG INTERPRETATION ([id number]),  CAMRON QUISPE ([id number]) on 3/16/2022 2:36:05 PM       WBC Count 03/15/2022 5.0  4.0 - 11.0 10e3/uL Final     RBC Count 03/15/2022 3.87 (A) 4.40 - 5.90 10e6/uL Final     Hemoglobin 03/15/2022 12.0 (A) 13.3 - 17.7 g/dL Final     Hematocrit 03/15/2022 37.3 (A) 40.0 - 53.0 % Final     MCV 03/15/2022  96  78 - 100 fL Final     MCH 03/15/2022 31.0  26.5 - 33.0 pg Final     MCHC 03/15/2022 32.2  31.5 - 36.5 g/dL Final     RDW 03/15/2022 12.3  10.0 - 15.0 % Final     Platelet Count 03/15/2022 160  150 - 450 10e3/uL Final     % Neutrophils 03/15/2022 76  % Final     % Lymphocytes 03/15/2022 16  % Final     % Monocytes 03/15/2022 6  % Final     % Eosinophils 03/15/2022 1  % Final     % Basophils 03/15/2022 1  % Final     % Immature Granulocytes 03/15/2022 0  % Final     NRBCs per 100 WBC 03/15/2022 0  <1 /100 Final     Absolute Neutrophils 03/15/2022 3.8  1.6 - 8.3 10e3/uL Final     Absolute Lymphocytes 03/15/2022 0.8  0.8 - 5.3 10e3/uL Final     Absolute Monocytes 03/15/2022 0.3  0.0 - 1.3 10e3/uL Final     Absolute Eosinophils 03/15/2022 0.1  0.0 - 0.7 10e3/uL Final     Absolute Basophils 03/15/2022 0.0  0.0 - 0.2 10e3/uL Final     Absolute Immature Granulocytes 03/15/2022 0.0  <=0.4 10e3/uL Final     Absolute NRBCs 03/15/2022 0.0  10e3/uL Final     Hold Specimen 03/15/2022 [facility]   Final     Creatinine POCT 03/15/2022 2.2 (A) 0.7 - 1.3 mg/dL Final     GFR, ESTIMATED POCT 03/15/2022 31 (A) >60 mL/min/1.73m2 Final     Hold Specimen 03/15/2022 [facility]   Final     Hemoglobin A1C 03/15/2022 5.6  <=5.6 % Final      Prediabetes: 5.7 to 6.4%        Diabetes:  >=6.5%     Patients with Hgb F >5%, total bilirubin >10.0 mg/dL, abnormal red cell turnover, severe renal or hepatic disease or malignancy should not have this A1C method used to diagnose or monitor diabetes.      Cholesterol 03/15/2022 181  <=199 mg/dL Final     Triglycerides 03/15/2022 105  <=149 mg/dL Final     Direct Measure HDL 03/15/2022 34 (A) >=40 mg/dL Final    HDL Cholesterol Reference Range:     0-2 years:   No reference ranges established for patients under 2 years old  at [facility] for lipid analytes.    2-8 years:  Greater than 45 mg/dL     18 years and older:   Female: Greater than or equal to 50 mg/dL   Male:   Greater than or equal to 40 mg/dL      LDL Cholesterol Calculated 03/15/2022 126  <=129 mg/dL Final     SARS CoV2 PCR 03/15/2022 Negative  Negative Final    NEGATIVE: SARS-CoV-2 (COVID-19) RNA not detected, presumed negative.     Troponin I 03/15/2022 0.02  0.00 - 0.29 ng/mL Final     Cholesterol 03/15/2022 179  <=199 mg/dL Final     Triglycerides 03/15/2022 105  <=149 mg/dL Final     Direct Measure HDL 03/15/2022 34 (A) >=40 mg/dL Final    HDL Cholesterol Reference Range:     0-2 years:   No reference ranges established for patients under 2 years old  at [facility] for lipid analytes.    2-8 years:  Greater than 45 mg/dL     18 years and older:   Female: Greater than or equal to 50 mg/dL   Male:   Greater than or equal to 40 mg/dL     LDL Cholesterol Calculated 03/15/2022 124  <=129 mg/dL Final     GLUCOSE BY METER POCT 03/15/2022 89  70 - 99 mg/dL Final     LVEF  03/16/2022 65-70%   Final     WBC Count 03/16/2022 3.7 (A) 4.0 - 11.0 10e3/uL Final     RBC Count 03/16/2022 3.65 (A) 4.40 - 5.90 10e6/uL Final     Hemoglobin 03/16/2022 11.7 (A) 13.3 - 17.7 g/dL Final     Hematocrit 03/16/2022 34.9 (A) 40.0 - 53.0 % Final     MCV 03/16/2022 96  78 - 100 fL Final     MCH 03/16/2022 32.1  26.5 - 33.0 pg Final     MCHC 03/16/2022 33.5  31.5 - 36.5 g/dL Final     RDW 03/16/2022 12.3  10.0 - 15.0 % Final     Platelet Count 03/16/2022 148 (A) 150 - 450 10e3/uL Final     GLUCOSE BY METER POCT 03/16/2022 75  70 - 99 mg/dL Final     GLUCOSE BY METER POCT 03/16/2022 78  70 - 99 mg/dL Final     Platelet Function P2Y12 03/16/2022 184  PRU Final     GLUCOSE BY METER POCT 03/16/2022 112 (A) 70 - 99 mg/dL Final     Hold Specimen 03/16/2022 [facility]   Final     GLUCOSE BY METER POCT 03/16/2022 96  70 - 99 mg/dL Final     GLUCOSE BY METER POCT 03/16/2022 99  70 - 99 mg/dL Final     Sodium 03/17/2022 142  136 - 145 mmol/L Final     Potassium 03/17/2022 3.7  3.5 - 5.0 mmol/L Final     Chloride 03/17/2022 112 (A) 98 - 107 mmol/L Final     Carbon Dioxide (CO2)  03/17/2022 22  22 - 31 mmol/L Final     Anion Gap 03/17/2022 8  5 - 18 mmol/L Final     Urea Nitrogen 03/17/2022 15  8 - 28 mg/dL Final     Creatinine 03/17/2022 1.32 (A) 0.70 - 1.30 mg/dL Final     Calcium 03/17/2022 8.9  8.5 - 10.5 mg/dL Final     Glucose 03/17/2022 89  70 - 125 mg/dL Final     GFR Estimate 03/17/2022 57 (A) >60 mL/min/1.73m2 Final    Effective December 21, 2021 eGFRcr in adults is calculated using the 2021 CKD-EPI creatinine equation which includes age and gender (Michael et al., NEJM, DOI: 10.1056/HKWPtl0067342)

## 2022-03-29 NOTE — TELEPHONE ENCOUNTER
Writer attempted to call the patient to conduct medication reconciliation via [facility]  per provider request.     Patient does not have a voicemail box, writer was unable to leave message at this time. RN team will call the patient back.    Pema Iraheta RN  [facility]

## 2022-03-29 NOTE — TELEPHONE ENCOUNTER
Telephone Call:     Pt is calling to give his new phone number:  [phone]    He stated that someone from his care team was going to call him and that he forgot to give them the new phone number.     Writer is routing a message to patient's care team so that they know that he has a new number.     Jennifer Kim RN  [facility] Nurse Advisor 3:08 PM 3/29/2022

## 2022-03-29 NOTE — LETTER
Jessica Healy  [address]  [place] [facility] [place] [place] [address]          04/01/22      Dear Jessica Healy        At [facility] we care about your health and are committed to providing quality patient care. Regular appointments are a vital part of the care and management of your health and can help prevent many of the complications that can occur.      It has come to our attention that your Dr would like you to contact our RN Team at [facility] at [phone] to discuss your home blood pressure medication regimen. Please clarify medication name, dosage, and frequency with reconciliation.           Sincerely,      [facility] Care Team

## 2022-03-30 NOTE — TELEPHONE ENCOUNTER
Attempted to call patient via [employer] . Patient was unavailable and mobile number does not have voicemail available, home phone number continues to ring with no voicemail set up. Therefore unable to leave patient a message.    RN: If patient returns call,  Patient saw VEENA Dominguez CNP on 3/29, per visit note, patient was unsure of the name of the medication he was taking for his blood pressure, and provider wanted RN's to reach out and clarify mediation name, dosage, and frequency with patient. Patient is needing medication reconciliation.    Liz Salas RN  [facility]

## 2022-03-31 ENCOUNTER — TELEPHONE (OUTPATIENT)
Dept: FAMILY MEDICINE | Facility: CLINIC | Age: 77
End: 2022-03-31
Payer: MEDICARE

## 2022-03-31 NOTE — TELEPHONE ENCOUNTER
Writer attempted to contact patient via [employer]  regarding follow up of message below. Writer attempted to contact mobile and home phone number and mobile does not have a voicemail box set up yet and the home phone number just continues to ring. Writer was unable to leave message.     If patient calls back please address message below regarding blood pressure medication and addressing that patient needs a medication reconciliation done.     Taya Pierce RN, BSN  [facility]

## 2022-03-31 NOTE — TELEPHONE ENCOUNTER
[employer] Drug Coverage  Request Form received via fax, Placed in providers box for completion.  Cami Thomas  [facility]  2nd Floor  Primary Care

## 2022-04-01 NOTE — TELEPHONE ENCOUNTER
Created, printed and mailed letter.  Trish Branch MA  [facility]  2nd Floor  Primary Care

## 2022-04-01 NOTE — TELEPHONE ENCOUNTER
Please send letter advising patient to call clinic and talk to RNs about home blood pressure medication regimen.    Thanks,  VEENA Michel CNP

## 2022-04-05 NOTE — TELEPHONE ENCOUNTER
Form faxed to [facility] [phone], copy placed in abstract and original in tc bin

## 2022-05-14 ENCOUNTER — HEALTH MAINTENANCE LETTER (OUTPATIENT)
Age: 77
End: 2022-05-14

## 2022-05-31 DIAGNOSIS — B02.29 POST HERPETIC NEURALGIA: ICD-10-CM

## 2022-06-01 RX ORDER — GABAPENTIN 300 MG/1
300 CAPSULE ORAL 3 TIMES DAILY
Qty: 90 CAPSULE | Refills: 1 | Status: SHIPPED | OUTPATIENT
Start: 2022-06-01 | End: 2023-02-10

## 2022-06-01 NOTE — TELEPHONE ENCOUNTER
Routing refill request to provider for review/approval because:  Drug not on the [facility] refill protocol     Elyse Johnson RN  [facility]

## 2022-08-29 ENCOUNTER — OFFICE VISIT (OUTPATIENT)
Dept: URGENT CARE | Facility: URGENT CARE | Age: 77
End: 2022-08-29
Payer: MEDICARE

## 2022-08-29 VITALS
OXYGEN SATURATION: 99 % | WEIGHT: 134.2 LBS | SYSTOLIC BLOOD PRESSURE: 134 MMHG | DIASTOLIC BLOOD PRESSURE: 81 MMHG | BODY MASS INDEX: 23.04 KG/M2 | TEMPERATURE: 97.1 F | HEART RATE: 69 BPM

## 2022-08-29 DIAGNOSIS — M1A.9XX1 TOPHACEOUS GOUT OF JOINT: Primary | ICD-10-CM

## 2022-08-29 PROCEDURE — 99214 OFFICE O/P EST MOD 30 MIN: CPT | Performed by: STUDENT IN AN ORGANIZED HEALTH CARE EDUCATION/TRAINING PROGRAM

## 2022-08-29 RX ORDER — PREDNISONE 20 MG/1
TABLET ORAL
Qty: 20 TABLET | Refills: 0 | Status: SHIPPED | OUTPATIENT
Start: 2022-08-29 | End: 2023-02-10

## 2022-08-29 NOTE — PROGRESS NOTES
"Urgent Care Visit    Assessment & Plan   1. Tophaceous gout of joint  He has CKD, thus will avoid colchicine or indomethacin. He has had many gout flares and he is not taking his allopurinol daily as prescribed. He needs to follow up with primary and possibly specialist if he is not reducing flares with allopurinol.   - predniSONE (DELTASONE) 20 MG tablet; Take 3 tabs by mouth daily x 3 days, then 2 tabs daily x 3 days, then 1 tab daily x 3 days, then 1/2 tab daily x 3 days.  Dispense: 20 tablet; Refill: 0       Options for treatment and follow-up care were reviewed with the patient who was engaged and actively involved in the decision making process, verbalized understanding of the options discussed, and satisfied with the final plan.      Ryann Culp MD    Cristina Healy is a 77 year old male with a history including CKD, CVA, who presents for \"gout.\"    He has had issues with gout for 2 years. It waxes and wanes in intensity. He uses a needle to poke the sites. It is constantly painful, but lately has been more red and swollen to the point he cannot wear shoes.     Per chart review, he is supposed to be on allopurinol 50mg daily. He takes this only when the pain is worse.     He also uses a muscle relaxer and tylenol.       Patient Active Problem List    Diagnosis Date Noted     Post herpetic neuralgia 03/29/2022     Priority: Medium     Stroke-like symptoms 03/15/2022     Priority: Medium     Coronary artery disease involving coronary bypass graft of native heart with angina pectoris (H) 02/18/2020     Priority: Medium     Kidney cyst, acquired 12/16/2016     Priority: Medium     CKD (chronic kidney disease) stage 3, GFR 30-59 ml/min (H) 10/26/2016     Priority: Medium     Cerebrovascular accident (CVA), unspecified mechanism (H) 10/25/2016     Priority: Medium     Dyslipidemia 04/27/2012     Priority: Medium     Advanced directives, counseling/discussion 12/30/2011     Priority: Medium     " Advance Directive Problem List Overview:   Name Relationship Phone    Primary Health Care Agent            Alternative Health Care Agent          Discussed advance care planning with patient; information given to patient to review. 12/30/2011   Jeni PHAM         CAD (coronary artery disease) 12/30/2011     Priority: Medium     Hypertension goal BP (blood pressure) < 130/80 12/30/2011     Priority: Medium       Social: He reports that he has never smoked. He has never used smokeless tobacco. He reports previous alcohol use. He reports that he does not use drugs.    There are no exam notes on file for this visit.    Objective     Vitals:    08/29/22 1134   BP: 134/81   BP Location: Left arm   Patient Position: Sitting   Cuff Size: Adult Small   Pulse: 69   Temp: 97.1  F (36.2  C)   TempSrc: Tympanic   SpO2: 99%   Weight: 60.9 kg (134 lb 3.2 oz)     Body mass index is 23.04 kg/m .    GEN: NAD, healthy, alert  Constitutional: Awake, alert, cooperative, no acute distress, and appears stated age.  HEENT: NC/AT, EOMI, normal conjunctivae/sclerae, mask on  Lungs: No increased WOB. 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 It is warm, very tender to palpation, and edematous. He has tophi present on other joints in the left and right hand.   Neurologic: A&Ox3.    Skin: No other visible rashes, erythema, pallor, petechia or purpura.

## 2022-09-03 ENCOUNTER — HEALTH MAINTENANCE LETTER (OUTPATIENT)
Age: 77
End: 2022-09-03

## 2022-11-16 ENCOUNTER — OFFICE VISIT (OUTPATIENT)
Dept: PODIATRY | Facility: CLINIC | Age: 77
End: 2022-11-16
Payer: MEDICARE

## 2022-11-16 VITALS
HEART RATE: 62 BPM | BODY MASS INDEX: 23 KG/M2 | DIASTOLIC BLOOD PRESSURE: 72 MMHG | SYSTOLIC BLOOD PRESSURE: 142 MMHG | WEIGHT: 134 LBS

## 2022-11-16 DIAGNOSIS — M1A.09X0 CHRONIC GOUT OF MULTIPLE SITES, UNSPECIFIED CAUSE: ICD-10-CM

## 2022-11-16 DIAGNOSIS — M1A.09X0 CHRONIC GOUT OF MULTIPLE SITES, UNSPECIFIED CAUSE: Primary | ICD-10-CM

## 2022-11-16 PROCEDURE — 99204 OFFICE O/P NEW MOD 45 MIN: CPT | Performed by: PODIATRIST

## 2022-11-16 RX ORDER — ALLOPURINOL 100 MG/1
50 TABLET ORAL DAILY
Qty: 30 TABLET | Refills: 0 | Status: SHIPPED | OUTPATIENT
Start: 2022-11-16 | End: 2023-01-16

## 2022-11-16 NOTE — PROGRESS NOTES
Subjective:    Pt is seen today as a new pt referral with the c/c of painful feet.  This has been symptomatic for years.  Points to  first metatarsal phalangeal joint and other parts of his foot..  Has pain w/ ambulation and shoewear and is relieved by rest.  Patient has prescription for allopurinol but sounds as though he is not taking this.  Also points to gouty tophi on hands and other parts of his body.  Denies erythema edema or drainage.    ROS:  See above       Allergies   Allergen Reactions     Nkda [No Known Drug Allergies]      Seasonal Allergies        Current Outpatient Medications   Medication Sig Dispense Refill     aspirin (ASA) 325 MG EC tablet Take 1 tablet (325 mg) by mouth daily 30 tablet 11     acetaminophen (TYLENOL) 500 MG tablet Take 500-1,000 mg by mouth every 6 hours as needed for mild pain (Patient not taking: Reported on 8/29/2022)       allopurinol (ZYLOPRIM) 100 MG tablet Take 50 mg by mouth daily (Patient not taking: Reported on 8/29/2022)       clopidogrel (PLAVIX) 75 MG tablet TAKE 1 TABLET(75 MG) BY MOUTH DAILY 90 tablet 3     ezetimibe (ZETIA) 10 MG tablet TAKE 1 TABLET(10 MG) BY MOUTH DAILY 90 tablet 3     gabapentin (NEURONTIN) 300 MG capsule Take 1 capsule (300 mg) by mouth 3 times daily 90 capsule 1     lidocaine (LIDODERM) 5 % patch Place 1 patch onto the skin every 24 hours To prevent lidocaine toxicity, patient should be patch free for 12 hrs daily. 30 patch 3     lisinopril (ZESTRIL) 10 MG tablet Take 1 tablet (10 mg) by mouth 2 times daily 180 tablet 3     nitroGLYcerin (NITROSTAT) 0.4 MG sublingual tablet PLACE 1 TABLET UNDER THE TONGUE AT THE ONSET OF CHEST PAIN. MAY REPEAT EVERY 5 MINUTES AS NEEDED FOR A TOTAL OF 3 DOSES. 25 tablet 3     ORDER FOR [facility] Blood pressure monitor machine 1 each 0     predniSONE (DELTASONE) 20 MG tablet Take 3 tabs by mouth daily x 3 days, then 2 tabs daily x 3 days, then 1 tab daily x 3 days, then 1/2 tab daily x 3 days. 20 tablet 0      tamsulosin (FLOMAX) 0.4 MG capsule Take 0.4 mg by mouth daily (Patient not taking: Reported on 8/29/2022)         Patient Active Problem List   Diagnosis     Advanced directives, counseling/discussion     CAD (coronary artery disease)     Hypertension goal BP (blood pressure) < 130/80     Dyslipidemia     Cerebrovascular accident (CVA), unspecified mechanism (H)     CKD (chronic kidney disease) stage 3, GFR 30-59 ml/min (H)     Kidney cyst, acquired     Coronary artery disease involving coronary bypass graft of native heart with angina pectoris (H)     Stroke-like symptoms     Post herpetic neuralgia       Past Medical History:   Diagnosis Date     CAD (coronary artery disease) 12/30/2011     Cerebrovascular accident (CVA), unspecified mechanism (H) 10/25/2016     CKD (chronic kidney disease) stage 3, GFR 30-59 ml/min (H) 10/26/2016     Coronary artery disease due to lipid rich plaque 12/30/2011    he transferred his care from the [facility] team in [facility] to the [facility] team in [facility] 2021     CVA (cerebral vascular accident) (H) 10/16    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 He also said he does not like to take too many medications. Our RN spoke with him via  and hopefully has convinced him to take it daily; we will recheck his lipid profile in 3 months       Past Surgical History:   Procedure  Laterality Date     BYPASS GRAFT ARTERY CORONARY  12/22/2006    GARZA to LAD, vein graft to OM2, vein graft to PDA. This was complicated by postop afib. Done in [place], [place]     CARDIAC CATHETERIZATION  2006    in [place]     CARDIAC CATHETERIZATION  11/21/2014    by Dr. Sepulveda at [facility], no PCI was done: LMCA 90%, mid %, prox LCX 90%, prox %, SVG's to OM2 and RPDA 100%, LIMA to LAD patent with L to R collaterals     SURGICAL HISTORY OF -       CABG 2006       Family History   Problem Relation Age of Onset     Family History Negative Other         no known specifics     Kidney Disease Mother        Social History     Tobacco Use     Smoking status: Never     Smokeless tobacco: Never   Substance Use Topics     Alcohol use: Not Currently       Objective:    BP (!) 142/72   Pulse 62   Wt 60.8 kg (134 lb)   BMI 23.00 kg/m  .      Constitutional/ general:  Pt is in no apparent distress, appears well-nourished.  Cooperative with history and physical exam.  Patient seen with electronic .  Family member also present.    Psych:  The patient answered questions appropriately.  Normal affect.  Seems to have reasonable expectations, in terms of treatment.     Lungs:  Non labored breathing, non labored speech. No cough.  No audible wheezing. Even, quiet breathing.       Vascular:  Pedal pulses are palpable bilaterally for both the DP and PT arteries.  CFT < 3 sec.  No edema.  Pedal hair growth noted.     Neuro:  Alert and oriented x 3. Coordinated gait.  Light touch sensation is intact     Derm: Normal texture and turgor.  No erythema, ecchymosis, or cyanosis.  No open lesions.     Musculoskeletal:    Patient is ambulatory without an assistive device or brace.  No gross deformities.  Normal arch with weightbearing.  No forefoot or rear foot deformities noted.  MS 5/5 all compartments.    No equinus.   Normal ROM all forefoot and rearfoot joints.  Both feet both first MTPJ's numerous tophi noted.   Tophi noted on dorsum of feet.  Numerous tophi on hands.       Latest Reference Range & Units 07/03/19 07:54   Uric Acid 3.5 - 7.2 mg/dL 8.9 (H)   (H): Data is abnormally high    Assessment: Uncontrolled gout with numerous tophi throughout body    Plan:  Reviewed past uric acid with patient.  This is over 3 years old.  It is quite high.  Explained long-term treatment for chronic gouty tophi is to get his uric acid down to approximately 5.  He does not have any allopurinol at this time.  Not having significant gout attack at this time.  We wrote prescription for allopurinol for 1 month.  Instructed patient to follow-up with primary care in 1 month to have uric acid repeated on a regular basis until down to therapeutic level.    Karsten Fuller DPM, FACFAS

## 2022-11-16 NOTE — LETTER
11/16/2022         RE: Jessica Healy  [address]  [place] [address]        Dear Colleague,    Thank you for referring your patient, Jessica Healy, to the [facility]. Please see a copy of my visit note below.     Subjective:    Pt is seen today as a new pt referral with the c/c of painful feet.  This has been symptomatic for years.  Points to  first metatarsal phalangeal joint and other parts of his foot..  Has pain w/ ambulation and shoewear and is relieved by rest.  Patient has prescription for allopurinol but sounds as though he is not taking this.  Also points to gouty tophi on hands and other parts of his body.  Denies erythema edema or drainage.    ROS:  See above       Allergies   Allergen Reactions     Nkda [No Known Drug Allergies]      Seasonal Allergies        Current Outpatient Medications   Medication Sig Dispense Refill     aspirin (ASA) 325 MG EC tablet Take 1 tablet (325 mg) by mouth daily 30 tablet 11     acetaminophen (TYLENOL) 500 MG tablet Take 500-1,000 mg by mouth every 6 hours as needed for mild pain (Patient not taking: Reported on 8/29/2022)       allopurinol (ZYLOPRIM) 100 MG tablet Take 50 mg by mouth daily (Patient not taking: Reported on 8/29/2022)       clopidogrel (PLAVIX) 75 MG tablet TAKE 1 TABLET(75 MG) BY MOUTH DAILY 90 tablet 3     ezetimibe (ZETIA) 10 MG tablet TAKE 1 TABLET(10 MG) BY MOUTH DAILY 90 tablet 3     gabapentin (NEURONTIN) 300 MG capsule Take 1 capsule (300 mg) by mouth 3 times daily 90 capsule 1     lidocaine (LIDODERM) 5 % patch Place 1 patch onto the skin every 24 hours To prevent lidocaine toxicity, patient should be patch free for 12 hrs daily. 30 patch 3     lisinopril (ZESTRIL) 10 MG tablet Take 1 tablet (10 mg) by mouth 2 times daily 180 tablet 3     nitroGLYcerin (NITROSTAT) 0.4 MG sublingual tablet PLACE 1 TABLET UNDER THE TONGUE AT THE ONSET OF CHEST PAIN. MAY REPEAT EVERY 5 MINUTES AS NEEDED FOR A TOTAL OF 3 DOSES. 25  tablet 3     ORDER FOR [facility] Blood pressure monitor machine 1 each 0     predniSONE (DELTASONE) 20 MG tablet Take 3 tabs by mouth daily x 3 days, then 2 tabs daily x 3 days, then 1 tab daily x 3 days, then 1/2 tab daily x 3 days. 20 tablet 0     tamsulosin (FLOMAX) 0.4 MG capsule Take 0.4 mg by mouth daily (Patient not taking: Reported on 8/29/2022)         Patient Active Problem List   Diagnosis     Advanced directives, counseling/discussion     CAD (coronary artery disease)     Hypertension goal BP (blood pressure) < 130/80     Dyslipidemia     Cerebrovascular accident (CVA), unspecified mechanism (H)     CKD (chronic kidney disease) stage 3, GFR 30-59 ml/min (H)     Kidney cyst, acquired     Coronary artery disease involving coronary bypass graft of native heart with angina pectoris (H)     Stroke-like symptoms     Post herpetic neuralgia       Past Medical History:   Diagnosis Date     CAD (coronary artery disease) 12/30/2011     Cerebrovascular accident (CVA), unspecified mechanism (H) 10/25/2016     CKD (chronic kidney disease) stage 3, GFR 30-59 ml/min (H) 10/26/2016     Coronary artery disease due to lipid rich plaque 12/30/2011    he transferred his care from the P team in [facility] to the [facility] team in [facility] 2021     CVA (cerebral vascular accident) (H) 10/16    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 He also said he does not like to take too many medications. Our RN spoke with him via  and hopefully has convinced him to take it daily; we will recheck his lipid profile in 3 months       Past Surgical History:   Procedure Laterality Date     BYPASS GRAFT ARTERY CORONARY  12/22/2006    GARZA to LAD, vein graft to OM2, vein graft to PDA. This was complicated by postop afib. Done in [place], [place]     CARDIAC CATHETERIZATION  2006    in [place]     CARDIAC CATHETERIZATION  11/21/2014    by Dr. Sepulveda at [facility], no PCI was done: LMCA 90%, mid %, prox LCX 90%, prox %, SVG's to OM2 and RPDA 100%, LIMA to LAD patent with L to R collaterals     SURGICAL HISTORY OF -       CABG 2006       Family History   Problem Relation Age of Onset     Family History Negative Other         no known specifics     Kidney Disease Mother        Social History     Tobacco Use     Smoking status: Never     Smokeless tobacco: Never   Substance Use Topics     Alcohol use: Not Currently       Objective:    BP (!) 142/72   Pulse 62   Wt 60.8 kg (134 lb)   BMI 23.00 kg/m  .      Constitutional/ general:  Pt is in no apparent distress, appears well-nourished.  Cooperative with history and physical exam.  Patient seen with electronic .  Family member also present.    Psych:  The patient answered questions appropriately.  Normal affect.  Seems to have reasonable expectations, in terms of treatment.     Lungs:  Non labored breathing, non labored speech. No cough.  No audible wheezing. Even, quiet breathing.       Vascular:  Pedal pulses are palpable bilaterally for both the DP and PT arteries.  CFT < 3 sec.  No edema.  Pedal hair growth noted.     Neuro:  Alert and oriented x 3. Coordinated gait.  Light touch sensation is intact     Derm: Normal texture and turgor.  No erythema, ecchymosis, or cyanosis.  No open lesions.     Musculoskeletal:    Patient is ambulatory without an assistive device or  brace.  No gross deformities.  Normal arch with weightbearing.  No forefoot or rear foot deformities noted.  MS 5/5 all compartments.    No equinus.   Normal ROM all forefoot and rearfoot joints.  Both feet both first MTPJ's numerous tophi noted.  Tophi noted on dorsum of feet.  Numerous tophi on hands.       Latest Reference Range & Units 07/03/19 07:54   Uric Acid 3.5 - 7.2 mg/dL 8.9 (H)   (H): Data is abnormally high    Assessment: Uncontrolled gout with numerous tophi throughout body    Plan:  Reviewed past uric acid with patient.  This is over 3 years old.  It is quite high.  Explained long-term treatment for chronic gouty tophi is to get his uric acid down to approximately 5.  He does not have any allopurinol at this time.  Not having significant gout attack at this time.  We wrote prescription for allopurinol for 1 month.  Instructed patient to follow-up with primary care in 1 month to have uric acid repeated on a regular basis until down to therapeutic level.    Karsten Fuller DPM, FACFAS           Again, thank you for allowing me to participate in the care of your patient.        Sincerely,        Karsten Fuller DPM

## 2022-11-16 NOTE — PATIENT INSTRUCTIONS
We wish you continued good healing. If you have any questions or concerns, please do not hesitate to contact us at  [phone]    [facility] (secure e-mail communication and access to your chart) to send a message or to make an appointment.    Please remember to call and schedule a follow up appointment if one was recommended at your earliest convenience.     PODIATRY CLINIC HOURS  TELEPHONE NUMBER    Dr. Karsten ALCANTARAPKEVEN [facility]        Clinics:  [facility]   [place]  Tuesday 1PM-6PM  [place]  Wednesday 745AM-330PM  [place]  Thursday/Friday 745AM-230PM       [place] APPOINTMENTS  [phone]    [place] APPOINTMENTS  [phone]        If you need a medication refill, please contact us you may need lab work and/or a follow up visit prior to your refill (i.e. Antifungal medications).  If MRI needed please call Imaging at [phone]   HOW DO I GET MY KNEE SCOOTER? Knee scooters can be picked up at ANY Medical Supply stores with your knee scooter Prescription.  OR  Bring your signed prescription to an [facility] showroom.             Pt needs to take Allopurinol daily to help reduce Uric Acid in Blood  Follow-up in 1 month with Primary Doctor

## 2022-11-17 RX ORDER — ALLOPURINOL 100 MG/1
TABLET ORAL
Qty: 45 TABLET | OUTPATIENT
Start: 2022-11-17

## 2022-11-23 ENCOUNTER — TELEPHONE (OUTPATIENT)
Dept: FAMILY MEDICINE | Facility: CLINIC | Age: 77
End: 2022-11-23

## 2022-11-23 NOTE — TELEPHONE ENCOUNTER
Medication Question or Refill    Contacts       Type Contact Phone/Fax    11/23/2022 04:03 PM CST Phone (Incoming) Jessica Healy (Self) [phone] (M)          What medication are you calling about (include dose and sig)?: Aporvastaein 80mg    Controlled Substance Agreement on file:   CSA -- Patient Level:    CSA: None found at the patient level.       Who prescribed the medication?: Dr. Christiano Maciel    Do you need a refill? Yes: Refill    When did you use the medication last? Last week    Patient offered an appointment? Yes: trying to schedule     Do you have any questions or concerns?  No    Preferred Pharmacy:   [facility] [place], [place] - [address]  [address]  [facility] [address]  Phone: [phone] Fax: [phone]    [facility], [place] - [address]  [address] [facility] [address]  Phone: [phone] Fax: [phone]    [id number] - [place], [place] - [address] AT [facility] & [address]  [address] [place] [address]  Phone: [phone] Fax: [phone]    [id number] [place], [place] - [address] AT [facility] [address]  [address] [facility] [address]  Phone: [phone] Fax: [phone]      Could we send this information to you in [facility] or would you prefer to receive a phone call?:   Patient would prefer a phone call   Okay to leave a detailed message?: Yes at Cell number on file:    Telephone Information:   Mobile [phone]   Mobile Not on file.

## 2023-01-13 DIAGNOSIS — M1A.09X0 CHRONIC GOUT OF MULTIPLE SITES, UNSPECIFIED CAUSE: ICD-10-CM

## 2023-01-16 DIAGNOSIS — M10.072 ACUTE IDIOPATHIC GOUT OF LEFT FOOT: Primary | ICD-10-CM

## 2023-01-16 RX ORDER — ALLOPURINOL 100 MG/1
TABLET ORAL
Qty: 30 TABLET | Refills: 0 | OUTPATIENT
Start: 2023-01-16

## 2023-01-16 RX ORDER — ALLOPURINOL 100 MG/1
50 TABLET ORAL DAILY
Qty: 30 TABLET | Refills: 0 | Status: SHIPPED | OUTPATIENT
Start: 2023-01-16 | End: 2023-02-10

## 2023-01-16 NOTE — TELEPHONE ENCOUNTER
Does not pass refill protocol. How would you like to proceed Dr Ospina?    Cami THOMASON RN Specialty Triage 1/16/2023 12:40 PM

## 2023-01-17 RX ORDER — ALLOPURINOL 100 MG/1
50 TABLET ORAL DAILY
Qty: 45 TABLET | Refills: 3 | Status: SHIPPED | OUTPATIENT
Start: 2023-01-17 | End: 2023-02-10

## 2023-02-01 ENCOUNTER — TELEPHONE (OUTPATIENT)
Dept: FAMILY MEDICINE | Facility: CLINIC | Age: 78
End: 2023-02-01
Payer: MEDICARE

## 2023-02-01 NOTE — TELEPHONE ENCOUNTER
Patient Quality Outreach    Patient is due for the following:   Hypertension -  BP check  Colon Cancer Screening  Physical Annual Wellness Visit    Next Steps:   Patient has upcoming appointment, these items will be addressed at that time.    Type of outreach:    Chart review performed, no outreach needed.      Questions for provider review:    None     Ara Norton

## 2023-02-10 ENCOUNTER — LAB (OUTPATIENT)
Dept: FAMILY MEDICINE | Facility: CLINIC | Age: 78
End: 2023-02-10

## 2023-02-10 ENCOUNTER — OFFICE VISIT (OUTPATIENT)
Dept: FAMILY MEDICINE | Facility: CLINIC | Age: 78
End: 2023-02-10
Payer: MEDICARE

## 2023-02-10 VITALS
TEMPERATURE: 98.6 F | BODY MASS INDEX: 23.14 KG/M2 | RESPIRATION RATE: 13 BRPM | SYSTOLIC BLOOD PRESSURE: 158 MMHG | HEART RATE: 95 BPM | OXYGEN SATURATION: 100 % | DIASTOLIC BLOOD PRESSURE: 88 MMHG | WEIGHT: 134.8 LBS

## 2023-02-10 DIAGNOSIS — E78.5 HYPERLIPIDEMIA LDL GOAL <70: ICD-10-CM

## 2023-02-10 DIAGNOSIS — Z12.11 SCREEN FOR COLON CANCER: ICD-10-CM

## 2023-02-10 DIAGNOSIS — M1A.00X1 IDIOPATHIC CHRONIC GOUT WITH TOPHUS, UNSPECIFIED SITE: ICD-10-CM

## 2023-02-10 DIAGNOSIS — R73.09 ELEVATED GLUCOSE: ICD-10-CM

## 2023-02-10 DIAGNOSIS — Z23 ENCOUNTER FOR IMMUNIZATION: ICD-10-CM

## 2023-02-10 DIAGNOSIS — Z00.00 MEDICARE ANNUAL WELLNESS VISIT, SUBSEQUENT: Primary | ICD-10-CM

## 2023-02-10 DIAGNOSIS — N18.30 STAGE 3 CHRONIC KIDNEY DISEASE, UNSPECIFIED WHETHER STAGE 3A OR 3B CKD (H): ICD-10-CM

## 2023-02-10 DIAGNOSIS — Z86.73 HISTORY OF CVA (CEREBROVASCULAR ACCIDENT): ICD-10-CM

## 2023-02-10 DIAGNOSIS — I25.10 CORONARY ARTERY DISEASE INVOLVING NATIVE CORONARY ARTERY OF NATIVE HEART WITHOUT ANGINA PECTORIS: ICD-10-CM

## 2023-02-10 DIAGNOSIS — I10 ESSENTIAL HYPERTENSION WITH GOAL BLOOD PRESSURE LESS THAN 130/80: ICD-10-CM

## 2023-02-10 PROBLEM — I25.709 CORONARY ARTERY DISEASE INVOLVING CORONARY BYPASS GRAFT OF NATIVE HEART WITH ANGINA PECTORIS (H): Status: RESOLVED | Noted: 2020-02-18 | Resolved: 2023-02-10

## 2023-02-10 LAB
ALBUMIN SERPL-MCNC: 3.7 G/DL (ref 3.4–5)
ALP SERPL-CCNC: 55 U/L (ref 40–150)
ALT SERPL W P-5'-P-CCNC: 25 U/L (ref 0–70)
ANION GAP SERPL CALCULATED.3IONS-SCNC: 7 MMOL/L (ref 3–14)
AST SERPL W P-5'-P-CCNC: 23 U/L (ref 0–45)
BILIRUB SERPL-MCNC: 0.5 MG/DL (ref 0.2–1.3)
BUN SERPL-MCNC: 34 MG/DL (ref 7–30)
CALCIUM SERPL-MCNC: 9.6 MG/DL (ref 8.5–10.1)
CHLORIDE BLD-SCNC: 113 MMOL/L (ref 94–109)
CHOLEST SERPL-MCNC: 335 MG/DL
CO2 SERPL-SCNC: 26 MMOL/L (ref 20–32)
CREAT SERPL-MCNC: 1.89 MG/DL (ref 0.66–1.25)
CREAT UR-MCNC: 278 MG/DL
FASTING STATUS PATIENT QL REPORTED: NO
GFR SERPL CREATININE-BSD FRML MDRD: 37 ML/MIN/1.73M2
GLUCOSE BLD-MCNC: 92 MG/DL (ref 70–99)
HBA1C MFR BLD: 5.7 % (ref 0–5.6)
HDLC SERPL-MCNC: 51 MG/DL
HGB BLD-MCNC: 13.2 G/DL (ref 13.3–17.7)
LDLC SERPL CALC-MCNC: 253 MG/DL
MICROALBUMIN UR-MCNC: 179 MG/L
MICROALBUMIN/CREAT UR: 64.39 MG/G CR (ref 0–17)
NONHDLC SERPL-MCNC: 284 MG/DL
POTASSIUM BLD-SCNC: 4.5 MMOL/L (ref 3.4–5.3)
PROT SERPL-MCNC: 6.8 G/DL (ref 6.8–8.8)
SODIUM SERPL-SCNC: 146 MMOL/L (ref 133–144)
TRIGL SERPL-MCNC: 153 MG/DL
URATE SERPL-MCNC: 7.5 MG/DL (ref 3.5–7.2)

## 2023-02-10 PROCEDURE — 80061 LIPID PANEL: CPT | Performed by: FAMILY MEDICINE

## 2023-02-10 PROCEDURE — 80053 COMPREHEN METABOLIC PANEL: CPT | Performed by: FAMILY MEDICINE

## 2023-02-10 PROCEDURE — 82570 ASSAY OF URINE CREATININE: CPT | Performed by: FAMILY MEDICINE

## 2023-02-10 PROCEDURE — 85018 HEMOGLOBIN: CPT | Performed by: FAMILY MEDICINE

## 2023-02-10 PROCEDURE — 84550 ASSAY OF BLOOD/URIC ACID: CPT | Performed by: FAMILY MEDICINE

## 2023-02-10 PROCEDURE — 90471 IMMUNIZATION ADMIN: CPT | Performed by: FAMILY MEDICINE

## 2023-02-10 PROCEDURE — 82043 UR ALBUMIN QUANTITATIVE: CPT | Performed by: FAMILY MEDICINE

## 2023-02-10 PROCEDURE — G0438 PPPS, INITIAL VISIT: HCPCS | Performed by: FAMILY MEDICINE

## 2023-02-10 PROCEDURE — 36415 COLL VENOUS BLD VENIPUNCTURE: CPT | Performed by: FAMILY MEDICINE

## 2023-02-10 PROCEDURE — 90715 TDAP VACCINE 7 YRS/> IM: CPT | Performed by: FAMILY MEDICINE

## 2023-02-10 PROCEDURE — 99214 OFFICE O/P EST MOD 30 MIN: CPT | Mod: 25 | Performed by: FAMILY MEDICINE

## 2023-02-10 PROCEDURE — 83036 HEMOGLOBIN GLYCOSYLATED A1C: CPT | Performed by: FAMILY MEDICINE

## 2023-02-10 RX ORDER — LOSARTAN POTASSIUM 100 MG/1
100 TABLET ORAL DAILY
Qty: 90 TABLET | Refills: 3 | Status: SHIPPED | OUTPATIENT
Start: 2023-02-10 | End: 2023-05-24

## 2023-02-10 RX ORDER — CLOPIDOGREL BISULFATE 75 MG/1
TABLET ORAL
Qty: 90 TABLET | Refills: 3 | Status: SHIPPED | OUTPATIENT
Start: 2023-02-10 | End: 2023-07-07

## 2023-02-10 RX ORDER — ALLOPURINOL 100 MG/1
100 TABLET ORAL DAILY
Qty: 90 TABLET | Refills: 3 | Status: SHIPPED | OUTPATIENT
Start: 2023-02-10 | End: 2023-07-07

## 2023-02-10 RX ORDER — EZETIMIBE 10 MG/1
TABLET ORAL
Qty: 90 TABLET | Refills: 3 | Status: SHIPPED | OUTPATIENT
Start: 2023-02-10 | End: 2023-07-07

## 2023-02-10 RX ORDER — PREDNISONE 20 MG/1
TABLET ORAL
Qty: 20 TABLET | Refills: 0 | Status: SHIPPED | OUTPATIENT
Start: 2023-02-10 | End: 2023-07-07

## 2023-02-10 RX ORDER — ATORVASTATIN CALCIUM 80 MG/1
80 TABLET, FILM COATED ORAL DAILY
Qty: 90 TABLET | Refills: 3 | Status: SHIPPED | OUTPATIENT
Start: 2023-02-10 | End: 2023-07-07

## 2023-02-10 NOTE — NURSING NOTE
Prior to immunization administration, verified patients identity using patient s name and date of birth. Please see Immunization Activity for additional information.     Screening Questionnaire for Adult Immunization    Are you sick today?   No   Do you have allergies to medications, food, a vaccine component or latex?   No   Have you ever had a serious reaction after receiving a vaccination?   No   Do you have a long-term health problem with heart, lung, kidney, or metabolic disease (e.g., diabetes), asthma, a blood disorder, no spleen, complement component deficiency, a cochlear implant, or a spinal fluid leak?  Are you on long-term aspirin therapy?   No   Do you have cancer, leukemia, HIV/AIDS, or any other immune system problem?   No   Do you have a parent, brother, or sister with an immune system problem?   No   In the past 3 months, have you taken medications that affect  your immune system, such as prednisone, other steroids, or anticancer drugs; drugs for the treatment of rheumatoid arthritis, Crohn s disease, or psoriasis; or have you had radiation treatments?   No   Have you had a seizure, or a brain or other nervous system problem?   No   During the past year, have you received a transfusion of blood or blood    products, or been given immune (gamma) globulin or antiviral drug?   No   For women: Are you pregnant or is there a chance you could become       pregnant during the next month?   No   Have you received any vaccinations in the past 4 weeks?   No     Immunization questionnaire answers were all negative.        Per orders of Dr. Maciel, injection of tdap given by Rosemary Kenney MA. Patient instructed to remain in clinic for 15 minutes afterwards, and to report any adverse reaction to me immediately.       Screening performed by Rosemary Kenney MA on 2/10/2023 at 3:17 PM.

## 2023-02-10 NOTE — PROGRESS NOTES
"Cristina Lopez is a 77 year old presenting for the following health issues:  No chief complaint on file.      History of Present Illness       Hypertension: He presents for follow up of hypertension.  He does not check blood pressure  regularly outside of the clinic. Outpatient blood pressures have not been over 140/90. He does not follow a low salt diet.         Gout/ Single Inflamed Joint  Onset/Duration: Several Years  Description:   Location: Big Toe and Thumb - left  Joint Swelling: YES  Redness: YES  Pain: No  Intensity: moderate  Progression of Symptoms: worsening  Accompanying Signs & Symptoms:  Fevers: No  History:   Trauma to the area: No  Previous history of gout: YES  Recent illness: No  Alcohol use: No  Diuretic use: No  Precipitating or alleviating factors: None  Therapies tried and outcome: none    Annual Wellness Visit    Patient has been advised of split billing requirements and indicates understanding: Yes     Are you in the first 12 months of your Medicare Part B coverage?  No    Physical Health:    In general, how would you rate your overall physical health? poor    Outside of work, how many days during the week do you exercise?none    Outside of work, approximately how many minutes a day do you exercise?not applicable    If you drink alcohol do you typically have >3 drinks per day or >7 drinks per week? Not Applicable    Do you usually eat at least 4 servings of fruit and vegetables a day, include whole grains & fiber and avoid regularly eating high fat or \"junk\" foods? Yes    Do you have any problems taking medications regularly? No    Do you have any side effects from medications? none    Needs assistance for the following daily activities: no assistance needed    Which of the following safety concerns are present in your home?  none identified     Hearing impairment: No    In the past 6 months, have you been bothered by leaking of urine? no    Mental Health:    In general, how would " you rate your overall mental or emotional health? fair  PHQ-2 Score: 0    Do you feel safe in your environment? Yes    Have you ever done Advance Care Planning? (For example, a Health Directive, POLST, or a discussion with a medical provider or your loved ones about your wishes)? No, advance care planning information given to patient to review.  Patient declined advance care planning discussion at this time.    Fall risk:  Fall Risk Assessment not completed.    Cognitive Screenin) Repeat 3 items (Leader, Season, Table)    2) Clock draw: ABNORMAL Rivera copy of watch on wrist   3) 3 item recall: Recalls 1 object   Results: ABNORMAL clock, 1-2 items recalled: PROBABLE COGNITIVE IMPAIRMENT, **INFORM PROVIDER**    Mini-CogTM Copyright S [employer]. Licensed by the author for use in [facility]; reprinted with permission ([email or web address]). All rights reserved.      Do you have sleep apnea, excessive snoring or daytime drowsiness?: no    Current providers sharing in care for this patient include:   Patient Care Team:  Christiano Maciel MD as PCP - General (Family Practice)  Gilberto Cordon MD as Assigned Heart and Vascular Provider  Luanne Latham APRN CNP as Assigned PCP  Karsten Fuller DPM as Assigned Musculoskeletal Provider    Patient has been advised of split billing requirements and indicates understanding: Yes    Review of Systems   Constitutional, HEENT, cardiovascular, pulmonary, GI, , musculoskeletal, neuro, skin, endocrine and psych systems are negative, except as otherwise noted.      Objective    BP (!) 158/88 (BP Location: Left arm, Patient Position: Sitting, Cuff Size: Adult Regular)   Pulse 95   Temp 98.6  F (37  C) (Tympanic)   Resp 13   Wt 61.1 kg (134 lb 12.8 oz)   SpO2 100%   BMI 23.14 kg/m    Body mass index is 23.14 kg/m .  Physical Exam   GENERAL: healthy, alert and no distress  NECK: no adenopathy, no asymmetry, masses, or scars and thyroid normal to palpation  RESP:  lungs clear to auscultation - no rales, rhonchi or wheezes  CV: regular rate and rhythm, normal S1 S2, no S3 or S4, no murmur, click or rub, no peripheral edema and peripheral pulses strong  ABDOMEN: soft, nontender, no hepatosplenomegaly, no masses and bowel sounds normal  MS: tophi in many joints of hands and fingers    A/P:  (Z00.00) Medicare annual wellness visit, subsequent  (primary encounter diagnosis)  Comment:   Plan: as below.    (M1A.00X1) Idiopathic chronic gout with tophus, unspecified site  Comment:   Plan: allopurinol (ZYLOPRIM) 100 MG tablet,         predniSONE (DELTASONE) 20 MG tablet, Uric acid        Poorly controlled. Restart allopurinol. Prednisone taper given for pain. Recheck in 3 months.    (I10) Essential hypertension with goal blood pressure less than 130/80  Comment:   Plan: losartan (COZAAR) 100 MG tablet, Comprehensive         metabolic panel (BMP + Alb, Alk Phos, ALT, AST,        Total. Bili, TP), Albumin Random Urine         Quantitative with Creat Ratio        Not controlled. Start losartan. May be helpful for gout as well. Recheck in 3 months.    (E78.5) Hyperlipidemia LDL goal <70  Comment:   Plan: ezetimibe (ZETIA) 10 MG tablet, Comprehensive         metabolic panel (BMP + Alb, Alk Phos, ALT, AST,        Total. Bili, TP), Lipid panel reflex to direct         LDL Non-fasting        Likely not controlled. Not taking medications. Restart. Recheck in 3 months.    (N18.30) Stage 3 chronic kidney disease, unspecified whether stage 3a or 3b CKD (H)  Comment:   Plan: Hemoglobin, Comprehensive metabolic panel (BMP         + Alb, Alk Phos, ALT, AST, Total. Bili, TP),         Albumin Random Urine Quantitative with Creat         Ratio        Recheck.    (I25.10) Coronary artery disease involving native coronary artery of native heart without angina pectoris  Comment:   Plan: atorvastatin (LIPITOR) 80 MG tablet        Restart statin.    (R73.09) Elevated glucose  Comment:   Plan: Hemoglobin  A1c, Comprehensive metabolic panel         (BMP + Alb, Alk Phos, ALT, AST, Total. Bili,         TP)        Monitor.    (Z86.73) History of CVA (cerebrovascular accident)  Comment:   Plan: clopidogrel (PLAVIX) 75 MG tablet            (Z12.11) Screen for colon cancer  Comment:   Plan: COLOGUARD([employer])            (Z23) Encounter for immunization  Comment:   Plan: TDAP VACCINE (Adacel, Boostrix)            Christiano Maciel MD

## 2023-05-24 ENCOUNTER — OFFICE VISIT (OUTPATIENT)
Dept: FAMILY MEDICINE | Facility: CLINIC | Age: 78
End: 2023-05-24
Payer: MEDICARE

## 2023-05-24 VITALS
TEMPERATURE: 97.2 F | OXYGEN SATURATION: 97 % | RESPIRATION RATE: 20 BRPM | SYSTOLIC BLOOD PRESSURE: 96 MMHG | WEIGHT: 137 LBS | BODY MASS INDEX: 23.52 KG/M2 | HEART RATE: 68 BPM | DIASTOLIC BLOOD PRESSURE: 51 MMHG

## 2023-05-24 DIAGNOSIS — M1A.00X1 IDIOPATHIC CHRONIC GOUT WITH TOPHUS, UNSPECIFIED SITE: ICD-10-CM

## 2023-05-24 DIAGNOSIS — I10 HYPERTENSION GOAL BP (BLOOD PRESSURE) < 130/80: Primary | ICD-10-CM

## 2023-05-24 PROCEDURE — 99213 OFFICE O/P EST LOW 20 MIN: CPT | Performed by: NURSE PRACTITIONER

## 2023-05-24 RX ORDER — LISINOPRIL 10 MG/1
10 TABLET ORAL 2 TIMES DAILY
Qty: 180 TABLET | Refills: 1 | Status: SHIPPED | OUTPATIENT
Start: 2023-05-24 | End: 2023-07-07

## 2023-05-24 ASSESSMENT — PAIN SCALES - GENERAL: PAINLEVEL: NO PAIN (0)

## 2023-05-24 NOTE — PROGRESS NOTES
Assessment & Plan     Hypertension goal BP (blood pressure) < 130/80  Patient stopped losartan and restarted lisinopril at previous dose of BID. BP low today. He is asymptomatic. He will continue to monitor BP at home and follow up if it continues to be low. He is feeling well. Discussed not taking evening dose if BP low.   - lisinopril (ZESTRIL) 10 MG tablet; Take 1 tablet (10 mg) by mouth 2 times daily    Idiopathic chronic gout with tophus, unspecified site  I recommended he continue allopurinol. We discussed this medication is more for preventing attacks than treating an attack so if symptoms recur he needs to follow up in clinic for treatment.    He requested follow up with Dr. Maciel to discuss his medications and for diabetes check. MA assisted him to schedule.       The benefits, risks and potential side effects were discussed in detail. Black box warnings discussed as relevant. All patient questions were answered. The patient was instructed to follow up immediately if any adverse reactions develop.    Return precautions discussed, including when to seek urgent/emergent care.    Patient verbalizes understanding and agrees with plan of care. Patient stable for discharge.      VEENA CAMARA [facility]    Cristina Lopez is a 77 year old, presenting for the following health issues:  Lab Result Notice and Recheck Medication (Wants to stop taking Allopurinol  &  Losartan)         View : No data to display.              History of Present Illness       Hypertension: He presents for follow up of hypertension.  He does check blood pressure  regularly outside of the clinic. Outside blood pressures have been over 140/90. He follows a low salt diet.     He eats 0-1 servings of fruits and vegetables daily.He consumes 0 sweetened beverage(s) daily.He exercises with enough effort to increase his heart rate 9 or less minutes per day.  He exercises with enough effort to increase  his heart rate 3 or less days per week.   He is taking medications regularly.       Pt would like to go over some lab results. He also would like to talk about stopping 2 of his medication.Pt is having Gout in hands medication he has is not working.          He has been taking lisinoril   Stopped losartan 1 month ago. Doesn't feel like it's working  BP at home 130s-140s usually with lisinopril  Wants to restart lisinopril (has been taking old supply)      Allopurinol - doesn't feel like it helps. Still has gout.  Discussed that it doesn't help acute attack but should help decrease attacks over time          Review of Systems   Constitutional, HEENT, cardiovascular, pulmonary, GI, , musculoskeletal, neuro, skin, endocrine and psych systems are negative, except as otherwise noted.      Objective    BP 96/51   Pulse 68   Temp 97.2  F (36.2  C) (Tympanic)   Resp 20   Wt 62.1 kg (137 lb)   SpO2 97%   BMI 23.52 kg/m    Body mass index is 23.52 kg/m .  Physical Exam   GENERAL: healthy, alert and no distress  RESP: lungs clear to auscultation - no rales, rhonchi or wheezes  CV: regular rate and rhythm, normal S1 S2, no S3 or S4, no murmur, click or rub, no peripheral edema   MS: no gross musculoskeletal defects noted, no edema  PSYCH: mentation appears normal, affect normal/bright

## 2023-06-16 DIAGNOSIS — E78.5 HYPERLIPIDEMIA LDL GOAL <70: ICD-10-CM

## 2023-06-20 RX ORDER — ISOSORBIDE MONONITRATE 120 MG/1
TABLET, EXTENDED RELEASE ORAL
Qty: 90 TABLET | Refills: 0 | OUTPATIENT
Start: 2023-06-20

## 2023-06-20 RX ORDER — EZETIMIBE 10 MG/1
TABLET ORAL
Qty: 90 TABLET | Refills: 3 | OUTPATIENT
Start: 2023-06-20

## 2023-07-07 ENCOUNTER — OFFICE VISIT (OUTPATIENT)
Dept: FAMILY MEDICINE | Facility: CLINIC | Age: 78
End: 2023-07-07
Payer: MEDICARE

## 2023-07-07 ENCOUNTER — LAB (OUTPATIENT)
Dept: FAMILY MEDICINE | Facility: CLINIC | Age: 78
End: 2023-07-07

## 2023-07-07 VITALS
SYSTOLIC BLOOD PRESSURE: 160 MMHG | OXYGEN SATURATION: 98 % | TEMPERATURE: 97.1 F | HEART RATE: 69 BPM | WEIGHT: 138.2 LBS | BODY MASS INDEX: 23.6 KG/M2 | HEIGHT: 64 IN | DIASTOLIC BLOOD PRESSURE: 82 MMHG

## 2023-07-07 DIAGNOSIS — Z86.73 HISTORY OF CVA (CEREBROVASCULAR ACCIDENT): ICD-10-CM

## 2023-07-07 DIAGNOSIS — Z12.11 COLON CANCER SCREENING: ICD-10-CM

## 2023-07-07 DIAGNOSIS — I10 ESSENTIAL HYPERTENSION WITH GOAL BLOOD PRESSURE LESS THAN 140/90: Primary | ICD-10-CM

## 2023-07-07 DIAGNOSIS — I25.10 CORONARY ARTERY DISEASE INVOLVING NATIVE CORONARY ARTERY OF NATIVE HEART WITHOUT ANGINA PECTORIS: ICD-10-CM

## 2023-07-07 DIAGNOSIS — R73.09 ELEVATED GLUCOSE: ICD-10-CM

## 2023-07-07 DIAGNOSIS — M1A.00X1 IDIOPATHIC CHRONIC GOUT WITH TOPHUS, UNSPECIFIED SITE: ICD-10-CM

## 2023-07-07 DIAGNOSIS — G45.9 TIA (TRANSIENT ISCHEMIC ATTACK): ICD-10-CM

## 2023-07-07 DIAGNOSIS — E78.5 HYPERLIPIDEMIA LDL GOAL <70: ICD-10-CM

## 2023-07-07 LAB
ANION GAP SERPL CALCULATED.3IONS-SCNC: 11 MMOL/L (ref 7–15)
BUN SERPL-MCNC: 35.9 MG/DL (ref 8–23)
CALCIUM SERPL-MCNC: 9.4 MG/DL (ref 8.8–10.2)
CHLORIDE SERPL-SCNC: 107 MMOL/L (ref 98–107)
CHOLEST SERPL-MCNC: 212 MG/DL
CREAT SERPL-MCNC: 2.17 MG/DL (ref 0.67–1.17)
DEPRECATED HCO3 PLAS-SCNC: 20 MMOL/L (ref 22–29)
GFR SERPL CREATININE-BSD FRML MDRD: 31 ML/MIN/1.73M2
GLUCOSE SERPL-MCNC: 115 MG/DL (ref 70–99)
HBA1C MFR BLD: 5.9 % (ref 0–5.6)
HDLC SERPL-MCNC: 51 MG/DL
LDLC SERPL CALC-MCNC: 142 MG/DL
NONHDLC SERPL-MCNC: 161 MG/DL
POTASSIUM SERPL-SCNC: 5.5 MMOL/L (ref 3.4–5.3)
SODIUM SERPL-SCNC: 138 MMOL/L (ref 136–145)
TRIGL SERPL-MCNC: 96 MG/DL
URATE SERPL-MCNC: 7.8 MG/DL (ref 3.4–7)

## 2023-07-07 PROCEDURE — 80061 LIPID PANEL: CPT | Performed by: FAMILY MEDICINE

## 2023-07-07 PROCEDURE — 36415 COLL VENOUS BLD VENIPUNCTURE: CPT | Performed by: FAMILY MEDICINE

## 2023-07-07 PROCEDURE — 84550 ASSAY OF BLOOD/URIC ACID: CPT | Performed by: FAMILY MEDICINE

## 2023-07-07 PROCEDURE — 80048 BASIC METABOLIC PNL TOTAL CA: CPT | Performed by: FAMILY MEDICINE

## 2023-07-07 PROCEDURE — 99214 OFFICE O/P EST MOD 30 MIN: CPT | Performed by: FAMILY MEDICINE

## 2023-07-07 PROCEDURE — 83036 HEMOGLOBIN GLYCOSYLATED A1C: CPT | Performed by: FAMILY MEDICINE

## 2023-07-07 RX ORDER — EZETIMIBE 10 MG/1
TABLET ORAL
Qty: 90 TABLET | Refills: 3 | Status: SHIPPED | OUTPATIENT
Start: 2023-07-07 | End: 2024-01-19

## 2023-07-07 RX ORDER — LISINOPRIL 20 MG/1
20 TABLET ORAL 2 TIMES DAILY
Qty: 180 TABLET | Refills: 3 | Status: SHIPPED | OUTPATIENT
Start: 2023-07-07 | End: 2024-01-19

## 2023-07-07 RX ORDER — ASPIRIN 325 MG
325 TABLET, DELAYED RELEASE (ENTERIC COATED) ORAL DAILY
Qty: 100 TABLET | Refills: 3 | Status: SHIPPED | OUTPATIENT
Start: 2023-07-07 | End: 2024-01-19

## 2023-07-07 RX ORDER — CLOPIDOGREL BISULFATE 75 MG/1
TABLET ORAL
Qty: 90 TABLET | Refills: 3 | Status: SHIPPED | OUTPATIENT
Start: 2023-07-07 | End: 2024-01-19

## 2023-07-07 RX ORDER — ATORVASTATIN CALCIUM 80 MG/1
80 TABLET, FILM COATED ORAL DAILY
Qty: 90 TABLET | Refills: 3 | Status: SHIPPED | OUTPATIENT
Start: 2023-07-07 | End: 2024-01-19

## 2023-07-07 RX ORDER — ALLOPURINOL 100 MG/1
100 TABLET ORAL DAILY
Qty: 90 TABLET | Refills: 3 | Status: SHIPPED | OUTPATIENT
Start: 2023-07-07 | End: 2023-10-13

## 2023-07-07 ASSESSMENT — PAIN SCALES - GENERAL: PAINLEVEL: NO PAIN (0)

## 2023-07-07 NOTE — PROGRESS NOTES
"  Cristina Lopez is a 78 year old, presenting for the following health issues:  Hypertension        7/7/2023     3:00 PM   Additional Questions   Roomed by Crystal Tejeda   Accompanied by Wife     History of Present Illness       Hypertension: He presents for follow up of hypertension.  He does check blood pressure  regularly outside of the clinic. Outside blood pressures have been over 140/90. He follows a low salt diet.     He eats 0-1 servings of fruits and vegetables daily.He consumes 1 sweetened beverage(s) daily.He exercises with enough effort to increase his heart rate 10 to 19 minutes per day.  He exercises with enough effort to increase his heart rate 4 days per week. He is missing 1 dose(s) of medications per week.  He is not taking prescribed medications regularly due to remembering to take.       Hyperlipidemia Follow-Up      Are you regularly taking any medication or supplement to lower your cholesterol?   Yes- atorvastatin    Are you having muscle aches or other side effects that you think could be caused by your cholesterol lowering medication?  No    Review of Systems   Constitutional, HEENT, cardiovascular, pulmonary, GI, , musculoskeletal, neuro, skin, endocrine and psych systems are negative, except as otherwise noted.      Objective    BP (!) 160/82 (BP Location: Right arm, Patient Position: Sitting, Cuff Size: Adult Regular)   Pulse 69   Temp 97.1  F (36.2  C) (Tympanic)   Ht 1.626 m (5' 4\")   Wt 62.7 kg (138 lb 3.2 oz)   SpO2 98%   BMI 23.72 kg/m    Body mass index is 23.72 kg/m .  Physical Exam   GENERAL: healthy, alert and no distress  NECK: no adenopathy, no asymmetry, masses, or scars and thyroid normal to palpation  RESP: lungs clear to auscultation - no rales, rhonchi or wheezes  CV: regular rate and rhythm, normal S1 S2, no S3 or S4, no murmur, click or rub, no peripheral edema and peripheral pulses strong  ABDOMEN: soft, nontender, no hepatosplenomegaly, no masses and bowel " sounds normal  MS: no gross musculoskeletal defects noted, no edema    A/P:  (I10) Essential hypertension with goal blood pressure less than 140/90  (primary encounter diagnosis)  Comment:   Plan: lisinopril (ZESTRIL) 20 MG tablet, Basic         metabolic panel  (Ca, Cl, CO2, Creat, Gluc, K,         Na, BUN)        Not controlled. Increased lisinopril from 10 mg BID to 20 mg BID. Recheck in 3 months.    (G45.9) TIA (transient ischemic attack)  Comment:   Plan: aspirin (ASA) 325 MG EC tablet            (I25.10) Coronary artery disease involving native coronary artery of native heart without angina pectoris  Comment:   Plan: atorvastatin (LIPITOR) 80 MG tablet            (Z86.73) History of CVA (cerebrovascular accident)  Comment:   Plan: clopidogrel (PLAVIX) 75 MG tablet            (E78.5) Hyperlipidemia LDL goal <70  Comment:   Plan: ezetimibe (ZETIA) 10 MG tablet, Lipid panel         reflex to direct LDL Non-fasting        Recheck lipids.    (M1A.00X1) Idiopathic chronic gout with tophus, unspecified site  Comment:   Plan: allopurinol (ZYLOPRIM) 100 MG tablet, Uric acid        Recheck. If not at goal, switch to Uloric.    (R73.09) Elevated glucose  Comment:   Plan: Hemoglobin A1c        Monitor.    (Z12.11) Colon cancer screening  Comment:   Plan: COLOGUARD([employer])            Christiano Maciel MD

## 2023-07-18 ENCOUNTER — TELEPHONE (OUTPATIENT)
Dept: FAMILY MEDICINE | Facility: CLINIC | Age: 78
End: 2023-07-18
Payer: MEDICARE

## 2023-07-18 ENCOUNTER — APPOINTMENT (OUTPATIENT)
Dept: INTERPRETER SERVICES | Facility: CLINIC | Age: 78
End: 2023-07-18
Payer: MEDICARE

## 2023-07-18 DIAGNOSIS — I25.709 CORONARY ARTERY DISEASE INVOLVING CORONARY BYPASS GRAFT OF NATIVE HEART WITH ANGINA PECTORIS (H): ICD-10-CM

## 2023-07-18 RX ORDER — ISOSORBIDE MONONITRATE 120 MG/1
TABLET, EXTENDED RELEASE ORAL
Qty: 90 TABLET | Refills: 3 | Status: SHIPPED | OUTPATIENT
Start: 2023-07-18 | End: 2024-01-19

## 2023-07-18 RX ORDER — ISOSORBIDE MONONITRATE 120 MG/1
TABLET, EXTENDED RELEASE ORAL
Qty: 90 TABLET | Refills: 0 | OUTPATIENT
Start: 2023-07-18

## 2023-07-18 NOTE — TELEPHONE ENCOUNTER
Patient calling in requesting refill. Patient was calling without , offered to conference call in [facility] , patient agreed. Remainder of call completed with [facility] .    Patient states that he is looking for refill of his Isosorbide 120 mg tablet daily. Patient states that he takes this every day and wasn't aware that he didn't have any refills available. Writer reviewed chart and noted that this was discontinued last year, not active on med list, and earlier this month during OV with PCP, medication was not listed as patient taking.     Patient states that he is also taking the lisinopril as ordered, but wasn't aware that he wasn't supposed to take the Imdur. Patient states he's been taking it daily and his SBPs have been around 130-150s with this medication on board.    Informed patient we will reach out to PCP to see what next steps would be. Patient requesting call back with [facility] .      Routing to provider to review and advise      ELENA Villalta, RN  [facility]

## 2023-10-13 ENCOUNTER — OFFICE VISIT (OUTPATIENT)
Dept: FAMILY MEDICINE | Facility: CLINIC | Age: 78
End: 2023-10-13
Payer: MEDICARE

## 2023-10-13 ENCOUNTER — TELEPHONE (OUTPATIENT)
Dept: FAMILY MEDICINE | Facility: CLINIC | Age: 78
End: 2023-10-13

## 2023-10-13 VITALS
OXYGEN SATURATION: 100 % | RESPIRATION RATE: 16 BRPM | TEMPERATURE: 98.1 F | HEIGHT: 64 IN | WEIGHT: 134.4 LBS | BODY MASS INDEX: 22.94 KG/M2 | DIASTOLIC BLOOD PRESSURE: 76 MMHG | HEART RATE: 67 BPM | SYSTOLIC BLOOD PRESSURE: 158 MMHG

## 2023-10-13 DIAGNOSIS — M1A.00X1 IDIOPATHIC CHRONIC GOUT WITH TOPHUS, UNSPECIFIED SITE: ICD-10-CM

## 2023-10-13 DIAGNOSIS — I10 ESSENTIAL HYPERTENSION WITH GOAL BLOOD PRESSURE LESS THAN 140/90: Primary | ICD-10-CM

## 2023-10-13 DIAGNOSIS — Z23 ENCOUNTER FOR IMMUNIZATION: ICD-10-CM

## 2023-10-13 DIAGNOSIS — E78.5 HYPERLIPIDEMIA LDL GOAL <70: ICD-10-CM

## 2023-10-13 DIAGNOSIS — N18.30 STAGE 3 CHRONIC KIDNEY DISEASE, UNSPECIFIED WHETHER STAGE 3A OR 3B CKD (H): ICD-10-CM

## 2023-10-13 DIAGNOSIS — M1A.00X1 IDIOPATHIC CHRONIC GOUT WITH TOPHUS, UNSPECIFIED SITE: Primary | ICD-10-CM

## 2023-10-13 PROCEDURE — 99214 OFFICE O/P EST MOD 30 MIN: CPT | Mod: 25 | Performed by: FAMILY MEDICINE

## 2023-10-13 PROCEDURE — 80061 LIPID PANEL: CPT | Performed by: FAMILY MEDICINE

## 2023-10-13 PROCEDURE — 36415 COLL VENOUS BLD VENIPUNCTURE: CPT | Performed by: FAMILY MEDICINE

## 2023-10-13 PROCEDURE — 90662 IIV NO PRSV INCREASED AG IM: CPT | Performed by: FAMILY MEDICINE

## 2023-10-13 PROCEDURE — G0008 ADMIN INFLUENZA VIRUS VAC: HCPCS | Mod: 59 | Performed by: FAMILY MEDICINE

## 2023-10-13 PROCEDURE — 91320 SARSCV2 VAC 30MCG TRS-SUC IM: CPT | Performed by: FAMILY MEDICINE

## 2023-10-13 PROCEDURE — 80048 BASIC METABOLIC PNL TOTAL CA: CPT | Performed by: FAMILY MEDICINE

## 2023-10-13 PROCEDURE — 90480 ADMN SARSCOV2 VAC 1/ONLY CMP: CPT | Performed by: FAMILY MEDICINE

## 2023-10-13 PROCEDURE — 84550 ASSAY OF BLOOD/URIC ACID: CPT | Performed by: FAMILY MEDICINE

## 2023-10-13 RX ORDER — AMLODIPINE BESYLATE 5 MG/1
5 TABLET ORAL EVERY EVENING
Qty: 90 TABLET | Refills: 3 | Status: SHIPPED | OUTPATIENT
Start: 2023-10-13 | End: 2024-01-19

## 2023-10-13 RX ORDER — FEBUXOSTAT 40 MG/1
40 TABLET, FILM COATED ORAL DAILY
Qty: 90 TABLET | Refills: 3 | Status: SHIPPED | OUTPATIENT
Start: 2023-10-13 | End: 2023-10-19

## 2023-10-13 NOTE — TELEPHONE ENCOUNTER
"FEBUXOSTAT 40MG TABS        To submit the PA, please follow the instructions below:    Login to [email or web address] and click \"Enter a Key\"    2.   Enter the patient's last name, date of birth, and the Key provided below     Key: [id number]   Patient last name: INDU   : 1945    3.   Complete the PA and click \"Send to Plan\" for approval    Please notify the pharmacy when you receive a determination from the plan         "

## 2023-10-13 NOTE — PROGRESS NOTES
"Cristina Lopez is a 78 year old, presenting for the following health issues:  Hypertension, Lipids, and Arthritis (Gout )        10/13/2023     3:05 PM   Additional Questions   Roomed by petros   Accompanied by self         10/13/2023     3:05 PM   Patient Reported Additional Medications   Patient reports taking the following new medications no       Arthritis    History of Present Illness       Hyperlipidemia:  He presents for follow up of hyperlipidemia.   He is taking medication to lower cholesterol. He is not having myalgia or other side effects to statin medications.    Hypertension: He presents for follow up of hypertension.  He does check blood pressure  regularly outside of the clinic. Outside blood pressures have been over 140/90. He follows a low salt diet.     Reason for visit:  Gout    He eats 0-1 servings of fruits and vegetables daily.He consumes 0 sweetened beverage(s) daily.He exercises with enough effort to increase his heart rate 10 to 19 minutes per day.  He exercises with enough effort to increase his heart rate 7 days per week.   He is taking medications regularly.         Review of Systems   Musculoskeletal:  Positive for arthritis.      Constitutional, HEENT, cardiovascular, pulmonary, GI, , musculoskeletal, neuro, skin, endocrine and psych systems are negative, except as otherwise noted.      Objective    BP (!) 158/76 (BP Location: Left arm, Patient Position: Sitting, Cuff Size: Adult Regular)   Pulse 67   Temp 98.1  F (36.7  C) (Oral)   Resp 16   Ht 1.619 m (5' 3.75\")   Wt 61 kg (134 lb 6.4 oz)   SpO2 100%   BMI 23.25 kg/m    Body mass index is 23.25 kg/m .  Physical Exam   GENERAL: healthy, alert and no distress  NECK: no adenopathy, no asymmetry, masses, or scars and thyroid normal to palpation  RESP: lungs clear to auscultation - no rales, rhonchi or wheezes  CV: regular rate and rhythm, normal S1 S2, no S3 or S4, no murmur, click or rub, no peripheral edema and peripheral " pulses strong  ABDOMEN: soft, nontender, no hepatosplenomegaly, no masses and bowel sounds normal    A/P:    (I10) Essential hypertension with goal blood pressure less than 140/90  (primary encounter diagnosis)  Comment:   Plan: Basic metabolic panel  (Ca, Cl, CO2, Creat,         Gluc, K, Na, BUN), amLODIPine (NORVASC) 5 MG         tablet        Not controlled. Added amlodipine 5 mg daily. Recheck in 3 months.    (E78.5) Hyperlipidemia LDL goal <70  Comment:   Plan: Lipid panel reflex to direct LDL Non-fasting        Recheck lipids.    (N18.30) Stage 3 chronic kidney disease, unspecified whether stage 3a or 3b CKD (H)  Comment:   Plan: Basic metabolic panel  (Ca, Cl, CO2, Creat,         Gluc, K, Na, BUN)        Monitor.    (M1A.00X1) Idiopathic chronic gout with tophus, unspecified site  Comment:   Plan: Uric acid, febuxostat (ULORIC) 40 MG TABS         tablet        Patient stated allopurinol not helping. Will switch to Uloric and check uric acid in 3 months.    (Z23) Encounter for immunization  Comment:   Plan: COVID-19 12+ (2023-24) (PFIZER), INFLUENZA         VACCINE 65+ (FLUZONE HD), respiratory syncytial        virus vaccine, bivalent (ABRYSVO) injection 0.5        mL            Christiano Maciel MD

## 2023-10-14 LAB
ANION GAP SERPL CALCULATED.3IONS-SCNC: 9 MMOL/L (ref 7–15)
BUN SERPL-MCNC: 45.1 MG/DL (ref 8–23)
CALCIUM SERPL-MCNC: 9.1 MG/DL (ref 8.8–10.2)
CHLORIDE SERPL-SCNC: 110 MMOL/L (ref 98–107)
CHOLEST SERPL-MCNC: 232 MG/DL
CREAT SERPL-MCNC: 2.18 MG/DL (ref 0.67–1.17)
DEPRECATED HCO3 PLAS-SCNC: 21 MMOL/L (ref 22–29)
EGFRCR SERPLBLD CKD-EPI 2021: 31 ML/MIN/1.73M2
GLUCOSE SERPL-MCNC: 102 MG/DL (ref 70–99)
HDLC SERPL-MCNC: 47 MG/DL
LDLC SERPL CALC-MCNC: 157 MG/DL
NONHDLC SERPL-MCNC: 185 MG/DL
POTASSIUM SERPL-SCNC: 4.3 MMOL/L (ref 3.4–5.3)
SODIUM SERPL-SCNC: 140 MMOL/L (ref 135–145)
TRIGL SERPL-MCNC: 140 MG/DL
URATE SERPL-MCNC: 8 MG/DL (ref 3.4–7)

## 2023-10-18 NOTE — TELEPHONE ENCOUNTER
PA Initiation    Medication: FEBUXOSTAT 40 MG PO TABS  Insurance Company: [employer] - Phone [phone] Fax [phone]  Pharmacy Filling the Rx: [facility] [place], [place] - [address] [place] [address]  Filling Pharmacy Phone: [phone]  Filling Pharmacy Fax: [phone]  Start Date: 10/18/2023

## 2023-10-18 NOTE — TELEPHONE ENCOUNTER
PRIOR AUTHORIZATION DENIED    Medication: FEBUXOSTAT 40 MG PO TABS  Insurance Company: [employer] - Phone [phone] Fax [phone]  Denial Date: 10/18/2023  Denial Rational:     Appeal Information:     Patient Notified: No, care team must notify on denials.

## 2023-10-19 RX ORDER — COLCHICINE 0.6 MG/1
0.6 TABLET ORAL DAILY
Qty: 90 TABLET | Refills: 3 | Status: SHIPPED | OUTPATIENT
Start: 2023-10-19 | End: 2024-01-19

## 2023-10-20 NOTE — TELEPHONE ENCOUNTER
RN called pt via [employer]  to relay provider message. Pt verbalized understanding, pt has follow up visit schedule in January with provider already. Pt states he will go  the new medication today.       Mone Prajapati RN

## 2023-10-20 NOTE — TELEPHONE ENCOUNTER
Patient needs a [facility] . Please let patient know that insurance will no cover the new gout medication, Uloric, given at last visit. A new medication, colchicine, was sent to her pharmacy to take daily that is covered. We will recheck gout and blood pressure in about 3 months in clinic.    Christiano Maciel MD

## 2024-01-19 ENCOUNTER — OFFICE VISIT (OUTPATIENT)
Dept: FAMILY MEDICINE | Facility: CLINIC | Age: 79
End: 2024-01-19
Payer: MEDICARE

## 2024-01-19 VITALS
OXYGEN SATURATION: 100 % | DIASTOLIC BLOOD PRESSURE: 85 MMHG | SYSTOLIC BLOOD PRESSURE: 165 MMHG | TEMPERATURE: 97.4 F | WEIGHT: 136.2 LBS | HEART RATE: 66 BPM | BODY MASS INDEX: 23.25 KG/M2 | HEIGHT: 64 IN

## 2024-01-19 DIAGNOSIS — E78.5 HYPERLIPIDEMIA LDL GOAL <70: ICD-10-CM

## 2024-01-19 DIAGNOSIS — M54.50 CHRONIC RIGHT-SIDED LOW BACK PAIN WITHOUT SCIATICA: ICD-10-CM

## 2024-01-19 DIAGNOSIS — R39.12 BENIGN PROSTATIC HYPERPLASIA WITH WEAK URINARY STREAM: ICD-10-CM

## 2024-01-19 DIAGNOSIS — I10 ESSENTIAL HYPERTENSION WITH GOAL BLOOD PRESSURE LESS THAN 140/90: Primary | ICD-10-CM

## 2024-01-19 DIAGNOSIS — M1A.00X1 IDIOPATHIC CHRONIC GOUT WITH TOPHUS, UNSPECIFIED SITE: ICD-10-CM

## 2024-01-19 DIAGNOSIS — N18.30 STAGE 3 CHRONIC KIDNEY DISEASE, UNSPECIFIED WHETHER STAGE 3A OR 3B CKD (H): ICD-10-CM

## 2024-01-19 DIAGNOSIS — N40.1 BENIGN PROSTATIC HYPERPLASIA WITH WEAK URINARY STREAM: ICD-10-CM

## 2024-01-19 DIAGNOSIS — R73.09 ELEVATED GLUCOSE: ICD-10-CM

## 2024-01-19 DIAGNOSIS — G45.9 TIA (TRANSIENT ISCHEMIC ATTACK): ICD-10-CM

## 2024-01-19 DIAGNOSIS — I25.10 CORONARY ARTERY DISEASE INVOLVING NATIVE CORONARY ARTERY OF NATIVE HEART WITHOUT ANGINA PECTORIS: ICD-10-CM

## 2024-01-19 DIAGNOSIS — G89.29 CHRONIC RIGHT-SIDED LOW BACK PAIN WITHOUT SCIATICA: ICD-10-CM

## 2024-01-19 DIAGNOSIS — Z86.73 HISTORY OF CVA (CEREBROVASCULAR ACCIDENT): ICD-10-CM

## 2024-01-19 DIAGNOSIS — I25.709 CORONARY ARTERY DISEASE INVOLVING CORONARY BYPASS GRAFT OF NATIVE HEART WITH ANGINA PECTORIS (H): ICD-10-CM

## 2024-01-19 LAB — HBA1C MFR BLD: 5.9 % (ref 0–5.6)

## 2024-01-19 PROCEDURE — 99214 OFFICE O/P EST MOD 30 MIN: CPT | Performed by: FAMILY MEDICINE

## 2024-01-19 PROCEDURE — 82043 UR ALBUMIN QUANTITATIVE: CPT | Performed by: FAMILY MEDICINE

## 2024-01-19 PROCEDURE — 36415 COLL VENOUS BLD VENIPUNCTURE: CPT | Performed by: FAMILY MEDICINE

## 2024-01-19 PROCEDURE — 83036 HEMOGLOBIN GLYCOSYLATED A1C: CPT | Performed by: FAMILY MEDICINE

## 2024-01-19 PROCEDURE — 80048 BASIC METABOLIC PNL TOTAL CA: CPT | Performed by: FAMILY MEDICINE

## 2024-01-19 PROCEDURE — 82570 ASSAY OF URINE CREATININE: CPT | Performed by: FAMILY MEDICINE

## 2024-01-19 PROCEDURE — 84550 ASSAY OF BLOOD/URIC ACID: CPT | Performed by: FAMILY MEDICINE

## 2024-01-19 RX ORDER — LISINOPRIL 20 MG/1
20 TABLET ORAL 2 TIMES DAILY
Qty: 180 TABLET | Refills: 3 | Status: SHIPPED | OUTPATIENT
Start: 2024-01-19 | End: 2024-09-24

## 2024-01-19 RX ORDER — ATORVASTATIN CALCIUM 80 MG/1
80 TABLET, FILM COATED ORAL DAILY
Qty: 90 TABLET | Refills: 3 | Status: SHIPPED | OUTPATIENT
Start: 2024-01-19

## 2024-01-19 RX ORDER — AMLODIPINE BESYLATE 10 MG/1
10 TABLET ORAL EVERY EVENING
Qty: 90 TABLET | Refills: 3 | Status: SHIPPED | OUTPATIENT
Start: 2024-01-19

## 2024-01-19 RX ORDER — TAMSULOSIN HYDROCHLORIDE 0.4 MG/1
0.4 CAPSULE ORAL DAILY
Qty: 90 CAPSULE | Refills: 3 | Status: SHIPPED | OUTPATIENT
Start: 2024-01-19

## 2024-01-19 RX ORDER — COLCHICINE 0.6 MG/1
0.6 TABLET ORAL DAILY
Qty: 90 TABLET | Refills: 3 | Status: SHIPPED | OUTPATIENT
Start: 2024-01-19 | End: 2024-09-24

## 2024-01-19 RX ORDER — ISOSORBIDE MONONITRATE 120 MG/1
TABLET, EXTENDED RELEASE ORAL
Qty: 90 TABLET | Refills: 3 | Status: SHIPPED | OUTPATIENT
Start: 2024-01-19

## 2024-01-19 RX ORDER — CLOPIDOGREL BISULFATE 75 MG/1
TABLET ORAL
Qty: 90 TABLET | Refills: 3 | Status: SHIPPED | OUTPATIENT
Start: 2024-01-19

## 2024-01-19 RX ORDER — ASPIRIN 325 MG
325 TABLET, DELAYED RELEASE (ENTERIC COATED) ORAL DAILY
Qty: 100 TABLET | Refills: 3 | Status: SHIPPED | OUTPATIENT
Start: 2024-01-19

## 2024-01-19 RX ORDER — EZETIMIBE 10 MG/1
TABLET ORAL
Qty: 90 TABLET | Refills: 3 | Status: SHIPPED | OUTPATIENT
Start: 2024-01-19

## 2024-01-19 ASSESSMENT — PAIN SCALES - GENERAL: PAINLEVEL: NO PAIN (0)

## 2024-01-19 NOTE — PROGRESS NOTES
"Cristina Lopez is a 78 year old, presenting for the following health issues:  Hypertension, Diabetes, and Back Pain      1/19/2024     4:19 PM   Additional Questions   Roomed by Crystal Tejeda   Accompanied by Wife     History of Present Illness       Back Pain:  He presents for follow up of back pain. Patient's back pain is a chronic problem.  Location of back pain:  Right lower back and right middle of back  Description of back pain: dull ache  Back pain spreads: nowhere    Since patient first noticed back pain, pain is: always present, but gets better and worse  Does back pain interfere with his job:  Yes       Hyperlipidemia:  He presents for follow up of hyperlipidemia.   He is taking medication to lower cholesterol. He is not having myalgia or other side effects to statin medications.    Hypertension: He presents for follow up of hypertension.  He does check blood pressure  regularly outside of the clinic. Outpatient blood pressures have not been over 140/90. He follows a low salt diet.     He eats 2-3 servings of fruits and vegetables daily.He consumes 1 sweetened beverage(s) daily.He exercises with enough effort to increase his heart rate 30 to 60 minutes per day.  He exercises with enough effort to increase his heart rate 7 days per week.   He is taking medications regularly.         Objective    BP (!) 165/85   Pulse 66   Temp 97.4  F (36.3  C) (Tympanic)   Ht 1.619 m (5' 3.75\")   Wt 61.8 kg (136 lb 3.2 oz)   SpO2 100%   BMI 23.56 kg/m    Body mass index is 23.56 kg/m .  Physical Exam   GENERAL: alert and no distress  NECK: no adenopathy, no asymmetry, masses, or scars  RESP: lungs clear to auscultation - no rales, rhonchi or wheezes  CV: regular rate and rhythm, normal S1 S2, no S3 or S4, no murmur, click or rub, no peripheral edema  ABDOMEN: soft, nontender, no hepatosplenomegaly, no masses and bowel sounds normal  MS: mild tenderness right lumbar area      A/P:    (I10) Essential " hypertension with goal blood pressure less than 140/90  (primary encounter diagnosis)  Comment:   Plan: Albumin Random Urine Quantitative with Creat         Ratio, Basic metabolic panel  (Ca, Cl, CO2,         Creat, Gluc, K, Na, BUN), amLODIPine (NORVASC)         10 MG tablet, lisinopril (ZESTRIL) 20 MG tablet        Not controlled. Increase amlodipine dose from 5 mg daily to 10 mg daily. Recheck in 3 months.    (M1A.00X1) Idiopathic chronic gout with tophus, unspecified site  Comment:   Plan: Uric acid, colchicine (COLCRYS) 0.6 MG tablet        Recheck uric acid. Insurance not covering Uloric.    (N18.30) Stage 3 chronic kidney disease, unspecified whether stage 3a or 3b CKD (H)  Comment:   Plan: Albumin Random Urine Quantitative with Creat         Ratio, Basic metabolic panel  (Ca, Cl, CO2,         Creat, Gluc, K, Na, BUN)        Monitor.    (R73.09) Elevated glucose  Comment:   Plan: Hemoglobin A1c        Monitor.    (G45.9) TIA (transient ischemic attack)  Comment:   Plan: aspirin (ASA) 325 MG EC tablet        Continue with aspirin.    (I25.10) Coronary artery disease involving native coronary artery of native heart without angina pectoris  Comment:   Plan: atorvastatin (LIPITOR) 80 MG tablet        Continue with statin.    (Z86.73) History of CVA (cerebrovascular accident)  Comment:   Plan: clopidogrel (PLAVIX) 75 MG tablet        Continue with statin.    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      Signed  Electronically by: Christiano Maciel MD, MD

## 2024-01-20 LAB
ANION GAP SERPL CALCULATED.3IONS-SCNC: 9 MMOL/L (ref 7–15)
BUN SERPL-MCNC: 38.3 MG/DL (ref 8–23)
CALCIUM SERPL-MCNC: 9.2 MG/DL (ref 8.8–10.2)
CHLORIDE SERPL-SCNC: 109 MMOL/L (ref 98–107)
CREAT SERPL-MCNC: 1.83 MG/DL (ref 0.67–1.17)
CREAT UR-MCNC: 16.5 MG/DL
DEPRECATED HCO3 PLAS-SCNC: 22 MMOL/L (ref 22–29)
EGFRCR SERPLBLD CKD-EPI 2021: 38 ML/MIN/1.73M2
GLUCOSE SERPL-MCNC: 67 MG/DL (ref 70–99)
MICROALBUMIN UR-MCNC: <12 MG/L
MICROALBUMIN/CREAT UR: NORMAL MG/G{CREAT}
POTASSIUM SERPL-SCNC: 4.8 MMOL/L (ref 3.4–5.3)
SODIUM SERPL-SCNC: 140 MMOL/L (ref 135–145)
URATE SERPL-MCNC: 7.6 MG/DL (ref 3.4–7)

## 2024-04-27 ENCOUNTER — HEALTH MAINTENANCE LETTER (OUTPATIENT)
Age: 79
End: 2024-04-27

## 2024-08-14 ENCOUNTER — TELEPHONE (OUTPATIENT)
Dept: SCHEDULING | Facility: CLINIC | Age: 79
End: 2024-08-14
Payer: MEDICARE

## 2024-08-14 DIAGNOSIS — M1A.09X0 CHRONIC GOUT OF MULTIPLE SITES, UNSPECIFIED CAUSE: Primary | ICD-10-CM

## 2024-08-14 NOTE — TELEPHONE ENCOUNTER
Order/Referral Request    Who is requesting: Patient/Mental health provider    Orders being requested: Referral for Rheumatology    Reason service is needed/diagnosis: Ongoing gout issues    When are orders needed by: Asap    Has this been discussed with Provider: Yes    Does patient have a preference on a Group/Provider/Facility? [facility]    Does patient have an appointment scheduled?: No    Where to send orders: Fax    Could we send this information to you in [place] or would you prefer to receive a phone call?:   Patient would prefer a phone call   Okay to leave a detailed message?: Yes at Cell number on file:    Telephone Information:   Mobile [phone]   Mobile Not on file.

## 2024-08-14 NOTE — TELEPHONE ENCOUNTER
Received Referral Order for Rheumatology and faxed to [facility], [phone], right fax confirmed at 2:08 pm today, 8/14/2024. Placed in writer's copy basket.  Trish Branch MA  [facility]   Primary Care

## 2024-08-20 ENCOUNTER — TELEPHONE (OUTPATIENT)
Dept: FAMILY MEDICINE | Facility: CLINIC | Age: 79
End: 2024-08-20
Payer: MEDICARE

## 2024-08-20 NOTE — TELEPHONE ENCOUNTER
Order/Referral Request    Who is requesting: patient / HLI FROM [employer] CALLED REQUESTING TO RE-FAX REFFERAL  Orders being requested: RHEUMATOLOGY     Reason service is needed/diagnosis: N/A    When are orders needed by: ASAP    Has this been discussed with Provider: Yes    Does patient have a preference on a Group/Provider/Facility? [facility], [phone]    Does patient have an appointment scheduled?: No    Where to send orders: Fax [phone]    Could we send this information to you in [employer] or would you prefer to receive a phone call?:   Patient would prefer a phone call   Okay to leave a detailed message?: Yes at Cell number on file:    Telephone Information:   Mobile [phone]   Mobile Not on file.

## 2024-09-03 ENCOUNTER — TRANSFERRED RECORDS (OUTPATIENT)
Dept: HEALTH INFORMATION MANAGEMENT | Facility: CLINIC | Age: 79
End: 2024-09-03
Payer: MEDICARE

## 2024-09-13 ENCOUNTER — NURSE TRIAGE (OUTPATIENT)
Dept: FAMILY MEDICINE | Facility: CLINIC | Age: 79
End: 2024-09-13
Payer: MEDICARE

## 2024-09-13 NOTE — TELEPHONE ENCOUNTER
Hli, patient's [facility] worker through [employer], calling to report elevated BP at rheumatology appt yesterday at [facility]. Hli states patient reported his BP at visit yesterday was 197/97. Hli not sure if patient was symptomatic at all. Hli states patient does have a way to check BPs at home, asking if patient is OK to stay home until appt Monday with a different [facility] provider. Informed Hli we will reach out to patient re: BPs.    Called patient via [employer] . This AM, BP still high before BP meds, 189/80. Current BP while on phone with writer is 115/75 after taking BP meds today. No symptoms other than pain from gout flare. Yesterday, rheumatologist prescribed new gout medication (prednisone per patient) as he was having gout flare, also taking tylenol PRN. Patient states he just started taking prednisone today. Patient states he has no concerns re: BP at this time, states he monitors it at home and is taking meds as prescribed. Aware of s/s and BPs to watch for this weekend that would necessitate visit to UC or ED (explained difference). No further questions or concerns.    Rosemary Diaz, RN, BSN  [facility]      Reason for Disposition   Systolic BP < 130 with Diastolic < 80, and taking BP medications    Additional Information   Negative: Sounds like a life-threatening emergency to the triager   Negative: Symptom is main concern (e.g., headache, chest pain)   Negative: Low blood pressure is main concern   Negative: Systolic BP >= 160 OR Diastolic >= 100, and any cardiac (e.g., breathing difficulty, chest pain) or neurologic symptoms (e.g., new-onset blurred or double vision)   Negative: Pregnant 20 or more weeks (or postpartum < 6 weeks) with new hand or face swelling   Negative: Pregnant 20 or more weeks (or postpartum < 6 weeks) and Systolic BP >= 160 OR Diastolic >= 110   Negative: Patient sounds very sick or weak to the triager   Negative: Systolic BP  >= 200 OR Diastolic >= 120 and having NO cardiac or neurologic symptoms   Negative: Pregnant 20 or more weeks (or postpartum < 6 weeks) with Systolic BP >= 140 OR Diastolic >= 90   Negative: Systolic BP >= 180 OR Diastolic >= 110, and missed most recent dose of blood pressure medication   Negative: Systolic BP >= 180 OR Diastolic >= 110   Negative: Patient wants to be seen   Negative: Ran out of BP medications   Negative: Taking BP medications and feels is having side effects (e.g., impotence, cough, dizziness)   Negative: Systolic BP >= 160 OR Diastolic >= 100   Negative: Systolic BP >= 130 OR Diastolic >= 80, and pregnant   Negative: Systolic BP >= 130 OR Diastolic >= 80, and is taking BP medications   Negative: Systolic BP >= 130 OR Diastolic >= 80, and is not taking BP medications   Negative: Systolic BP >= 130 OR Diastolic >= 80, and history of heart problems, kidney disease, or diabetes mellitus    Protocols used: Blood Pressure - High-A-OH

## 2024-09-16 NOTE — TELEPHONE ENCOUNTER
Hli, patient's ARM worker through [employer], calling to reschedule Elayne appt that was set up with Dr. Figueroa today but had to be cancelled as the provider called out.    Assisted with setting up a new [facility] care appt with an alternate provider next week at this locations.    BP precautions given    Maynor Norman RN     [facility]

## 2024-09-24 ENCOUNTER — OFFICE VISIT (OUTPATIENT)
Dept: FAMILY MEDICINE | Facility: CLINIC | Age: 79
End: 2024-09-24
Payer: MEDICARE

## 2024-09-24 VITALS
HEART RATE: 61 BPM | TEMPERATURE: 97.9 F | WEIGHT: 131 LBS | DIASTOLIC BLOOD PRESSURE: 101 MMHG | SYSTOLIC BLOOD PRESSURE: 205 MMHG | OXYGEN SATURATION: 99 % | RESPIRATION RATE: 18 BRPM | BODY MASS INDEX: 22.36 KG/M2 | HEIGHT: 64 IN

## 2024-09-24 DIAGNOSIS — I25.709 CORONARY ARTERY DISEASE INVOLVING CORONARY BYPASS GRAFT OF NATIVE HEART WITH ANGINA PECTORIS (H): ICD-10-CM

## 2024-09-24 DIAGNOSIS — Z00.00 ENCOUNTER FOR MEDICARE ANNUAL WELLNESS EXAM: Primary | ICD-10-CM

## 2024-09-24 DIAGNOSIS — Z86.73 HISTORY OF CVA (CEREBROVASCULAR ACCIDENT): ICD-10-CM

## 2024-09-24 DIAGNOSIS — B02.29 POST HERPETIC NEURALGIA: ICD-10-CM

## 2024-09-24 DIAGNOSIS — E78.2 MIXED HYPERLIPIDEMIA: ICD-10-CM

## 2024-09-24 DIAGNOSIS — N40.1 BENIGN PROSTATIC HYPERPLASIA WITH WEAK URINARY STREAM: ICD-10-CM

## 2024-09-24 DIAGNOSIS — M10.9 GOUT, UNSPECIFIED CAUSE, UNSPECIFIED CHRONICITY, UNSPECIFIED SITE: ICD-10-CM

## 2024-09-24 DIAGNOSIS — Z12.5 SCREENING FOR PROSTATE CANCER: ICD-10-CM

## 2024-09-24 DIAGNOSIS — Z79.899 POLYPHARMACY: ICD-10-CM

## 2024-09-24 DIAGNOSIS — R39.12 BENIGN PROSTATIC HYPERPLASIA WITH WEAK URINARY STREAM: ICD-10-CM

## 2024-09-24 DIAGNOSIS — Z86.73 HISTORY OF TIA (TRANSIENT ISCHEMIC ATTACK) AND STROKE: ICD-10-CM

## 2024-09-24 DIAGNOSIS — M79.18 GLUTEAL PAIN: ICD-10-CM

## 2024-09-24 DIAGNOSIS — I10 ESSENTIAL HYPERTENSION: ICD-10-CM

## 2024-09-24 DIAGNOSIS — F33.1 MODERATE RECURRENT MAJOR DEPRESSION (H): ICD-10-CM

## 2024-09-24 DIAGNOSIS — N18.30 STAGE 3 CHRONIC KIDNEY DISEASE, UNSPECIFIED WHETHER STAGE 3A OR 3B CKD (H): ICD-10-CM

## 2024-09-24 DIAGNOSIS — Z91.81 AT RISK FOR FALLS: ICD-10-CM

## 2024-09-24 PROCEDURE — G0439 PPPS, SUBSEQ VISIT: HCPCS | Performed by: PHYSICIAN ASSISTANT

## 2024-09-24 PROCEDURE — 99214 OFFICE O/P EST MOD 30 MIN: CPT | Mod: 25 | Performed by: PHYSICIAN ASSISTANT

## 2024-09-24 RX ORDER — ALLOPURINOL 100 MG/1
50 TABLET ORAL DAILY
COMMUNITY
Start: 2024-09-19

## 2024-09-24 RX ORDER — LOSARTAN POTASSIUM 50 MG/1
50 TABLET ORAL DAILY
Qty: 90 TABLET | Refills: 3 | Status: SHIPPED | OUTPATIENT
Start: 2024-09-24 | End: 2024-09-26

## 2024-09-24 RX ORDER — PREDNISONE 5 MG/1
TABLET ORAL
COMMUNITY
Start: 2024-09-12

## 2024-09-24 ASSESSMENT — PATIENT HEALTH QUESTIONNAIRE - PHQ9
SUM OF ALL RESPONSES TO PHQ QUESTIONS 1-9: 27
SUM OF ALL RESPONSES TO PHQ QUESTIONS 1-9: 27
10. IF YOU CHECKED OFF ANY PROBLEMS, HOW DIFFICULT HAVE THESE PROBLEMS MADE IT FOR YOU TO DO YOUR WORK, TAKE CARE OF THINGS AT HOME, OR GET ALONG WITH OTHER PEOPLE: EXTREMELY DIFFICULT

## 2024-09-24 ASSESSMENT — ENCOUNTER SYMPTOMS
BACK PAIN: 1
HYPERTENSION: 1

## 2024-09-24 NOTE — PATIENT INSTRUCTIONS
"Patient Education   Gisell Amirah Xeeb Saib Xyuas Kom Tiv Thaiv Tus Kheej  Nov yog ib co gisell amirah xeeb uas peb yeej meem muab leonardo tib neeg pab lawv noj qab nyob zoo. Lisette zaum koj pab pawg saib xyuas yuav muaj ib co gisell amirah xeeb uas tshwj xeeb leonardo koj nkaus xwb. Thov nrog koj pab pawg saib xyuas sib lilo txog lisette yam uas koj toob latosha kom tiv thaiv koj tus kheej.  Prema Coj Lub Neej  Es xaws xais ntau lauren 150 feeb txhua lub arnold tiam (30 feeb txhua hnub, 5 hnub ib lub arnold tiam).  Ua yam pab cov leeg muaj zog tuaj 2 zaug txhua lub arnold tiam. Lisette yam no pab koj tswj hwm koj lub cev qhov hnyav thiab tiv thaiv ntawm kab mob.  Txhob haus luam yeeb.  Pleev tshuaj tiv thaiv tshav kub kom thiaj tsis raug mob khees xaws ntawm nqaij tawv.  Txhua 2 mus leonardo 7 xyoos yuav tau kuaj koj lub tsev leonardo qhov radon. Radon yog ib gabe tremayne uas tsis muaj xim tsis muaj ntxhiab uas mob tau koj cov ntsws. Kom thiaj kawm ntxiv, mus saib [email or web address] thiab ntaus ntawv \"Radon in Homes.\"  Ceev cov phom kom tsis muaj mos txwv leonardo hauv thiab muab xauv vimal hauv ib qho chaw nyab xeeb xws li lub txhoj, los yog muab xauv vimal thiab muab cov yawm sij zais vimal. Muab cov mos txwv xauv leonardo hauv lwm qhov chaw nrug cov phom. Kom thiaj kawm ntxiv, mus saib [email or web address] thiab ntaus ntawv \"safe gun storage.\"  Prema Noj Qab Huv  Noj 5 qho txiv hmab txiv ntoo thiab zaub txhua hnub.  Noj nplem nplej, txhuv xim av thiab cov fawm muaj nplej (los theej nplem dawb, txhuv dawb, thiab fawm dawb).  Ua tib zoo noj calcium thiab vitamin D ntau. Saib cov ntawv lo leonardo pob khoom noj thiab siv zog noj kom txog 100% RDA (qhov ntau hauv ib hnub).  Yeej meem kuaj ntsuas  Txhua 6 lub hli mus kuaj hniav thiab muab tu.  Txhua xyoo mus saib koj pab pawg saib xyuas prema noj qab nyob zoo kom sib lilo txog:  Ib yam dab tsi uas hloov ntawm koj txoj prema noj qab nyob zoo.  Cov tshuaj uas koj pab pawg tau hais kom siv.  Prema saib xyuas kom tiv thaiv, prema npaj leonardo tsev neeg, thiab yuav ua li rosanna tiv " thaiv ntawm kab mob uas ntev.  Prema txhaj tshuaj (koob tshuaj tiv thaiv)   Cov koob tshuaj HPV (txog thaum muaj 28 xyoo), yog koj yeej tsis tau txais lauren li.  Cov koob tshuaj Hepatitis B Kab Mob Siab (txog thaum muaj 61 xyoos), yog koj yeej tsis tau txais lauren li.  Koob tshuaj COVID-19: Txais koob tshuaj no thaum txog caij txais.  Koob tshuaj tiv thaiv ntawm mob khaub thuas: Txais txhua xyoo.  Koob tshuaj tiv thaiv mob daig tsaig: Txais txhua 10 xyoo.  Pneumococcal, hepatitis A, thiab RSV cov koob tshuaj: Nug koj pab pawg saib xyuas karina puas tsim nyog leonardo koj txais cov no raws li koj qhov pheej hmoo.  Koob tshuaj tiv thaiv ntawm kab mob sawv hlwv (leonardo cov muaj 52 xyoo rov juwan).  Prema kuaj ntsuas leonardo prema noj qab nyob zoo  Kuaj mob ntshav qab zib:  Pib thaum muaj 37 xyoos, Mus kuaj mob ntshav qab zib txhua 3 xyoos los yog ntau zog.  Yog koj tseem tsis tau txog 37 xyoos, nug koj pab pawg saib xyuas karina puas tsim nyog leonardo koj kuaj mob ntshav qab zib.  Kuaj cholesterol: Thaum muaj 41 xyoo, pib kuaj cholesterol txhua 5 xyoos, los yog ntau lauren ntawd yog kws karina mob qhia.  Kuaj txha qhov tuab (DEXA): Thaum txog 52 xyoo lawd, nug koj pab pawg saib xyuas karina puas tsim nyog leonardo koj kuaj txha karina puas ruaj.  Kab Mob Siab Hepatitis C: Kuaj ib zaug hauv koj lub neej.  Kuaj Txoj Hlab Ntshav Hauv Plab: Nrog koj tus kws karina mob lilo txog prema kuaj ntsuas no yog koj:  Tau haus luam yeeb ib zaug li; thiab  Yog txiv neej; thiab  Nruab hnub nyoog 67 thiab 77.  Mob Latosha Cees (kis mob dhau ntawm prema sib deev)  Ua ntej muaj 24 xyoos: Nug koj pab pawg saib xyuas karina puas tsim nyog kuaj leonardo mob latosha cees.  Julian qab muaj 26 xyoos: Mus kuaj leonardo mob latosha cees yog koj muaj prema pheej hmoo. Koj muaj prema pheej hmoo leonardo mob latosha cees (suav nrog HIV) yog:  Koj sib deev nrog ntau lauren ib tug neeg.  Koj tsis siv cov hnab looj qau thaum sib deev.  Koj los yog koj tus khub deev kuaj pom tias muaj mob latosha cees lawm.  Yog koj muaj prema pheej hmoo leonardo mob latosha cees  HIV, nug txog cov tshuaj PrEP kom tiv thaiv ntawm HIV.  Mus kuaj leonardo mob latosha cees HIV yam ntau lauren ib zaug hauv koj lub neej, txawm yog koj muaj prema pheej hmoo leonardo mob latosha cees HIV los tsis muaj los xij.  Kuaj leonardo mob khees xaws  Kuaj lub ncauj tsev me nyuam leonardo mob khees xaws: Yog koj muaj ib lub ncauj tsev me nyuam, vamshi yuav tau ib sij kuaj lub ncauj tsev me nyuam seb puas muaj mob khees xaws pib thaum muaj 23 xyoos. Neeg feem coob uas ib sij kuaj lub ncauj tsev me nyuam thiab tsis pom dab tsi txawv lawv tsum tau stephanie qab muaj 67 xyoos. Nrog koj tus kws karina mob sib lilo txog qhov no.  Kuaj lub mis leonardo mob khees xaws (mammogram): Yog koj tau muaj mis lauren li, yuav tau ib sij kuaj lub mis pib thaum muaj 40 xyoo. Prema kuaj no yog kom saib seb puas muaj mob khees xaws hauv lub mis.  Kuaj Txoj Hnyuv Leonardo Mob Khees Xaws: Yeej tseem ceeb pib kuaj txoj hnyuv leonardo mob khees xaws pib thaum muaj 47 xyoos.  Txhua 12 xyoo yuav tau kuaj txoj hnyuv (los yog ntau zog yog koj muaj prema pheej hmoo) Los sis, nug koj tus kws karina mob txog prema kuaj thooj quav FIT txhua xyoo los yog Cologuard txhua 5 xyoos.  Kom thiaj kawm ntxiv txog lisette gabe kuaj no, mus saib: [email or web address].  Yog xav tau prema pab txog qhov no, mus saib: [email or web address].  Kuaj lub tyler kua phev (prostate) leonardo mob khees xaws: Yog koj muaj ib lub tyler kua phev (prostate) thiab nruab hnub nyoog 57 mus leonardo 71, nug koj tus kws karina mob karina puas tsim nyog leonardo koj kuaj lub tyler kua phev.  Kuaj ntsws leonardo mob khees xaws: Yog koj haus luam yeeb silva sim no los yog tau haus yav tas los uas muaj hnub nyoog nruab 50 xyoos mus leonardo 80 xyoo, nug koj pab pawg saib xyuas karina puas tsim nyog leonardo koj yeej meem kuaj lub ntsws leonardo mob khees xaws.    Leonardo cov gabe phiaj prema siv ua ntaub ntawv qhia nkaus xwb. Yuav tsis pauv prema qhia los ntawm koj qhov chaw karina mob. Copyright (casi pan)   2023 [facility].   Txhua txoj viviane raug tswj tseg lawm. Tshaj xyuas los ntawm [facility]. Alexis Bittar [id number] - REV 04/24.

## 2024-09-24 NOTE — PROGRESS NOTES
Preventive Care Visit  [facility]  Laura Rincon PA-C, Physician Assistant - Medical  Sep 24, 2024      Assessment & Plan     Encounter for Medicare annual wellness exam  Labs- routine labs ordered, patient left for ER before drawn. Will attempt to get him to schedule lab only visit.  Vaccines- UTD per patient got flu and COVID yesterday.  - PRIMARY CARE FOLLOW-UP SCHEDULING  - CBC with platelets  - Comprehensive metabolic panel  - Lipid panel reflex to direct LDL Fasting  - Home Care Referral  - Primary Care - Care Coordination Referral  - Home Care Referral    Benign prostatic hyperplasia with weak urinary stream  Chronic issue, stable on Flomax.  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   Referral placed.  Labs updated.  - CBC with platelets  - Comprehensive metabolic panel  - Adult Cardiology Eval  Referral  - Home Care Referral    Essential hypertension  Chronic issue, BP incredibly elevated in office, no headache or chest pain. However, given heart history of hx of stroke, I would like him to go to ER to ensure normalization prior to leaving for home.  - CBC with platelets  - Comprehensive metabolic panel  - losartan (COZAAR) 50 MG tablet  Dispense: 90 tablet; Refill: 3  - Home Care Referral    Mixed hyperlipidemia  Chronic issue, update lipid panel and continue Lipitor daily.  - Lipid panel reflex to direct LDL Fasting  - Home Care Referral    Gout, unspecified cause, unspecified chronicity, unspecified site  Chronic issue, patient following with Rheumatology.  Will have him stop Lisinopril and start Losartan daily to help with gout.  - Uric acid  - Home Care Referral    Stage 3 chronic kidney disease, unspecified whether stage 3a or 3b CKD (H)  Chronic issue, patient has never seen  nephrologist. Recommend given level of CKD; referral placed to nephrology.  - Adult Nephrology  Referral  - Home Care Referral    History of CVA (cerebrovascular accident)  History of TIA (transient ischemic attack) and stroke  Chronic issue, patient on Plavix.   Recommend strict BP control, cholesterol medication.  Referral placed back to Neurology to establish care.  - Lipid panel reflex to direct LDL Fasting  - Adult Neurology  Referral  - Home Care Referral    Post herpetic neuralgia  Patient has history of shingles, but continues to have nerve pain.   Has failed numerous medications including Lyrica, Gabapentin, Elavil and Lidocaine patches.  Recommend he follow back up with Neurology for treatment.  - Home Care Referral    Polypharmacy  Patient is at risk for polypharmacy and medication misuse/noncompliance given his numerous medical problems and multiple medications.  Referral for home health for med set up given.  - Home Care Referral  - Home Care Referral    At risk for falls  Chronic issue, patient uses walker but endorses unstable gait.  Referral for home PT given.  - Home Care Referral    Moderate recurrent major depression (H)  Chronic issue, patient endorses significant depression secondary to life in general.  Declines referral for therapy or medication.  Continue to monitor and if worsening, follow up in clinic  - Home Care Referral    Gluteal pain  Chronic issue, patient endorses left lower buttock pain. Currently uses Tylenol, recommend Salon PAS and again PT referral placed.  Follow up if symptoms worsen or do not improve.  - Home Care Referral  - Home Care Referral    Screening for prostate cancer  PSA updated today.  - Prostate Specific Antigen Screen      Patient has been advised of split billing requirements and indicates understanding: Yes        Depression Screening Follow Up        9/24/2024    10:35 AM   PHQ   PHQ-9 Total Score 27   Q9: Thoughts of better off  dead/self-harm past 2 weeks Nearly every day   F/U: Thoughts of suicide or self-harm No   F/U: Safety concerns No         9/24/2024    10:35 AM   Last PHQ-9   1.  Little interest or pleasure in doing things 3   2.  Feeling down, depressed, or hopeless 3   3.  Trouble falling or staying asleep, or sleeping too much 3   4.  Feeling tired or having little energy 3   5.  Poor appetite or overeating 3   6.  Feeling bad about yourself 3   7.  Trouble concentrating 3   8.  Moving slowly or restless 3   Q9: Thoughts of better off dead/self-harm past 2 weeks 3   PHQ-9 Total Score 27   In the past two weeks have you had thoughts of suicide or self harm? No   Do you have concerns about your personal safety or the safety of others? No                No data to display                      Follow Up Actions Taken  Crisis resource information provided in the After Visit Summary  Patient declined referral.    Discussed the following ways the patient can remain in a safe environment:  be around others  Counseling  Appropriate preventive services were addressed with this patient via screening, questionnaire, or discussion as appropriate for fall prevention, nutrition, physical activity, Tobacco-use cessation, social engagement, weight loss and cognition.  Checklist reviewing preventive services available has been given to the patient.  Reviewed patient's diet, addressing concerns and/or questions.   Patient is at risk for social isolation and has been provided with information about the benefit of social connection.   The patient was instructed to see the dentist every 6 months.   Discussed possible causes of fatigue. Updated plan of care.  Patient reported difficulty with activities of daily living were addressed today.Patient reported safety concerns were addressed today.Information on urinary incontinence and treatment options given to patient.   The patient's PHQ-9 score is consistent with severe depression. He was provided with  information regarding depression.         Risks, benefits and alternatives were discussed with patient. Agreeable to the plan of care.      Cristina Lopez is a 79 year old, presenting for the following:  Physical, Hypertension, Shingles (Has had for 2 yrs now.  Would like to discuss.  Blisters are gone, but still has sensitive/painful skin. Got the shingles vaccine, but it did not get better. ), and Back Pain (Back pain down into his hip and down his leg.  Started two months ago. Cannot turn over in bed.  Pain increases with activity. )        9/24/2024    10:00 AM   Additional Questions   Roomed by DANDY Rojas CMA([facility])   Accompanied by Daughter       Health Care Directive  Patient does not have a Health Care Directive or Living Will: Discussed advance care planning with patient; information given to patient to review.    Hypertension     Back Pain           9/24/2024   General Health   How would you rate your overall physical health? (!) POOR   Feel stress (tense, anxious, or unable to sleep) Very much      (!) STRESS CONCERN      9/24/2024   Nutrition   Diet: Regular (no restrictions)    I don't know       Multiple values from one day are sorted in reverse-chronological order         9/24/2024   Exercise   Days per week of moderate/strenous exercise 7 days   Average minutes spent exercising at this level 40 min            9/24/2024   Social Factors   Frequency of gathering with friends or relatives Never   Worry food won't last until get money to buy more Yes   Food not last or not have enough money for food? Yes   Do you have housing? (Housing is defined as stable permanent housing and does not include staying ouside in a car, in a tent, in an abandoned building, in an overnight shelter, or couch-surfing.) No   Are you worried about losing your housing? Yes   Lack of transportation? Yes   Unable to get utilities (heat,electricity)? Yes   Want help with housing or utility concern? No      (!) FOOD SECURITY  CONCERN PRESENT (!) TRANSPORTATION CONCERN PRESENT(!) HOUSING CONCERN PRESENT(!) FINANCIAL RESOURCE STRAIN CONCERN(!) SOCIAL CONNECTIONS CONCERN      9/24/2024   Fall Risk   Fallen 2 or more times in the past year? No    No   Trouble with walking or balance? Yes    Yes       Multiple values from one day are sorted in reverse-chronological order           9/24/2024   Activities of Daily Living- Home Safety   Needs help with the following daily activites Transportation    Shopping    Preparing meals    Housework    Laundry   Safety concerns in the home Throw rugs in the hallway    No grab bars in the bathroom       Multiple values from one day are sorted in reverse-chronological order         9/24/2024   Dental   Dentist two times every year? (!) NO            9/24/2024   Hearing Screening   Hearing concerns? None of the above            9/24/2024   Driving Risk Screening   Patient/family members have concerns about driving (!) DECLINE            9/24/2024   General Alertness/Fatigue Screening   Have you been more tired than usual lately? (!) YES            9/24/2024   Urinary Incontinence Screening   Bothered by leaking urine in past 6 months Yes            9/24/2024   TB Screening   Were you born outside of the [place]? Yes          Today's PHQ-9 Score:       9/24/2024    10:35 AM   PHQ-9 SCORE   PHQ-9 Total Score MyChart 27 (Severe depression)   PHQ-9 Total Score 27         9/24/2024   Substance Use   Alcohol more than 3/day or more than 7/wk No   Do you have a current opioid prescription? No   How severe/bad is pain from 1 to 10? 9/10   Do you use any other substances recreationally? No        Social History     Tobacco Use    Smoking status: Never     Passive exposure: Never    Smokeless tobacco: Never   Vaping Use    Vaping status: Former   Substance Use Topics    Alcohol use: Not Currently    Drug use: Never       ASCVD Risk   The ASCVD Risk score (Jai BLACNO, et al., 2019) failed to calculate for the  following reasons:    The patient has a prior MI or stroke diagnosis          Reviewed and updated as needed this visit by Provider   Tobacco  Allergies  Meds  Problems  Med Hx  Surg Hx  Fam Hx            Patient Active Problem List   Diagnosis    CAD (coronary artery disease)    Hypertension goal BP (blood pressure) < 130/80    Dyslipidemia    Cerebrovascular accident (CVA), unspecified mechanism (H)    CKD (chronic kidney disease) stage 3, GFR 30-59 ml/min (H)    Kidney cyst, acquired    Stroke-like symptoms    Post herpetic neuralgia    Lower urinary tract symptoms due to benign prostatic hyperplasia     Past Surgical History:   Procedure Laterality Date    BYPASS GRAFT ARTERY CORONARY  12/22/2006    GARZA to LAD, vein graft to OM2, vein graft to PDA. This was complicated by postop afib. Done in [place], [place]    CARDIAC CATHETERIZATION  2006    in [place]    CARDIAC CATHETERIZATION  11/21/2014    by Dr. Sepulveda at [facility], no PCI was done: LMCA 90%, mid %, prox LCX 90%, prox %, SVG's to OM2 and RPDA 100%, LIMA to LAD patent with L to R collaterals    SURGICAL HISTORY OF -       CABG 2006       Social History     Tobacco Use    Smoking status: Never     Passive exposure: Never    Smokeless tobacco: Never   Substance Use Topics    Alcohol use: Not Currently     Family History   Problem Relation Age of Onset    Family History Negative Other         no known specifics    Kidney Disease Mother          Current Outpatient Medications   Medication Sig Dispense Refill    allopurinol (ZYLOPRIM) 100 MG tablet Take 50 mg by mouth daily.      amLODIPine (NORVASC) 10 MG tablet Take 1 tablet (10 mg) by mouth every evening 90 tablet 3    aspirin (ASA) 325 MG EC tablet Take 1 tablet (325 mg) by mouth daily 100 tablet 3    atorvastatin (LIPITOR) 80 MG tablet Take 1 tablet (80 mg) by mouth daily 90 tablet 3    clopidogrel (PLAVIX) 75 MG tablet TAKE 1 TABLET(75 MG) BY MOUTH DAILY 90 tablet 3    ezetimibe  (ZETIA) 10 MG tablet TAKE 1 TABLET(10 MG) BY MOUTH DAILY 90 tablet 3    isosorbide mononitrate CR (IMDUR) 120 MG 24 HR ER tablet TAKE 1 TABLET(120 MG) BY MOUTH DAILY 90 tablet 3    nitroGLYcerin (NITROSTAT) 0.4 MG sublingual tablet PLACE 1 TABLET UNDER THE TONGUE AT THE ONSET OF CHEST PAIN. MAY REPEAT EVERY 5 MINUTES AS NEEDED FOR A TOTAL OF 3 DOSES. 25 tablet 3    ORDER FOR [facility] Home Blood pressure monitor machine 1 each 0    predniSONE (DELTASONE) 5 MG tablet TAKE 3 TABLETS (15 MG) BY MOUTH DAILY FOR 7 DAYS, THEN 2 TABLETS (10 MG) DAILY FOR 7 DAYS, THEN 1 TABLET (5 MG) DAILY.      tamsulosin (FLOMAX) 0.4 MG capsule Take 1 capsule (0.4 mg) by mouth daily 90 capsule 3     Allergies   Allergen Reactions    Nkda [No Known Drug Allergy]     Seasonal Allergies      Current providers sharing in care for this patient include:  Patient Care Team:  Christiano Maciel MD as PCP - General (Family Practice)  Christiano Maciel MD as Assigned PCP    The following health maintenance items are reviewed in Epic and correct as of today:  Health Maintenance   Topic Date Due    ZOSTER IMMUNIZATION (1 of 2) Never done    HEMOGLOBIN  02/10/2024    INFLUENZA VACCINE (1) 09/01/2024    COVID-19 Vaccine (3 - 2024-25 season) 09/01/2024    LIPID  10/13/2024    BMP  01/19/2025    MICROALBUMIN  01/19/2025    ANNUAL REVIEW OF HM ORDERS  01/19/2025    MEDICARE ANNUAL WELLNESS VISIT  09/24/2025    FALL RISK ASSESSMENT  09/24/2025    GLUCOSE  01/19/2027    ADVANCE CARE PLANNING  02/10/2028    DTAP/TDAP/TD IMMUNIZATION (3 - Td or Tdap) 02/10/2033    HEPATITIS C SCREENING  Completed    PHQ-2 (once per calendar year)  Completed    Pneumococcal Vaccine: 65+ Years  Completed    URINALYSIS  Completed    RSV VACCINE  Completed    HPV IMMUNIZATION  Aged Out    MENINGITIS IMMUNIZATION  Aged Out    RSV MONOCLONAL ANTIBODY  Aged Out    COLORECTAL CANCER SCREENING  Discontinued         Review of Systems  Constitutional, HEENT, cardiovascular, pulmonary, gi and gu systems are  "negative, except as otherwise noted.    Patient lives alone, they are working on getting assessment to help with PCA services    He cannot drive, due to poor vision    He also notes that he feels he is at risk for falls, walks with a cane  Interested in PT    In regards to mood, he notes that is \"old, can't drive, his kids are busy, can't sleep, feels just feels like he thinks a lot\"  He feels like he is off balance and weak as well. He does eat two meals a day because he has to. Has decreased interested in things.      He has history of CAD with heart surgery, per chart history of stroke which he denies history of.  He is taking BP medications and BP at home, gets 135-140 as systolic BP.  Currently on Lipitor 80mg daily, Imdur, Nitroglycerin, Zetia and Plavix.     He also has significant history of gout, now seeing rheumatology who recommended he be on Amlodipine and Losartan for BP control. On Allopurionol and Prednisone.     He notes persistent nerve pain from where he had shingles on buttock. Has tried Lyrica. Elavil, Lidocaine patches and Gabapentin.      Objective    Exam  BP (!) 171/88   Pulse 58   Temp 97.9  F (36.6  C) (Oral)   Resp 18   Ht 1.619 m (5' 3.75\")   Wt 59.4 kg (131 lb)   SpO2 99%   BMI 22.66 kg/m     Estimated body mass index is 22.66 kg/m  as calculated from the following:    Height as of this encounter: 1.619 m (5' 3.75\").    Weight as of this encounter: 59.4 kg (131 lb).    Physical Exam  GENERAL: alert, elderly, frail, walks with cane  EYES: Eyes grossly normal to inspection, PERRL and conjunctivae and sclerae normal  HENT: ear canals and TM's normal, nose and mouth without ulcers or lesions  NECK: no adenopathy, no asymmetry, masses, or scars  RESP: lungs clear to auscultation - no rales, rhonchi or wheezes  CV: regular rate and rhythm, normal S1 S2, no S3 or S4, no murmur, click or rub, no peripheral edema  ABDOMEN: soft, nontender, no hepatosplenomegaly, no masses and bowel sounds " normal  MS: no gross musculoskeletal defects noted, no edema  SKIN: no suspicious lesions or rashes  NEURO: Normal strength and tone, mentation intact and speech normal  PSYCH: mentation appears normal, affect normal/bright        9/24/2024   Mini Cog   Clock Draw Score 0 Abnormal   3 Item Recall 2 objects recalled   Mini Cog Total Score 2            Vision Screen         Signed Electronically by: Laura Rincon PA-C    .undefined[^^

## 2024-09-25 ENCOUNTER — TELEPHONE (OUTPATIENT)
Dept: FAMILY MEDICINE | Facility: CLINIC | Age: 79
End: 2024-09-25
Payer: MEDICARE

## 2024-09-25 DIAGNOSIS — I25.709 CORONARY ARTERY DISEASE INVOLVING CORONARY BYPASS GRAFT OF NATIVE HEART WITH ANGINA PECTORIS (H): ICD-10-CM

## 2024-09-25 RX ORDER — NITROGLYCERIN 0.4 MG/1
TABLET SUBLINGUAL
Qty: 25 TABLET | Refills: 3 | Status: SHIPPED | OUTPATIENT
Start: 2024-09-25

## 2024-09-25 NOTE — TELEPHONE ENCOUNTER
Please call patient/daughter and see if he went to ER for BP as instructed.     If not, he should be seen for BP follow up this week.    Laura Rincon PA-C

## 2024-09-26 DIAGNOSIS — I10 ESSENTIAL HYPERTENSION: ICD-10-CM

## 2024-09-26 DIAGNOSIS — I25.709 CORONARY ARTERY DISEASE INVOLVING CORONARY BYPASS GRAFT OF NATIVE HEART WITH ANGINA PECTORIS (H): ICD-10-CM

## 2024-09-26 RX ORDER — LOSARTAN POTASSIUM 50 MG/1
50 TABLET ORAL DAILY
Qty: 90 TABLET | Refills: 3 | Status: CANCELLED | OUTPATIENT
Start: 2024-09-26

## 2024-09-26 RX ORDER — LOSARTAN POTASSIUM 50 MG/1
50 TABLET ORAL DAILY
Qty: 90 TABLET | Refills: 3 | Status: SHIPPED | OUTPATIENT
Start: 2024-09-26

## 2024-09-26 NOTE — TELEPHONE ENCOUNTER
Patient's prescription for Losartan was send to the wrong pharmacy. [facility]

## 2024-09-27 ENCOUNTER — TELEPHONE (OUTPATIENT)
Dept: FAMILY MEDICINE | Facility: CLINIC | Age: 79
End: 2024-09-27
Payer: MEDICARE

## 2024-09-27 ENCOUNTER — PATIENT OUTREACH (OUTPATIENT)
Dept: CARE COORDINATION | Facility: CLINIC | Age: 79
End: 2024-09-27
Payer: MEDICARE

## 2024-09-27 DIAGNOSIS — R29.90 STROKE-LIKE SYMPTOMS: Primary | ICD-10-CM

## 2024-09-27 NOTE — PROGRESS NOTES
Clinic Care Coordination Contact  [facility]/Voicemail    Clinical Data: Care Coordinator Outreach    Outreach Documentation Number of Outreach Attempt   9/27/2024   9:52 AM 1       Left message on patient's voicemail with call back information and requested return call.    Plan: Care Coordinator will try to reach patient again in 1-2 business days.    MASSIMO Torre  [phone]  [facility]

## 2024-09-27 NOTE — TELEPHONE ENCOUNTER
Home Care is calling regarding an established patient with [facility].       Requesting orders from: Laura Rincon  Provider is following patient: No       Orders Requested  Delay in start of care to date: 10/2/24 due to pt request      Information was gathered and will be sent to provider for review.  RN will contact Home Care with information after provider review.  Information was gathered and will be sent to provider to confirm provider will be following patient.  RN will contact Home Care with information after provider review.  Confirmed ok to leave a detailed message with call back.  Contact information confirmed and updated as needed.    Pema Salas RN

## 2024-09-27 NOTE — TELEPHONE ENCOUNTER
Called Oneil with [facility] and left a detailed message with verbal orders.    Maynor Norman RN     [facility]

## 2024-09-30 ENCOUNTER — LAB (OUTPATIENT)
Dept: LAB | Facility: CLINIC | Age: 79
End: 2024-09-30
Payer: MEDICARE

## 2024-09-30 DIAGNOSIS — Z00.00 ENCOUNTER FOR MEDICARE ANNUAL WELLNESS EXAM: ICD-10-CM

## 2024-09-30 DIAGNOSIS — Z12.5 SCREENING FOR PROSTATE CANCER: ICD-10-CM

## 2024-09-30 DIAGNOSIS — Z86.73 HISTORY OF TIA (TRANSIENT ISCHEMIC ATTACK) AND STROKE: ICD-10-CM

## 2024-09-30 DIAGNOSIS — Z86.73 HISTORY OF CVA (CEREBROVASCULAR ACCIDENT): ICD-10-CM

## 2024-09-30 DIAGNOSIS — I25.709 CORONARY ARTERY DISEASE INVOLVING CORONARY BYPASS GRAFT OF NATIVE HEART WITH ANGINA PECTORIS (H): ICD-10-CM

## 2024-09-30 DIAGNOSIS — M10.9 GOUT, UNSPECIFIED CAUSE, UNSPECIFIED CHRONICITY, UNSPECIFIED SITE: ICD-10-CM

## 2024-09-30 DIAGNOSIS — E78.2 MIXED HYPERLIPIDEMIA: ICD-10-CM

## 2024-09-30 DIAGNOSIS — I10 ESSENTIAL HYPERTENSION: ICD-10-CM

## 2024-09-30 LAB
ALBUMIN SERPL BCG-MCNC: 4 G/DL (ref 3.5–5.2)
ALP SERPL-CCNC: 68 U/L (ref 40–150)
ALT SERPL W P-5'-P-CCNC: 26 U/L (ref 0–70)
ANION GAP SERPL CALCULATED.3IONS-SCNC: 8 MMOL/L (ref 7–15)
AST SERPL W P-5'-P-CCNC: 23 U/L (ref 0–45)
BILIRUB SERPL-MCNC: 0.4 MG/DL
BUN SERPL-MCNC: 27.2 MG/DL (ref 8–23)
CALCIUM SERPL-MCNC: 9.1 MG/DL (ref 8.8–10.4)
CHLORIDE SERPL-SCNC: 113 MMOL/L (ref 98–107)
CHOLEST SERPL-MCNC: 274 MG/DL
CREAT SERPL-MCNC: 1.68 MG/DL (ref 0.67–1.17)
EGFRCR SERPLBLD CKD-EPI 2021: 42 ML/MIN/1.73M2
ERYTHROCYTE [DISTWIDTH] IN BLOOD BY AUTOMATED COUNT: 13.3 % (ref 10–15)
FASTING STATUS PATIENT QL REPORTED: YES
FASTING STATUS PATIENT QL REPORTED: YES
GLUCOSE SERPL-MCNC: 85 MG/DL (ref 70–99)
HCO3 SERPL-SCNC: 23 MMOL/L (ref 22–29)
HCT VFR BLD AUTO: 38.9 % (ref 40–53)
HDLC SERPL-MCNC: 52 MG/DL
HGB BLD-MCNC: 12.2 G/DL (ref 13.3–17.7)
LDLC SERPL CALC-MCNC: 198 MG/DL
MCH RBC QN AUTO: 31.6 PG (ref 26.5–33)
MCHC RBC AUTO-ENTMCNC: 31.4 G/DL (ref 31.5–36.5)
MCV RBC AUTO: 101 FL (ref 78–100)
NONHDLC SERPL-MCNC: 222 MG/DL
PLATELET # BLD AUTO: 171 10E3/UL (ref 150–450)
POTASSIUM SERPL-SCNC: 4.2 MMOL/L (ref 3.4–5.3)
PROT SERPL-MCNC: 6.1 G/DL (ref 6.4–8.3)
PSA SERPL DL<=0.01 NG/ML-MCNC: 4 NG/ML (ref 0–6.5)
RBC # BLD AUTO: 3.86 10E6/UL (ref 4.4–5.9)
SODIUM SERPL-SCNC: 144 MMOL/L (ref 135–145)
TRIGL SERPL-MCNC: 118 MG/DL
URATE SERPL-MCNC: 8.6 MG/DL (ref 3.4–7)
WBC # BLD AUTO: 6.3 10E3/UL (ref 4–11)

## 2024-09-30 PROCEDURE — 85027 COMPLETE CBC AUTOMATED: CPT

## 2024-09-30 PROCEDURE — G0103 PSA SCREENING: HCPCS

## 2024-09-30 PROCEDURE — 36415 COLL VENOUS BLD VENIPUNCTURE: CPT

## 2024-09-30 PROCEDURE — 84550 ASSAY OF BLOOD/URIC ACID: CPT

## 2024-09-30 PROCEDURE — 80061 LIPID PANEL: CPT

## 2024-09-30 PROCEDURE — 80053 COMPREHEN METABOLIC PANEL: CPT

## 2024-09-30 NOTE — PROGRESS NOTES
Clinic Care Coordination Contact  Community Health Worker Initial Outreach    CHW Initial Information Gathering:  Referral Source: PCP  Preferred Urgent Care: [facility], [phone]  Current living arrangement:: I live alone  Community Resources: None  Supplies Currently Used at Home: None  Equipment Currently Used at Home: none  Informal Support system:: Children  No PCP office visit in Past Year: No  Transportation means:: Family       Patient accepts CC: Yes. Patient scheduled for assessment with the SW on 11/1/24 at 10:00 am. Patient noted desire to discuss supports with in the home, food, housing, finances and transportation.     Nicho, CHW  [phone]  [facility]  [facility]

## 2024-10-02 ENCOUNTER — MEDICAL CORRESPONDENCE (OUTPATIENT)
Dept: HEALTH INFORMATION MANAGEMENT | Facility: CLINIC | Age: 79
End: 2024-10-02

## 2024-10-02 ENCOUNTER — TELEPHONE (OUTPATIENT)
Dept: FAMILY MEDICINE | Facility: CLINIC | Age: 79
End: 2024-10-02
Payer: MEDICARE

## 2024-10-02 NOTE — TELEPHONE ENCOUNTER
Home Care is calling regarding an established patient with [facility].        9/27/2024     9:54 AM   Home Care Information   Date of Home Care episode start 10/2/2024    Name/Phone Number FAREED Riggs, [phone]   Home Care agency [facility]     Requesting orders from: Laura Rincon  Provider is following patient: No       Orders Requested    Skilled Nursing  Request for initial certification (first set of orders)   Frequency:  1 x/wk for 5 wks and then one time every other week for 4 weeks.    Occupational Therapy  Request for initial evaluation and treatment (one time) (first set of orders)   Frequency:  one time      HHA (Home Health Aide)  Request for initial certification (first set of orders)   Frequency:  1x/wk for 3 wks    Information was gathered and will be sent to provider for review.  RN will contact Home Care with information after provider review.  Information was gathered and will be sent to provider to confirm provider will be following patient.  RN will contact Home Care with information after provider review.  Confirmed ok to leave a detailed message with call back.  Contact information confirmed and updated as needed.    Rohini Weiner RN

## 2024-10-03 NOTE — TELEPHONE ENCOUNTER
Called FAREED Jordan back and relayed provider message below. RN verbalized understanding.     Did clarify if RN was looking for orders from Dr. Maciel or listed PCP in chart, Laura Rincon PA-C - RN stated he was looking for orders from Dr. Maciel. Did let RN know that patient last seen by Laura Rincon PA-C and does have future visit with them, but Dr. Maciel is OK following for orders, RN aware. No further questions or concerns.      Rosemary Diaz RN, BSN  [facility]

## 2024-10-09 ENCOUNTER — TELEPHONE (OUTPATIENT)
Dept: FAMILY MEDICINE | Facility: CLINIC | Age: 79
End: 2024-10-09
Payer: MEDICARE

## 2024-10-09 DIAGNOSIS — Z53.9 DIAGNOSIS NOT YET DEFINED: Primary | ICD-10-CM

## 2024-10-09 PROCEDURE — G0180 MD CERTIFICATION HHA PATIENT: HCPCS | Performed by: FAMILY MEDICINE

## 2024-10-09 NOTE — TELEPHONE ENCOUNTER
FAREED Hennessy with [facility] calling to report patient's BP this morning was 150/82, no symptoms reported. Patient has told home care that he was taken off of amlodipine, however this is still listed on current medication list. Patient does continue to take lisinopril 20 mg BIS and losartan 50 mg every day. Please review and advise. Thanks!    Bouchra Chatman RN

## 2024-10-09 NOTE — TELEPHONE ENCOUNTER
Patient should remain on Amlodipine if not taking given current BP reading.    Laura Rincon PA-C

## 2024-10-09 NOTE — TELEPHONE ENCOUNTER
Forms/Letter Request    Type of form/letter: Home Health Certification and Plan of Care Order #[id number] Cert Period: 10/2/24-11/30/24      Do we have the form/letter: Yes: Faxed in    Who is the form from? [facility]  (if other please explain)    Where did/will the form come from? form was faxed in    When is form/letter needed by: ASAP    How would you like the form/letter returned: Fax : [phone]    Patient Notified form requests are processed in 5-7 business days:Yes    Could we send this information to you in Drop Messages or would you prefer to receive a phone call?:   Patient would prefer a phone call   Okay to leave a detailed message?: Yes at Cell number on file:    Telephone Information:   Mobile [phone]   Mobile Not on file.

## 2024-10-09 NOTE — TELEPHONE ENCOUNTER
Forms received and faxed to [facility] at [phone].  A copy placed in TC bin and Abstract.

## 2024-10-11 ENCOUNTER — TELEPHONE (OUTPATIENT)
Dept: FAMILY MEDICINE | Facility: CLINIC | Age: 79
End: 2024-10-11
Payer: MEDICARE

## 2024-10-11 NOTE — TELEPHONE ENCOUNTER
Incoming call from FAREED Hennessy [facility]. Wanting to update PCP that patient reported a systolic BP of 200 this morning. BP came down to 150 systolic 1 hour later. No symptoms of concern. Gerhard will  Jessica's amlodipine so that he can restart it.    Maynor Norman RN     [facility]

## 2024-10-17 ENCOUNTER — TELEPHONE (OUTPATIENT)
Dept: FAMILY MEDICINE | Facility: CLINIC | Age: 79
End: 2024-10-17
Payer: MEDICARE

## 2024-10-17 DIAGNOSIS — B02.29 POST HERPETIC NEURALGIA: Primary | ICD-10-CM

## 2024-10-17 NOTE — TELEPHONE ENCOUNTER
Home Care is calling regarding an established patient with [facility].        9/27/2024     9:54 AM   Home Care Information   Date of Home Care episode start 10/2/2024    Name/Phone Number Oenil RN, [phone]   Home Care agency [facility]     Requesting orders from: Laura Rincon  Provider is following patient: Yes  Is this a 60-day recertification request?  No    Orders Requested    Occupational Therapy  Request for initial certification (first set of orders)   Frequency:  1x/wk for 2 wks, then one visit every other week for four weeks.  ADLs and safety.    Information was gathered and will be sent to provider for review.  RN will contact Home Care with information after provider review.  Confirmed ok to leave a detailed message with call back.  Contact information confirmed and updated as needed.    JEFFREY NgoN, PHN, RN-[facility]  [phone]

## 2024-10-17 NOTE — TELEPHONE ENCOUNTER
FYI - Status Update    Who is Calling:  SOL DUBON (PT ARMS WORKER)    Update: PT NURSE FORGOT TO ASK FOR A REFERRAL FOR NEUROLOGY AND THE PAIN CLINIC. PT IS DEALING WITH SIGNIFICANT PAIN FROM SHINGLES, PLEASE CALL HER BACK TO DISCUSS THIS FURTHER.    Does caller want a call/response back: Yes     Could we send this information to you in ChatID or would you prefer to receive a phone call?:   Patient would prefer a phone call   Okay to leave a detailed message?: Yes at Other phone number:  [phone] ARMS WORKER PHONE NUMBER, THIS IS WHO CALLED ON THE PATIENT'S BEHALF

## 2024-10-17 NOTE — TELEPHONE ENCOUNTER
General Call      Reason for Call:  called to see if we could reach out to the patient or have someone else reach out to him because he is under the assumption they are not coming back. She told me every week he said he is told they will see if they are going to come next week or not and do not give him a concrete answer to what is going on.     Called and spoke with Oneil who is the  for [facility]ef[facility] at [facility] and he told me that he does have someone coming to home once a week. They just cannot give them a date in advance because they're schedules change very quick.   I then reached out to his daughter Rocky on his consent to communicate form and explained what was going on so that she can call and clarify this with her father because of language barrier. She understood and stated she thinks he is worried because he does not want to leave the home in fear they might come when he is gone from the home. Gave her the [facility] number to reach out to them with questions about what happens if he does miss an appt.

## 2024-10-18 NOTE — TELEPHONE ENCOUNTER
Called [facility] OT and left detailed message with verbal orders.    Maynor Norman RN     [facility]

## 2024-10-23 ENCOUNTER — TELEPHONE (OUTPATIENT)
Dept: FAMILY MEDICINE | Facility: CLINIC | Age: 79
End: 2024-10-23

## 2024-10-23 NOTE — TELEPHONE ENCOUNTER
General Call      Reason for Call:  [facility] called in and wanted to send a fax to us. I had her send this to the fax in [facility] and Laura signed it. This was faxed back to them ASAP.

## 2024-10-28 ENCOUNTER — OFFICE VISIT (OUTPATIENT)
Dept: FAMILY MEDICINE | Facility: CLINIC | Age: 79
End: 2024-10-28
Payer: MEDICARE

## 2024-10-28 VITALS
HEART RATE: 62 BPM | HEIGHT: 64 IN | OXYGEN SATURATION: 99 % | SYSTOLIC BLOOD PRESSURE: 147 MMHG | WEIGHT: 131.8 LBS | DIASTOLIC BLOOD PRESSURE: 70 MMHG | BODY MASS INDEX: 22.5 KG/M2 | RESPIRATION RATE: 16 BRPM | TEMPERATURE: 98.4 F

## 2024-10-28 DIAGNOSIS — I10 HYPERTENSION GOAL BP (BLOOD PRESSURE) < 130/80: Primary | ICD-10-CM

## 2024-10-28 PROCEDURE — 99213 OFFICE O/P EST LOW 20 MIN: CPT | Performed by: PHYSICIAN ASSISTANT

## 2024-10-28 PROCEDURE — T1013 SIGN LANG/ORAL INTERPRETER: HCPCS | Mod: U4 | Performed by: INTERPRETER

## 2024-10-28 RX ORDER — LOSARTAN POTASSIUM 100 MG/1
100 TABLET ORAL DAILY
Qty: 90 TABLET | Refills: 1 | Status: SHIPPED | OUTPATIENT
Start: 2024-10-28

## 2024-10-28 NOTE — PROGRESS NOTES
"  Assessment & Plan     Hypertension goal BP (blood pressure) < 130/80  Chronic issue, BP elevated in office, will increase Cozaar to 100mg daily in addition to his 10mg Amlodipine daily.  Follow up in 1 month.  - losartan (COZAAR) 100 MG tablet  Dispense: 90 tablet; Refill: 1      Risks, benefits and alternatives were discussed with patient. Agreeable to the plan of care.      Cristina Lopez is a 79 year old, presenting for the following health issues:  Hypertension (Follow up)        10/28/2024    11:39 AM   Additional Questions   Roomed by María ZALDIVAR CMA   Accompanied by Daughter     History of Present Illness       Hypertension: He presents for follow up of hypertension.  He does check blood pressure  regularly outside of the clinic. Outside blood pressures have been over 140/90. He does not follow a low salt diet.     He eats 0-1 servings of fruits and vegetables daily.He consumes 0 sweetened beverage(s) daily.He exercises with enough effort to increase his heart rate 30 to 60 minutes per day.  He exercises with enough effort to increase his heart rate 4 days per week.   He is taking medications regularly.           Patient is here today for follow up on his blood pressure  Did take the blood pressure medication this morning  No chest pain or shortness of breath    He also notes that he is following with  rheumatology for his gout        Review of Systems  Constitutional, HEENT, cardiovascular, pulmonary, gi and gu systems are negative, except as otherwise noted.      Objective    Resp 16   Ht 1.619 m (5' 3.75\")   Wt 59.8 kg (131 lb 12.8 oz)   SpO2 97%   BMI 22.80 kg/m    Body mass index is 22.8 kg/m .  Physical Exam   GENERAL: alert and no distress  NECK: no adenopathy, no asymmetry, masses, or scars  RESP: lungs clear to auscultation - no rales, rhonchi or wheezes  CV: regular rate and rhythm, normal S1 S2, no S3 or S4, no murmur, click or rub, no peripheral edema  MS: no gross musculoskeletal " defects noted, no edema          Signed Electronically by: Laura Rincon PA-C

## 2024-10-30 ENCOUNTER — TELEPHONE (OUTPATIENT)
Dept: FAMILY MEDICINE | Facility: CLINIC | Age: 79
End: 2024-10-30
Payer: MEDICARE

## 2024-10-30 NOTE — TELEPHONE ENCOUNTER
FYI - Status Update    Who is Calling: nurseGerhard    Update: gerhard called in to go over medications that Jessica is taking currently. He had no further questions.     Does caller want a call/response back: No

## 2024-10-31 ENCOUNTER — TELEPHONE (OUTPATIENT)
Dept: FAMILY MEDICINE | Facility: CLINIC | Age: 79
End: 2024-10-31
Payer: MEDICARE

## 2024-10-31 ENCOUNTER — MEDICAL CORRESPONDENCE (OUTPATIENT)
Dept: HEALTH INFORMATION MANAGEMENT | Facility: CLINIC | Age: 79
End: 2024-10-31
Payer: MEDICARE

## 2024-10-31 NOTE — TELEPHONE ENCOUNTER
Home Care is calling regarding an established patient with  [facility].        9/27/2024     9:54 AM   Home Care Information   Date of Home Care episode start 10/2/2024    Name/Phone Number FAREED Riggs, [phone]   Home Care agency [facility]     Requesting orders from: Laura Rincon  Provider is following patient: Yes  Is this a 60-day recertification request?  No    Orders Requested    PT-left leg pain   Request for initial evaluation and treatment (one time)       Verbal orders given.  Home Care will send orders for provider to sign.  Confirmed ok to leave a detailed message with call back.  Contact information confirmed and updated as needed.    Maynor Norman RN

## 2024-11-01 ENCOUNTER — PATIENT OUTREACH (OUTPATIENT)
Dept: NURSING | Facility: CLINIC | Age: 79
End: 2024-11-01
Payer: MEDICARE

## 2024-11-01 NOTE — PROGRESS NOTES
Clinic Care Coordination Contact  Clinic Care Coordination Contact  OUTREACH    Referral Information:  Referral Source: PCP    Primary Diagnosis: Psychosocial    Chief Complaint   Patient presents with    Clinic Care Coordination - Initial        Universal Utilization:   Clinic Utilization  Difficulty keeping appointments:: No  Compliance Concerns: No  No-Show Concerns: No  No PCP office visit in Past Year: No  Utilization      No Show Count (past year)  0             ED Visits  0             Hospital Admissions  0                    Current as of: 11/1/2024  9:30 AM                Clinical Concerns:  Current Medical Concerns:    Patient Active Problem List   Diagnosis    CAD (coronary artery disease)    Hypertension goal BP (blood pressure) < 130/80    Dyslipidemia    Cerebrovascular accident (CVA), unspecified mechanism (H)    CKD (chronic kidney disease) stage 3, GFR 30-59 ml/min (H)    Kidney cyst, acquired    Stroke-like symptoms    Post herpetic neuralgia    Lower urinary tract symptoms due to benign prostatic hyperplasia        Current Behavioral Concerns: no current concerns      Education Provided to patient:     Food Resources:     Meals of Wheels   https://[email or web address] [email or web address].com/get-meals/  Getting started with Meals on Wheels is easy. Either give us a call at [phone] or complete your order using the website above.     [facility] Emergency Food Shelf  [address] [facility] [address] [facility].org  [phone]    [facility].org  [phone]  [place]  [address], [place] [employer]  [address] [place] [employer]  [email or web address]  [phone]    [address], [place]  [email or web address]  [phone], call between 9 a.m. and 5 p.m.    [place] Food Shelf [address], [place] [email or web address]  [phone]    Today [place]  [address]  todayPacific Alliance Medical [email or web address]  [phone]         Health Maintenance Reviewed: Due/Overdue   Health Maintenance Due   Topic Date Due    DEPRESSION ACTION PLAN  Never done    ZOSTER IMMUNIZATION (1 of 2) Never done    INFLUENZA VACCINE (1) 09/01/2024    COVID-19 Vaccine (3 - 2024-25 season) 09/01/2024      Clinical Pathway: None    Medication Management:  Medication review status: Medications reviewed and no changes reported per patient.             Functional Status:  Dependent ADLs:: Independent  Dependent IADLs:: Transportation, Laundry, Cooking, Shopping, Meal Preparation, Cleaning  Bed or wheelchair confined:: No  Mobility Status: Independent  Fallen 2 or more times in the past year?: No  Any fall with injury in the past year?: No    Living Situation:  Current living arrangement:: I live alone  Type of residence:: Private home - no stairs    Lifestyle & Psychosocial Needs:    Social Drivers of Health     Food Insecurity: High Risk (9/24/2024)    Food Insecurity     Within the past 12 months, did you worry that your food would run out before you got money to buy more?: Yes     Within the past 12 months, did the food you bought just not last and you didn t have money to get more?: Yes   Depression: At risk (9/24/2024)    PHQ-2     PHQ-2 Score: 6   Housing Stability: High Risk (9/24/2024)    Housing Stability     Do you have housing? : No     Are you worried about losing your housing?: Yes   Tobacco Use: Low Risk  (10/28/2024)    Patient History     Smoking Tobacco Use: Never     Smokeless Tobacco Use: Never     Passive Exposure: Never   Financial Resource Strain: High Risk (9/24/2024)    Financial Resource Strain     Within the past 12 months, have you or your family members you live with been unable to get utilities (heat, electricity) when it was really needed?: Yes   Alcohol Use: Not on file    Transportation Needs: High Risk (9/24/2024)    Transportation Needs     Within the past 12 months, has lack of transportation kept you from medical appointments, getting your medicines, non-medical meetings or appointments, work, or from getting things that you need?: Yes   Physical Activity: Sufficiently Active (9/24/2024)    Exercise Vital Sign     Days of Exercise per Week: 7 days     Minutes of Exercise per Session: 40 min   Interpersonal Safety: Low Risk  (1/19/2024)    Interpersonal Safety     Do you feel physically and emotionally safe where you currently live?: Yes     Within the past 12 months, have you been hit, slapped, kicked or otherwise physically hurt by someone?: No     Within the past 12 months, have you been humiliated or emotionally abused in other ways by your partner or ex-partner?: No   Stress: Stress Concern Present (9/24/2024)    [place] [place] [employer]ational [employer] - Occupational Stress Questionnaire     Feeling of Stress : Very much   Social Connections: Unknown (9/24/2024)    Social Connection and Isolation Panel [NHANES]     Frequency of Communication with Friends and Family: Not on file     Frequency of Social Gatherings with Friends and Family: Never     Attends Advent Services: Not on file     Active Member of Clubs or Organizations: Not on file     Attends Club or Organization Meetings: Not on file     Marital Status: Not on file   Health Literacy: Not on file     Inadequate nutrition (GOAL):: No  Tube Feeding: No  Inadequate activity/exercise (GOAL):: No  Significant changes in sleep pattern (GOAL): No  Transportation means:: Family     Mental health DX:: No  Mental health management concern (GOAL):: No  Informal Support system:: Children             Resources and Interventions:  Current Resources:   Skilled Home Care Services: Skilled Nursing, Home Health Aid  Community Resources: None  Supplies Currently Used at Home: None  Equipment Currently Used at Home:  none  Employment Status: retired         Advance Care Plan/Directive  Advanced Care Plans/Directives on file:: No    Referrals Placed: Community Resources         Care Plan:  Care Plan: Food Insecurity       Problem: Reliable food source       Goal: Establish Options for Affordable Food Sources       Start Date: 11/1/2024    Note:     Goal Statement: I will continue to take steps to minimize food insecurity over the next two month(s).  Barriers: language   Strengths: family support  Patient expressed understanding of goal: yes    Action steps to achieve this goal:  I will review resources and supports sent to me via Mail: food pantry, meals on wheels  I will outreach to supports and consider establishing with ones I might find beneficial.  I will continue to outreach to care coordination as needed for additional resources or supports.                                Patient/Caregiver understanding: Pt reports understanding and denies any additional questions or concerns at this times. SW CC engaged in AIDET communication during encounter.     Outreach Frequency: monthly, more frequently as needed  Future Appointments                In 3 weeks Laura Rincon PA-C [facility] VHTS    In 4 months Phu Lujan MD [facility] Neurology [facility] M[facility]            Plan: [facility] completed enrollment to [facility]. CC completed handoff to [facility]. CC provided resources via phone and mail. [facility] will complete outreach in about 4 week. [facility] will complete chart review in about 6 weeks. CC reviewed care coordination role and availability with pt and pt's daughter. SWCC discussed resources and supports available. Resources discussed: home care, PCA, [facility], transportation, food. Pt noted they currently have home care and just had a request to increase this. They are waiting for PCA to start - they do not have a timeline for this at this time. Pt spoke at length about  medical concerns and pain which limits his ability to do things for himself. [facility] provided a listening ear. Pt accepted resources food support (food pantry and meals on wheels). Pt noted nothing else is needed at this time. Pt's daughter confirmed that she is currently the Pt's [facility] worker and she is providing all transportation - no resources are needed for this at this time. No other questions or concerns. Pt will review resources and reach out to the best option for him.

## 2024-11-01 NOTE — LETTER
[facility] CARE COORDINATION  [address]  [facility] [place] [address]   November 1, 2024    Jessica Monet  [address] [address]      Dear Jessica,    I am a clinic care coordinator who works with Laura Rincon PA-C with the [facility]. I wanted to thank you for spending the time to talk with me.  Below is a description of the resources we talked about together.     Food Resources:     Meals of Wheels   https://meals-on-wheels.com/get-meals/  Getting started with Meals on Wheels is easy. Either give us a call at [phone] or complete your order using the website above.     [facility] Emergency Food Shelf  [address].org  [phone]    [facility].org  [phone]  [place]  [address], [place] [employer]  [address] [place] [employer]  [address] [facility].[email or web address] John [email or web address] Lincoln Medical [email or web address]  [phone]    [facility]  [address] [place] [address]., [place]  [email or web address]  [phone], call between 9 a.m. and 5 p.m.    [place] Food Shelf  [address] [facility] [place]  [email or web address]  [phone]    Today s [place]  [address] [place]  [email or web address]  [phone]       Please feel free to contact me with any questions or concerns regarding care coordination and what we can offer.      We are focused on providing you with the highest-quality healthcare experience possible.    Sincerely,     Elvia [facility] Clinical Care Coordinator  [facility], [facility], and [facility]   [phone]     Enclosed: I have enclosed a copy of the Patient Centered Plan of Care. This has helpful information and goals that we have talked about. Please keep this in an easy to access place to  use as needed.

## 2024-11-04 ENCOUNTER — MEDICAL CORRESPONDENCE (OUTPATIENT)
Dept: HEALTH INFORMATION MANAGEMENT | Facility: CLINIC | Age: 79
End: 2024-11-04
Payer: MEDICARE

## 2024-11-06 ENCOUNTER — MEDICAL CORRESPONDENCE (OUTPATIENT)
Dept: HEALTH INFORMATION MANAGEMENT | Facility: CLINIC | Age: 79
End: 2024-11-06
Payer: MEDICARE

## 2024-11-06 ENCOUNTER — TELEPHONE (OUTPATIENT)
Dept: FAMILY MEDICINE | Facility: CLINIC | Age: 79
End: 2024-11-06
Payer: MEDICARE

## 2024-11-06 DIAGNOSIS — K59.00 CONSTIPATION, UNSPECIFIED CONSTIPATION TYPE: Primary | ICD-10-CM

## 2024-11-06 RX ORDER — POLYETHYLENE GLYCOL 3350 17 G/17G
1 POWDER, FOR SOLUTION ORAL 2 TIMES DAILY
Qty: 850 G | Refills: 11 | Status: SHIPPED | OUTPATIENT
Start: 2024-11-06

## 2024-11-06 NOTE — TELEPHONE ENCOUNTER
Please okay orders. Miralax twice daily has been sent to pharmacy. for constipation and hard stools.    Christiano Maciel MD

## 2024-11-06 NOTE — TELEPHONE ENCOUNTER
General Call      Reason for Call:  [facility] called to check and see if we had orders  #[id number]  #[id number]  #[id number]  They are re-faxing these to us to fill out and send back.

## 2024-11-06 NOTE — TELEPHONE ENCOUNTER
Home Care staff notified on confidential voicemail about approval for home care orders.      Patient notified of provider message regarding medication as written via [employer] .  Patient verbalized understanding.      Kristina Kjellberg, MSN, RN

## 2024-11-06 NOTE — TELEPHONE ENCOUNTER
Mandeep from [facility] called regarding the most recent 3 signed orders that were faxed back, states the signatures need to be in darker ink as they are not legible currently. Faxes found and given to María to have provider re-sign and fax back to [phone].    Bouchra Chatman RN

## 2024-11-06 NOTE — TELEPHONE ENCOUNTER
"Home Care is calling regarding an established patient with [facility].      Requesting orders from: Christiano Maciel  Provider is following patient: Yes, per home care TE encounter from 10/3/2024.     Orders Requested    Physical Therapy  Request for initial certification (first set of orders)   Frequency:  1x/wk for 3 wks    Provider: patient reporting has been having constipation. Patient reports \"rock hard stools.\" Patient wondering if he could get a medication to help with constipation.     Information was gathered and will be sent to provider for review.  RN will contact Home Care with information after provider review.  Confirmed ok to leave a detailed message with call back.  Contact information confirmed and updated as needed.    Liz Jimenez RN      "

## 2024-11-07 ENCOUNTER — MEDICAL CORRESPONDENCE (OUTPATIENT)
Dept: HEALTH INFORMATION MANAGEMENT | Facility: CLINIC | Age: 79
End: 2024-11-07
Payer: MEDICARE

## 2024-11-08 ENCOUNTER — MEDICAL CORRESPONDENCE (OUTPATIENT)
Dept: HEALTH INFORMATION MANAGEMENT | Facility: CLINIC | Age: 79
End: 2024-11-08
Payer: MEDICARE

## 2024-11-19 ENCOUNTER — TELEPHONE (OUTPATIENT)
Dept: FAMILY MEDICINE | Facility: CLINIC | Age: 79
End: 2024-11-19
Payer: MEDICARE

## 2024-11-19 NOTE — TELEPHONE ENCOUNTER
Request for home care order to be signed and faxed back.     Order from 10/17/24 #[id number].     Fax order to [phone]    ELENA Nino RN        Urology Operative Summary     Pre-operative diagnosis: Urinary retention   Post-operative diagnosis: Urinary retention   Procedure: Stage I Interstim placement  Interpretation of fluoroscopic imaging   Surgeon: Kb Tracy MD   Assistant(s): Gunner Reyes MD       Anesthesia: Local anesthesia with MAC sedation   Estimated blood loss: Less than 2 ml   Complications: None   Condition: Patient taken to recovery in stable condition.      Findings: Placed tined lead in the S3 foramen with biplanar flouroscopic guidance. The lead was tunneled to the contralateral side and connected to a temporary stimulator. Excellent motor response at low thresholds.      Specimens:     None     Indications: Maia Miranda is a 86 year old lady with urinary retention. She has elected to proceed with a Stage I Interstim trial with the understanding of the risks for infection, pain, bleeding, and the need for future procedures and risks of anesthesia. She received IV antibiotics prior to the procedure initiation.     Procedure: The patient was identified correctly, consented and placed in the prone position. The patient's sacral area was prepped and draped in the usual sterile fashion. Using the fluoroscope in the AP position the medial aspects of the sacral foramena were identified and marked. The fluoroscope was placed in the lateral position and the S3 foramen was identified and marked.   Marcaine was injected at the insertion site to anesthetize the skin. Once this was achieved a 3 1/2 inch needle was inserted on the right side until it was passed through the S3 foramen as seen on fluoroscopy. Next the needle was tested and the patient had a sensory and motor responses. At this point the stilette was removed from the insertion needle and passer was placed. She incision was widened to allow the dilator through which our electrode was passed until the appropriate position on fluoroscopy. The electrode was tested and found to have  good motor and sensory response at low amplitude. The dilator was then removed under fluoroscopy ensuring our electrode was not displaced.  At this point a second incision approximately 1 cm in length was created at the right buttocks. We used marcaine at the insertion site were injected to anesthetize the skin. Electrocautery was used to dissect down to the gluteal fascia. Just above this we then tunneled a temporary electrode that was then connected to the permanent electrode. The temporary electrode was externalized to the surface. Our connections were verified to be secure. We then closed Tiago's with interrupted vicryl stitches, closed the deep dermis with a running vicryl stitch and then reapproximated the skin with Dermabond at both the future generator site and electrode incisions. The temporary generator was then connected to the temporary electrode and secured to the patient with Tegaderm and the carrying belt.  The patient was awakened from anesthesia and returned to the supine position. All instrument and counts were correct at the end of the procedure.  Patient was seen, evaluated and plan was formulated in conjunction with me and I agree with the above.  I was present for the entire procedure.  Kb Tracy MD

## 2024-11-25 ENCOUNTER — OFFICE VISIT (OUTPATIENT)
Dept: FAMILY MEDICINE | Facility: CLINIC | Age: 79
End: 2024-11-25
Payer: MEDICARE

## 2024-11-25 VITALS
OXYGEN SATURATION: 99 % | HEIGHT: 64 IN | HEART RATE: 71 BPM | DIASTOLIC BLOOD PRESSURE: 68 MMHG | WEIGHT: 131.2 LBS | SYSTOLIC BLOOD PRESSURE: 122 MMHG | RESPIRATION RATE: 16 BRPM | TEMPERATURE: 97.8 F | BODY MASS INDEX: 22.4 KG/M2

## 2024-11-25 DIAGNOSIS — N18.30 STAGE 3 CHRONIC KIDNEY DISEASE, UNSPECIFIED WHETHER STAGE 3A OR 3B CKD (H): ICD-10-CM

## 2024-11-25 DIAGNOSIS — M10.9 GOUT, UNSPECIFIED CAUSE, UNSPECIFIED CHRONICITY, UNSPECIFIED SITE: ICD-10-CM

## 2024-11-25 DIAGNOSIS — I10 HYPERTENSION GOAL BP (BLOOD PRESSURE) < 130/80: Primary | ICD-10-CM

## 2024-11-25 DIAGNOSIS — R53.81 PHYSICAL DECONDITIONING: ICD-10-CM

## 2024-11-25 PROCEDURE — 99213 OFFICE O/P EST LOW 20 MIN: CPT | Performed by: PHYSICIAN ASSISTANT

## 2024-11-25 ASSESSMENT — PATIENT HEALTH QUESTIONNAIRE - PHQ9: SUM OF ALL RESPONSES TO PHQ QUESTIONS 1-9: 14

## 2024-11-25 NOTE — PROGRESS NOTES
Assessment & Plan     Hypertension goal BP (blood pressure) < 130/80  Chronic issue, BP at goal.  Continue current medication without change, BMP updated today.  - Basic metabolic panel    Stage 3 chronic kidney disease, unspecified whether stage 3a or 3b CKD (H)  Will recheck BMP given new BP medication.  - Basic metabolic panel    Gout, unspecified cause, unspecified chronicity, unspecified site  Defer to rheumatology to see about home orders for lab draws.    Physical deconditioning  Chronic issue, will refer for home PT given deconditioning trouble walking far distances.  - Home Care Referral              Risks, benefits and alternatives were discussed with patient. Agreeable to the plan of care.      Cristina Lopez is a 79 year old, presenting for the following health issues:  Follow up  (Follow up blood pressure.  Home care will be discharging on wed.  Wondering about home blood draws for rheumatology, so he does not have to leave the house. )      11/25/2024    10:53 AM   Additional Questions   Roomed by DANDY Rojas CMA([facility])   Accompanied by Daughter     History of Present Illness       Hypertension: He presents for follow up of hypertension.  He does check blood pressure  regularly outside of the clinic. Outpatient blood pressures have not been over 140/90. He follows a low salt diet.     He eats 0-1 servings of fruits and vegetables daily.He consumes 2 sweetened beverage(s) daily.He exercises with enough effort to increase his heart rate 30 to 60 minutes per day.  He exercises with enough effort to increase his heart rate 7 days per week.   He is taking medications regularly.     Patient is here today for follow up on blood pressure  At last visit we increased his amlodipine to 10mg daily  He is tolerating this well    He is also wondering about extending PT at home  He feels this has been beneficial for him for strengthening  Has trouble walking far distances    Lastly, wonders about blood draws at  "home  See rheumatology at       Review of Systems  Constitutional, HEENT, cardiovascular, pulmonary, gi and gu systems are negative, except as otherwise noted.      Objective    /68   Pulse 71   Temp 97.8  F (36.6  C) (Oral)   Resp 16   Ht 1.619 m (5' 3.75\")   Wt 59.5 kg (131 lb 3.2 oz)   SpO2 99%   BMI 22.70 kg/m    Body mass index is 22.7 kg/m .  Physical Exam   GENERAL: alert and no distress  NECK: no adenopathy, no asymmetry, masses, or scars  RESP: lungs clear to auscultation - no rales, rhonchi or wheezes  CV: regular rate and rhythm, normal S1 S2, no S3 or S4, no murmur, click or rub, no peripheral edema  MS: no gross musculoskeletal defects noted, no edema          Signed Electronically by: Laura Rincon PA-C    "

## 2024-11-26 ENCOUNTER — TELEPHONE (OUTPATIENT)
Dept: FAMILY MEDICINE | Facility: CLINIC | Age: 79
End: 2024-11-26
Payer: MEDICARE

## 2024-11-26 NOTE — TELEPHONE ENCOUNTER
Home Care is calling regarding an established patient with [facility].        9/27/2024     9:54 AM   Home Care Information   Date of Home Care episode start 10/2/2024    Name/Phone Number FAREED Riggs, [phone]   Home Care agency [facility]     Requesting orders from: Laura Rincon  Provider is following patient: Yes  Is this a 60-day recertification request?  Yes    Orders Requested    Physical Therapy  Request for recertification   Frequency:  1 visit every other week for 8 weeks total of 4 visits      Confirmed ok to leave a detailed message with call back.  Contact information confirmed and updated as needed.    Billie Pena RN

## 2024-12-01 ENCOUNTER — MEDICAL CORRESPONDENCE (OUTPATIENT)
Dept: HEALTH INFORMATION MANAGEMENT | Facility: CLINIC | Age: 79
End: 2024-12-01

## 2024-12-10 ENCOUNTER — PATIENT OUTREACH (OUTPATIENT)
Dept: CARE COORDINATION | Facility: CLINIC | Age: 79
End: 2024-12-10
Payer: MEDICARE

## 2024-12-10 NOTE — LETTER
[facility] CARE COORDINATION  [address]  [place] [address]    December 10, 2024    Jessica Healy  [address] [address]      Dear Jessica,    I am a clinic community health worker who works with Laura Rincon with the [facility]. I wanted to thank you for spending the time to talk with me.  Below is information regarding the resources we discussed:       Food Resources:     Meals of Wheels   https://meals-on-wheels.com/get-meals/  Getting started with Meals on Wheels is easy. Either give us a call at [phone] or complete your order using the website above.      [facility] Food Shelf  [address] [facility].org  [phone]     [facility].org  [phone]  [place]  [address], [place] [employer]  [address] [place] [employer]  [address] [facility] [email or web address]  [phone]     [facility]  [address] [place] [address]., [email or web address]  [phone], call between 9 a.m. and 5 p.m.     [place] Food Shelf  [address] [place]  [email or web address]  [phone]               [facility]  [address] [place]  [email or web address]  [phone]       Please feel free to contact me with any questions or concerns.           Sincerely,      Jenn Deng  Community Health Worker  [facility] Care Coordination   Aileen Starks, [place], [place], [place] and [facility]  Office: [phone]

## 2024-12-10 NOTE — PROGRESS NOTES
Clinic Care Coordination Contact  Community Health Worker Follow Up    Care Gaps:     Health Maintenance Due   Topic Date Due    DEPRESSION ACTION PLAN  Never done    ZOSTER IMMUNIZATION (1 of 2) Never done       Patient accepted scheduling phone number for [facility]  to schedule independently     Care Plan:   Care Plan: Food Insecurity       Problem: Reliable food source       Goal: Establish Options for Affordable Food Sources       Start Date: 11/1/2024    This Visit's Progress: 20%    Note:     Goal Statement: I will continue to take steps to minimize food insecurity over the next two month(s).  Barriers: language   Strengths: family support  Patient expressed understanding of goal: yes    Action steps to achieve this goal:  I will review resources and supports sent to me via Mail: food pantry, meals on wheels. I am receiving food from a Meals on Wheels on Monday, My CHW is resending me food resources through [employer].  I will outreach to supports and consider establishing with ones I might find beneficial. Patient had Waiver Assessment today 12/10/24.   I will continue to outreach to care coordination as needed for additional resources or supports. Continuous (MB)                              Intervention and Education during outreach: Patient let me know he has started receiving meals but unsure where he is receiving them from. Patient has not had a chance to look into food recourses yet. I am resending food resources to patient by mail.    CHW spoke with patients daughter Hli Thin. CHW received verbal permission from patient to speak with his daughter Hli. Patient will also complete a Consent to communicate and add his daughter Hli when he is in the clinic next.     CHW Plan: CHW will follow up with patient in about 1 month.     Jenn Deng  [facility] Health Worker  [facility]  Clinic Care Coordination   [facility] and [facility]  Office:  [phone]

## 2025-01-13 ENCOUNTER — PATIENT OUTREACH (OUTPATIENT)
Dept: CARE COORDINATION | Facility: CLINIC | Age: 80
End: 2025-01-13
Payer: MEDICARE

## 2025-01-13 NOTE — PROGRESS NOTES
Clinic Care Coordination Contact  [facility]/Voicemail    Clinical Data: Care Coordinator Outreach    Outreach Documentation Number of Outreach Attempt   1/13/2025   9:17 AM 1     Left message on patient's voicemail with call back information and requested return call.    Plan: Care Coordinator will try to reach patient again in 10 business days.    Jenn Deng  [facility] Health Worker  [facility] Care Coordination   [place], [facility], [facility], [facility]  Office: [phone]

## 2025-01-28 DIAGNOSIS — I10 ESSENTIAL HYPERTENSION WITH GOAL BLOOD PRESSURE LESS THAN 140/90: ICD-10-CM

## 2025-01-29 RX ORDER — AMLODIPINE BESYLATE 10 MG/1
TABLET ORAL
Qty: 90 TABLET | Refills: 3 | Status: SHIPPED | OUTPATIENT
Start: 2025-01-29

## 2025-01-30 ENCOUNTER — PATIENT OUTREACH (OUTPATIENT)
Dept: CARE COORDINATION | Facility: CLINIC | Age: 80
End: 2025-01-30
Payer: MEDICARE

## 2025-01-30 ENCOUNTER — MEDICAL CORRESPONDENCE (OUTPATIENT)
Dept: HEALTH INFORMATION MANAGEMENT | Facility: CLINIC | Age: 80
End: 2025-01-30

## 2025-01-30 NOTE — PROGRESS NOTES
Clinic Care Coordination Contact  [facility]/Voicemail    Clinical Data: Care Coordinator Outreach    Outreach Documentation Number of Outreach Attempt   1/30/2025  10:50 AM 1     Left message on patients daughter Deneen's voicemail with call back information and requested return call.    Plan: Care Coordinator will try to reach patient again in 10 business days.    [facility] Health Worker  [facility] Care Coordination   [facility], [place], [place], [facility]  Office: [phone]

## 2025-01-30 NOTE — PROGRESS NOTES
Clinic Care Coordination Contact    Situation-Received a request from CHW for patient to transition to [facility] Maintenance.    Background- Patient enrolled into [facility] 9/30/24.  Patient seeking assistance with community resources.  Several referrals pended that patient has not scheduled.    Action-Chart review completed.  New Goal added to care plan to support patient.    Response:  Patient to remain Enrolled and Not transitioned to Maintenance.  CHW to move outreach to every 30 days to continue to support patient accordingly.

## 2025-01-30 NOTE — PROGRESS NOTES
Clinic Care Coordination Contact  Community Health Worker Follow Up    Care Gaps:     Health Maintenance Due   Topic Date Due    DEPRESSION ACTION PLAN  Never done    ZOSTER IMMUNIZATION (1 of 2) Never done    MICROALBUMIN  01/19/2025    DEPRESSION 6 MO INDEX REPEAT PHQ-9  01/23/2025       Patient accepted scheduling phone number for [facility]  to schedule independently     Care Plan:   Care Plan: Food Insecurity       Problem: Reliable food source       Goal: I have accomplished establishing Options for Affordable Food Sources  Completed 1/30/2025      Start Date: 11/1/2024    This Visit's Progress: 100% Recent Progress: 20%    Note:     Personal Plan  I will continue with getting meals from meals on Wheels and SNAP beneifts. If I am needing to look into food shelves I can look into locations below:    [facility] Food Shelf  [address] [facility].org  [phone]     [facility].org  [phone]  [place]  [address], [place] [employer]  [address] [place] [employer]  [address] [facility].[email or web address]  [phone]     [facility]  [address] [facility], [place]  [email or web address]  [phone], call between 9 a.m. and 5 p.m.     [place] Food Shelf  [address] [place]  [email or web address]  [phone]     [facility] [place]  [address] [place]  [email or web address]  [phone]                                  Intervention and Education during outreach: Patient is receiving meals from Meals on Wheels and has SNAP. Patient also has food shelf resources to use when needed. Patient had Waiver assessment complete 12/10/24 and is waiting to get a call back from [facility] . Patients daughter does not feel like thy need assistance with that at this  time. No other needs at this time.     CHW Plan: Patient has accomplished goals and has no other goals that this patient would like to work on with Clinic Care Coordination. Community Health Worker sent request to [facility] RN pool to review for Maintenance.    Jenn Deng  Critical access hospital Health Worker  [facility]  Clinic Care Coordination   [place]en Starks, [place], [place], [facility]  Office: [phone]

## 2025-02-11 ENCOUNTER — PATIENT OUTREACH (OUTPATIENT)
Dept: CARE COORDINATION | Facility: CLINIC | Age: 80
End: 2025-02-11
Payer: MEDICARE

## 2025-02-11 DIAGNOSIS — Z53.9 DIAGNOSIS NOT YET DEFINED: Primary | ICD-10-CM

## 2025-02-11 PROCEDURE — G0179 MD RECERTIFICATION HHA PT: HCPCS | Performed by: PHYSICIAN ASSISTANT

## 2025-02-11 NOTE — PROGRESS NOTES
Clinic Care Coordination Contact  Community Health Worker Follow Up    Care Gaps:     Health Maintenance Due   Topic Date Due    DEPRESSION ACTION PLAN  Never done    ZOSTER IMMUNIZATION (1 of 2) Never done    MICROALBUMIN  01/19/2025    DEPRESSION 6 MO INDEX REPEAT PHQ-9  01/23/2025       Patient accepted scheduling phone number for [facility]  to schedule independently     Care Plan:   Care Plan: Health Maintenance       Problem: Health Maintenance Due or Overdue       Goal: Become up-to-date with health maintenance visit(s)       Start Date: 1/30/2025    This Visit's Progress: 20%    Note:     Barriers: numerous appointments  Strengths: ARHMS worker  Patient expressed understanding of goal: per chart review  Action steps to achieve this goal:  Referrals pended for Cardiology-patient needs to schedule- I Hli let Elayne RASMUSSENW know that Jessica plans on scheduling this appointment after he heals from his eye surgery that is scheduled on 2/28/25.  Nephrology-patient needs to schedule- I Hli let Elayne RASMUSSENW know that Jessica plans on scheduling this appointment after he heals from his eye surgery that is scheduled on 2/28/25.  Neurology-scheduled 3/6/25 @ 8:35  Pain Clinic-patient needs to schedule- I Hli let Elayne RASMUSSENW know that Jessica plans on scheduling this appointment after he heals from his eye surgery that is scheduled on 2/28/25.  5. Eye surgery- 2/28/25  6. Pre op for Eye surgery with PCP-scheduled 2/12/25 @ 12:40                              Intervention and Education during outreach: Patient has eye surgery coming up on 2/28/25. Patient has pre op appointment scheduled with PCP on 2/12/25. Patient has Neurology appointment scheduled 3/6/25 and would like to wait on scheduling other specialty appointments until he is healed from eye surgery.     No other needs at this time.     CHW Plan: CHW will follow up with patients daughter Hli in about 1 month.     Jenn Deng  Community Health Worker  [facility] Care Coordination   [place]ileen Starks, [place], [place], [place] and [facility]  Office: [phone]

## 2025-02-12 ENCOUNTER — OFFICE VISIT (OUTPATIENT)
Dept: FAMILY MEDICINE | Facility: CLINIC | Age: 80
End: 2025-02-12
Payer: MEDICARE

## 2025-02-12 VITALS
SYSTOLIC BLOOD PRESSURE: 93 MMHG | WEIGHT: 130.6 LBS | HEIGHT: 64 IN | RESPIRATION RATE: 16 BRPM | HEART RATE: 77 BPM | BODY MASS INDEX: 22.3 KG/M2 | TEMPERATURE: 98.2 F | OXYGEN SATURATION: 97 % | DIASTOLIC BLOOD PRESSURE: 55 MMHG

## 2025-02-12 DIAGNOSIS — E78.5 HYPERLIPIDEMIA LDL GOAL <70: ICD-10-CM

## 2025-02-12 DIAGNOSIS — H11.002 PTERYGIUM OF LEFT EYE: ICD-10-CM

## 2025-02-12 DIAGNOSIS — I25.709 CORONARY ARTERY DISEASE INVOLVING CORONARY BYPASS GRAFT OF NATIVE HEART WITH ANGINA PECTORIS: ICD-10-CM

## 2025-02-12 DIAGNOSIS — N18.32 STAGE 3B CHRONIC KIDNEY DISEASE (H): ICD-10-CM

## 2025-02-12 DIAGNOSIS — G45.9 TIA (TRANSIENT ISCHEMIC ATTACK): ICD-10-CM

## 2025-02-12 DIAGNOSIS — R39.12 BENIGN PROSTATIC HYPERPLASIA WITH WEAK URINARY STREAM: ICD-10-CM

## 2025-02-12 DIAGNOSIS — N40.1 BENIGN PROSTATIC HYPERPLASIA WITH WEAK URINARY STREAM: ICD-10-CM

## 2025-02-12 DIAGNOSIS — F33.1 MODERATE RECURRENT MAJOR DEPRESSION (H): ICD-10-CM

## 2025-02-12 DIAGNOSIS — Z01.818 PREOP GENERAL PHYSICAL EXAM: Primary | ICD-10-CM

## 2025-02-12 DIAGNOSIS — I10 HYPERTENSION GOAL BP (BLOOD PRESSURE) < 130/80: ICD-10-CM

## 2025-02-12 PROCEDURE — G2211 COMPLEX E/M VISIT ADD ON: HCPCS | Performed by: PHYSICIAN ASSISTANT

## 2025-02-12 PROCEDURE — 99214 OFFICE O/P EST MOD 30 MIN: CPT | Performed by: PHYSICIAN ASSISTANT

## 2025-02-12 PROCEDURE — 80048 BASIC METABOLIC PNL TOTAL CA: CPT | Performed by: PHYSICIAN ASSISTANT

## 2025-02-12 PROCEDURE — 82570 ASSAY OF URINE CREATININE: CPT | Performed by: PHYSICIAN ASSISTANT

## 2025-02-12 PROCEDURE — 36415 COLL VENOUS BLD VENIPUNCTURE: CPT | Performed by: PHYSICIAN ASSISTANT

## 2025-02-12 PROCEDURE — 82043 UR ALBUMIN QUANTITATIVE: CPT | Performed by: PHYSICIAN ASSISTANT

## 2025-02-12 RX ORDER — TAMSULOSIN HYDROCHLORIDE 0.4 MG/1
0.4 CAPSULE ORAL DAILY
Qty: 90 CAPSULE | Refills: 3 | Status: SHIPPED | OUTPATIENT
Start: 2025-02-12

## 2025-02-12 RX ORDER — KETOROLAC TROMETHAMINE 5 MG/ML
SOLUTION OPHTHALMIC
COMMUNITY
Start: 2024-12-17

## 2025-02-12 RX ORDER — CLOPIDOGREL BISULFATE 75 MG/1
TABLET ORAL
Qty: 90 TABLET | Refills: 3 | Status: SHIPPED | OUTPATIENT
Start: 2025-02-12

## 2025-02-12 RX ORDER — ISOSORBIDE MONONITRATE 120 MG/1
TABLET, EXTENDED RELEASE ORAL
Qty: 90 TABLET | Refills: 3 | Status: SHIPPED | OUTPATIENT
Start: 2025-02-12

## 2025-02-12 RX ORDER — ASPIRIN 325 MG
325 TABLET, DELAYED RELEASE (ENTERIC COATED) ORAL DAILY
Qty: 100 TABLET | Refills: 3 | Status: SHIPPED | OUTPATIENT
Start: 2025-02-12

## 2025-02-12 RX ORDER — EZETIMIBE 10 MG/1
TABLET ORAL
Qty: 90 TABLET | Refills: 3 | Status: SHIPPED | OUTPATIENT
Start: 2025-02-12

## 2025-02-12 RX ORDER — ATORVASTATIN CALCIUM 80 MG/1
80 TABLET, FILM COATED ORAL DAILY
Qty: 90 TABLET | Refills: 3 | Status: SHIPPED | OUTPATIENT
Start: 2025-02-12

## 2025-02-12 RX ORDER — GATIFLOXACIN 5 MG/ML
SOLUTION/ DROPS OPHTHALMIC
COMMUNITY
Start: 2024-12-17

## 2025-02-12 RX ORDER — LOSARTAN POTASSIUM 100 MG/1
100 TABLET ORAL DAILY
Qty: 90 TABLET | Refills: 3 | Status: SHIPPED | OUTPATIENT
Start: 2025-02-12

## 2025-02-12 ASSESSMENT — PATIENT HEALTH QUESTIONNAIRE - PHQ9: SUM OF ALL RESPONSES TO PHQ QUESTIONS 1-9: 1

## 2025-02-12 NOTE — PATIENT INSTRUCTIONS
How to Take Your Medication Before Surgery  Preoperative Medication Instructions   Antiplatelet or Anticoagulation Medication Instructions   - aspirin: Discontinue aspirin 7 days prior to procedure to reduce bleeding risk. It should be resumed postoperatively.    - clopidrogel (Plavix), prasugrel (Effient), ticagrelor (Brilinta): Patient should hold 5 days prior to surgery, surgeon to tell you when to resume post op.    Additional Medication Instructions  Take all scheduled medications on the day of surgery        Patient Education   Preparing for Your Surgery  For Adults  Getting started  In most cases, a nurse will call to review your health history and instructions. They will give you an arrival time based on your scheduled surgery time. Please be ready to share:  Your doctor's clinic name and phone number  Your medical, surgical, and anesthesia history  A list of allergies and sensitivities  A list of medicines, including herbal treatments and over-the-counter drugs  Whether the patient has a legal guardian (ask how to send us the papers in advance)  Note: You may not receive a call if you were seen at our PAC (Preoperative Assessment Center).  Please tell us if you're pregnant--or if there's any chance you might be pregnant. Some surgeries may injure a fetus (unborn baby), so they require a pregnancy test. Surgeries that are safe for a fetus don't always need a test, and you can choose whether to have one.   Preparing for surgery  Within 10 to 30 days of surgery: Have a pre-op exam (sometimes called an H&P, or History and Physical). This can be done at a clinic or pre-operative center.  If you're having a , you may not need this exam. Talk to your care team.  At your pre-op exam, talk to your care team about all medicines you take. (This includes CBD oil and any drugs, such as THC, marijuana, and other forms of cannabis.) If you need to stop any medicine before surgery, ask when to start taking it  again.  This is for your safety. Many medicines and drugs can make you bleed too much during surgery. Some change how well surgery (anesthesia) drugs work.  Call your insurance company to let them know you're having surgery. (If you don't have insurance, call [phone].)  Call your clinic if there's any change in your health. This includes a scrape or scratch near the surgery site, or any signs of a cold (sore throat, runny nose, cough, rash, fever).  Eating and drinking guidelines  For your safety: Unless your surgeon tells you otherwise, follow the guidelines below.  Eat and drink as normal until 8 hours before you arrive for surgery. After that, no food or milk. You can spit out gum when you arrive.  Drink clear liquids until 2 hours before you arrive. These are liquids you can see through, like water, Gatorade, and Propel Water. They also include plain black coffee and tea (no cream or milk).  No alcohol for 24 hours before you arrive. The night before surgery, stop any drinks that contain THC.  If your care team tells you to take medicine on the morning of surgery, it's okay to take it with a sip of water. No other medicines or drugs are allowed (including CBD oil)--follow your care team's instructions.  If you have questions the day of surgery, call your hospital or surgery center.   Preventing infection  Shower or bathe the night before and the morning of surgery. Follow the instructions your clinic gave you. (If no instructions, use regular soap.)  Don't shave or clip hair near your surgery site. We'll remove the hair if needed.  Don't smoke or vape the morning of surgery. No chewing tobacco for 6 hours before you arrive. A nicotine patch is okay. You may spit out nicotine gum when you arrive.  For some surgeries, the surgeon will tell you to fully quit smoking and nicotine.  We will make every effort to keep you safe from infection. We will:  Clean our hands often with soap and water (or an alcohol-based  hand rub).  Clean the skin at your surgery site with a special soap that kills germs.  Give you a special gown to keep you warm. (Cold raises the risk of infection.)  Wear hair covers, masks, gowns, and gloves during surgery.  Give antibiotic medicine, if prescribed. Not all surgeries need this medicine.  What to bring on the day of surgery  Photo ID and insurance card  Copy of your health care directive, if you have one  Glasses and hearing aids (bring cases)  You can't wear contacts during surgery  Inhaler and eye drops, if you use them (tell us about these when you arrive)  CPAP machine or breathing device, if you use them  A few personal items, if spending the night  If you have . . .  A pacemaker, ICD (cardiac defibrillator), or other implant: Bring the ID card.  An implanted stimulator: Bring the remote control.  A legal guardian: Bring a copy of the certified (court-stamped) guardianship papers.  Please remove any jewelry, including body piercings. Leave jewelry and other valuables at home.  If you're going home the day of surgery  You must have a responsible adult drive you home. They should stay with you overnight as well.  If you don't have someone to stay with you, and you aren't safe to go home alone, we may keep you overnight. Insurance often won't pay for this.  After surgery  If it's hard to control your pain or you need more pain medicine, please call your surgeon's office.  Questions?   If you have any questions for your care team, list them here:   ____________________________________________________________________________________________________________________________________________________________________________________________________________________________________________________________  For informational purposes only. Not to replace the advice of your health care provider. Copyright   2003, 2019 [facility]. All rights reserved. Clinically reviewed by Omega Bailey MD.  National Institutes of Health (NIH) 316878 - REV 08/24.

## 2025-02-12 NOTE — PROGRESS NOTES
Preoperative Evaluation  [facility]  [address] [facility] [address]  Phone: [phone]  Fax: [phone]  Primary Provider: Laura Rincon PA-C  Pre-op Performing Provider: Laura Rincon PA-C  Feb 12, 2025 2/12/2025   Surgical Information   What procedure is being done? pterygium excision   Facility or Hospital where procedure/surgery will be performed: [facility]   Who is doing the procedure / surgery? Dr Prudencio Castillo   Date of surgery / procedure: [id number]   Time of surgery / procedure: dont know yet   Where do you plan to recover after surgery? at home with family     Fax number for surgical facility: [phone]    Assessment & Plan     The proposed surgical procedure is considered LOW risk.    Preop general physical exam  Pterygium of left eye  Patient is having excision of pterygium of eye.  NPO and Medication recommendations reviewed.  Labs updated.    Hypertension goal BP (blood pressure) < 130/80  Chronic issue, BP stable, continue Cozaar 100mg daily.  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   Refer back to cardiology.  - isosorbide mononitrate CR (IMDUR) 120 MG 24 HR ER tablet  Dispense: 90 tablet; Refill: 3  - Adult Cardiology Eval [facility] Referral  - ezetimibe (ZETIA) 10 MG tablet  Dispense: 90 tablet; Refill: 3  - atorvastatin (LIPITOR) 80 MG tablet  Dispense: 90 tablet; Refill: 3    TIA (transient ischemic attack)  Chronic issue, stable on Plavix and ASA.  - clopidogrel (PLAVIX) 75 MG tablet  Dispense: 90 tablet; Refill: 3  - aspirin (ASA) 325 MG EC tablet  Dispense: 100 tablet; Refill: 3    Benign prostatic hyperplasia with weak urinary  stream  Chronic issue, stable on Flomax.  - tamsulosin (FLOMAX) 0.4 MG capsule  Dispense: 90 capsule; Refill: 3    Stage 3b chronic kidney disease (H)  Chronic issue, BMP and UA updated today.  - Albumin Random Urine Quantitative with Creat Ratio  - Basic metabolic panel  (Ca, Cl, CO2, Creat, Gluc, K, Na, BUN)    Moderate recurrent major depression (H)  Chronic issue stable.              - No identified additional risk factors other than previously addressed    Preoperative Medication Instructions  Antiplatelet or Anticoagulation Medication Instructions   - aspirin: Discontinue aspirin 7 days prior to procedure to reduce bleeding risk. It should be resumed postoperatively.    - clopidrogel (Plavix), prasugrel (Effient), ticagrelor (Brilinta): Patient should hold 5 days prior to surgery, surgeon to tell you when to resume post op.    Additional Medication Instructions  Take all scheduled medications on the day of surgery     Recommendation  Approval given to proceed with proposed procedure, without further diagnostic evaluation.    Risks, benefits and alternatives were discussed with patient. Agreeable to the plan of care.    The longitudinal plan of care for the diagnosis(es)/condition(s) as documented were addressed during this visit. Due to the added complexity in care, I will continue to support Jessica in the subsequent management and with ongoing continuity of care.    Cristina Lopez is a 79 year old, presenting for the following:  Pre-Op Exam (Eye surgery on 02/28)          2/12/2025    12:33 PM   Additional Questions   Roomed by María ZALDIVAR CMA   Accompanied by Daughter         2/12/2025   Forms   Any forms needing to be completed Yes     HPI related to upcoming procedure: patient has pterygium to be removed from left eye        2/12/2025   Pre-Op Questionnaire   Have you ever had a heart attack or stroke? (!) YES - TIA and on Plavix   Have you ever had surgery on your heart or blood vessels, such as a  stent placement, a coronary artery bypass, or surgery on an artery in your head, neck, heart, or legs? (!) YES - CABG 2006   Do you have chest pain with activity? No   Do you have a history of heart failure? No   Do you currently have a cold, bronchitis or symptoms of other infection? No   Do you have a cough, shortness of breath, or wheezing? No   Do you or anyone in your family have previous history of blood clots? (!) UNKNOWN    Do you or does anyone in your family have a serious bleeding problem such as prolonged bleeding following surgeries or cuts? (!) UNKNOWN    Have you ever had problems with anemia or been told to take iron pills? (!) UNKNOWN    Have you had any abnormal blood loss such as black, tarry or bloody stools? No   Have you ever had a blood transfusion? (!) YES   Have you ever had a transfusion reaction? No   Are you willing to have a blood transfusion if it is medically needed before, during, or after your surgery? Yes   Have you or any of your relatives ever had problems with anesthesia? No   Do you have sleep apnea, excessive snoring or daytime drowsiness? No   Do you have any artifical heart valves or other implanted medical devices like a pacemaker, defibrillator, or continuous glucose monitor? No   Do you have artificial joints? No   Are you allergic to latex? No     Health Care Directive  Patient does not have a Health Care Directive: Discussed advance care planning with patient; however, patient declined at this time.    Preoperative Review of    reviewed - no record of controlled substances prescribed.      Status of Chronic Conditions:  See problem list for active medical problems.  Problems all longstanding and stable, except as noted/documented.  See ROS for pertinent symptoms related to these conditions.    Patient Active Problem List    Diagnosis Date Noted    Post herpetic neuralgia 03/29/2022     Priority: Medium    Stroke-like symptoms 03/15/2022     Priority: Medium     Lower urinary tract symptoms due to benign prostatic hyperplasia 12/17/2020     Priority: Medium    Kidney cyst, acquired 12/16/2016     Priority: Medium    CKD (chronic kidney disease) stage 3, GFR 30-59 ml/min (H) 10/26/2016     Priority: Medium    Cerebrovascular accident (CVA), unspecified mechanism (H) 10/25/2016     Priority: Medium    Dyslipidemia 04/27/2012     Priority: Medium    CAD (coronary artery disease) 12/30/2011     Priority: Medium    Hypertension goal BP (blood pressure) < 130/80 12/30/2011     Priority: Medium      Past Medical History:   Diagnosis Date    CAD (coronary artery disease) 12/30/2011    Cerebrovascular accident (CVA), unspecified mechanism (H) 10/25/2016    CKD (chronic kidney disease) stage 3, GFR 30-59 ml/min (H) 10/26/2016    Coronary artery disease due to lipid rich plaque 12/30/2011    he transferred his care from the P team in [facility] to the [facility] team in [facility] 2021    CVA (cerebral vascular accident) (H) 10/16    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 He also said he does not like to take too many medications. Our RN spoke with him via  and hopefully has convinced him to take it daily; we will recheck his lipid profile in 3 months     Past Surgical History:   Procedure Laterality Date    BYPASS GRAFT ARTERY CORONARY  12/22/2006    GARZA to LAD, vein graft to  OM2, vein graft to PDA. This was complicated by postop afib. Done in [place], [place]    CARDIAC CATHETERIZATION  2006    in [place]    CARDIAC CATHETERIZATION  11/21/2014    by Dr. Sepulveda at [facility], no PCI was done: LMCA 90%, mid %, prox LCX 90%, prox %, SVG's to OM2 and RPDA 100%, LIMA to LAD patent with L to R collaterals    SURGICAL HISTORY OF -       CABG 2006     Current Outpatient Medications   Medication Sig Dispense Refill    allopurinol (ZYLOPRIM) 100 MG tablet Take 200 mg by mouth daily.      amLODIPine (NORVASC) 10 MG tablet TAKE 1 TABLET DAILY AT BEDTIME FOR HIGH BLOOD PRESSURE // 1 HNUB NOJ 1 LUB THAUM MUS PW PAB SHAHRAM NTSHAV SIAB 90 tablet 3    aspirin (ASA) 325 MG EC tablet Take 1 tablet (325 mg) by mouth daily 100 tablet 3    atorvastatin (LIPITOR) 80 MG tablet Take 1 tablet (80 mg) by mouth daily 90 tablet 3    clopidogrel (PLAVIX) 75 MG tablet TAKE 1 TABLET(75 MG) BY MOUTH DAILY 90 tablet 3    ezetimibe (ZETIA) 10 MG tablet TAKE 1 TABLET(10 MG) BY MOUTH DAILY 90 tablet 3    isosorbide mononitrate CR (IMDUR) 120 MG 24 HR ER tablet TAKE 1 TABLET(120 MG) BY MOUTH DAILY 90 tablet 3    losartan (COZAAR) 100 MG tablet Take 1 tablet (100 mg) by mouth daily. 90 tablet 1    nitroGLYcerin (NITROSTAT) 0.4 MG sublingual tablet PLACE 1 TABLET UNDER THE TONGUE AT THE ONSET OF CHEST PAIN. MAY REPEAT EVERY 5 MINUTES AS NEEDED FOR A TOTAL OF 3 DOSES. 25 tablet 3    ORDER FOR [facility] Blood pressure monitor machine 1 each 0    predniSONE (DELTASONE) 5 MG tablet TAKE 3 TABLETS (15 MG) BY MOUTH DAILY FOR 7 DAYS, THEN 2 TABLETS (10 MG) DAILY FOR 7 DAYS, THEN 1 TABLET (5 MG) DAILY.      tamsulosin (FLOMAX) 0.4 MG capsule Take 1 capsule (0.4 mg) by mouth daily 90 capsule 3       Allergies   Allergen Reactions    Nkda [No Known Drug Allergy]     Seasonal Allergies         Social History     Tobacco Use    Smoking status: Never     Passive exposure: Never    Smokeless tobacco: Never   Substance Use Topics  "   Alcohol use: Not Currently     History   Drug Use Unknown             Review of Systems  Constitutional, HEENT, cardiovascular, pulmonary, gi and gu systems are negative, except as otherwise noted.    Objective    BP 93/55 (BP Location: Left arm, Patient Position: Sitting, Cuff Size: Adult Regular)   Pulse 77   Temp 98.2  F (36.8  C) (Oral)   Resp 16   Ht 1.619 m (5' 3.75\")   Wt 59.2 kg (130 lb 9.6 oz)   SpO2 97%   BMI 22.59 kg/m     Estimated body mass index is 22.59 kg/m  as calculated from the following:    Height as of this encounter: 1.619 m (5' 3.75\").    Weight as of this encounter: 59.2 kg (130 lb 9.6 oz).  Physical Exam  GENERAL: alert and no distress  NECK: no adenopathy, no asymmetry, masses, or scars  RESP: lungs clear to auscultation - no rales, rhonchi or wheezes  CV: regular rate and rhythm, normal S1 S2, no S3 or S4, no murmur, click or rub, no peripheral edema  ABDOMEN: soft, nontender, no hepatosplenomegaly, no masses and bowel sounds normal  MS: no gross musculoskeletal defects noted, no edema    Recent Labs   Lab Test 09/30/24  0937   HGB 12.2*         POTASSIUM 4.2   CR 1.68*        Diagnostics  No labs were ordered during this visit.   No EKG required for low risk surgery (cataract, skin procedure, breast biopsy, etc).    Revised Cardiac Risk Index (RCRI)  The patient has the following serious cardiovascular risks for perioperative complications:   - Coronary Artery Disease (MI, positive stress test, angina, Qs on EKG) = 1 point     RCRI Interpretation: 1 point: Class II (low risk - 0.9% complication rate)         Signed Electronically by: Laura Rincon PA-C  A copy of this evaluation report is provided to the requesting physician.         Answers submitted by the patient for this visit:  Patient Health Questionnaire (Submitted on 2/12/2025)  PHQ9 TOTAL SCORE: Incomplete    "

## 2025-02-13 LAB
ANION GAP SERPL CALCULATED.3IONS-SCNC: 9 MMOL/L (ref 7–15)
BUN SERPL-MCNC: 30.3 MG/DL (ref 8–23)
CALCIUM SERPL-MCNC: 8.7 MG/DL (ref 8.8–10.4)
CHLORIDE SERPL-SCNC: 109 MMOL/L (ref 98–107)
CREAT SERPL-MCNC: 1.84 MG/DL (ref 0.67–1.17)
CREAT UR-MCNC: 183 MG/DL
EGFRCR SERPLBLD CKD-EPI 2021: 37 ML/MIN/1.73M2
GLUCOSE SERPL-MCNC: 100 MG/DL (ref 70–99)
HCO3 SERPL-SCNC: 22 MMOL/L (ref 22–29)
MICROALBUMIN UR-MCNC: 14.1 MG/L
MICROALBUMIN/CREAT UR: 7.7 MG/G CR (ref 0–17)
POTASSIUM SERPL-SCNC: 4.5 MMOL/L (ref 3.4–5.3)
SODIUM SERPL-SCNC: 140 MMOL/L (ref 135–145)

## 2025-02-21 ENCOUNTER — APPOINTMENT (OUTPATIENT)
Dept: CT IMAGING | Facility: HOSPITAL | Age: 80
DRG: 281 | End: 2025-02-21
Attending: EMERGENCY MEDICINE
Payer: MEDICARE

## 2025-02-21 ENCOUNTER — HOSPITAL ENCOUNTER (INPATIENT)
Facility: HOSPITAL | Age: 80
LOS: 2 days | Discharge: HOME | DRG: 281 | End: 2025-02-23
Attending: EMERGENCY MEDICINE | Admitting: INTERNAL MEDICINE
Payer: MEDICARE

## 2025-02-21 DIAGNOSIS — E78.5 DYSLIPIDEMIA: ICD-10-CM

## 2025-02-21 DIAGNOSIS — G45.9 TIA (TRANSIENT ISCHEMIC ATTACK): ICD-10-CM

## 2025-02-21 DIAGNOSIS — I21.3 ST ELEVATION MI (STEMI) (H): ICD-10-CM

## 2025-02-21 DIAGNOSIS — I48.91 NEW ONSET ATRIAL FIBRILLATION (H): ICD-10-CM

## 2025-02-21 DIAGNOSIS — I25.10 CORONARY ARTERY DISEASE DUE TO LIPID RICH PLAQUE: ICD-10-CM

## 2025-02-21 DIAGNOSIS — I24.9 ACS (ACUTE CORONARY SYNDROME) (H): Primary | ICD-10-CM

## 2025-02-21 DIAGNOSIS — R07.89 OTHER CHEST PAIN: ICD-10-CM

## 2025-02-21 DIAGNOSIS — N28.9 RENAL INSUFFICIENCY: ICD-10-CM

## 2025-02-21 DIAGNOSIS — I21.3 ST ELEVATION MYOCARDIAL INFARCTION (STEMI), UNSPECIFIED ARTERY (H): ICD-10-CM

## 2025-02-21 DIAGNOSIS — I25.83 CORONARY ARTERY DISEASE DUE TO LIPID RICH PLAQUE: ICD-10-CM

## 2025-02-21 DIAGNOSIS — R07.9 CHEST PAIN, UNSPECIFIED TYPE: ICD-10-CM

## 2025-02-21 LAB
ANION GAP SERPL CALCULATED.3IONS-SCNC: 12 MMOL/L (ref 7–15)
APTT PPP: 25 SECONDS (ref 22–38)
BASOPHILS # BLD AUTO: 0 10E3/UL (ref 0–0.2)
BASOPHILS NFR BLD AUTO: 0 %
BUN SERPL-MCNC: 40.9 MG/DL (ref 8–23)
CALCIUM SERPL-MCNC: 9 MG/DL (ref 8.8–10.4)
CHLORIDE SERPL-SCNC: 111 MMOL/L (ref 98–107)
CREAT BLD-MCNC: 2 MG/DL (ref 0.7–1.3)
CREAT SERPL-MCNC: 1.8 MG/DL (ref 0.67–1.17)
EGFRCR SERPLBLD CKD-EPI 2021: 34 ML/MIN/1.73M2
EGFRCR SERPLBLD CKD-EPI 2021: 38 ML/MIN/1.73M2
EOSINOPHIL # BLD AUTO: 0 10E3/UL (ref 0–0.7)
EOSINOPHIL NFR BLD AUTO: 0 %
ERYTHROCYTE [DISTWIDTH] IN BLOOD BY AUTOMATED COUNT: 13.4 % (ref 10–15)
GLUCOSE SERPL-MCNC: 205 MG/DL (ref 70–99)
HCO3 SERPL-SCNC: 18 MMOL/L (ref 22–29)
HCT VFR BLD AUTO: 34.1 % (ref 40–53)
HGB BLD-MCNC: 10.7 G/DL (ref 13.3–17.7)
HOLD SPECIMEN: NORMAL
IMM GRANULOCYTES # BLD: 0.1 10E3/UL
IMM GRANULOCYTES NFR BLD: 1 %
INR PPP: 0.94 (ref 0.85–1.15)
LYMPHOCYTES # BLD AUTO: 0.6 10E3/UL (ref 0.8–5.3)
LYMPHOCYTES NFR BLD AUTO: 6 %
MCH RBC QN AUTO: 31.8 PG (ref 26.5–33)
MCHC RBC AUTO-ENTMCNC: 31.4 G/DL (ref 31.5–36.5)
MCV RBC AUTO: 102 FL (ref 78–100)
MONOCYTES # BLD AUTO: 0.5 10E3/UL (ref 0–1.3)
MONOCYTES NFR BLD AUTO: 5 %
NEUTROPHILS # BLD AUTO: 8.5 10E3/UL (ref 1.6–8.3)
NEUTROPHILS NFR BLD AUTO: 88 %
NRBC # BLD AUTO: 0 10E3/UL
NRBC BLD AUTO-RTO: 0 /100
PLATELET # BLD AUTO: 179 10E3/UL (ref 150–450)
POTASSIUM SERPL-SCNC: 4 MMOL/L (ref 3.4–5.3)
RADIOLOGIST FLAGS: NORMAL
RBC # BLD AUTO: 3.36 10E6/UL (ref 4.4–5.9)
SODIUM SERPL-SCNC: 141 MMOL/L (ref 135–145)
TROPONIN T SERPL HS-MCNC: 329 NG/L
TROPONIN T SERPL HS-MCNC: 48 NG/L
TSH SERPL DL<=0.005 MIU/L-ACNC: 0.25 UIU/ML (ref 0.3–4.2)
WBC # BLD AUTO: 9.7 10E3/UL (ref 4–11)

## 2025-02-21 PROCEDURE — C1760 CLOSURE DEV, VASC: HCPCS | Performed by: INTERNAL MEDICINE

## 2025-02-21 PROCEDURE — 99152 MOD SED SAME PHYS/QHP 5/>YRS: CPT | Performed by: INTERNAL MEDICINE

## 2025-02-21 PROCEDURE — 85730 THROMBOPLASTIN TIME PARTIAL: CPT | Performed by: EMERGENCY MEDICINE

## 2025-02-21 PROCEDURE — 80048 BASIC METABOLIC PNL TOTAL CA: CPT | Performed by: EMERGENCY MEDICINE

## 2025-02-21 PROCEDURE — 250N000011 HC RX IP 250 OP 636: Performed by: EMERGENCY MEDICINE

## 2025-02-21 PROCEDURE — 96375 TX/PRO/DX INJ NEW DRUG ADDON: CPT

## 2025-02-21 PROCEDURE — 36415 COLL VENOUS BLD VENIPUNCTURE: CPT | Performed by: STUDENT IN AN ORGANIZED HEALTH CARE EDUCATION/TRAINING PROGRAM

## 2025-02-21 PROCEDURE — 250N000013 HC RX MED GY IP 250 OP 250 PS 637: Performed by: INTERNAL MEDICINE

## 2025-02-21 PROCEDURE — 85025 COMPLETE CBC W/AUTO DIFF WBC: CPT | Performed by: EMERGENCY MEDICINE

## 2025-02-21 PROCEDURE — 99223 1ST HOSP IP/OBS HIGH 75: CPT | Mod: 25 | Performed by: INTERNAL MEDICINE

## 2025-02-21 PROCEDURE — 4A023N7 MEASUREMENT OF CARDIAC SAMPLING AND PRESSURE, LEFT HEART, PERCUTANEOUS APPROACH: ICD-10-PCS | Performed by: INTERNAL MEDICINE

## 2025-02-21 PROCEDURE — 96374 THER/PROPH/DIAG INJ IV PUSH: CPT | Mod: 59

## 2025-02-21 PROCEDURE — 99223 1ST HOSP IP/OBS HIGH 75: CPT | Performed by: INTERNAL MEDICINE

## 2025-02-21 PROCEDURE — 258N000003 HC RX IP 258 OP 636: Performed by: EMERGENCY MEDICINE

## 2025-02-21 PROCEDURE — 93455 CORONARY ART/GRFT ANGIO S&I: CPT | Performed by: INTERNAL MEDICINE

## 2025-02-21 PROCEDURE — 93005 ELECTROCARDIOGRAM TRACING: CPT | Performed by: EMERGENCY MEDICINE

## 2025-02-21 PROCEDURE — 85041 AUTOMATED RBC COUNT: CPT | Performed by: EMERGENCY MEDICINE

## 2025-02-21 PROCEDURE — 82565 ASSAY OF CREATININE: CPT

## 2025-02-21 PROCEDURE — 200N000001 HC R&B ICU

## 2025-02-21 PROCEDURE — 255N000002 HC RX 255 OP 636: Performed by: INTERNAL MEDICINE

## 2025-02-21 PROCEDURE — 71275 CT ANGIOGRAPHY CHEST: CPT

## 2025-02-21 PROCEDURE — 250N000009 HC RX 250: Performed by: EMERGENCY MEDICINE

## 2025-02-21 PROCEDURE — 93005 ELECTROCARDIOGRAM TRACING: CPT | Performed by: STUDENT IN AN ORGANIZED HEALTH CARE EDUCATION/TRAINING PROGRAM

## 2025-02-21 PROCEDURE — 250N000009 HC RX 250: Performed by: INTERNAL MEDICINE

## 2025-02-21 PROCEDURE — 99291 CRITICAL CARE FIRST HOUR: CPT | Mod: 25

## 2025-02-21 PROCEDURE — 84484 ASSAY OF TROPONIN QUANT: CPT | Performed by: EMERGENCY MEDICINE

## 2025-02-21 PROCEDURE — B211YZZ FLUOROSCOPY OF MULTIPLE CORONARY ARTERIES USING OTHER CONTRAST: ICD-10-PCS | Performed by: INTERNAL MEDICINE

## 2025-02-21 PROCEDURE — 36415 COLL VENOUS BLD VENIPUNCTURE: CPT | Performed by: EMERGENCY MEDICINE

## 2025-02-21 PROCEDURE — C1894 INTRO/SHEATH, NON-LASER: HCPCS | Performed by: INTERNAL MEDICINE

## 2025-02-21 PROCEDURE — 85018 HEMOGLOBIN: CPT | Performed by: EMERGENCY MEDICINE

## 2025-02-21 PROCEDURE — 250N000011 HC RX IP 250 OP 636: Performed by: INTERNAL MEDICINE

## 2025-02-21 PROCEDURE — 250N000013 HC RX MED GY IP 250 OP 250 PS 637: Performed by: EMERGENCY MEDICINE

## 2025-02-21 PROCEDURE — 93455 CORONARY ART/GRFT ANGIO S&I: CPT | Mod: 26 | Performed by: INTERNAL MEDICINE

## 2025-02-21 PROCEDURE — 84443 ASSAY THYROID STIM HORMONE: CPT | Performed by: INTERNAL MEDICINE

## 2025-02-21 PROCEDURE — C1887 CATHETER, GUIDING: HCPCS | Performed by: INTERNAL MEDICINE

## 2025-02-21 PROCEDURE — 99153 MOD SED SAME PHYS/QHP EA: CPT | Performed by: INTERNAL MEDICINE

## 2025-02-21 PROCEDURE — 85610 PROTHROMBIN TIME: CPT | Performed by: EMERGENCY MEDICINE

## 2025-02-21 PROCEDURE — C1769 GUIDE WIRE: HCPCS | Performed by: INTERNAL MEDICINE

## 2025-02-21 PROCEDURE — 96361 HYDRATE IV INFUSION ADD-ON: CPT

## 2025-02-21 PROCEDURE — 272N000001 HC OR GENERAL SUPPLY STERILE: Performed by: INTERNAL MEDICINE

## 2025-02-21 PROCEDURE — 82435 ASSAY OF BLOOD CHLORIDE: CPT | Performed by: EMERGENCY MEDICINE

## 2025-02-21 PROCEDURE — 82374 ASSAY BLOOD CARBON DIOXIDE: CPT | Performed by: EMERGENCY MEDICINE

## 2025-02-21 RX ORDER — IOPAMIDOL 755 MG/ML
75 INJECTION, SOLUTION INTRAVASCULAR ONCE
Status: COMPLETED | OUTPATIENT
Start: 2025-02-21 | End: 2025-02-21

## 2025-02-21 RX ORDER — HYDRALAZINE HYDROCHLORIDE 20 MG/ML
10 INJECTION INTRAMUSCULAR; INTRAVENOUS
Status: DISCONTINUED | OUTPATIENT
Start: 2025-02-21 | End: 2025-02-23 | Stop reason: HOSPADM

## 2025-02-21 RX ORDER — PREDNISONE 5 MG/1
10 TABLET ORAL DAILY
Status: DISCONTINUED | OUTPATIENT
Start: 2025-02-22 | End: 2025-02-23 | Stop reason: HOSPADM

## 2025-02-21 RX ORDER — FLUMAZENIL 0.1 MG/ML
0.2 INJECTION, SOLUTION INTRAVENOUS
Status: ACTIVE | OUTPATIENT
Start: 2025-02-21 | End: 2025-02-22

## 2025-02-21 RX ORDER — ATROPINE SULFATE 0.1 MG/ML
0.5 INJECTION INTRAVENOUS
Status: ACTIVE | OUTPATIENT
Start: 2025-02-21 | End: 2025-02-22

## 2025-02-21 RX ORDER — METOPROLOL TARTRATE 25 MG/1
25 TABLET, FILM COATED ORAL 2 TIMES DAILY
Status: DISCONTINUED | OUTPATIENT
Start: 2025-02-21 | End: 2025-02-22

## 2025-02-21 RX ORDER — IODIXANOL 320 MG/ML
INJECTION, SOLUTION INTRAVASCULAR
Status: DISCONTINUED | OUTPATIENT
Start: 2025-02-21 | End: 2025-02-21 | Stop reason: HOSPADM

## 2025-02-21 RX ORDER — FENTANYL CITRATE 50 UG/ML
50 INJECTION, SOLUTION INTRAMUSCULAR; INTRAVENOUS
Status: DISCONTINUED | OUTPATIENT
Start: 2025-02-21 | End: 2025-02-23 | Stop reason: HOSPADM

## 2025-02-21 RX ORDER — OXYCODONE HYDROCHLORIDE 5 MG/1
5 TABLET ORAL EVERY 4 HOURS PRN
Status: DISCONTINUED | OUTPATIENT
Start: 2025-02-21 | End: 2025-02-23 | Stop reason: HOSPADM

## 2025-02-21 RX ORDER — NALOXONE HYDROCHLORIDE 0.4 MG/ML
0.2 INJECTION, SOLUTION INTRAMUSCULAR; INTRAVENOUS; SUBCUTANEOUS
Status: ACTIVE | OUTPATIENT
Start: 2025-02-21 | End: 2025-02-22

## 2025-02-21 RX ORDER — NALOXONE HYDROCHLORIDE 0.4 MG/ML
0.4 INJECTION, SOLUTION INTRAMUSCULAR; INTRAVENOUS; SUBCUTANEOUS
Status: ACTIVE | OUTPATIENT
Start: 2025-02-21 | End: 2025-02-22

## 2025-02-21 RX ORDER — METOPROLOL TARTRATE 1 MG/ML
5 INJECTION, SOLUTION INTRAVENOUS
Status: DISPENSED | OUTPATIENT
Start: 2025-02-21 | End: 2025-02-21

## 2025-02-21 RX ORDER — ACETAMINOPHEN 325 MG/1
650 TABLET ORAL EVERY 4 HOURS PRN
Status: DISCONTINUED | OUTPATIENT
Start: 2025-02-21 | End: 2025-02-23 | Stop reason: HOSPADM

## 2025-02-21 RX ORDER — SODIUM CHLORIDE 9 MG/ML
75 INJECTION, SOLUTION INTRAVENOUS CONTINUOUS
Status: ACTIVE | OUTPATIENT
Start: 2025-02-21 | End: 2025-02-21

## 2025-02-21 RX ORDER — CLOPIDOGREL BISULFATE 75 MG/1
75 TABLET ORAL DAILY
Status: DISCONTINUED | OUTPATIENT
Start: 2025-02-22 | End: 2025-02-23 | Stop reason: HOSPADM

## 2025-02-21 RX ORDER — EZETIMIBE 10 MG/1
10 TABLET ORAL DAILY
Status: DISCONTINUED | OUTPATIENT
Start: 2025-02-22 | End: 2025-02-23 | Stop reason: HOSPADM

## 2025-02-21 RX ORDER — ASPIRIN 325 MG
325 TABLET, DELAYED RELEASE (ENTERIC COATED) ORAL DAILY
Status: DISCONTINUED | OUTPATIENT
Start: 2025-02-23 | End: 2025-02-23 | Stop reason: HOSPADM

## 2025-02-21 RX ORDER — ASPIRIN 81 MG/1
324 TABLET, CHEWABLE ORAL ONCE
Status: COMPLETED | OUTPATIENT
Start: 2025-02-21 | End: 2025-02-21

## 2025-02-21 RX ORDER — FENTANYL CITRATE 50 UG/ML
25 INJECTION, SOLUTION INTRAMUSCULAR; INTRAVENOUS
Status: DISCONTINUED | OUTPATIENT
Start: 2025-02-21 | End: 2025-02-23 | Stop reason: HOSPADM

## 2025-02-21 RX ORDER — ALLOPURINOL 100 MG/1
200 TABLET ORAL DAILY
Status: DISCONTINUED | OUTPATIENT
Start: 2025-02-21 | End: 2025-02-21

## 2025-02-21 RX ORDER — ISOSORBIDE MONONITRATE 60 MG/1
120 TABLET, EXTENDED RELEASE ORAL DAILY
Status: DISCONTINUED | OUTPATIENT
Start: 2025-02-22 | End: 2025-02-23 | Stop reason: HOSPADM

## 2025-02-21 RX ORDER — OXYCODONE HYDROCHLORIDE 5 MG/1
10 TABLET ORAL EVERY 4 HOURS PRN
Status: DISCONTINUED | OUTPATIENT
Start: 2025-02-21 | End: 2025-02-23 | Stop reason: HOSPADM

## 2025-02-21 RX ORDER — FENTANYL CITRATE 50 UG/ML
INJECTION, SOLUTION INTRAMUSCULAR; INTRAVENOUS
Status: DISCONTINUED | OUTPATIENT
Start: 2025-02-21 | End: 2025-02-21 | Stop reason: HOSPADM

## 2025-02-21 RX ORDER — HEPARIN SODIUM 200 [USP'U]/100ML
INJECTION, SOLUTION INTRAVENOUS
Status: DISCONTINUED | OUTPATIENT
Start: 2025-02-21 | End: 2025-02-21 | Stop reason: HOSPADM

## 2025-02-21 RX ORDER — SODIUM CHLORIDE 9 MG/ML
INJECTION, SOLUTION INTRAVENOUS CONTINUOUS
Status: DISCONTINUED | OUTPATIENT
Start: 2025-02-21 | End: 2025-02-21

## 2025-02-21 RX ORDER — EZETIMIBE 10 MG/1
10 TABLET ORAL AT BEDTIME
Status: DISCONTINUED | OUTPATIENT
Start: 2025-02-22 | End: 2025-02-21

## 2025-02-21 RX ORDER — ATORVASTATIN CALCIUM 40 MG/1
80 TABLET, FILM COATED ORAL DAILY
Status: DISCONTINUED | OUTPATIENT
Start: 2025-02-22 | End: 2025-02-23 | Stop reason: HOSPADM

## 2025-02-21 RX ORDER — TAMSULOSIN HYDROCHLORIDE 0.4 MG/1
0.4 CAPSULE ORAL DAILY
Status: DISCONTINUED | OUTPATIENT
Start: 2025-02-22 | End: 2025-02-23 | Stop reason: HOSPADM

## 2025-02-21 RX ADMIN — ASPIRIN 81 MG CHEWABLE TABLET 324 MG: 81 TABLET CHEWABLE at 16:17

## 2025-02-21 RX ADMIN — HEPARIN SODIUM 4000 UNITS: 1000 INJECTION INTRAVENOUS; SUBCUTANEOUS at 16:18

## 2025-02-21 RX ADMIN — METOPROLOL TARTRATE 5 MG: 5 INJECTION INTRAVENOUS at 16:27

## 2025-02-21 RX ADMIN — METOPROLOL TARTRATE 25 MG: 25 TABLET, FILM COATED ORAL at 21:30

## 2025-02-21 RX ADMIN — ACETAMINOPHEN 650 MG: 325 TABLET ORAL at 18:58

## 2025-02-21 RX ADMIN — IOPAMIDOL 75 ML: 755 INJECTION, SOLUTION INTRAVENOUS at 15:56

## 2025-02-21 RX ADMIN — TICAGRELOR 180 MG: 90 TABLET ORAL at 16:18

## 2025-02-21 RX ADMIN — SODIUM CHLORIDE: 9 INJECTION, SOLUTION INTRAVENOUS at 15:18

## 2025-02-21 RX ADMIN — FENTANYL CITRATE 50 MCG: 50 INJECTION, SOLUTION INTRAMUSCULAR; INTRAVENOUS at 15:14

## 2025-02-21 ASSESSMENT — ACTIVITIES OF DAILY LIVING (ADL)
ADLS_ACUITY_SCORE: 44
ADLS_ACUITY_SCORE: 44
ADLS_ACUITY_SCORE: 42
ADLS_ACUITY_SCORE: 42
ADLS_ACUITY_SCORE: 21
ADLS_ACUITY_SCORE: 42
ADLS_ACUITY_SCORE: 21
ADLS_ACUITY_SCORE: 21
ADLS_ACUITY_SCORE: 42

## 2025-02-21 NOTE — ED PROVIDER NOTES
Emergency Department Encounter     Evaluation Date & Time:   2025  2:41 PM    CHIEF COMPLAINT:  Chest Pain      Triage Note:Patient arrives by private car with his son for evaluation of right sided chest pain that radiates to his back that started about 1100 this am.  History of CABG 10 years ago.             FINAL IMPRESSION:    ICD-10-CM    1. ST elevation MI (STEMI) (H)  I21.3 Cardiac Catheterization     Cardiac Catheterization      2. ST elevation myocardial infarction (STEMI), unspecified artery (H)  I21.3       3. New onset atrial fibrillation (H)  I48.91       4. Chest pain, unspecified type  R07.9       5. Renal insufficiency  N28.9           Impression and Plan     ED COURSE & MEDICAL DECISION MAKIN:56 PM I met with the patient, obtained history, performed an initial exam, and discussed options and plan for diagnostics and treatment here in the ED.plan to send emergently to ct  4:07 PM I called a Level 1 STEMI and it was announced overhead.  4:10 PM I spoke with Dr. Lindy Rosen, cardiologist.  4:16 PM I rechecked and updated the patient on the further plan of care.  4:24 PM I spoke with the interventional cardiologist Dr. Forbes  4:38 PM Spoke with Dr. Olsen, Hospitalist, regarding plan for admission. The patient was taken to the cardiac cath lab.    ED Course as of 25 1652      1507 My partner was handed he is EKG with concerning EKG changes.  They were at the end of show so they signed the patient and then passed him off to me.  I agree his EKG changes are concerning.  Additionally he has got pain that is radiating from his right chest through to his right back blood pressures were low and so am concerned for dissection.  To palpation he has equal strong pulses in all extremities.  I am going to start some slow fluids and fentanyl and get into him emergently over to CT.  Otherwise certainly doing troponin.  No fever to suggest infectious cause and no infectious symptoms.   Does look like he is in atrial fibrillation and he was unclear as to whether or not that is part of his history so I am reviewing his chart   1511 I reviewed his chart.  He just had a preoperative physical done within the last 2 weeks that I reviewed.  He has some kidney disease but currently with his significant pain radiating to his back I think we do need to give him the contrast for CT and I will continue to rehydrate as able.  Otherwise, his low blood pressure could be because of his prehospital sequential nitroglycerin, but is concerning obviously still for dissection.  Nursing staff is bringing the patient over for emergent CT.   1558 While in CT the patient converted from atrial fibs to normal sinus rhythm and his blood pressures improved.  Second EKG being done.   1605 Second EKG is concerning for developing inferior STEMI but he did have some similar agitation Q waves in that same area with slight ST elevation on 3 though notes in both leads.  Calling CT to get the CT results to make sure there is no dissection but in the interim will activate STEMI protocol.  Patient's pain is improved but still having pain so I am calling STEMI.  Tried to call radiology for the CT images but there is an issue with this.  They are working on it.   1622 CT read via the radiologist is no dissection no leaking aneurysm but he does have an aneurysmal aortic arch.  I spoke with he and his son about going to angiogram and they are agreeable to that.  Continues to decline  prefers to use his son.  However, at the time STEMI was called his chest pain was still 3 out of 10.      Initial hypotension has been resolved.  May have been due to the atrial for but may also have been because this was inferior MI and the patient took his nitroglycerin at home.    For now since the heart rate is up we will try to give a dose of metoprolol and see if patient tolerates.   1631 Spoke with interventionalist about the EKG findings and  ongoing cp.  He agrees with intervention.   1632 Cp is now down to 0/10.  Patient and son no further questions.   1649 Patient has been sent up to cath lab.  Admitted to hospitalist.       At the conclusion of the encounter I discussed the results of all the tests and the disposition. The questions were answered. The patient or family acknowledged understanding and was agreeable with the care plan.          60 minutes of critical care time        MEDICATIONS GIVEN IN THE EMERGENCY DEPARTMENT:  Medications   sodium chloride 0.9 % infusion ( Intravenous $New Bag 2/21/25 1518)   fentaNYL (PF) (SUBLIMAZE) injection 50 mcg (50 mcg Intravenous $Given 2/21/25 1514)   metoprolol (LOPRESSOR) injection 5 mg (5 mg Intravenous $Given 2/21/25 1627)   iopamidol (ISOVUE-370) solution 75 mL (75 mLs Intravenous $Given 2/21/25 1556)   aspirin (ASA) chewable tablet 324 mg (324 mg Oral $Given 2/21/25 1617)   ticagrelor (BRILINTA) tablet 180 mg (180 mg Oral $Given 2/21/25 1618)   heparin (porcine) injection 1000 units/mL (rounds in 500 unit increments) (4,000 Units Intravenous $Given 2/21/25 1618)       NEW PRESCRIPTIONS STARTED AT TODAY'S ED VISIT:  Current Discharge Medication List          HPI     The history is provided by the patient and a relative (and pt's son). No  was used.        Jessica Healy is a 79 year old male with a pertinent history of CAD, s/p CABG, HTN, dyslipidemia, CVA history, CKD3 and post herpetic neuralgia, who presents to this ED via walk-in with his son for evaluation of chest pain.    Patient reports he started experiencing right-sided chest pain this morning at ~11 AM. Per patient's son, the patient symptoms started out with the patient developing pain on the front side of his right arm, and this pain worked its way to his back. He denies feeling diaphoretic with this chest pain. He took 3 tablets of prescribed nitroglycerin (placed it under his tongue) at home, but notes he noticed no  "changes to his symptoms or pain level. He mentions that he had his prescription nitroglycerin for \"a while\" and he did not feel a tingling sensation after he placed the tablet under his tongue. Right now, he rates his chest pain a 6 or 7 out of 10. He states that the chest pain was at a 7 or 8 out of 10 when he was at home. His son mentions that the patient could not walk at home secondary to the pain. Patient reports he goes into atrial fibrillation after he does labor and heavy lifting. Denies he is allergic to IV dye.    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    REVIEW OF SYSTEMS:  Review of Systems  remainder of systems are all otherwise negative.        Medical History     Past Medical History:   Diagnosis Date    CAD (coronary artery disease) 12/30/2011    Cerebrovascular accident (CVA), unspecified mechanism (H) 10/25/2016    CKD (chronic kidney disease) stage 3, GFR 30-59 ml/min (H) 10/26/2016    Coronary artery disease due to lipid rich plaque 12/30/2011    CVA (cerebral vascular accident) (H) 10/16    Dyslipidemia, goal LDL below 70 04/27/2012    Essential hypertension 12/30/2011    Hyperlipidemia LDL goal <70 4/27/2012    Hypertension goal BP (blood pressure) < 130/80 12/30/2011    Hypertensive urgency 10/10/2016    Ischemic cardiomyopathy 10/11/2016    Kidney cyst, acquired 12/16/2016    Noncompliance with medication regimen 2/5/2021       Past Surgical History:   Procedure Laterality Date    BYPASS GRAFT ARTERY CORONARY  12/22/2006    GARZA to LAD, vein graft to OM2, vein graft to PDA. This was complicated by postop afib. Done in [place], [place]    CARDIAC CATHETERIZATION  2006    in [place]    CARDIAC CATHETERIZATION  11/21/2014    by Dr. Sepulveda at [facility], no PCI was done: LMCA 90%, mid %, prox LCX 90%, prox %, SVG's to OM2 and RPDA 100%, LIMA to LAD patent with L to R collaterals    SURGICAL HISTORY OF -       CABG 2006       Family History   Problem Relation Age of Onset    " Family History Negative Other         no known specifics    Kidney Disease Mother        Social History     Tobacco Use    Smoking status: Never     Passive exposure: Never    Smokeless tobacco: Never   Vaping Use    Vaping status: Former   Substance Use Topics    Alcohol use: Not Currently    Drug use: Never       No current outpatient medications on file.      Physical Exam     First Vitals:  Patient Vitals for the past 24 hrs:   BP Temp Pulse Resp SpO2 Weight   02/21/25 1630 (!) 143/84 -- 98 16 98 % --   02/21/25 1627 138/80 -- 97 15 98 % --   02/21/25 1620 138/80 -- 96 15 96 % --   02/21/25 1600 133/79 -- 96 14 96 % --   02/21/25 1556 135/79 -- 97 15 94 % --   02/21/25 1553 133/77 -- 97 15 94 % --   02/21/25 1544 126/70 -- 107 14 96 % --   02/21/25 1542 121/62 -- 105 13 95 % --   02/21/25 1532 -- -- 111 12 95 % --   02/21/25 1530 93/57 -- 111 13 95 % --   02/21/25 1520 94/50 -- 110 13 97 % --   02/21/25 1500 91/50 -- 118 20 98 % --   02/21/25 1445 113/56 -- 114 17 99 % --   02/21/25 1439 (!) 65/50 97.1  F (36.2  C) 98 18 99 % 59 kg (130 lb)       PHYSICAL EXAM:   Constitutional:   The patient is sitting up in bed and coversing. Patient's son is at bedside. He is not in acute distress.  HENT:  Normocephalic, posterior pharynx wnl,  Eyes:  PERRL, EOMI, Conjunctiva normal, No discharge, no scleral icterus.  Respiratory:  Breathing easily, lungs clear to auscultation  Cardiovascular:  Regular rate and rhythm nl s1s2 0 murmurs, rubs, or gallops.  Peripheral pulses dp, pt, and radial are wnl.  No peripheral edema   GI:  Bowel sounds normal, Soft, No tenderness, No flank tenderness, nondistended.  :No CVA tenderness.   Musculoskeletal:  Moves all extremities.  No erythematous or swollen major joints,   Integument:  Normal.  Neurologic:  Alert & oriented x 3, Normal motor function, Normal sensory function, No focal deficits noted. Normal speech.  Psychiatric:  Affect normal, Judgment normal, Mood normal.     Results      LAB AND RADIOLOGY:  All pertinent labs reviewed and interpreted  Results for orders placed or performed during the hospital encounter of 02/21/25   CTA Chest Abdomen Pelvis w Contrast     Status: None   Result Value Ref Range    Radiologist flags Thyroid nodules measuring up to 3.2 cm.     Narrative    EXAM: CTA CHEST ABDOMEN PELVIS W CONTRAST  LOCATION: [facility]  DATE: 2/21/2025    INDICATION: Right chest pain radiating towards the back and neck. Hypotension. EKG changes.  COMPARISON: CT abdomen/pelvis 2/25/2020.  TECHNIQUE: CT angiogram chest abdomen pelvis during arterial phase of injection of IV contrast. 2D and 3D MIP reconstructions were performed by the CT technologist. Dose reduction techniques were used.   CONTRAST: 75mL ISOVUE 370    FINDINGS:   CT ANGIOGRAM CHEST, ABDOMEN, AND PELVIS:   Pulmonary Arteries: The pulmonary arteries are suboptimally opacified with contrast, limiting evaluation for pulmonary embolism. Within this limitation, no central or lobar pulmonary embolism.    Thoracic Aorta: Patent. Ascending thoracic aortic aneurysm measuring 4.3 cm in diameter. Moderate atherosclerotic disease. No evidence of dissection, intramural hematoma, or penetrating ulcer.    Abdominal Aorta: Patent. Borderline ectatic abdominal aorta measuring up to 2.1 cm. Moderate atherosclerotic disease. No evidence of dissection or penetrating ulcer.    Celiac Artery: Patent.  Superior Mesenteric Artery: Patent.  Right Renal Artery: Severe stenosis at the origin. Otherwise patent.  Left Renal Artery: Patent.  Inferior Mesenteric Artery: Patent.    Right Common Iliac Artery: Patent.  Right External Iliac Artery: Patent.  Right Internal Iliac Artery: Patent.  Right Common Femoral Artery: Patent.  Proximal Right Superficial Femoral Artery: Patent.  Proximal Right Deep Femoral Artery: Patent.    Left Common Iliac Artery: Patent.  Left External Iliac Artery: Patent.  Left Internal Iliac Artery:  Patent.  Left Common Femoral Artery: Patent.  Proximal Left Superficial Femoral Artery: Patent.  Proximal Left Deep Femoral Artery: Patent.    LOWER NECK: Multiple thyroid nodules, with the largest in the left thyroid lobe measuring 3.2 cm.    LUNGS AND PLEURA: Motion artifact limits evaluation. Bilateral dependent atelectasis. No focal airspace disease. Trace bilateral pleural effusions. No pneumothorax.    MEDIASTINUM/AXILLAE: No lymphadenopathy. No pericardial effusion. Cardiomegaly.    CORONARY ARTERY CALCIFICATION: Previous intervention (stents or CABG).    HEPATOBILIARY: Normal.    PANCREAS: Normal.    SPLEEN: Normal.    ADRENAL GLANDS: Normal.    KIDNEYS/BLADDER: Multiple low-attenuation lesions in both kidneys, with the larger ones compatible with simple renal cysts for which no follow-up is indicated and the smaller ones too small to characterize. No hydronephrosis. Urinary bladder appears   normal.    BOWEL: No obstruction or inflammatory change. Normal appendix. No evidence of acute appendicitis.    LYMPH NODES: No lymphadenopathy.    PELVIC ORGANS: Enlarged prostate.    MUSCULOSKELETAL: Multilevel degenerative changes of spine. Grade 1 anterolisthesis of L3 on L4. Median sternotomy wires.      Impression    IMPRESSION:  1.  Ascending thoracic aortic aneurysm measuring 4.3 cm. Borderline ectatic abdominal aorta measuring up to 2.1 cm. No aortic dissection or acute aortic pathology.    2.  Severe right renal artery stenosis at the origin.    3.  Trace bilateral pleural effusions.    4.  No acute findings in the abdomen or pelvis.    5.  Incidental note of multiple thyroid nodules measuring up to 3.2 cm. Consider nonemergent thyroid ultrasound for characterization.      Findings in impression #1 were communicated to Frances Schwartz over the telephone by Dr. Espinosa at 2/21/2025 4:20 PM CST.      [Consider Follow Up: Thyroid nodules measuring up to 3.2 cm.]    This report will be copied to the [facility] to ensure a provider acknowledges the finding.      [place] Draw     Status: None    Narrative    The following orders were created for panel order [place] Draw.  Procedure                               Abnormality         Status                     ---------                               -----------         ------                     Extra Blue Top Tube[[id number]]                              Final result               Extra Red Top Tube[[id number]]                               Final result               Extra Green Top (Lithium...[[id number]]                      Final result               Extra Purple Top Tube[[id number]]                            Final result                 Please view results for these tests on the individual orders.   Extra Blue Top Tube     Status: None   Result Value Ref Range    Hold Specimen JIC    Extra Red Top Tube     Status: None   Result Value Ref Range    Hold Specimen JIC    Extra Green Top (Lithium Heparin) Tube     Status: None   Result Value Ref Range    Hold Specimen JIC    Extra Purple Top Tube     Status: None   Result Value Ref Range    Hold Specimen JIC    Basic metabolic panel     Status: Abnormal   Result Value Ref Range    Sodium 141 135 - 145 mmol/L    Potassium 4.0 3.4 - 5.3 mmol/L    Chloride 111 (H) 98 - 107 mmol/L    Carbon Dioxide (CO2) 18 (L) 22 - 29 mmol/L    Anion Gap 12 7 - 15 mmol/L    Urea Nitrogen 40.9 (H) 8.0 - 23.0 mg/dL    Creatinine 1.80 (H) 0.67 - 1.17 mg/dL    GFR Estimate 38 (L) >60 mL/min/1.73m2    Calcium 9.0 8.8 - 10.4 mg/dL    Glucose 205 (H) 70 - 99 mg/dL   Troponin T, High Sensitivity     Status: Abnormal   Result Value Ref Range    Troponin T, High Sensitivity 48 (H) <=22 ng/L   CBC with platelets and differential     Status: Abnormal   Result Value Ref Range    WBC Count 9.7 4.0 - 11.0 10e3/uL    RBC Count 3.36 (L) 4.40 - 5.90 10e6/uL    Hemoglobin 10.7 (L) 13.3 - 17.7 g/dL    Hematocrit 34.1 (L) 40.0 - 53.0 %     (H) 78 - 100 fL     MCH 31.8 26.5 - 33.0 pg    MCHC 31.4 (L) 31.5 - 36.5 g/dL    RDW 13.4 10.0 - 15.0 %    Platelet Count 179 150 - 450 10e3/uL    % Neutrophils 88 %    % Lymphocytes 6 %    % Monocytes 5 %    % Eosinophils 0 %    % Basophils 0 %    % Immature Granulocytes 1 %    NRBCs per 100 WBC 0 <1 /100    Absolute Neutrophils 8.5 (H) 1.6 - 8.3 10e3/uL    Absolute Lymphocytes 0.6 (L) 0.8 - 5.3 10e3/uL    Absolute Monocytes 0.5 0.0 - 1.3 10e3/uL    Absolute Eosinophils 0.0 0.0 - 0.7 10e3/uL    Absolute Basophils 0.0 0.0 - 0.2 10e3/uL    Absolute Immature Granulocytes 0.1 <=0.4 10e3/uL    Absolute NRBCs 0.0 10e3/uL   Creatinine POCT     Status: Abnormal   Result Value Ref Range    Creatinine POCT 2.0 (H) 0.7 - 1.3 mg/dL    GFR, ESTIMATED POCT 34 (L) >60 mL/min/1.73m2   INR     Status: Normal   Result Value Ref Range    INR 0.94 0.85 - 1.15   Partial thromboplastin time     Status: Normal   Result Value Ref Range    aPTT 25 22 - 38 Seconds   CBC with platelets differential     Status: Abnormal    Narrative    The following orders were created for panel order CBC with platelets differential.  Procedure                               Abnormality         Status                     ---------                               -----------         ------                     CBC with platelets and d...[[id number]]  Abnormal            Final result                 Please view results for these tests on the individual orders.         ECG:    Performed at: 2/21/2025 15:58:23    Impression: Sinus rhythm with 1st degree A-V block. Incomplete right bundle branch block. Inferior infarct. Consider right ventricular involvement in acute inferior infarct.  Compared to earlier today at 1447 he now has elevation in iii, and slightly in avf now.  With reciprocal changes in I, avl persisting concern for stemi in inferior leads.    Rate: 97 BPM  Rhythm: Sinus  Axis: 37  MA Interval: 220 ms  QRS Interval: 96 ms  QTc Interval: 459 ms  ST Changes: Slight ST  elevation in avf now as well as iii  Comparison: When compared with ECG of 2/21/2025 14:47, significant changes have occurred.      Performed at: 2/21/2025 14:47:02    Impression: Atrial fibrillation with rapid ventricular response Inferior infarct. Acute MI / STEMI. Consider right ventricular involvement in acute inferior infarct.    Rate: 115 BPM  Rhythm: A-fib  Axis: 71  AZ Interval: *  QRS Interval: 94 ms  QTc Interval: 464 ms  ST Changes: st elevation isolated in iii with reciprocal st depression in I, avl.  Inferior infarct pattern with q waves and slight st elevation in iii was seen on previous EKG dated 3/15/22 but more pronounced now and with new st depression in I, avl concerning for ischemia.  Comparison: When compared with ECG of 3/15/2022 20:23, significant changes have occurred.    I have independently reviewed and interpreted the EKS(s) documented above    PROCEDURES:  Procedures:       [facility] Documentation     Medical Decision Making  Obtained supplemental history:Supplemental history obtained?: Documented in chart  Reviewed external records: External records reviewed?: Other: Documented in chart, if applicable  Care impacted by chronic illness:Documented in Chart  Did you consider but not order tests?: Work up considered but not performed and documented in chart, if applicable  Did you interpret images independently?: Independent interpretation of ECG and images noted in documentation, when applicable.  Consultation discussion with other provider:Did you involve another provider (consultant, , pharmacy, etc.)?: I discussed the care with another health care provider, see documentation for details.  Admit.    MIPS (CTPE, Dental pain, Ahmadi, Sinusitis, Asthma/COPD, Head Trauma): Not Applicable      CMS Diagnoses: None and The patient has a STEMI     The patient was evaluated at 1500, stemi changes officially found at 1558   Cath lab transfer at 1640 for cardiac intervention   ASA given in  the ER                 The creation of this record is based on the scribe s observations of the work being performed by Liana Worrell and the provider s statements to them. This document has been checked and approved by MD Frances Bolanos MD  Emergency Medicine  [facility] EMERGENCY DEPARTMENT         Frances Schwartz MD  02/21/25 [id number]

## 2025-02-21 NOTE — ED NOTES
Pt was brought to CT imaging on cardiac monitor. Pt was in atrial fib with RVR, -120 en route to imaging. While in imaging pt converted to sinus rhythm with HR 90s. BP improved to 120-133 systolic. Upon return provider notified and ECG repeated.

## 2025-02-21 NOTE — CONSULTS
CARDIOLOGY INPATIENT CONSULT NOTE    [facility] HEART [facility]   [address], [place] [address]   [email or web address]   OFFICE: [phone]          Impression and Plan     Assessment:  Severe CAD with chronically occluded native coronaries that are effectively supplied by LIMA to the LAD with collaterals to the Lcx and RCA - unchanged from prior.    Atrial fibrillation with rapid ventricular response, complicated by transient hypotension and demand MI  History of ischemic cardiomyopathy, resolved with medical therapy  Hypertension and hyperlipidemia  Acute on chronic kidney disease  Peripheral vascular disease with renal artery stenosis  Prior CVA  Ascending aortic aneurysm, stable  Gout   Thyroid nodules, incidentally noted on current CT    Plan:  Recommend medical management of patient's chronic coronary artery disease - continue home aspirin/Plavix (of note, received loading dose of ticagrelor in the ED), high intensity statin, and Imdur.  Hold amlodipine and losartan given hypotension in the ED, can be resumed if hypertensive.  Will initiate beta-blocker therapy for atrial arrhythmia.  Would benefit from Zio patch at discharge.  Consider therapeutic anticoagulation starting tomorrow for secondary stroke prevention if no issues with femoral access site, Plavix can be discontinued at that time.  Transthoracic echocardiogram  Defer further management of noncardiac issues including incidentally noted thyroid nodule to our hospitalist colleagues.    Primary Cardiologist:  Dr. Cordon     History of Present Illness      Mr. Jessica Healy is a 79 year old male with complex past medical history as above who presented with right-sided chest pain found to have atrial fibrillation with rapid ventricular response.  Patient was also hypotensive on presentation with concern for aortic dissection for which he underwent a CTA chest in the emergency department.  Blood pressure improved with  fluid resuscitation but repeat EKG with some mild dynamic inferior ST changes.  Cardiac catheterization laboratory was activated and patient underwent a coronary angiogram notable for severe diffuse chronic disease with LIMA to the LAD as the only remaining grafts patent (unchanged from prior).  Patient self converted to sinus rhythm with improvement in symptoms. Other than noted above, Mr. Healy denies any shortness of breath at rest or with exertion, light headedness/dizziness, pre-syncope, syncope, lower extremity swelling, palpitations, paroxysmal nocturnal dyspnea (PND), or orthopnea.    Review of Systems:  Further review of systems is otherwise negative/noncontributory (based on review of medical record (admission H&P) and 13 point review of systems reviewed. Pertinent positives noted).    Cardiac Diagnostics     Electrocardiogram  (personally reviewed):   Initial presenting EKG with A-fib with RVR repeat EKG with sinus with first-degree AV block, right IMI with borderline ST elevations in the inferior leads    Cath  (personally reviewed):   Severe multivessel coronary artery disease involving the distal left main and chronic total occlusions of the left anterior descending and right coronary artery and severe diffuse disease of the left circumflex system.  Patent LIMA to the LAD which provides collaterals to the right coronary artery and left circumflex system.   Occluded vein grafts to the right coronary artery and left circumflex system.        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 He also said he does not like to take too many medications. Our RN spoke with him via  and hopefully has convinced him to take it daily; we will recheck his lipid profile in 3 months     Past Surgical History:   Procedure Laterality Date    BYPASS GRAFT ARTERY CORONARY  12/22/2006    GARZA to LAD, vein graft to OM2, vein graft to PDA. This was complicated by postop afib. Done in [place], [place]    CARDIAC CATHETERIZATION  2006    in [place]    CARDIAC CATHETERIZATION  11/21/2014    by Dr. Sepulveda at [facility], no PCI was done: LMCA 90%, mid %, prox LCX 90%, prox %, SVG's to OM2 and RPDA 100%, LIMA to LAD patent with L to R collaterals    SURGICAL HISTORY OF -       CABG 2006     Family History   Problem Relation Age of Onset    Family History Negative Other         no known specifics    Kidney Disease Mother            Social History     Socioeconomic History    Marital status:      Spouse name: Not on file    Number of children: 6    Years of education: Not on file    Highest education level: Not on file   Occupational History    Not on file   Tobacco Use    Smoking status: Never     Passive exposure: Never    Smokeless tobacco: Never   Vaping Use    Vaping status: Former   Substance and Sexual Activity    Alcohol use:  Not Currently    Drug use: Never    Sexual activity: Never     Partners: Female   Other Topics Concern    Parent/sibling w/ CABG, MI or angioplasty before 65F 55M? No   Social History Narrative    He is .     Social Drivers of Health     Financial Resource Strain: High Risk (9/24/2024)    Financial Resource Strain     Within the past 12 months, have you or your family members you live with been unable to get utilities (heat, electricity) when it was really needed?: Yes   Food Insecurity: High Risk (9/24/2024)    Food Insecurity     Within the past 12 months, did you worry that your food would run out before you got money to buy more?: Yes     Within the past 12 months, did the food you bought just not last and you didn t have money to get more?: Yes   Transportation Needs: High Risk (9/24/2024)    Transportation Needs     Within the past 12 months, has lack of transportation kept you from medical appointments, getting your medicines, non-medical meetings or appointments, work, or from getting things that you need?: Yes   Physical Activity: Sufficiently Active (9/24/2024)    Exercise Vital Sign     Days of Exercise per Week: 7 days     Minutes of Exercise per Session: 40 min   Stress: Stress Concern Present (9/24/2024)    [employer] [place] [employer] - Occupational Stress Questionnaire     Feeling of Stress : Very much   Social Connections: Unknown (9/24/2024)    Social Connection and Isolation Panel [NHANES]     Frequency of Communication with Friends and Family: Not on file     Frequency of Social Gatherings with Friends and Family: Never     Attends [employer] Services: Not on file     Active Member of Clubs or Organizations: Not on file     Attends Club or Organization Meetings: Not on file     Marital Status: Not on file   Interpersonal Safety: Low Risk  (2/12/2025)    Interpersonal Safety     Do you feel physically and emotionally safe where you currently live?: Yes     Within the past 12  months, have you been hit, slapped, kicked or otherwise physically hurt by someone?: No     Within the past 12 months, have you been humiliated or emotionally abused in other ways by your partner or ex-partner?: No   Housing Stability: High Risk (9/24/2024)    Housing Stability     Do you have housing? : No     Are you worried about losing your housing?: Yes             Physical Examination   VITALS: BP (!) 143/84   Pulse 98   Temp 97.1  F (36.2  C)   Resp 16   Wt 59 kg (130 lb)   SpO2 98%   BMI 22.49 kg/m    BMI: Body mass index is 22.49 kg/m .  Wt Readings from Last 3 Encounters:   02/21/25 59 kg (130 lb)   02/12/25 59.2 kg (130 lb 9.6 oz)   11/25/24 59.5 kg (131 lb 3.2 oz)       Intake/Output Summary (Last 24 hours) at 2/21/2025 1740  Last data filed at 2/21/2025 1629  Gross per 24 hour   Intake --   Output 100 ml   Net -100 ml       General: pleasant male. No acute distress.   HEENT: JVP not elevated, no HJR  Lungs: clear to auscultation  COR:  regular  rhythm, systolic murmur  Abd: soft, nt, nd  Extrem: Trace edema         Non-cardiac Imaging Studies Reviewed      CTA:  1.  Ascending thoracic aortic aneurysm measuring 4.3 cm. Borderline ectatic abdominal aorta measuring up to 2.1 cm. No aortic dissection or acute aortic pathology.     2.  Severe right renal artery stenosis at the origin.     3.  Trace bilateral pleural effusions.     4.  No acute findings in the abdomen or pelvis.     5.  Incidental note of multiple thyroid nodules measuring up to 3.2 cm. Consider nonemergent thyroid ultrasound for characterization.       Lab Results Reviewed    Chemistry/lipid CBC Cardiac Enzymes/BNP/TSH/INR   Recent Labs   Lab Test 09/30/24  0937   CHOL 274*   HDL 52   *   TRIG 118     Recent Labs   Lab Test 09/30/24  0937 10/13/23  1555 07/07/23  1529   * 157* 142*     Recent Labs   Lab Test 02/21/25  1526 02/21/25  1455   NA  --  141   POTASSIUM  --  4.0   CHLORIDE  --  111*   CO2  --  18*   GLC  --   "205*   BUN  --  40.9*   CR 2.0* 1.80*   GFRESTIMATED 34* 38*   ANISH  --  9.0     Recent Labs   Lab Test 02/21/25  1526 02/21/25  1455 02/12/25  1310   CR 2.0* 1.80* 1.84*     Recent Labs   Lab Test 01/19/24  1642 07/07/23  1529 02/10/23  1522   A1C 5.9* 5.9* 5.7*          Recent Labs   Lab Test 02/21/25  1455   WBC 9.7   HGB 10.7*   HCT 34.1*   *        Recent Labs   Lab Test 02/21/25  1455 09/30/24  0937 02/10/23  1522   HGB 10.7* 12.2* 13.2*    Recent Labs   Lab Test 03/15/22  2226 03/15/22  1937 03/06/20  1948   TROPONINI 0.02 0.02 0.03     Recent Labs   Lab Test 03/06/20  0821   *     No results for input(s): \"TSH\" in the last 10732 hours.  Recent Labs   Lab Test 02/21/25  1455 03/15/22  1937 03/07/20  0000   INR 0.94 1.05 0.98           Current Inpatient Scheduled Medications   Scheduled Meds:  Current Facility-Administered Medications   Medication Dose Route Frequency Provider Last Rate Last Admin    allopurinol (ZYLOPRIM) tablet 200 mg  200 mg Oral Daily Troy Forbes MD        [START ON 2/23/2025] aspirin (ASA) EC tablet 325 mg  325 mg Oral Daily Troy Forbes MD        atorvastatin (LIPITOR) tablet 80 mg  80 mg Oral Daily Troy Forbes MD        clopidogrel (PLAVIX) tablet 75 mg  75 mg Oral Daily Troy Forbes MD        ezetimibe (ZETIA) tablet 10 mg  10 mg Oral At Bedtime Troy Forbes MD        isosorbide mononitrate CR (IMDUR) TB24 120 mg  120 mg Oral Daily Troy Forbes MD        metoprolol tartrate (LOPRESSOR) tablet 25 mg  25 mg Oral BID Troy Forbes MD        tamsulosin (FLOMAX) capsule 0.4 mg  0.4 mg Oral Daily Troy Forbes MD         Continuous Infusions:  Current Facility-Administered Medications   Medication Dose Route Frequency Provider Last Rate Last Admin    sodium chloride 0.9 % infusion   Intravenous Continuous Troy Forbes  mL/hr at 02/21/25 1518 New Bag at 02/21/25 1518    sodium chloride 0.9 % infusion  75 mL/hr Intravenous " Troy Clements MD           No current outpatient medications on file.          Medications Prior to Admission   Prior to Admission medications    Medication Sig Start Date End Date Taking? Authorizing Provider   allopurinol (ZYLOPRIM) 100 MG tablet Take 200 mg by mouth daily. 9/19/24   Reported, Patient   amLODIPine (NORVASC) 10 MG tablet TAKE 1 TABLET DAILY AT BEDTIME FOR HIGH BLOOD PRESSURE // 1 HNUB NOJ 1 LUB THAUM MUS PW PAB SHAHRAM NTSHAV SIAB 1/29/25   Laura Rincon PA-C   aspirin (ASA) 325 MG EC tablet Take 1 tablet (325 mg) by mouth daily. 2/12/25   Laura Rincon PA-C   atorvastatin (LIPITOR) 80 MG tablet Take 1 tablet (80 mg) by mouth daily. 2/12/25   Laura Rincon PA-C   clopidogrel (PLAVIX) 75 MG tablet TAKE 1 TABLET(75 MG) BY MOUTH DAILY 2/12/25   Laura Rincon PA-C   ezetimibe (ZETIA) 10 MG tablet TAKE 1 TABLET(10 MG) BY MOUTH DAILY 2/12/25   Laura Rincon PA-C   gatifloxacin (ZYMAXID) 0.5 % ophthalmic solution INSTILL 1 DROP INTO THE LEFT EYE 4 TIMES DAILY. START 1 DAY PRIOR TO SURGERY UNTIL DONE // 1 ZAUG FELIBERTO 1 NCOS SHAHRAM LUB QHOV MUAG SAB LAUJ, 1 HNUB SIV 4 ZAUG 12/17/24   Reported, Patient   isosorbide mononitrate CR (IMDUR) 120 MG 24 HR ER tablet TAKE 1 TABLET(120 MG) BY MOUTH DAILY 2/12/25   Laura Rincon PA-C   ketorolac (ACULAR) 0.5 % ophthalmic solution INSTILL 1 DROP INTO THE LEFT EYE TWICE DAILY. START 1 DAY PRIOR TO SURGERY AND CONTINUE UNTIL DONE // IB ZAUG  IB NCOS SHAHRAM LUB PHOV MUAG PHAB LAUG. 12/17/24   Reported, Patient   losartan (COZAAR) 100 MG tablet Take 1 tablet (100 mg) by mouth daily. 2/12/25   Laura Rincon PA-C   nitroGLYcerin (NITROSTAT) 0.4 MG sublingual tablet PLACE 1 TABLET UNDER THE TONGUE AT THE ONSET OF CHEST PAIN. MAY REPEAT EVERY 5 MINUTES AS NEEDED FOR A TOTAL OF 3 DOSES. 9/25/24   Laura Rincon PA-C   ORDER FOR [employer] [facility] Home Blood pressure monitor machine 4/27/12    Christiano Maciel MD   predniSONE (DELTASONE) 5 MG tablet TAKE 3 TABLETS (15 MG) BY MOUTH DAILY FOR 7 DAYS, THEN 2 TABLETS (10 MG) DAILY FOR 7 DAYS, THEN 1 TABLET (5 MG) DAILY. 9/12/24   Reported, Patient   tamsulosin (FLOMAX) 0.4 MG capsule Take 1 capsule (0.4 mg) by mouth daily. 2/12/25   Laura Rincon PA-C              Clinically Significant Risk Factors Present on Admission          # Hyperchloremia: Highest Cl = 111 mmol/L in last 2 days, will monitor as appropriate            # Drug Induced Platelet Defect: home medication list includes an antiplatelet medication   # Hypertension: Noted on problem list      # Anemia: based on hgb <11            # History of CABG: noted on surgical history      Troy Forbes MD   Interventional Cardiology

## 2025-02-22 ENCOUNTER — VIRTUAL VISIT (OUTPATIENT)
Dept: INTERPRETER SERVICES | Facility: CLINIC | Age: 80
End: 2025-02-22
Payer: MEDICARE

## 2025-02-22 ENCOUNTER — APPOINTMENT (OUTPATIENT)
Dept: ULTRASOUND IMAGING | Facility: HOSPITAL | Age: 80
DRG: 281 | End: 2025-02-22
Attending: STUDENT IN AN ORGANIZED HEALTH CARE EDUCATION/TRAINING PROGRAM
Payer: MEDICARE

## 2025-02-22 ENCOUNTER — APPOINTMENT (OUTPATIENT)
Dept: CARDIOLOGY | Facility: HOSPITAL | Age: 80
DRG: 281 | End: 2025-02-22
Attending: INTERNAL MEDICINE
Payer: MEDICARE

## 2025-02-22 LAB
ANION GAP SERPL CALCULATED.3IONS-SCNC: 8 MMOL/L (ref 7–15)
BASOPHILS # BLD AUTO: 0 10E3/UL (ref 0–0.2)
BASOPHILS NFR BLD AUTO: 0 %
BUN SERPL-MCNC: 34 MG/DL (ref 8–23)
CALCIUM SERPL-MCNC: 8.6 MG/DL (ref 8.8–10.4)
CHLORIDE SERPL-SCNC: 114 MMOL/L (ref 98–107)
CREAT SERPL-MCNC: 1.51 MG/DL (ref 0.67–1.17)
EGFRCR SERPLBLD CKD-EPI 2021: 47 ML/MIN/1.73M2
EOSINOPHIL # BLD AUTO: 0.1 10E3/UL (ref 0–0.7)
EOSINOPHIL NFR BLD AUTO: 1 %
ERYTHROCYTE [DISTWIDTH] IN BLOOD BY AUTOMATED COUNT: 13.5 % (ref 10–15)
GLUCOSE SERPL-MCNC: 82 MG/DL (ref 70–99)
HCO3 SERPL-SCNC: 20 MMOL/L (ref 22–29)
HCT VFR BLD AUTO: 30.3 % (ref 40–53)
HGB BLD-MCNC: 10 G/DL (ref 13.3–17.7)
IMM GRANULOCYTES # BLD: 0 10E3/UL
IMM GRANULOCYTES NFR BLD: 0 %
LVEF ECHO: NORMAL
LYMPHOCYTES # BLD AUTO: 0.5 10E3/UL (ref 0.8–5.3)
LYMPHOCYTES NFR BLD AUTO: 7 %
MCH RBC QN AUTO: 32.8 PG (ref 26.5–33)
MCHC RBC AUTO-ENTMCNC: 33 G/DL (ref 31.5–36.5)
MCV RBC AUTO: 99 FL (ref 78–100)
MONOCYTES # BLD AUTO: 0.6 10E3/UL (ref 0–1.3)
MONOCYTES NFR BLD AUTO: 7 %
NEUTROPHILS # BLD AUTO: 6.8 10E3/UL (ref 1.6–8.3)
NEUTROPHILS NFR BLD AUTO: 85 %
NRBC # BLD AUTO: 0 10E3/UL
NRBC BLD AUTO-RTO: 0 /100
PLATELET # BLD AUTO: 143 10E3/UL (ref 150–450)
POTASSIUM SERPL-SCNC: 4 MMOL/L (ref 3.4–5.3)
RBC # BLD AUTO: 3.05 10E6/UL (ref 4.4–5.9)
SODIUM SERPL-SCNC: 142 MMOL/L (ref 135–145)
WBC # BLD AUTO: 8 10E3/UL (ref 4–11)

## 2025-02-22 PROCEDURE — 250N000013 HC RX MED GY IP 250 OP 250 PS 637: Performed by: INTERNAL MEDICINE

## 2025-02-22 PROCEDURE — 93880 EXTRACRANIAL BILAT STUDY: CPT

## 2025-02-22 PROCEDURE — 93306 TTE W/DOPPLER COMPLETE: CPT | Mod: 26 | Performed by: INTERNAL MEDICINE

## 2025-02-22 PROCEDURE — 82310 ASSAY OF CALCIUM: CPT | Performed by: INTERNAL MEDICINE

## 2025-02-22 PROCEDURE — T1013 SIGN LANG/ORAL INTERPRETER: HCPCS | Mod: U4,TEL,95

## 2025-02-22 PROCEDURE — 85014 HEMATOCRIT: CPT | Performed by: INTERNAL MEDICINE

## 2025-02-22 PROCEDURE — 99222 1ST HOSP IP/OBS MODERATE 55: CPT | Mod: GC | Performed by: SURGERY

## 2025-02-22 PROCEDURE — 99232 SBSQ HOSP IP/OBS MODERATE 35: CPT | Performed by: INTERNAL MEDICINE

## 2025-02-22 PROCEDURE — 250N000011 HC RX IP 250 OP 636: Performed by: HOSPITALIST

## 2025-02-22 PROCEDURE — 76770 US EXAM ABDO BACK WALL COMP: CPT

## 2025-02-22 PROCEDURE — 250N000012 HC RX MED GY IP 250 OP 636 PS 637: Performed by: INTERNAL MEDICINE

## 2025-02-22 PROCEDURE — 80048 BASIC METABOLIC PNL TOTAL CA: CPT | Performed by: INTERNAL MEDICINE

## 2025-02-22 PROCEDURE — 93975 VASCULAR STUDY: CPT

## 2025-02-22 PROCEDURE — 250N000011 HC RX IP 250 OP 636: Performed by: INTERNAL MEDICINE

## 2025-02-22 PROCEDURE — 120N000004 HC R&B MS OVERFLOW

## 2025-02-22 PROCEDURE — 85004 AUTOMATED DIFF WBC COUNT: CPT | Performed by: INTERNAL MEDICINE

## 2025-02-22 PROCEDURE — 255N000002 HC RX 255 OP 636: Performed by: INTERNAL MEDICINE

## 2025-02-22 PROCEDURE — 36415 COLL VENOUS BLD VENIPUNCTURE: CPT | Performed by: INTERNAL MEDICINE

## 2025-02-22 PROCEDURE — 93356 MYOCRD STRAIN IMG SPCKL TRCK: CPT

## 2025-02-22 PROCEDURE — 93356 MYOCRD STRAIN IMG SPCKL TRCK: CPT | Performed by: INTERNAL MEDICINE

## 2025-02-22 PROCEDURE — 999N000208 ECHOCARDIOGRAM COMPLETE

## 2025-02-22 PROCEDURE — 85041 AUTOMATED RBC COUNT: CPT | Performed by: INTERNAL MEDICINE

## 2025-02-22 RX ORDER — METOPROLOL TARTRATE 25 MG/1
25 TABLET, FILM COATED ORAL 2 TIMES DAILY
Status: DISCONTINUED | OUTPATIENT
Start: 2025-02-22 | End: 2025-02-23 | Stop reason: HOSPADM

## 2025-02-22 RX ORDER — NALOXONE HYDROCHLORIDE 0.4 MG/ML
0.4 INJECTION, SOLUTION INTRAMUSCULAR; INTRAVENOUS; SUBCUTANEOUS
Status: DISCONTINUED | OUTPATIENT
Start: 2025-02-22 | End: 2025-02-23 | Stop reason: HOSPADM

## 2025-02-22 RX ORDER — PANTOPRAZOLE SODIUM 20 MG/1
20 TABLET, DELAYED RELEASE ORAL
Status: DISCONTINUED | OUTPATIENT
Start: 2025-02-22 | End: 2025-02-23 | Stop reason: HOSPADM

## 2025-02-22 RX ORDER — NALOXONE HYDROCHLORIDE 0.4 MG/ML
0.2 INJECTION, SOLUTION INTRAMUSCULAR; INTRAVENOUS; SUBCUTANEOUS
Status: DISCONTINUED | OUTPATIENT
Start: 2025-02-22 | End: 2025-02-23 | Stop reason: HOSPADM

## 2025-02-22 RX ORDER — METOPROLOL TARTRATE 25 MG/1
50 TABLET, FILM COATED ORAL 2 TIMES DAILY
Status: DISCONTINUED | OUTPATIENT
Start: 2025-02-22 | End: 2025-02-22

## 2025-02-22 RX ORDER — METOPROLOL TARTRATE 25 MG/1
25 TABLET, FILM COATED ORAL ONCE
Status: DISCONTINUED | OUTPATIENT
Start: 2025-02-22 | End: 2025-02-22

## 2025-02-22 RX ORDER — HYDRALAZINE HYDROCHLORIDE 20 MG/ML
10 INJECTION INTRAMUSCULAR; INTRAVENOUS EVERY 4 HOURS PRN
Status: DISCONTINUED | OUTPATIENT
Start: 2025-02-22 | End: 2025-02-23 | Stop reason: HOSPADM

## 2025-02-22 RX ORDER — NITROGLYCERIN 0.4 MG/1
0.4 TABLET SUBLINGUAL EVERY 5 MIN PRN
Status: DISCONTINUED | OUTPATIENT
Start: 2025-02-22 | End: 2025-02-23 | Stop reason: HOSPADM

## 2025-02-22 RX ORDER — LOSARTAN POTASSIUM 50 MG/1
100 TABLET ORAL DAILY
Status: DISCONTINUED | OUTPATIENT
Start: 2025-02-22 | End: 2025-02-23 | Stop reason: HOSPADM

## 2025-02-22 RX ADMIN — TAMSULOSIN HYDROCHLORIDE 0.4 MG: 0.4 CAPSULE ORAL at 08:19

## 2025-02-22 RX ADMIN — HYDRALAZINE HYDROCHLORIDE 10 MG: 20 INJECTION INTRAMUSCULAR; INTRAVENOUS at 21:23

## 2025-02-22 RX ADMIN — ATORVASTATIN CALCIUM 80 MG: 40 TABLET, FILM COATED ORAL at 08:20

## 2025-02-22 RX ADMIN — METOPROLOL TARTRATE 25 MG: 25 TABLET, FILM COATED ORAL at 08:20

## 2025-02-22 RX ADMIN — NITROGLYCERIN 0.4 MG: 0.4 TABLET, ORALLY DISINTEGRATING SUBLINGUAL at 08:38

## 2025-02-22 RX ADMIN — NITROGLYCERIN 0.4 MG: 0.4 TABLET, ORALLY DISINTEGRATING SUBLINGUAL at 08:30

## 2025-02-22 RX ADMIN — PREDNISONE 10 MG: 5 TABLET ORAL at 08:19

## 2025-02-22 RX ADMIN — ISOSORBIDE MONONITRATE 120 MG: 60 TABLET, EXTENDED RELEASE ORAL at 08:19

## 2025-02-22 RX ADMIN — PANTOPRAZOLE SODIUM 20 MG: 20 TABLET, DELAYED RELEASE ORAL at 14:47

## 2025-02-22 RX ADMIN — CLOPIDOGREL BISULFATE 75 MG: 75 TABLET ORAL at 08:20

## 2025-02-22 RX ADMIN — EZETIMIBE 10 MG: 10 TABLET ORAL at 08:20

## 2025-02-22 RX ADMIN — HYDROMORPHONE HYDROCHLORIDE 1 MG: 2 TABLET ORAL at 16:38

## 2025-02-22 RX ADMIN — NITROGLYCERIN 0.4 MG: 0.4 TABLET, ORALLY DISINTEGRATING SUBLINGUAL at 15:51

## 2025-02-22 RX ADMIN — NITROGLYCERIN 0.4 MG: 0.4 TABLET, ORALLY DISINTEGRATING SUBLINGUAL at 08:24

## 2025-02-22 RX ADMIN — HYDRALAZINE HYDROCHLORIDE 10 MG: 20 INJECTION INTRAMUSCULAR; INTRAVENOUS at 05:07

## 2025-02-22 RX ADMIN — METOPROLOL TARTRATE 25 MG: 25 TABLET, FILM COATED ORAL at 20:11

## 2025-02-22 RX ADMIN — ACETAMINOPHEN 650 MG: 325 TABLET ORAL at 14:50

## 2025-02-22 RX ADMIN — LOSARTAN POTASSIUM 100 MG: 50 TABLET, FILM COATED ORAL at 10:24

## 2025-02-22 RX ADMIN — PERFLUTREN 2 ML: 6.52 INJECTION, SUSPENSION INTRAVENOUS at 09:23

## 2025-02-22 ASSESSMENT — ACTIVITIES OF DAILY LIVING (ADL)
ADLS_ACUITY_SCORE: 25
ADLS_ACUITY_SCORE: 27
ADLS_ACUITY_SCORE: 25
ADLS_ACUITY_SCORE: 21
ADLS_ACUITY_SCORE: 25
ADLS_ACUITY_SCORE: 36
ADLS_ACUITY_SCORE: 27
ADLS_ACUITY_SCORE: 25
ADLS_ACUITY_SCORE: 27
ADLS_ACUITY_SCORE: 25
ADLS_ACUITY_SCORE: 21
ADLS_ACUITY_SCORE: 25
ADLS_ACUITY_SCORE: 25

## 2025-02-22 NOTE — CONSULTS
Test claim for DOAC'S- Both Eliquis and Xarelto are covered $5 for 30 days supply. Thank you for allowing me to help with your patient  Cheri Abdelrahman [facility]  Pharmacy Discharge Liaison   [facility] locations  Available on teams and [employer]  Phone [phone] Fax [phone]    Disclaimer: Pharmacy test claims are estimates and may not reflect final costs. Suggested alternatives aim to be cost-effective but may not be therapeutically equivalent as this consult is informational and does not constitute medical advice. Clinical decisions should be made by qualified healthcare providers.

## 2025-02-22 NOTE — CONSULTS
Vascular surgery consultation    This is a 79-year-old male with a previous history of CAD, hypertension, and CKD who is consulted for right renal artery stenosis.    Patient is currently admitted due to chest pain status post diagnostic coronary angiogram.    Patient is on 3 antihypertensives at home.  ER, patient was hypotensive, and will need metoprolol has been restarted.    Denies history of smoking, or aneurysm in the family.    On exam,  NAD undergoing echocardiogram  Blood pressure in 130s on cuff  Bilateral femorals are palpable, DP are palpable  Mild ecchymosis on the access site in the right groin  Abdomen is soft, nontender, nondistended    I reviewed imaging that was obtained during this admission and compared to previous imagings.    On CTA, there is about 75% stenosis of right renal artery origin.  The velocity of corresponding area in the duplex is 202 cm/s, consistent with greater than 60% stenosis.  The kidney measures about 7 cm on CTA on the right and 9 cm on the left.    Bilateral carotid duplex were obtained due to history of CAD without previous duplex on our system.  This is with bilateral less than 50% stenosis.      Impression: Right renal artery stenosis.  Based on imaging, this is hemodynamically significant.  However, per chart review, the patient does not seem to have any indication for right renal artery revascularization.  Will consult nephrology as well for more opinions and future follow-up for his hypertension/CKD in the setting of right renal artery stenosis.  The patient can follow-up with us as needed.    Recommending continuing aspirin/Plavix/statin.  Recommending blood pressure control with eventual systolic goal less than 140.    Bisi Ford MD  Vascular Surgery Fellow

## 2025-02-22 NOTE — PROGRESS NOTES
[facility]    Medicine Progress Note - Hospitalist Service    Date of Admission:  2/21/2025    Assessment & Plan   Jessica Healy is a 79 year old male with a pertinent history of CAD, s/p CABG, HTN, dyslipidemia, CVA history, CKD3 and post herpetic neuralgia, who presented to this ED via walk-in with his son for evaluation of chest pain. Patient in the ED was found A-fib with RVR, also mildly hypotensive.  He had a CTA chest which was negative for aortic dissection.  Repeat EKG shows some ST segment changes and Cath Lab was urgently activated. No new changes noted on the coronary angiogram. Admitted in ICU post angiogram     Hospital medicine consulted by interventional cardiologist for formal H&P and admission to hospital     Chest pain  Elevated troponin, likely Type due to demand ischemia  -S/P urgent coronary angiogram 2/21 that showed: severe CAD with chronically occluded native coronaries that are effectively supplied by LIMA to the LAD with collaterals to the Lcx and RCA - unchanged from prior. Cardiology recommended medical management, continue aspirin and Plavix, statin therapy, Imdur  -Cardiology following  -Echo pending     Atrial fibrillation with rapid ventricular response, complicated by transient hypotension and demand MI  -Now in normal sinus rhythm at bedside  -Cardiology started beta-blocker, recommend Zio patch at discharge  -Echo pending  -Given elevated CHADS-Vasc score, cardiology recommended starting Eliquis. Will ask pharmacy liaison to check on insurance coverage. Will start DOAC (if covered by insurance) once we are sure no procedures are needed.     H/O ischemic cardiomyopathy, resolved with medical therapy  -Cont home meds  -Echo pending      Hypertension   Hyperlipidemia  Continue home meds. Zetia added by cards  -Resumed losartan as BP improved     Chronic kidney disease, baseline creatinine around 1.8  Chronic anemia due to CKD  -Stable  -Avoid nephrotoxins      Peripheral vascular disease with renal artery stenosis  -Renal ultrasound revealed hemodynamically significant stenosis (>60%).   -Vascular surgery consulted. Vascular surgery recommended nephrology evaluation.     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 Indication: Post- PCI/Angiogram (24 hours)  Code Status: Full Code      Clinically Significant Risk Factors Present on Admission          # Hyperchloremia: Highest Cl = 114 mmol/L in last 2 days, will monitor as appropriate            # Drug Induced Platelet Defect: home medication list includes an antiplatelet medication   # Hypertension: Noted on problem list      # Anemia: based on hgb <11            # History of CABG: noted on surgical history       Social Drivers of Health    Food Insecurity: High Risk (2/21/2025)    Food Insecurity     Within the past 12 months, did you worry that your food would run out before you got money to buy more?: Yes     Within the past 12 months, did the food you bought just not last and you didn t have money to get more?: Yes   Housing Stability: High Risk (2/21/2025)    Housing Stability     Do you have housing? : Yes     Are you worried about losing your housing?: Yes   Financial Resource Strain: High Risk (2/21/2025)    Financial Resource Strain     Within the past 12 months, have you or your family members you live with been unable to get utilities (heat, electricity) when it was  really needed?: Yes   Transportation Needs: High Risk (2/21/2025)    Transportation Needs     Within the past 12 months, has lack of transportation kept you from medical appointments, getting your medicines, non-medical meetings or appointments, work, or from getting things that you need?: Yes   Stress: Stress Concern Present (9/24/2024)    Citizen of [place] of Occupational Health - Occupational Stress Questionnaire     Feeling of Stress : Very much   Social Connections: Unknown (9/24/2024)    Social Connection and Isolation Panel [NHANES]     Frequency of Social Gatherings with Friends and Family: Never          Disposition Plan     Medically Ready for Discharge: Anticipated in 2-4 Days             JUDY Zee  Hospitalist Service  [facility]  Securely message with [employer] (more info)  Text page via [employer] Paging/Directory   ______________________________________________________________________    Interval History   Patient is new to me today. Patient seen and examined in ICU with the help of professional [employer] . Patient was having intermittent chest tightness this morning for which nitroglycerine s/l x 3 was provided. Pain came down to 3 from 6. Protonix started.    Reviewed with cardiology and nursing staff.    Physical Exam   Vital Signs: Temp: 97.6  F (36.4  C) Temp src: Oral BP: 124/59 Pulse: 66   Resp: 19 SpO2: 94 % O2 Device: None (Room air) Oxygen Delivery: 2 LPM  Weight: 127 lbs 1.6 oz      General: Not in obvious distress.  HEENT: NC, AT   Chest: Clear to auscultation bilaterally  Heart: S1S2 normal, regular. No M/R/G  Abdomen: Soft. NT, ND. Bowel sounds- active.  Extremities: No legs swelling  Neuro: Alert and awake, grossly non-focal      Medical Decision Making             Data     I have personally reviewed the following data over the past 24 hrs:    8.0  \   10.0 (L)   / 143 (L)     142 114 (H) 34.0 (H) /  82   4.0 20 (L) 1.51 (H) \     Trop: 329 (HH) BNP:  N/A     TSH: 0.25 (L) T4: N/A A1C: N/A     INR:  0.94 PTT:  25   D-dimer:  N/A Fibrinogen:  N/A       Imaging results reviewed over the past 24 hrs:   Recent Results (from the past 24 hours)   CTA Chest Abdomen Pelvis w Contrast   Result Value    Radiologist flags Thyroid nodules measuring up to 3.2 cm.    Narrative    EXAM: CTA CHEST ABDOMEN PELVIS W CONTRAST  LOCATION: [facility]  DATE: 2/21/2025    INDICATION: Right chest pain radiating towards the back and neck. Hypotension. EKG changes.  COMPARISON: CT abdomen/pelvis 2/25/2020.  TECHNIQUE: CT angiogram chest abdomen pelvis during arterial phase of injection of IV contrast. 2D and 3D MIP reconstructions were performed by the CT technologist. Dose reduction techniques were used.   CONTRAST: 75mL ISOVUE 370    FINDINGS:   CT ANGIOGRAM CHEST, ABDOMEN, AND PELVIS:   Pulmonary Arteries: The pulmonary arteries are suboptimally opacified with contrast, limiting evaluation for pulmonary embolism. Within this limitation, no central or lobar pulmonary embolism.    Thoracic Aorta: Patent. Ascending thoracic aortic aneurysm measuring 4.3 cm in diameter. Moderate atherosclerotic disease. No evidence of dissection, intramural hematoma, or penetrating ulcer.    Abdominal Aorta: Patent. Borderline ectatic abdominal aorta measuring up to 2.1 cm. Moderate atherosclerotic disease. No evidence of dissection or penetrating ulcer.    Celiac Artery: Patent.  Superior Mesenteric Artery: Patent.  Right Renal Artery: Severe stenosis at the origin. Otherwise patent.  Left Renal Artery: Patent.  Inferior Mesenteric Artery: Patent.    Right Common Iliac Artery: Patent.  Right External Iliac Artery: Patent.  Right Internal Iliac Artery: Patent.  Right Common Femoral Artery: Patent.  Proximal Right Superficial Femoral Artery: Patent.  Proximal Right Deep Femoral Artery: Patent.    Left Common Iliac Artery: Patent.  Left External Iliac Artery: Patent.  Left Internal  Iliac Artery: Patent.  Left Common Femoral Artery: Patent.  Proximal Left Superficial Femoral Artery: Patent.  Proximal Left Deep Femoral Artery: Patent.    LOWER NECK: Multiple thyroid nodules, with the largest in the left thyroid lobe measuring 3.2 cm.    LUNGS AND PLEURA: Motion artifact limits evaluation. Bilateral dependent atelectasis. No focal airspace disease. Trace bilateral pleural effusions. No pneumothorax.    MEDIASTINUM/AXILLAE: No lymphadenopathy. No pericardial effusion. Cardiomegaly.    CORONARY ARTERY CALCIFICATION: Previous intervention (stents or CABG).    HEPATOBILIARY: Normal.    PANCREAS: Normal.    SPLEEN: Normal.    ADRENAL GLANDS: Normal.    KIDNEYS/BLADDER: Multiple low-attenuation lesions in both kidneys, with the larger ones compatible with simple renal cysts for which no follow-up is indicated and the smaller ones too small to characterize. No hydronephrosis. Urinary bladder appears   normal.    BOWEL: No obstruction or inflammatory change. Normal appendix. No evidence of acute appendicitis.    LYMPH NODES: No lymphadenopathy.    PELVIC ORGANS: Enlarged prostate.    MUSCULOSKELETAL: Multilevel degenerative changes of spine. Grade 1 anterolisthesis of L3 on L4. Median sternotomy wires.      Impression    IMPRESSION:  1.  Ascending thoracic aortic aneurysm measuring 4.3 cm. Borderline ectatic abdominal aorta measuring up to 2.1 cm. No aortic dissection or acute aortic pathology.    2.  Severe right renal artery stenosis at the origin.    3.  Trace bilateral pleural effusions.    4.  No acute findings in the abdomen or pelvis.    5.  Incidental note of multiple thyroid nodules measuring up to 3.2 cm. Consider nonemergent thyroid ultrasound for characterization.      Findings in impression #1 were communicated to Frances Schwartz over the telephone by Dr. Espinosa at 2/21/2025 4:20 PM CST.      [Consider Follow Up: Thyroid nodules measuring up to 3.2 cm.]    This report will be copied to the  [facility] to ensure a provider acknowledges the finding.      Cardiac Catheterization    Narrative    CARDIAC CATHETERIZATION AND INTERVENTION REPORT    PATIENT NAME:  Jessica Healy          MEDICAL RECORD NUMBER: [id number]  : 1945       PROCEDURE DATE: 2025        CONCLUSIONS:   Severe multivessel coronary artery disease involving the distal left main   and chronic total occlusions of the left anterior descending and right   coronary artery and severe diffuse disease of the left circumflex system.  Patent LIMA to the LAD which provides collaterals to the right coronary   artery and left circumflex system.   Occluded vein grafts to the right coronary artery and left circumflex   system.      RECOMMENDATIONS:   Usual postprocedure cares, please see orders.  Recommend medical management of patient's coronary artery disease.    Overall unchanged since 2016.  Aggressive risk factor modification for secondary prevention.    PROCEDURE PERFORMED:   Selective right and left coronary angiography  Coronary artery bypass angiography    PRE-OP DIAGNOSIS:  High risk NSTEMI    POST-OP DIAGNOSIS:   High risk NSTEMI    PROCEDURALISTS: Troy Forbes MD      DIAGNOSTIC PROCEDURAL DETAILS:   Signed, informed consent was obtained. The patient was placed on the table   and prepped and draped in the usual fashion. Time out and site   verification performed.   Access: Right common femoral artery  Catheters: JL 4, JR4, VIKI  Hemostasis: Perclose ProGlide suture    PROCEDURAL MEDICATIONS:  Conscious sedation - midazolam and fentanyl, please see procedure log for   dosing.   Local anesthesia - 1% lidocaine   Procedural anticoagulation - none    CONTRAST VOLUME: 60 mL Visipaque  BLOOD LOSS: minimal   FLUIDS: None   SPECIMENS: None  COMPLICATIONS: None    Coronary Angiography:  LEFT MAIN: Normal caliber vessel that bifurcates into left anterior   descending and left circumflex arteries.  The proximal left  main has mild   disease followed by severe, ulcerated diffuse disease in the distal   portion.  LEFT ANTERIOR DESCENDING: Chronically occluded, fills distally via LIMA   graft.  LEFT CIRCUMFLEX: Nondominant vessel that gives rise to an OM1 branch, and   OM 2 branch, and OM 3 branch and terminates into a small vessel distally.    The left circumflex and branches have severe diffuse disease.  RIGHT CORONARY ARTERY: Chronically occluded.    Coronary Artery Bypass Angiography:   LIMA to the LAD: Widely patent with mild disease of the native vessel   beyond the anastomosis.  There is retrograde flow into the proximal to mid   LAD.  There are also collaterals to the left circumflex system and right   coronary artery from the  LAD.     Vein grafts to the left circumflex system and right coronary artery are   chronically occluded.    Please do not hesitate to contact me if you have any questions or   concerns. I was present for the procedure in its entirety. Moderate   conscious sedation at level 3-4 with fentanyl and midazolam was   administered, and I spent continuous face to face time with the patient   which encompassed the duration of the procedure.  Please refer to the   sedation flow sheet for additional information.  I have reviewed and agree   with the documentation provided in the RN sedation flow sheet as outlined.   I concluded sedation and recovery was monitored by the trained independent   observer. I attest that I have ordered all medications administered,   equipment used, and tests performed during the procedure.    Troy Forbes MD  Interventional Cardiology    US Carotid Bilateral    Narrative    EXAM: US CAROTID BILATERAL  LOCATION: [facility]  DATE: 2/22/2025    INDICATION: Screening. CAD. HTN.  COMPARISON: None.  TECHNIQUE: Duplex exam performed utilizing 2D gray-scale imaging, Doppler interrogation with color-flow and spectral waveform analysis. The percent diameter  stenosis is determined using Updated Recommendations for Carotid Stenosis Interpretation Criteria   from [employer].    FINDINGS:    RIGHT: Mild plaque at the bifurcation. The peak systolic velocity in the ICA is less than 180 cm/sec, consistent with less than 50% stenosis. Normal velocities in the ECA. Antegrade flow within the vertebral artery.     LEFT: Mild plaque at the bifurcation. The peak systolic velocity in the ICA is less than 180 cm/sec, consistent with less than 50% stenosis. Elevated velocities in the ECA consistent with a stenosis. Antegrade flow within the vertebral artery.    VELOCITY CHART:  CCA   Right: 70 cm/s   Left: 79 cm/s  ICA   Right: 88 cm/s   Left: 85 cm/s  ECA   Right: 104 cm/s   Left: 166 cm/s  ICA/CCA PSV Ratio   Right: 1.27   Left: 1.08    Incidentally noted is a mostly cystic subcentimeter right thyroid nodule with no specific follow-up needed.      Impression    IMPRESSION:  1.  Mild plaque formation, velocities consistent with less than 50% stenosis in the right internal carotid artery.  2.  Mild plaque formation, velocities consistent with less than 50% stenosis in the left internal carotid artery.  3.  Flow within the vertebral arteries is antegrade.  4.  Increased velocity in the left external carotid artery suggestive of stenosis.   US Renal Complete w Arterial Duplex    Narrative    EXAM:  1. RENAL ULTRASOUND   2. RENAL DUPLEX  LOCATION: [facility]  DATE: 2/22/2025    INDICATION: Renal artery stenosis seen on CT angiography  COMPARISON: CTA 2/21/2025.  TECHNIQUE: Duplex imaging is performed utilizing gray-scale, two-dimensional images, and color-flow imaging. Doppler waveform analysis and spectral Doppler imaging is also performed.    FINDINGS:     RIGHT KIDNEY: 7.1 x 4.7 x 5.2 cm. Normal echogenicity without hydronephrosis or masses. Multiple simple cysts redemonstrated measuring up to 2.3 x 2.3 x 1.9 cm, which do not require follow-up. Trace  perinephric edema again noted adjacent to the upper   pole.    LEFT KIDNEY 9.3 x 4.6 x 5 cm. Normal echogenicity without hydronephrosis or masses. Multiple simple cysts redemonstrated, largest 2.4 x 1.8 x 1.8 cm in the superior pole, and do not require follow-up.    BLADDER: Normal.    RENAL DUPLEX:  Aortic PSV: 81 cm/s, multiphasic  Right Renal Artery PSV: 202 cm/s, 27 cm/s, and 20 cm/s in the proximal, mid and distal segments, respectively. The distal right renal and intrarenal arterial waveforms appear somewhat obtunded.  Right Intrarenal Resistive Index: 0.6-0.8, mildly elevated.  Left Renal Artery  cm/s, 30 cm/s and 28 cm cm/s, normal. Normal waveform identified.  Left Intrarenal Resistive Index: Normal, 0.6-0.7.        Impression    IMPRESSION: -  1.  Elevated peak systolic velocity in the proximal right renal artery with mildly parvus tardus waveform in the distal right renal and intrarenal arteries, compatible with hemodynamically significant stenosis (>60%).  2.  Decreased size of the right kidney with mildly elevated resistive indices in the right kidney, compatible with underlying renal parenchymal change possibly related to proximal right renal artery stenosis.  3.  No hemodynamically significant stenosis in the left renal artery.   Echocardiogram Complete   Result Value    LVEF  55-60%    [facility]    [id number]  [id number]  [id number]  [id number]^NAVDEEP^[place] [address]     Name: SEDA GRIGGS  MRN: [id number]  : 1945  Study Date: 2025 08:33 AM  Age: 79 yrs  Gender: Male  Patient Location: [facility]  Reason For Study: CAD  Ordering Physician: TERRA SETHI  Performed By: MAYCOL     BSA: 1.6 m2  Height: 63 in  Weight: 127 lb  HR: 72  BP: 133/65 mmHg  ______________________________________________________________________________  Procedure  Complete Echo Adult. Definity (NDC #27266-093) given intravenously. [id number]  [id number]. Technically  difficult study.Extremely difficult acoustic windows  despite the use of contrast for endcardial border definition. Compared to the  prior study dated 3/6/2020, there have been no changes.  ______________________________________________________________________________  Interpretation Summary     1. Left ventricular size is normal with moderate concentric increase in wall  thickness.  - Left ventricular function is normal.The ejection fraction is 55-60%.  - There is hypokinesis of the basal to mid anterolateral wall.  - Global peak LV longitudinal strain is averaged at -13%. This suggests  abnormal strain (normal <-18%).  2. Right ventricular size and systolic function are normal.  3. Mild left atrial enlargement.  4. No hemodynamically significant valvular abnormalities.  5. Aortic root (4.2 cm) and ascending aorta (4.1 cm) dilatation is present.  6. Compared to the prior study dated 3/16/22 new wall motion abnormalities are  identified.  ______________________________________________________________________________  Left Ventricle  The left ventricle is normal in size. Left ventricular function is normal.The  ejection fraction is 55-60%. There is moderate concentric left ventricular  hypertrophy. Proximal septal thickening is noted. Left ventricular diastolic  function is indeterminate. Global peak LV longitudinal strain is averaged at -  13%. This suggests abnormal strain (normal <-18%). Septal motion is consistent  with post-operative state. There is hypokinesis of the basal to mid  anterolateral wall.     Right Ventricle  Normal right ventricle size and systolic function.     Atria  The left atrium is mildly dilated. Right atrial size is normal.     Mitral Valve  Mitral valve leaflets appear normal. There is trace mitral regurgitation.  There is no mitral valve stenosis.     Tricuspid Valve  Tricuspid valve leaflets appear normal. There is trace tricuspid  regurgitation. Right ventricle systolic pressure  estimate normal. There is no  tricuspid stenosis.     Aortic Valve  There is mild trileaflet aortic sclerosis. There is trace aortic  regurgitation. No aortic stenosis is present.     Pulmonic Valve  The pulmonic valve is not well seen, but is grossly normal. There is mild (1+)  pulmonic valvular regurgitation. There is no pulmonic valvular stenosis.     Vessels  Aortic root and ascending dilatation is present. IVC diameter <2.1 cm  collapsing >50% with sniff suggests a normal RA pressure of 3 mmHg.     Pericardium  There is no pericardial effusion.     Rhythm  Sinus rhythm was noted.  ______________________________________________________________________________  MMode/2D Measurements & Calculations     IVSd: 2.0 cm  LVIDd: 3.3 cm  LVIDs: 2.5 cm  LVPWd: 1.2 cm  FS: 24.9 %  LV mass(C)d: 204.7 grams  LV mass(C)dI: 128.4 grams/m2  Ao root diam: 4.2 cm  asc Aorta Diam: 4.1 cm  LVOT diam: 2.1 cm  LVOT area: 3.5 cm2  Ao root diam index Ht(cm/m): 2.6  Ao root diam index BSA (cm/m2): 2.6  Asc Ao diam index BSA (cm/m2): 2.6  Asc Ao diam index Ht(cm/m): 2.6     LA Volume Indexed (AL/bp): 24.3 ml/m2  RV Base: 3.5 cm  RWT: 0.74  TAPSE: 1.8 cm     Time Measurements  MM HR: 67.0 BPM     Doppler Measurements & Calculations  MV E max edin: 84.4 cm/sec  MV A max edin: 101.0 cm/sec  MV E/A: 0.84  MV max P.4 mmHg  MV mean P.0 mmHg  MV V2 VTI: 22.8 cm  MVA(VTI): 2.8 cm2  MV dec slope: 348.0 cm/sec2  MV dec time: 0.24 sec  Ao V2 max: 129.0 cm/sec  Ao max P.0 mmHg  Ao V2 mean: 86.7 cm/sec  Ao mean PG: 3.0 mmHg  Ao V2 VTI: 25.2 cm  MILLY(I,D): 2.5 cm2  MILLY(V,D): 2.5 cm2  AI P1/2t: 916.3 msec  LV V1 max PG: 3.5 mmHg  LV V1 max: 93.5 cm/sec  LV V1 VTI: 18.2 cm     SV(LVOT): 63.0 ml  SI(LVOT): 39.5 ml/m2  PA V2 max: 125.0 cm/sec  PA max P.3 mmHg  PA acc time: 0.15 sec  PI end-d edin: 116.0 cm/sec  AV Edin Ratio (DI): 0.72  MILLY Index (cm2/m2): 1.6  E/E': 17.1  E/E' av.3  Lateral E/e': 11.5  Medial E/e': 17.1  Peak E' Edin: 5.0  cm/sec  RV S Edin: 13.1 cm/sec     Measurements from QLAB  LV GLS Endo Peak A2C (AS): -13.5 %  LV GLS Endo Peak A3C (AS): -11.3 %  LV GLS Endo Peak A4C (AS): -14.0 %     LV GLS Endo Peak Avg (AS): -12.9 %  ______________________________________________________________________________  Report approved by: Prasanth Galvan MD on 02/22/2025 12:36 PM

## 2025-02-22 NOTE — CONSULTS
NEPHROLOGY CONSULTATION    Jessica Healy  [address] [facility] [address]  [phone] (home)   79 year old male  [id number]  PMD: Laura Rincon    CC: I was asked to see Jessica Healy by Dr. Ford to evaluate his LUCINA.    ASSESSMENT AND RECOMMENDATIONS:  1. Right LUCINA: by US. Right kidney atrophic at 7.1cm compared to 9.3cm on the left. This is clearly chronic. 3 larger studies/trials looking at intervention of LUCINA(STAR, NEWTON, and CORAL) showed no significant benefits of revascularization with angioplasty/stenting. The main concerns with these trials were exclusion of high risk patients, of which he is not. I see risk, but little if any benefit of stenting, especially with an already atropic right kidney. Good BP control should be our focus moving forward. See below.     2. Hypertension: His BP is now under good control. Continue losartan. Trend.     3. CKD stage 3a/b: stable. due to hypertension most likely. He was seen by Dr. Acevedo at [facility] in the past. Should have follow up with her again upon discharge and I will leave that planning up to the primary service.      I will sign off now  Please call with questions.      Harris Severino MD  [facility] Office: [phone]    HPI: Jessica Healy is a 79 year old man who I am asked to see for management of LUCINA. He has underlying CKD, seen by Dr. Acevedo at [facility]. He also has chronic hypertension. He has been seen by cardiology here at [facility]. BP under good control. Creatinine stable in the past few months. No cp, sob, edema.     ROS: Other than above, a comprehensive ROS was negative.        PMH:  Patient Active Problem List   Diagnosis    CAD (coronary artery disease)    Hypertension goal BP (blood pressure) < 130/80    Dyslipidemia    Cerebrovascular accident (CVA), unspecified mechanism (H)    CKD (chronic kidney disease) stage 3, GFR 30-59 ml/min (H)    Kidney cyst, acquired    Stroke-like symptoms    Post  herpetic neuralgia    Lower urinary tract symptoms due to benign prostatic hyperplasia    Moderate recurrent major depression (H)    Renal insufficiency    New onset atrial fibrillation (H)    ST elevation MI (STEMI) (H)    ST elevation myocardial infarction (STEMI), unspecified artery (H)    Chest pain, unspecified type    ACS (acute coronary syndrome) (H)         Current Facility-Administered Medications:     acetaminophen (TYLENOL) tablet 650 mg, 650 mg, Oral, Q4H PRN, Bairagi, Dallas B, MBBS, 650 mg at 02/21/25 1858    [START ON 2/23/2025] aspirin (ASA) EC tablet 325 mg, 325 mg, Oral, Daily, Bairagi, Dallas B, MBBS    atorvastatin (LIPITOR) tablet 80 mg, 80 mg, Oral, Daily, Bairagi, Dallas B, MBBS, 80 mg at 02/22/25 0820    clopidogrel (PLAVIX) tablet 75 mg, 75 mg, Oral, Daily, Bairagi, Dallas B, MBBS, 75 mg at 02/22/25 0820    ezetimibe (ZETIA) tablet 10 mg, 10 mg, Oral, Daily, Bairagi, Dallas B, MBBS, 10 mg at 02/22/25 0820    fentaNYL (PF) (SUBLIMAZE) injection 25 mcg, 25 mcg, Intravenous, Q15 Min PRN, Bairagi, Dallas B, MBBS    fentaNYL (PF) (SUBLIMAZE) injection 50 mcg, 50 mcg, Intravenous, Q20 Min PRN, Bairagi, Dallas B, MBBS, 50 mcg at 02/21/25 1514    HOLD:  Metformin and metformin containing medications if patient received IV contrast with acute kidney injury or severe chronic kidney disease (stage IV or stage V; i.e., eGFR less than 30), , Does not apply, HOLD, Bairagi, Dallas B, MBBS    hydrALAZINE (APRESOLINE) injection 10 mg, 10 mg, Intravenous, Q4H PRN, Bairagi, Dallas B, MBBS, 10 mg at 02/22/25 0507    hydrALAZINE (APRESOLINE) injection 10 mg, 10 mg, Intravenous, Once PRN, Bairagi, Dallas B, MBBS    isosorbide mononitrate (IMDUR) 24 hr tablet 120 mg, 120 mg, Oral, Daily, Bairagi, Dallas B, MBBS, 120 mg at 02/22/25 0819    losartan (COZAAR) tablet 100 mg, 100 mg, Oral, Daily, Dallas Smith MBBS, 100 mg at 02/22/25 1024    metoprolol tartrate (LOPRESSOR) tablet 25 mg, 25 mg, Oral, BID, Jessika,  Lindy MATHIS MD    midazolam (VERSED) injection 0.5 mg, 0.5 mg, Intravenous, Q5 Min PRN, Bairagi, Dallas B, MBBS    naloxone (NARCAN) injection 0.2 mg, 0.2 mg, Intravenous, Q2 Min PRN **OR** naloxone (NARCAN) injection 0.4 mg, 0.4 mg, Intravenous, Q2 Min PRN **OR** naloxone (NARCAN) injection 0.2 mg, 0.2 mg, Intramuscular, Q2 Min PRN **OR** naloxone (NARCAN) injection 0.4 mg, 0.4 mg, Intramuscular, Q2 Min PRN, Bairagi, Dallas B, MBBS    nitroGLYcerin (NITROSTAT) sublingual tablet 0.4 mg, 0.4 mg, Sublingual, Q5 Min PRN, Bairagi, Dallas B, MBBS, 0.4 mg at 02/22/25 0838    oxyCODONE (ROXICODONE) tablet 5 mg, 5 mg, Oral, Q4H PRN **OR** oxyCODONE (ROXICODONE) tablet 10 mg, 10 mg, Oral, Q4H PRN, Bairagi, Dallas B, MBBS    pantoprazole (PROTONIX) EC tablet 20 mg, 20 mg, Oral, QAM AC, Bairagi, Dallas B, MBBS, 20 mg at 02/22/25 1447    predniSONE (DELTASONE) tablet 10 mg, 10 mg, Oral, Daily, Bairagi, Dallas B, MBBS, 10 mg at 02/22/25 0819    tamsulosin (FLOMAX) capsule 0.4 mg, 0.4 mg, Oral, Daily, Bairagi, Dallas B, MBBS, 0.4 mg at 02/22/25 0819      Allergies:   Allergies   Allergen Reactions    Nkda [No Known Drug Allergy]     Seasonal Allergies        Social History:   Social History     Socioeconomic History    Marital status:      Spouse name: Not on file    Number of children: 6    Years of education: Not on file    Highest education level: Not on file   Occupational History    Not on file   Tobacco Use    Smoking status: Never     Passive exposure: Never    Smokeless tobacco: Never   Vaping Use    Vaping status: Former   Substance and Sexual Activity    Alcohol use: Not Currently    Drug use: Never    Sexual activity: Never     Partners: Female   Other Topics Concern    Parent/sibling w/ CABG, MI or angioplasty before 65F 55M? No   Social History Narrative    He is .     Social Drivers of Health     Financial Resource Strain: High Risk (2/21/2025)    Financial Resource Strain     Within the past 12 months,  have you or your family members you live with been unable to get utilities (heat, electricity) when it was really needed?: Yes   Food Insecurity: High Risk (2/21/2025)    Food Insecurity     Within the past 12 months, did you worry that your food would run out before you got money to buy more?: Yes     Within the past 12 months, did the food you bought just not last and you didn t have money to get more?: Yes   Transportation Needs: High Risk (2/21/2025)    Transportation Needs     Within the past 12 months, has lack of transportation kept you from medical appointments, getting your medicines, non-medical meetings or appointments, work, or from getting things that you need?: Yes   Physical Activity: Sufficiently Active (9/24/2024)    Exercise Vital Sign     Days of Exercise per Week: 7 days     Minutes of Exercise per Session: 40 min   Stress: Stress Concern Present (9/24/2024)    [employer] - Occupational Stress Questionnaire     Feeling of Stress : Very much   Social Connections: Unknown (9/24/2024)    Social Connection and Isolation Panel [NHANES]     Frequency of Communication with Friends and Family: Not on file     Frequency of Social Gatherings with Friends and Family: Never     Attends Jewish Services: Not on file     Active Member of Clubs or Organizations: Not on file     Attends Club or Organization Meetings: Not on file     Marital Status: Not on file   Interpersonal Safety: Low Risk  (2/21/2025)    Interpersonal Safety     Do you feel physically and emotionally safe where you currently live?: Yes     Within the past 12 months, have you been hit, slapped, kicked or otherwise physically hurt by someone?: No     Within the past 12 months, have you been humiliated or emotionally abused in other ways by your partner or ex-partner?: No   Housing Stability: High Risk (2/21/2025)    Housing Stability     Do you have housing? : Yes     Are you worried about losing your housing?:  "Yes         Family History:   Family History   Problem Relation Age of Onset    Family History Negative Other         no known specifics    Kidney Disease Mother           Physical Exam:  Vital signs:  Temp: 97.5  F (36.4  C) Temp src: Oral BP: 124/59 Pulse: 66   Resp: 20 SpO2: 94 % O2 Device: None (Room air) Oxygen Delivery: 2 LPM   Weight: 57.7 kg (127 lb 1.6 oz)  Estimated body mass index is 21.99 kg/m  as calculated from the following:    Height as of 2/12/25: 1.619 m (5' 3.75\").    Weight as of this encounter: 57.7 kg (127 lb 1.6 oz).       GENERAL: NAD  HEENT: NCAT, pupils equal, sclerae not icteric, MMM  NECK: Supple.Trachea midline.   LYMPHATIC: No cervical lymphadenopathy  LUNGS: no respiratory distress,  HEART: no leg edema.   ABDOMEN: Soft, NT,   PSYCH: Alert, normal  affect  NEURO:  sensation to light touch intact  MUSC/SKEL: diminished muscle mass, no joint effusions evident  SKIN: No rash     Potassium (mmol/L)   Date Value   02/22/2025 4.0   02/21/2025 4.0   02/12/2025 4.5   02/10/2023 4.5   03/17/2022 3.7   03/15/2022 3.7   01/27/2021 4.1   03/07/2020 4.1   02/18/2020 3.9     Chloride (mmol/L)   Date Value   02/22/2025 114 (H)   02/10/2023 113 (H)   02/18/2020 113 (H)     Urea Nitrogen (mg/dL)   Date Value   02/22/2025 34.0 (H)   02/10/2023 34 (H)   02/18/2020 28     Albumin (g/dL)   Date Value   09/30/2024 4.0   02/10/2023 3.7   07/03/2019 3.3 (L)     TSH   Date Value   02/21/2025 0.25 uIU/mL (L)   11/30/2016 0.60 mU/L     Hemoglobin (g/dL)   Date Value   02/22/2025 10.0 (L)   02/21/2025 10.7 (L)   09/30/2024 12.2 (L)   11/30/2016 14.2   11/21/2014 14.4   10/14/2014 14.1   .    Above labs reviewed by me       Harris Severino MD  "

## 2025-02-22 NOTE — PHARMACY-ADMISSION MEDICATION HISTORY
Pharmacist Admission Medication History    Admission medication history is complete. The information provided in this note is only as accurate as the sources available at the time of the update.    Information Source(s): Patient, Family member, Prescription bottles, and CareEverywhere/SureScripts via in-person    Pertinent Information: Eye drops (not listed below) have not yet been started, so not marked as taking. He's also been prescribed leflunomide but not taking it yet. He does take the prednisone.     Changes made to PTA medication list:  Added: None  Deleted: allopurinol, amlodipine- not taking  Changed: allopurinol dose and prednisone dose    Allergies reviewed with patient and updates made in EHR: yes    Medication History Completed By: Abbie Hahn [facility] 2/21/2025 6:31 PM    Prior to Admission medications    Medication Sig Last Dose Taking? Auth Provider Long Term End Date   aspirin (ASA) 325 MG EC tablet Take 1 tablet (325 mg) by mouth daily. 2/21/2025 Morning Yes Laura Rincon PA-C     atorvastatin (LIPITOR) 80 MG tablet Take 1 tablet (80 mg) by mouth daily. 2/21/2025 Morning Yes Laura Rincon PA-C Yes    clopidogrel (PLAVIX) 75 MG tablet TAKE 1 TABLET(75 MG) BY MOUTH DAILY 2/21/2025 Morning Yes Laura Rincon PA-C Yes    ezetimibe (ZETIA) 10 MG tablet TAKE 1 TABLET(10 MG) BY MOUTH DAILY 2/21/2025 Morning Yes Laura Rincon PA-C Yes    isosorbide mononitrate CR (IMDUR) 120 MG 24 HR ER tablet TAKE 1 TABLET(120 MG) BY MOUTH DAILY 2/21/2025 Morning Yes Laura Rincon PA-C Yes    losartan (COZAAR) 100 MG tablet Take 1 tablet (100 mg) by mouth daily. 2/21/2025 Morning Yes Laura Rincon PA-C Yes    nitroGLYcerin (NITROSTAT) 0.4 MG sublingual tablet PLACE 1 TABLET UNDER THE TONGUE AT THE ONSET OF CHEST PAIN. MAY REPEAT EVERY 5 MINUTES AS NEEDED FOR A TOTAL OF 3 DOSES.  Yes Laura Rincon PA-C Yes    predniSONE (DELTASONE) 10  MG tablet Take 10 mg by mouth daily. 2/21/2025 Morning Yes Reported, Patient     tamsulosin (FLOMAX) 0.4 MG capsule Take 1 capsule (0.4 mg) by mouth daily. 2/21/2025 Morning Yes Laura Rincon PA-C

## 2025-02-22 NOTE — PROGRESS NOTES
Cardiology Progress Note    Assessment/Plan:  1.  Status post NSTEMI secondary to demand ischemia in the setting of atrial fibrillation with rapid ventricular response.  Emergent coronary angiography demonstrated no acute culprit vessel.  Patient with severe disease which is unchanged from previous angiogram.  Continue high-dose Imdur along with recent addition of metoprolol tartrate  2.  Paroxysmal atrial fibrillation with RVR, resolved spontaneously in the ED.  Will increase metoprolol to tartrate to 50 mg twice daily in an effort to minimize recurrence.  Given elevated [id number] score, he should be considered for anticoagulation.  3.  Essential hypertension, controlled  4.  Hyperlipidemia, on high-dose statin therapy.  Last lipid profile demonstrated an LDL of 198.  This raises a question as to whether the patient has been compliant with the atorvastatin.    Primary cardiologist: Dr. Gilberto Cordon    Subjective:  Patient continues to report intermittent episodes of chest tightness on both the left and right sides of his chest.  Was given sublingual nitroglycerin with possible improvement.    Current Facility-Administered Medications   Medication Dose Route Frequency Provider Last Rate Last Admin    [START ON 2/23/2025] aspirin (ASA) EC tablet 325 mg  325 mg Oral Daily Troy Forbes MD        atorvastatin (LIPITOR) tablet 80 mg  80 mg Oral Daily Troy Forbes MD   80 mg at 02/22/25 0820    clopidogrel (PLAVIX) tablet 75 mg  75 mg Oral Daily Troy Forbes MD   75 mg at 02/22/25 0820    ezetimibe (ZETIA) tablet 10 mg  10 mg Oral Daily Troy Forbes MD   10 mg at 02/22/25 0820    isosorbide mononitrate (IMDUR) 24 hr tablet 120 mg  120 mg Oral Daily Troy Forbes MD   120 mg at 02/22/25 0819    losartan (COZAAR) tablet 100 mg  100 mg Oral Daily Dallas Smith MBBS        metoprolol tartrate (LOPRESSOR) tablet 25 mg  25 mg Oral BID Troy Forbes MD   25 mg at 02/22/25 0820     predniSONE (DELTASONE) tablet 10 mg  10 mg Oral Daily Ayush Anderson MD   10 mg at 02/22/25 0819    tamsulosin (FLOMAX) capsule 0.4 mg  0.4 mg Oral Daily Troy Forbes MD   0.4 mg at 02/22/25 0819         Objective:   Vital signs in last 24 hours:  Temp:  [97.1  F (36.2  C)-97.6  F (36.4  C)] 97.5  F (36.4  C)  Pulse:  [] 71  Resp:  [12-27] 17  BP: ()/(50-87) 135/74  SpO2:  [94 %-99 %] 95 %  Weight:        Review of Systems:  Negative    Physical Exam:  General appearance: alert, cooperative, no distress   Head: Normocephalic, without obvious abnormality, atraumatic  Neck: no JVD   Lungs: clear to auscultation bilaterally   Heart: Regular rate and rhythm.  S1, S2 normal.  No murmur or gallop  Extremities: No peripheral edema bilaterally    Cardiographics (personally reviewed):   Telemetry demonstrates sinus rhythm    Imaging (personally reviewed):   Echocardiogram pending    Lab Results (personally reviewed):     Recent Labs   Lab Test 09/30/24  0937   CHOL 274*   HDL 52   *   TRIG 118     Recent Labs   Lab Test 09/30/24  0937 10/13/23  1555 07/07/23  1529   * 157* 142*     Recent Labs   Lab Test 02/22/25  0416      POTASSIUM 4.0   CHLORIDE 114*   CO2 20*   GLC 82   BUN 34.0*   CR 1.51*   GFRESTIMATED 47*   ANISH 8.6*     Recent Labs   Lab Test 02/22/25  0416 02/21/25  1526 02/21/25  1455   CR 1.51* 2.0* 1.80*     Recent Labs   Lab Test 01/19/24  1642 07/07/23  1529 02/10/23  1522   A1C 5.9* 5.9* 5.7*          Recent Labs   Lab Test 02/22/25  0416   WBC 8.0   HGB 10.0*   HCT 30.3*   MCV 99   *     Recent Labs   Lab Test 02/22/25  0416 02/21/25  1455 09/30/24  0937   HGB 10.0* 10.7* 12.2*    Recent Labs   Lab Test 03/15/22  2226 03/15/22  1937 03/06/20  1948   TROPONINI 0.02 0.02 0.03     Recent Labs   Lab Test 03/06/20  0821   *     Recent Labs   Lab Test 02/21/25  1705   TSH 0.25*     Recent Labs   Lab Test 02/21/25  1455 03/15/22  1937 03/07/20  0000   INR 0.94  1.05 0.98          Lindy Rosen MD

## 2025-02-22 NOTE — H&P
[facility]    History and Physical - Hospitalist Service       Date of Admission:  2/21/2025    Jessica Healy is a 79 year old male with a pertinent history of CAD, s/p CABG, HTN, dyslipidemia, CVA history, CKD3 and post herpetic neuralgia, who presents to this ED via walk-in with his son for evaluation of chest pain.    Patient in the ED was found A-fib with RVR, also mild hypotension.  He had a CTA chest which was negative for aortic dissection.  Repeat EKG shows some ST segment changes and Cath Lab was urgently activated.  Patient is post angiogram, he is now admitted to ICU for further care    Hospital medicine consulted by interventional cardiologist for formal H&P and admission to hospital    Assessment & Plan    Chest pain, now resolved  Post angiogram with results below  Severe CAD with chronically occluded native coronaries that are effectively supplied by LIMA to the LAD with collaterals to the Lcx and RCA - unchanged from prior.    Cardiology recommended medical management, continue aspirin and Plavix, statin therapy, Imdur  Cardiology following, echo ordered for tomorrow    Atrial fibrillation with rapid ventricular response, complicated by transient hypotension and demand MI  Now in normal sinus rhythm at bedside  Cardiology started beta-blocker, recommend Zio patch at discharge, echo pending  Consider therapeutic anticoagulation starting tomorrow if no bleeding from femoral access site    History of ischemic cardiomyopathy, resolved with medical therapy, echo pending tomorrow    Hypertension   hyperlipidemia  Continue statin therapy  BP meds temporarily on hold, resume as BP allows    chronic kidney disease, baseline creatinine around 1.8  Chronic anemia follow CBC  Continue IV hydration per cardiology  Avoid nephrotoxins    Peripheral vascular disease with renal artery stenosis  Consult vascular surgery for evaluation    Prior CVA no residual neurodeficits  Continue  neurochecks    Ascending aortic aneurysm, stable on CTA  Follow-up in primary care    Gout not currently symptomatic, PCP follow-up    Thyroid nodules, incidentally noted on current CT  PCP follow-up check TSH    On steroids at home, unclear indication, resume if on this chronically, pharmacy to assist     Please see formal cardiology recommendation below  Recommend medical management of patient's chronic coronary artery disease - continue home aspirin/Plavix (of note, received loading dose of ticagrelor in the ED), high intensity statin, and Imdur.  Hold amlodipine and losartan given hypotension in the ED, can be resumed if hypertensive.  Will initiate beta-blocker therapy for atrial arrhythmia.  Would benefit from Zio patch at discharge.  Consider therapeutic anticoagulation starting tomorrow for secondary stroke prevention if no issues with femoral access site, Plavix can be discontinued at that time.  Transthoracic echocardiogram  Defer further management of noncardiac issues including incidentally noted thyroid nodule to our hospitalist colleagues.    Hospital medicine will follow along, labs ordered for tomorrow     Diet: Advance Diet as Tolerated: Clear Liquid Diet    DVT Prophylaxis: Low Risk/Ambulatory with no VTE prophylaxis indicated  Ahmadi Catheter: Not present  Lines: None     Cardiac Monitoring: ACTIVE order. Indication: Post- PCI/Angiogram (24 hours)  Code Status:  Per cardiology    Clinically Significant Risk Factors Present on Admission          # Hyperchloremia: Highest Cl = 111 mmol/L in last 2 days, will monitor as appropriate            # Drug Induced Platelet Defect: home medication list includes an antiplatelet medication   # Hypertension: Noted on problem list      # Anemia: based on hgb <11            # History of CABG: noted on surgical history       Disposition Plan     Medically Ready for Discharge: Anticipated in 2-4 Days           Ayush Anderson MD  Hospitalist Service  [facility]  [facility]  Securely message with [email or web address] (more info)  Text page via gdgt Paging/Directory     ______________________________________________________________________    Chief Complaint   Admitted for chest pain , now resolved    History is obtained from the patient    History of Present Illness   Patient reports he started experiencing right-sided chest pain this morning at ~11 AM. Per patient's son, the patient symptoms started out with the patient developing pain on the front side of his right arm, and this pain worked its way to his back.  No diaphoresis, no fever or chills, no PND orthopnea, no leg edema.  He took 3 tablets of prescribed nitroglycerin sublingual with no relief in symptoms.  In ED he was found to have A-fib RVR, rate around 120/min.  Also mild hypotension noted in ED.  For which he was given IV hydration, initial concern was for dissection therefore he had a CTA which is negative for dissection.  Subsequent EKG showed ST segment changes in inferior leads for which Cath Lab was activated.  He was taken urgently to angiogram.    Patient is in ICU currently, chest pain-free, denies any systemic symptoms.  Cardiology notes also reviewed.      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 He also said he does not like to take too many medications. Our RN spoke with him via  and hopefully has convinced him to take it daily; we will recheck his lipid profile in 3 months       Past Surgical History   Past Surgical History:   Procedure Laterality Date    BYPASS GRAFT ARTERY CORONARY  12/22/2006    GARZA to LAD, vein graft to OM2, vein graft to PDA. This was complicated by postop afib. Done in [place], [place]    CARDIAC CATHETERIZATION  2006    in [place]    CARDIAC CATHETERIZATION  11/21/2014    by Dr. Sepulveda at [facility], no PCI was done: LMCA 90%, mid %, prox LCX 90%, prox %, SVG's to OM2 and RPDA 100%, LIMA to LAD patent with L to R collaterals    SURGICAL HISTORY OF -       CABG 2006       Prior to Admission Medications   Prior to Admission Medications   Prescriptions Last Dose Informant Patient Reported? Taking?   ORDER FOR DME   No No   Sig: Home Blood pressure monitor machine   allopurinol (ZYLOPRIM) 100 MG tablet   Yes No   Sig: Take 200 mg by mouth daily.   amLODIPine (NORVASC) 10 MG tablet   No No   Sig: TAKE 1 TABLET DAILY AT BEDTIME FOR HIGH BLOOD PRESSURE // 1 HNUB NOJ 1 LUB THAUM MUS PW PAB SHAHRAM NTSHAV SIAB   aspirin (ASA) 325 MG EC tablet   No No   Sig: Take 1 tablet (325 mg) by mouth daily.   atorvastatin (LIPITOR) 80 MG tablet   No No   Sig: Take 1 tablet (80 mg) by mouth daily.   clopidogrel (PLAVIX) 75 MG tablet   No No   Sig: TAKE 1 TABLET(75 MG) BY MOUTH DAILY   ezetimibe (ZETIA) 10 MG tablet   No No   Sig: TAKE 1 TABLET(10 MG) BY MOUTH DAILY   gatifloxacin (ZYMAXID) 0.5 % ophthalmic solution   Yes No   Sig:  INSTILL 1 DROP INTO THE LEFT EYE 4 TIMES DAILY. START 1 DAY PRIOR TO SURGERY UNTIL DONE // 1 ZAUG FELIBERTO 1 NCOS SHAHRAM LUB QHOV MUAG SAB LAUJ, 1 HNUB SIV 4 ZAUG   isosorbide mononitrate CR (IMDUR) 120 MG 24 HR ER tablet   No No   Sig: TAKE 1 TABLET(120 MG) BY MOUTH DAILY   ketorolac (ACULAR) 0.5 % ophthalmic solution   Yes No   Sig: INSTILL 1 DROP INTO THE LEFT EYE TWICE DAILY. START 1 DAY PRIOR TO SURGERY AND CONTINUE UNTIL DONE // IB ZAUG  IB NCOS SHAHRAM LUB PHOV MUAG PHAB LAUG.   losartan (COZAAR) 100 MG tablet   No No   Sig: Take 1 tablet (100 mg) by mouth daily.   nitroGLYcerin (NITROSTAT) 0.4 MG sublingual tablet   No No   Sig: PLACE 1 TABLET UNDER THE TONGUE AT THE ONSET OF CHEST PAIN. MAY REPEAT EVERY 5 MINUTES AS NEEDED FOR A TOTAL OF 3 DOSES.   predniSONE (DELTASONE) 5 MG tablet   Yes No   Sig: TAKE 3 TABLETS (15 MG) BY MOUTH DAILY FOR 7 DAYS, THEN 2 TABLETS (10 MG) DAILY FOR 7 DAYS, THEN 1 TABLET (5 MG) DAILY.   tamsulosin (FLOMAX) 0.4 MG capsule   No No   Sig: Take 1 capsule (0.4 mg) by mouth daily.      Facility-Administered Medications: None        Physical Exam   Vital Signs: Temp: 97.6  F (36.4  C) Temp src: Oral BP: 128/76 Pulse: 85   Resp: 16 SpO2: 95 % O2 Device: None (Room air) Oxygen Delivery: 2 LPM  Weight: 129 lbs 6.56 oz    General Appearance: AAOx3 not in distress, neck exam no thyroid mass  Respiratory: Clear anteriorly  Cardiovascular: S1-S2 only CABG scar noted  GI: Soft nontender nondistended  Skin: No erythema  Other: No edema bilaterally  Neurology is nonfocal for weakness    Medical Decision Making       7 5 MINUTES SPENT BY ME on the date of service doing chart review, history, exam, documentation & further activities per the note.      Data   Imaging results reviewed over the past 24 hrs:   Recent Results (from the past 24 hours)   CTA Chest Abdomen Pelvis w Contrast   Result Value    Radiologist flags Thyroid nodules measuring up to 3.2 cm.    Narrative    EXAM: CTA CHEST ABDOMEN PELVIS  W CONTRAST  LOCATION: [facility]  DATE: 2/21/2025    INDICATION: Right chest pain radiating towards the back and neck. Hypotension. EKG changes.  COMPARISON: CT abdomen/pelvis 2/25/2020.  TECHNIQUE: CT angiogram chest abdomen pelvis during arterial phase of injection of IV contrast. 2D and 3D MIP reconstructions were performed by the CT technologist. Dose reduction techniques were used.   CONTRAST: 75mL ISOVUE 370    FINDINGS:   CT ANGIOGRAM CHEST, ABDOMEN, AND PELVIS:   Pulmonary Arteries: The pulmonary arteries are suboptimally opacified with contrast, limiting evaluation for pulmonary embolism. Within this limitation, no central or lobar pulmonary embolism.    Thoracic Aorta: Patent. Ascending thoracic aortic aneurysm measuring 4.3 cm in diameter. Moderate atherosclerotic disease. No evidence of dissection, intramural hematoma, or penetrating ulcer.    Abdominal Aorta: Patent. Borderline ectatic abdominal aorta measuring up to 2.1 cm. Moderate atherosclerotic disease. No evidence of dissection or penetrating ulcer.    Celiac Artery: Patent.  Superior Mesenteric Artery: Patent.  Right Renal Artery: Severe stenosis at the origin. Otherwise patent.  Left Renal Artery: Patent.  Inferior Mesenteric Artery: Patent.    Right Common Iliac Artery: Patent.  Right External Iliac Artery: Patent.  Right Internal Iliac Artery: Patent.  Right Common Femoral Artery: Patent.  Proximal Right Superficial Femoral Artery: Patent.  Proximal Right Deep Femoral Artery: Patent.    Left Common Iliac Artery: Patent.  Left External Iliac Artery: Patent.  Left Internal Iliac Artery: Patent.  Left Common Femoral Artery: Patent.  Proximal Left Superficial Femoral Artery: Patent.  Proximal Left Deep Femoral Artery: Patent.    LOWER NECK: Multiple thyroid nodules, with the largest in the left thyroid lobe measuring 3.2 cm.    LUNGS AND PLEURA: Motion artifact limits evaluation. Bilateral dependent atelectasis. No focal  airspace disease. Trace bilateral pleural effusions. No pneumothorax.    MEDIASTINUM/AXILLAE: No lymphadenopathy. No pericardial effusion. Cardiomegaly.    CORONARY ARTERY CALCIFICATION: Previous intervention (stents or CABG).    HEPATOBILIARY: Normal.    PANCREAS: Normal.    SPLEEN: Normal.    ADRENAL GLANDS: Normal.    KIDNEYS/BLADDER: Multiple low-attenuation lesions in both kidneys, with the larger ones compatible with simple renal cysts for which no follow-up is indicated and the smaller ones too small to characterize. No hydronephrosis. Urinary bladder appears   normal.    BOWEL: No obstruction or inflammatory change. Normal appendix. No evidence of acute appendicitis.    LYMPH NODES: No lymphadenopathy.    PELVIC ORGANS: Enlarged prostate.    MUSCULOSKELETAL: Multilevel degenerative changes of spine. Grade 1 anterolisthesis of L3 on L4. Median sternotomy wires.      Impression    IMPRESSION:  1.  Ascending thoracic aortic aneurysm measuring 4.3 cm. Borderline ectatic abdominal aorta measuring up to 2.1 cm. No aortic dissection or acute aortic pathology.    2.  Severe right renal artery stenosis at the origin.    3.  Trace bilateral pleural effusions.    4.  No acute findings in the abdomen or pelvis.    5.  Incidental note of multiple thyroid nodules measuring up to 3.2 cm. Consider nonemergent thyroid ultrasound for characterization.      Findings in impression #1 were communicated to Frances Schwartz over the telephone by Dr. Espinosa at 2025 4:20 PM CST.      [Consider Follow Up: Thyroid nodules measuring up to 3.2 cm.]    This report will be copied to the [facility] to ensure a provider acknowledges the finding.      Cardiac Catheterization    Narrative    CARDIAC CATHETERIZATION AND INTERVENTION REPORT    PATIENT NAME:  Jessica Healy          MEDICAL RECORD NUMBER: [id number]  : 1945       PROCEDURE DATE: 2025        CONCLUSIONS:   Severe multivessel coronary artery disease  involving the distal left main   and chronic total occlusions of the left anterior descending and right   coronary artery and severe diffuse disease of the left circumflex system.  Patent LIMA to the LAD which provides collaterals to the right coronary   artery and left circumflex system.   Occluded vein grafts to the right coronary artery and left circumflex   system.      RECOMMENDATIONS:   Usual postprocedure cares, please see orders.  Recommend medical management of patient's coronary artery disease.    Overall unchanged since 2016.  Aggressive risk factor modification for secondary prevention.    PROCEDURE PERFORMED:   Selective right and left coronary angiography  Coronary artery bypass angiography    PRE-OP DIAGNOSIS:  High risk NSTEMI    POST-OP DIAGNOSIS:   High risk NSTEMI    PROCEDURALISTS: Troy Forbes MD      DIAGNOSTIC PROCEDURAL DETAILS:   Signed, informed consent was obtained. The patient was placed on the table   and prepped and draped in the usual fashion. Time out and site   verification performed.   Access: Right common femoral artery  Catheters: JL 4, JR4, VIKI  Hemostasis: Perclose ProGlide suture    PROCEDURAL MEDICATIONS:  Conscious sedation - midazolam and fentanyl, please see procedure log for   dosing.   Local anesthesia - 1% lidocaine   Procedural anticoagulation - none    CONTRAST VOLUME: 60 mL Visipaque  BLOOD LOSS: minimal   FLUIDS: None   SPECIMENS: None  COMPLICATIONS: None    Coronary Angiography:  LEFT MAIN: Normal caliber vessel that bifurcates into left anterior   descending and left circumflex arteries.  The proximal left main has mild   disease followed by severe, ulcerated diffuse disease in the distal   portion.  LEFT ANTERIOR DESCENDING: Chronically occluded, fills distally via LIMA   graft.  LEFT CIRCUMFLEX: Nondominant vessel that gives rise to an OM1 branch, and   OM 2 branch, and OM 3 branch and terminates into a small vessel distally.    The left circumflex and  branches have severe diffuse disease.  RIGHT CORONARY ARTERY: Chronically occluded.    Coronary Artery Bypass Angiography:   LIMA to the LAD: Widely patent with mild disease of the native vessel   beyond the anastomosis.  There is retrograde flow into the proximal to mid   LAD.  There are also collaterals to the left circumflex system and right   coronary artery from the  LAD.     Vein grafts to the left circumflex system and right coronary artery are   chronically occluded.    Please do not hesitate to contact me if you have any questions or   concerns. I was present for the procedure in its entirety. Moderate   conscious sedation at level 3-4 with fentanyl and midazolam was   administered, and I spent continuous face to face time with the patient   which encompassed the duration of the procedure.  Please refer to the   sedation flow sheet for additional information.  I have reviewed and agree   with the documentation provided in the RN sedation flow sheet as outlined.   I concluded sedation and recovery was monitored by the trained independent   observer. I attest that I have ordered all medications administered,   equipment used, and tests performed during the procedure.    Troy Forbes MD  Interventional Cardiology

## 2025-02-22 NOTE — PLAN OF CARE
Goal Outcome Evaluation:      Plan of Care Reviewed With: patient, family    Overall Patient Progress: improvingOverall Patient Progress: improving     [facility] - ICU    RN Progress Note:            Pertinent Assessments:      Please refer to flowsheet rows for full assessment     Patient reported mild right sided chest pain that was relieved with Tylenol.  Vital signs were stable and right groin site was soft and without hematoma.  Monitor showed SR with 1st degree AV block and ST elevation in V2.              Key Events - This Shift:            RN Managed Protocols Orders: No                Barriers to Discharge / Downgrade:              Point of Contact Update: YES-OR-NO: Yes  Patient's son and daughter updated in room.

## 2025-02-22 NOTE — PLAN OF CARE
"Goal Outcome Evaluation:      Plan of Care Reviewed With: patient    Overall Patient Progress: improvingOverall Patient Progress: improving    Outcome Evaluation: Denied chest pain. Groin site ecchymotic, but soft without hematoma present. Pulses palpable.    [facility] - ICU    RN Progress Note:            Pertinent Assessments:      Please refer to flowsheet rows for full assessment     VSS. Afebrile. NSR with 1st degree AVB. Hypertensive. PRN hydralazine given per MAR. A&O x4. Neuro intact. On RA. LS diminished clear. Denied chest pain. Voided without difficulty. Groin site ecchymotic, but without hematoma. Pulses palpable.            Key Events - This Shift:       Wound noted on left thumb. Patient had it wrapped in plastic grocery bag and duct tape with white substance coating wound. Patient stated that it was \"medication to help with pain\" and that this wound was \"from gout.\" Cleansed wound and applied new dressing. MD updated and WOC consult ordered.       RN Managed Protocols Ordered:  No                Barriers to Downgrade:     None.          Point of Contact Update: YES-OR-NO: Yes  If No, reason:   Name: Phoebe Flood   Phone Number: Listed   Summary of Conversation: Updated about plan of care at bedside. All questions answered.          "

## 2025-02-23 ENCOUNTER — ORDERS ONLY (AUTO-RELEASED) (OUTPATIENT)
Dept: MEDSURG UNIT | Facility: HOSPITAL | Age: 80
End: 2025-02-23
Payer: MEDICARE

## 2025-02-23 VITALS
DIASTOLIC BLOOD PRESSURE: 66 MMHG | HEART RATE: 59 BPM | BODY MASS INDEX: 22.09 KG/M2 | TEMPERATURE: 98.6 F | WEIGHT: 124.7 LBS | OXYGEN SATURATION: 99 % | RESPIRATION RATE: 18 BRPM | HEIGHT: 63 IN | SYSTOLIC BLOOD PRESSURE: 126 MMHG

## 2025-02-23 DIAGNOSIS — I48.91 NEW ONSET ATRIAL FIBRILLATION (H): ICD-10-CM

## 2025-02-23 LAB
ANION GAP SERPL CALCULATED.3IONS-SCNC: 8 MMOL/L (ref 7–15)
BUN SERPL-MCNC: 27.4 MG/DL (ref 8–23)
CALCIUM SERPL-MCNC: 9.1 MG/DL (ref 8.8–10.4)
CHLORIDE SERPL-SCNC: 109 MMOL/L (ref 98–107)
CHOLEST SERPL-MCNC: 196 MG/DL
CREAT SERPL-MCNC: 1.58 MG/DL (ref 0.67–1.17)
EGFRCR SERPLBLD CKD-EPI 2021: 45 ML/MIN/1.73M2
ERYTHROCYTE [DISTWIDTH] IN BLOOD BY AUTOMATED COUNT: 13.6 % (ref 10–15)
GLUCOSE SERPL-MCNC: 160 MG/DL (ref 70–99)
HCO3 SERPL-SCNC: 21 MMOL/L (ref 22–29)
HCT VFR BLD AUTO: 33.1 % (ref 40–53)
HDLC SERPL-MCNC: 42 MG/DL
HGB BLD-MCNC: 10.8 G/DL (ref 13.3–17.7)
LDLC SERPL CALC-MCNC: 127 MG/DL
MCH RBC QN AUTO: 32.1 PG (ref 26.5–33)
MCHC RBC AUTO-ENTMCNC: 32.6 G/DL (ref 31.5–36.5)
MCV RBC AUTO: 99 FL (ref 78–100)
NONHDLC SERPL-MCNC: 154 MG/DL
PLATELET # BLD AUTO: 153 10E3/UL (ref 150–450)
POTASSIUM SERPL-SCNC: 3.7 MMOL/L (ref 3.4–5.3)
RBC # BLD AUTO: 3.36 10E6/UL (ref 4.4–5.9)
SODIUM SERPL-SCNC: 138 MMOL/L (ref 135–145)
TRIGL SERPL-MCNC: 135 MG/DL
WBC # BLD AUTO: 6.2 10E3/UL (ref 4–11)

## 2025-02-23 PROCEDURE — 250N000012 HC RX MED GY IP 250 OP 636 PS 637: Performed by: INTERNAL MEDICINE

## 2025-02-23 PROCEDURE — 250N000013 HC RX MED GY IP 250 OP 250 PS 637: Performed by: INTERNAL MEDICINE

## 2025-02-23 PROCEDURE — 85014 HEMATOCRIT: CPT | Performed by: INTERNAL MEDICINE

## 2025-02-23 PROCEDURE — 80048 BASIC METABOLIC PNL TOTAL CA: CPT | Performed by: INTERNAL MEDICINE

## 2025-02-23 PROCEDURE — 82565 ASSAY OF CREATININE: CPT | Performed by: INTERNAL MEDICINE

## 2025-02-23 PROCEDURE — 36415 COLL VENOUS BLD VENIPUNCTURE: CPT | Performed by: INTERNAL MEDICINE

## 2025-02-23 PROCEDURE — 99239 HOSP IP/OBS DSCHRG MGMT >30: CPT | Performed by: INTERNAL MEDICINE

## 2025-02-23 PROCEDURE — 99232 SBSQ HOSP IP/OBS MODERATE 35: CPT | Performed by: INTERNAL MEDICINE

## 2025-02-23 PROCEDURE — 82465 ASSAY BLD/SERUM CHOLESTEROL: CPT | Performed by: INTERNAL MEDICINE

## 2025-02-23 RX ORDER — PANTOPRAZOLE SODIUM 20 MG/1
20 TABLET, DELAYED RELEASE ORAL
Qty: 30 TABLET | Refills: 1 | Status: SHIPPED | OUTPATIENT
Start: 2025-02-24

## 2025-02-23 RX ORDER — CLOPIDOGREL BISULFATE 75 MG/1
TABLET ORAL
Status: SHIPPED
Start: 2025-02-23

## 2025-02-23 RX ORDER — ROSUVASTATIN CALCIUM 40 MG/1
40 TABLET, COATED ORAL DAILY
Qty: 30 TABLET | Refills: 1 | Status: SHIPPED | OUTPATIENT
Start: 2025-02-23

## 2025-02-23 RX ORDER — METOPROLOL TARTRATE 25 MG/1
25 TABLET, FILM COATED ORAL 2 TIMES DAILY
Qty: 60 TABLET | Refills: 1 | Status: SHIPPED | OUTPATIENT
Start: 2025-02-23

## 2025-02-23 RX ADMIN — OXYCODONE HYDROCHLORIDE 5 MG: 5 TABLET ORAL at 00:39

## 2025-02-23 RX ADMIN — CLOPIDOGREL BISULFATE 75 MG: 75 TABLET ORAL at 09:30

## 2025-02-23 RX ADMIN — PREDNISONE 10 MG: 5 TABLET ORAL at 09:30

## 2025-02-23 RX ADMIN — EZETIMIBE 10 MG: 10 TABLET ORAL at 09:30

## 2025-02-23 RX ADMIN — ISOSORBIDE MONONITRATE 120 MG: 60 TABLET, EXTENDED RELEASE ORAL at 09:29

## 2025-02-23 RX ADMIN — ACETAMINOPHEN 650 MG: 325 TABLET ORAL at 00:39

## 2025-02-23 RX ADMIN — TAMSULOSIN HYDROCHLORIDE 0.4 MG: 0.4 CAPSULE ORAL at 09:30

## 2025-02-23 RX ADMIN — METOPROLOL TARTRATE 25 MG: 25 TABLET, FILM COATED ORAL at 09:30

## 2025-02-23 RX ADMIN — PANTOPRAZOLE SODIUM 20 MG: 20 TABLET, DELAYED RELEASE ORAL at 07:03

## 2025-02-23 RX ADMIN — LOSARTAN POTASSIUM 100 MG: 50 TABLET, FILM COATED ORAL at 09:30

## 2025-02-23 RX ADMIN — ASPIRIN 325 MG: 325 TABLET, COATED ORAL at 09:30

## 2025-02-23 RX ADMIN — ATORVASTATIN CALCIUM 80 MG: 40 TABLET, FILM COATED ORAL at 09:30

## 2025-02-23 ASSESSMENT — ACTIVITIES OF DAILY LIVING (ADL)
ADLS_ACUITY_SCORE: 36

## 2025-02-23 NOTE — PLAN OF CARE
Problem: Chest Pain  Goal: Resolution of Chest Pain Symptoms  Outcome: Progressing   Goal Outcome Evaluation:       Pt is alert and oriented x 4, denies chest pain or SOB. Lungs sounds clear on RA. HR in the 60's, NSR with first degree AVB. SBA with transfer using cane.  BP went up to 168/91, Hydralazine given at 2122. BP came down to 127/62 one hour after Hydralazine was given.HS cares done.

## 2025-02-23 NOTE — PLAN OF CARE
"Discharge instructions reviewed with patient and dtr at bedside. Questions and concerns addressed. All personal belongings at bedside packed. Patient denied CP, SOB, dizziness, palpitations. Was eager to discharge home today. Stable. Discharged home.       Problem: Adult Inpatient Plan of Care  Goal: Plan of Care Review  Description: The Plan of Care Review/Shift note should be completed every shift.  The Outcome Evaluation is a brief statement about your assessment that the patient is improving, declining, or no change.  This information will be displayed automatically on your shift  note.  Outcome: Met  Goal: Patient-Specific Goal (Individualized)  Description: You can add care plan individualizations to a care plan. Examples of Individualization might be:  \"Parent requests to be called daily at 9am for status\", \"I have a hard time hearing out of my right ear\", or \"Do not touch me to wake me up as it startles  me\".  Outcome: Met  Goal: Absence of Hospital-Acquired Illness or Injury  Outcome: Met  Intervention: Identify and Manage Fall Risk  Recent Flowsheet Documentation  Taken 2/23/2025 0900 by Nicole Steinberg RN  Safety Promotion/Fall Prevention:   activity supervised   assistive device/personal items within reach   clutter free environment maintained   nonskid shoes/slippers when out of bed   mobility aid in reach   patient and family education   room near nurse's station   safety round/check completed  Intervention: Prevent Skin Injury  Recent Flowsheet Documentation  Taken 2/23/2025 1000 by Nicole Steinberg RN  Body Position: position changed independently  Taken 2/23/2025 0922 by Nicole Steinberg RN  Body Position: position changed independently  Taken 2/23/2025 0900 by Nicole Steinberg RN  Skin Protection:   adhesive use limited   tubing/devices free from skin contact  Intervention: Prevent and Manage VTE (Venous Thromboembolism) Risk  Recent Flowsheet Documentation  Taken 2/23/2025 0900 by Nicole Steinberg RN  VTE " Prevention/Management: (up ad richi in room) SCDs off (sequential compression devices)  Intervention: Prevent Infection  Recent Flowsheet Documentation  Taken 2/23/2025 0900 by Nicole Steinberg RN  Infection Prevention:   environmental surveillance performed   rest/sleep promoted   hand hygiene promoted  Goal: Optimal Comfort and Wellbeing  Outcome: Met  Intervention: Provide Person-Centered Care  Recent Flowsheet Documentation  Taken 2/23/2025 0900 by Nicole Steinberg RN  Trust Relationship/Rapport:   care explained   choices provided   emotional support provided   empathic listening provided   questions answered   reassurance provided   questions encouraged   thoughts/feelings acknowledged  Goal: Readiness for Transition of Care  Outcome: Met     Problem: Risk for Delirium  Goal: Optimal Coping  Outcome: Met  Intervention: Optimize Psychosocial Adjustment to Delirium  Recent Flowsheet Documentation  Taken 2/23/2025 0900 by Nicole Steinberg RN  Family/Support System Care: involvement promoted  Goal: Improved Behavioral Control  Outcome: Met  Intervention: Prevent and Manage Agitation  Recent Flowsheet Documentation  Taken 2/23/2025 0900 by Nicole Steinberg RN  Environment Familiarity/Consistency:   daily routine followed   familiar objects from home provided  Intervention: Minimize Safety Risk  Recent Flowsheet Documentation  Taken 2/23/2025 0900 by Nicole Steinberg RN  Enhanced Safety Measures:   family to remain at bedside   pain management   monitor patients coagulation values   patient/family teach back on injury risk   Pre/Post Op education on fall prevention   review medications for side effects with activity   room near unit station  Trust Relationship/Rapport:   care explained   choices provided   emotional support provided   empathic listening provided   questions answered   reassurance provided   questions encouraged   thoughts/feelings acknowledged  Goal: Improved Attention and Thought Clarity  Outcome:  Met  Intervention: Maximize Cognitive Function  Recent Flowsheet Documentation  Taken 2/23/2025 0900 by Nicole Steinberg RN  Sensory Stimulation Regulation: care clustered  Reorientation Measures:   clock in view   calendar in view   familiar social contact encouraged   glasses use encouraged  Goal: Improved Sleep  Outcome: Met     Problem: Skin Injury Risk Increased  Goal: Skin Health and Integrity  Outcome: Met  Intervention: Plan: Nurse Driven Intervention: Friction and Shear  Recent Flowsheet Documentation  Taken 2/23/2025 0900 by Nicole Steinberg RN  Friction/Shear Interventions: HOB 30 degrees or less  Intervention: Optimize Skin Protection  Recent Flowsheet Documentation  Taken 2/23/2025 1000 by Nicole Steinberg RN  Activity Management: ambulated to bathroom  Taken 2/23/2025 0922 by Nicole Steinberg RN  Head of Bed (HOB) Positioning: HOB at 45 degrees  Taken 2/23/2025 0900 by Nicole Steinberg RN  Pressure Reduction Techniques: frequent weight shift encouraged  Skin Protection:   adhesive use limited   tubing/devices free from skin contact  Activity Management: activity adjusted per tolerance     Problem: Fall Injury Risk  Goal: Absence of Fall and Fall-Related Injury  Outcome: Met  Intervention: Identify and Manage Contributors  Recent Flowsheet Documentation  Taken 2/23/2025 0900 by Nicole Steinberg RN  Medication Review/Management: medications reviewed  Intervention: Promote Injury-Free Environment  Recent Flowsheet Documentation  Taken 2/23/2025 0900 by Nicole Steinberg RN  Safety Promotion/Fall Prevention:   activity supervised   assistive device/personal items within reach   clutter free environment maintained   nonskid shoes/slippers when out of bed   mobility aid in reach   patient and family education   room near nurse's station   safety round/check completed     Problem: Cardiac Catheterization (Diagnostic/Interventional)  Goal: Absence of Bleeding  Outcome: Met  Goal: Absence of Contrast-Induced Injury  Outcome:  Met  Goal: Stable Heart Rate and Rhythm  Outcome: Met  Goal: Absence of Embolism Signs and Symptoms  Outcome: Met  Intervention: Prevent or Manage Embolism  Recent Flowsheet Documentation  Taken 2/23/2025 0900 by Nicole Steinberg RN  VTE Prevention/Management: (up ad richi in room) SCDs off (sequential compression devices)  Goal: Anesthesia/Sedation Recovery  Outcome: Met  Intervention: Optimize Anesthesia Recovery  Recent Flowsheet Documentation  Taken 2/23/2025 0900 by Nicole Steinberg RN  Safety Promotion/Fall Prevention:   activity supervised   assistive device/personal items within reach   clutter free environment maintained   nonskid shoes/slippers when out of bed   mobility aid in reach   patient and family education   room near nurse's station   safety round/check completed  Reorientation Measures:   clock in view   calendar in view   familiar social contact encouraged   glasses use encouraged  Goal: Optimal Pain Control and Function  Outcome: Met  Goal: Absence of Vascular Access Complication  Outcome: Met  Intervention: Prevent and Manage Access Complications  Recent Flowsheet Documentation  Taken 2/23/2025 1000 by Nicole Steinberg RN  Activity Management: ambulated to bathroom  Taken 2/23/2025 0922 by Nicole Steinberg RN  Head of Bed (HOB) Positioning: HOB at 45 degrees  Taken 2/23/2025 0900 by Nicole Steinberg RN  Activity Management: activity adjusted per tolerance     Problem: Chest Pain  Goal: Resolution of Chest Pain Symptoms  Outcome: Met     Problem: Comorbidity Management  Goal: Blood Pressure in Desired Range  Outcome: Met  Intervention: Maintain Blood Pressure Management  Recent Flowsheet Documentation  Taken 2/23/2025 0900 by Nicole Steinberg RN  Medication Review/Management: medications reviewed   Goal Outcome Evaluation:

## 2025-02-23 NOTE — PROGRESS NOTES
Cardiology Progress Note    Assessment/Plan:  1.  CAD, status post NSTEMI secondary to demand ischemia in the setting of atrial fibrillation with rapid ventricular response.  Emergent coronary angiography for possible STEMI demonstrated no acute culprit vessel.  Patient with severe diffuse disease which is unchanged from previous angiogram.  Placed on increased dose of metoprolol.  Continue high dose Imdur.  He reports resolution of chest discomfort today  2.  Paroxysmal atrial fibrillation with rapid ventricular response, resolved spontaneously in the ED.  Increased metoprolol to tartrate to 50 mg twice daily.  Given elevated, recommend initiation of DOAC.  [id number] score so should be initiated on DOAC.  This could be started after his eye surgery.  3.  Essential hypertension, well-controlled  4.  Hyperlipidemia, on high-dose atorvastatin therapy with LDL of 198 back in September.  Patient states he is taking his statin therapy.  Will add lipid profile on to labs drawn this morning.  If his LDL is high, would favor switch to rosuvastatin 40 mg daily.    Primary cardiologist: Dr. Gilberto Cordon    Subjective:  Patient without any complaints of chest discomfort or shortness of breath today.  Would like to go home.    Current Facility-Administered Medications   Medication Dose Route Frequency Provider Last Rate Last Admin    aspirin (ASA) EC tablet 325 mg  325 mg Oral Daily Bairagi, Dallas B, MBBS   325 mg at 02/23/25 0930    atorvastatin (LIPITOR) tablet 80 mg  80 mg Oral Daily Bairagi, Dallas B, MBBS   80 mg at 02/23/25 0930    clopidogrel (PLAVIX) tablet 75 mg  75 mg Oral Daily Bairagi, Dallas B, MBBS   75 mg at 02/23/25 0930    ezetimibe (ZETIA) tablet 10 mg  10 mg Oral Daily Bairagi, Dallas B, MBBS   10 mg at 02/23/25 0930    isosorbide mononitrate (IMDUR) 24 hr tablet 120 mg  120 mg Oral Daily Bairagi, Dallas B, MBBS   120 mg at 02/23/25 0929    losartan (COZAAR) tablet 100 mg  100 mg Oral Daily Bairagi,  Dallas TOLENTINO, MBBS   100 mg at 02/23/25 0930    metoprolol tartrate (LOPRESSOR) tablet 25 mg  25 mg Oral BID Lindy Rosen MD   25 mg at 02/23/25 0930    pantoprazole (PROTONIX) EC tablet 20 mg  20 mg Oral QAM AC Dallas Smith B, MBBS   20 mg at 02/23/25 0703    predniSONE (DELTASONE) tablet 10 mg  10 mg Oral Daily Dallas Smith, MBBS   10 mg at 02/23/25 0930    tamsulosin (FLOMAX) capsule 0.4 mg  0.4 mg Oral Daily Dallas Smith, MBBS   0.4 mg at 02/23/25 0930         Objective:   Vital signs in last 24 hours:  Temp:  [97.6  F (36.4  C)-98.6  F (37  C)] 98.6  F (37  C)  Pulse:  [58-76] 59  Resp:  [16-18] 18  BP: (115-172)/(57-91) 126/66  SpO2:  [94 %-99 %] 99 %  Weight:        Review of Systems:  Negative    Physical Exam:  General appearance: alert, cooperative, no distress   Head: Normocephalic, without obvious abnormality, atraumatic  Neck: no JVD   Lungs: clear to auscultation bilaterally   Heart: Regular rate and rhythm.  S1, S2 normal.  No murmur or gallop  Extremities: No peripheral edema    Cardiographics (personally reviewed):   Telemetry demonstrates sinus rhythm to sinus bradycardia    Imaging (personally reviewed):   No new imaging    Lab Results (personally reviewed):     Recent Labs   Lab Test 09/30/24  0937   CHOL 274*   HDL 52   *   TRIG 118     Recent Labs   Lab Test 09/30/24  0937 10/13/23  1555 07/07/23  1529   * 157* 142*     Recent Labs   Lab Test 02/23/25  0832      POTASSIUM 3.7   CHLORIDE 109*   CO2 21*   *   BUN 27.4*   CR 1.58*   GFRESTIMATED 45*   ANISH 9.1     Recent Labs   Lab Test 02/23/25  0832 02/22/25  0416 02/21/25  1526   CR 1.58* 1.51* 2.0*     Recent Labs   Lab Test 01/19/24  1642 07/07/23  1529 02/10/23  1522   A1C 5.9* 5.9* 5.7*          Recent Labs   Lab Test 02/23/25  0832   WBC 6.2   HGB 10.8*   HCT 33.1*   MCV 99        Recent Labs   Lab Test 02/23/25  0832 02/22/25  0416 02/21/25  1455   HGB 10.8* 10.0* 10.7*    Recent Labs   Lab Test  03/15/22  2226 03/15/22  1937 03/06/20  1948   TROPONINI 0.02 0.02 0.03     Recent Labs   Lab Test 03/06/20  0821   *     Recent Labs   Lab Test 02/21/25  1705   TSH 0.25*     Recent Labs   Lab Test 02/21/25  1455 03/15/22  1937 03/07/20  0000   INR 0.94 1.05 0.98          Lindy Rosen MD

## 2025-02-23 NOTE — DISCHARGE SUMMARY
[facility] MEDICINE  DISCHARGE SUMMARY     Primary Care Physician: Laura Rincon  Admission Date: 2/21/2025   Discharge Provider: JUDY Zee Discharge Date: 2/23/2025   Diet: as below   Code Status: Full Code   Activity: DCACTIVITY: Activity as tolerated        Condition at Discharge: Stable     REASON FOR PRESENTATION(See Admission Note for Details)   Chest pain    PRINCIPAL & ACTIVE DISCHARGE DIAGNOSES     Active Problems:    Renal insufficiency    New onset atrial fibrillation (H)    ST elevation MI (STEMI) (H)    ST elevation myocardial infarction (STEMI), unspecified artery (H)    Chest pain, unspecified type    ACS (acute coronary syndrome) (H)      PENDING LABS     Unresulted Labs Ordered in the Past 30 Days of this Admission       No orders found from 1/22/2025 to 2/22/2025.              PROCEDURES ( this hospitalization only)      Procedure(s):  Coronary Angiogram  Coronary Angiogram Graft    RECOMMENDATIONS TO OUTPATIENT PROVIDER FOR F/U VISIT     Follow-up Appointments       Follow Up      Follow up with Cardiology clinic in 4-8 wks or as planned  Follow up with Ophthalmologist as planned for left eye Pterygium resection. Follow the instructions from eye doctor on when to hold/start Plavix and Eliquis.        Hospital Follow-up with Existing Primary Care Provider (PCP)      Please see details below         Schedule Primary Care visit within: 30 Days                   DISPOSITION     Home    SUMMARY OF HOSPITAL COURSE:      Jessica Healy is a 79 year old male with a pertinent history of CAD, s/p CABG, HTN, dyslipidemia, CVA history, CKD3 and post herpetic neuralgia, who presented to this ED via walk-in with his son for evaluation of chest pain. Patient in the ED was found A-fib with RVR, also mildly hypotensive.  He had a CTA chest which was negative for aortic dissection.  Repeat EKG shows some ST segment changes and Cath Lab was urgently  activated. No new changes noted on the coronary angiogram. Admitted in ICU post angiogram     Chest pain  Elevated troponin, likely Type II ischemia (demand ischemia)  -S/P urgent coronary angiogram 2/21 that showed: severe CAD with chronically occluded native coronaries that are effectively supplied by LIMA to the LAD with collaterals to the Lcx and RCA - unchanged from prior. Cardiology recommended medical management  -Continue Plavix, statin therapy, Imdur. Discussed with cardiology. Discharging home on plavix and eliquis (for afib), no need for aspirin. Patient is having left eye surgery later this wk per daughter. Advised to follow the instructions on holding/resuming the plavix and eliquis from the ophthalmologist.   -Cardiology consulted  -Echo- EF 55-60%, hypokinesis of the basal to mid anterolateral wall     Atrial fibrillation with rapid ventricular response, complicated by transient hypotension and demand MI  -Now in normal sinus rhythm at bedside  -Cardiology started beta-blocker, recommend Zio patch at discharge  -Echo as above  -Given elevated CHADS-Vasc score, cardiology recommended starting Eliquis. Prescription for Eliquis sent to pharmacy, but tatient/family advised to check with ophthalmologist when is it ok to safely start the medication     H/O ischemic cardiomyopathy, resolved with medical therapy  -Cont home meds  -Echo pending      Hypertension   Hyperlipidemia  Continue home meds. Lipitor changed to Crestor per cardiology. Cont Zetia  -Resumed losartan     Chronic kidney disease, baseline creatinine around 1.8  Chronic anemia due to CKD  -Stable  -Avoid nephrotoxins     Peripheral vascular disease with renal artery stenosis  -Renal ultrasound revealed hemodynamically significant stenosis (>60%).   -Vascular surgery and nephrology consulted. No significant benefits of revascularization of LUCINA per nephrologist     Prior CVA no residual neuro deficits     Ascending aortic aneurysm, stable on  CTA  -Follow-up in primary care     Gout not currently symptomatic, PCP follow-up     Thyroid nodules, incidentally noted on current CT  PCP follow-up check TSH    Discharge Medications with Med changes:     Current Discharge Medication List        START taking these medications    Details   apixaban ANTICOAGULANT (ELIQUIS ANTICOAGULANT) 2.5 MG tablet Take 1 tablet (2.5 mg) by mouth 2 times daily.  Qty: 60 tablet, Refills: 2    Comments: Please check with eye doctor before starting this medication.  Associated Diagnoses: New onset atrial fibrillation (H)      metoprolol tartrate (LOPRESSOR) 25 MG tablet Take 1 tablet (25 mg) by mouth 2 times daily.  Qty: 60 tablet, Refills: 1    Associated Diagnoses: Coronary artery disease due to lipid rich plaque      pantoprazole (PROTONIX) 20 MG EC tablet Take 1 tablet (20 mg) by mouth every morning (before breakfast).  Qty: 30 tablet, Refills: 1    Associated Diagnoses: Other chest pain      rosuvastatin (CRESTOR) 40 MG tablet Take 1 tablet (40 mg) by mouth daily.  Qty: 30 tablet, Refills: 1    Associated Diagnoses: Dyslipidemia           CONTINUE these medications which have CHANGED    Details   clopidogrel (PLAVIX) 75 MG tablet TAKE 1 TABLET(75 MG) BY MOUTH DAILY    Comments: Hold for eye surgery when appropriate per Ophthalmologist's recommendation.  Associated Diagnoses: TIA (transient ischemic attack)           CONTINUE these medications which have NOT CHANGED    Details   ezetimibe (ZETIA) 10 MG tablet TAKE 1 TABLET(10 MG) BY MOUTH DAILY  Qty: 90 tablet, Refills: 3    Associated Diagnoses: Hyperlipidemia LDL goal <70      isosorbide mononitrate CR (IMDUR) 120 MG 24 HR ER tablet TAKE 1 TABLET(120 MG) BY MOUTH DAILY  Qty: 90 tablet, Refills: 3    Associated Diagnoses: Coronary artery disease involving coronary bypass graft of native heart with angina pectoris      losartan (COZAAR) 100 MG tablet Take 1 tablet (100 mg) by mouth daily.  Qty: 90 tablet, Refills: 3     Associated Diagnoses: Hypertension goal BP (blood pressure) < 130/80      nitroGLYcerin (NITROSTAT) 0.4 MG sublingual tablet PLACE 1 TABLET UNDER THE TONGUE AT THE ONSET OF CHEST PAIN. MAY REPEAT EVERY 5 MINUTES AS NEEDED FOR A TOTAL OF 3 DOSES.  Qty: 25 tablet, Refills: 3    Associated Diagnoses: Coronary artery disease involving coronary bypass graft of native heart with angina pectoris      predniSONE (DELTASONE) 10 MG tablet Take 10 mg by mouth daily.      tamsulosin (FLOMAX) 0.4 MG capsule Take 1 capsule (0.4 mg) by mouth daily.  Qty: 90 capsule, Refills: 3    Associated Diagnoses: Benign prostatic hyperplasia with weak urinary stream      gatifloxacin (ZYMAXID) 0.5 % ophthalmic solution INSTILL 1 DROP INTO THE LEFT EYE 4 TIMES DAILY. START 1 DAY PRIOR TO SURGERY UNTIL DONE // 1 ZAUG FELIBERTO 1 NCOS SHAHRAM LUB QHOV MUAG SAB LAUJ, 1 HNUB SIV 4 ZAUG      ketorolac (ACULAR) 0.5 % ophthalmic solution INSTILL 1 DROP INTO THE LEFT EYE TWICE DAILY. START 1 DAY PRIOR TO SURGERY AND CONTINUE UNTIL DONE // IB ZAUG FELIBERTO IB NCOS SHAHRAM LUB PHOV MUAG PHAB LAUG.      ORDER FOR DME Home Blood pressure monitor machine  Qty: 1 each, Refills: 0    Associated Diagnoses: Hypertension goal BP (blood pressure) < 130/80           STOP taking these medications       aspirin (ASA) 325 MG EC tablet Comments:   Reason for Stopping:         atorvastatin (LIPITOR) 80 MG tablet Comments:   Reason for Stopping:                     Rationale for medication changes:      Please see above        Consults   Cardiology, nephrology and vascular surgery.       Immunizations given this encounter     Most Recent Immunizations   Administered Date(s) Administered    COVID-19 12+ (Pfizer) 10/28/2024    COVID-19 Monovalent 12+ (Pfizer 2022) 10/13/2022    Flu, Unspecified 11/15/2019    Influenza (High Dose) Trivalent,PF (Fluzone) 10/28/2024    Influenza (IIV3) PF 12/30/2011    Influenza (intradermal) 11/15/2019    Influenza Vaccine (Flucelvax Quadrivalent) 10/13/2022     Influenza Vaccine 65+ (Fluzone HD) 10/13/2023    Pneumo Conj 13-V (2010&after) 09/25/2014    Pneumococcal 23 valent 09/25/2014    RSV Vaccine (Abrysvo) 01/25/2024    TDAP (Adacel,Boostrix) 02/10/2023    TDAP Vaccine (Adacel) 12/30/2011           Anticoagulation Information      Recent INR results:   Recent Labs   Lab 02/21/25  1455   INR 0.94     Warfarin doses (if applicable) or name of other anticoagulant: NA      SIGNIFICANT IMAGING FINDINGS     Results for orders placed or performed during the hospital encounter of 02/21/25   CTA Chest Abdomen Pelvis w Contrast   Result Value Ref Range    Radiologist flags Thyroid nodules measuring up to 3.2 cm.     Impression    IMPRESSION:  1.  Ascending thoracic aortic aneurysm measuring 4.3 cm. Borderline ectatic abdominal aorta measuring up to 2.1 cm. No aortic dissection or acute aortic pathology.    2.  Severe right renal artery stenosis at the origin.    3.  Trace bilateral pleural effusions.    4.  No acute findings in the abdomen or pelvis.    5.  Incidental note of multiple thyroid nodules measuring up to 3.2 cm. Consider nonemergent thyroid ultrasound for characterization.      Findings in impression #1 were communicated to Frances Schwartz over the telephone by Dr. Espinosa at 2/21/2025 4:20 PM CST.      [Consider Follow Up: Thyroid nodules measuring up to 3.2 cm.]    This report will be copied to the [facility] to ensure a provider acknowledges the finding.      US Renal Complete w Arterial Duplex    Impression    IMPRESSION: -  1.  Elevated peak systolic velocity in the proximal right renal artery with mildly parvus tardus waveform in the distal right renal and intrarenal arteries, compatible with hemodynamically significant stenosis (>60%).  2.  Decreased size of the right kidney with mildly elevated resistive indices in the right kidney, compatible with underlying renal parenchymal change possibly related to proximal right renal artery stenosis.  3.  No  hemodynamically significant stenosis in the left renal artery.   US Carotid Bilateral    Impression    IMPRESSION:  1.  Mild plaque formation, velocities consistent with less than 50% stenosis in the right internal carotid artery.  2.  Mild plaque formation, velocities consistent with less than 50% stenosis in the left internal carotid artery.  3.  Flow within the vertebral arteries is antegrade.  4.  Increased velocity in the left external carotid artery suggestive of stenosis.   Echocardiogram Complete   Result Value Ref Range    LVEF  55-60%        SIGNIFICANT LABORATORY FINDINGS     Most Recent 3 CBC's:  Recent Labs   Lab Test 02/23/25  0832 02/22/25  0416 02/21/25  1455   WBC 6.2 8.0 9.7   HGB 10.8* 10.0* 10.7*   MCV 99 99 102*    143* 179     Most Recent 3 BMP's:  Recent Labs   Lab Test 02/23/25  0832 02/22/25  0416 02/21/25  1526 02/21/25  1455    142  --  141   POTASSIUM 3.7 4.0  --  4.0   CHLORIDE 109* 114*  --  111*   CO2 21* 20*  --  18*   BUN 27.4* 34.0*  --  40.9*   CR 1.58* 1.51* 2.0* 1.80*   ANIONGAP 8 8  --  12   ANISH 9.1 8.6*  --  9.0   * 82  --  205*     Most Recent 2 LFT's:  Recent Labs   Lab Test 09/30/24  0937 02/10/23  1522   AST 23 23   ALT 26 25   ALKPHOS 68 55   BILITOTAL 0.4 0.5     Most Recent 3 INR's:  Recent Labs   Lab Test 02/21/25  1455 03/15/22  1937 03/07/20  0000   INR 0.94 1.05 0.98         Discharge Orders        Reason for your hospital stay    Chest pain     Activity    Your activity upon discharge: activity as tolerated     Follow Up    Follow up with Cardiology clinic in 4-8 wks or as planned  Follow up with Ophthalmologist as planned for left eye Pterygium resection. Follow the instructions from eye doctor on when to hold/start Plavix and Eliquis.     Diet    Follow this diet upon discharge: Current Diet:Orders Placed This Encounter      Advance Diet as Tolerated: Regular Diet Adult; Low Fat Diet, Low Saturated Fat Na <2400mg Diet     Hospital Follow-up  "with Existing Primary Care Provider (PCP)    Please see details below            Examination   /66 (BP Location: Right arm, Patient Position: Semi-Marshall's)   Pulse 59   Temp 98.6  F (37  C) (Oral)   Resp 18   Ht 1.6 m (5' 3\")   Wt 56.6 kg (124 lb 11.2 oz)   SpO2 99%   BMI 22.09 kg/m      General: Not in obvious distress.  HEENT: NC, AT   Chest: Clear to auscultation bilaterally  Heart: S1S2 normal, regular. No M/R/G  Abdomen: Soft. NT, ND. Bowel sounds- active.  Extremities: No legs swelling  Neuro: Alert and awake, grossly non-focal  Please see EMR for more detailed significant labs, imaging, consultant notes etc.    I, JUDY Zee, personally saw the patient today and spent greater than 30 minutes discharging this patient.    JUDY Zee  [facility]    CC:Laura Rincon      "

## 2025-02-23 NOTE — PLAN OF CARE
Problem: Cardiac Catheterization (Diagnostic/Interventional)  Goal: Absence of Bleeding  Outcome: Progressing     Problem: Cardiac Catheterization (Diagnostic/Interventional)  Goal: Stable Heart Rate and Rhythm  Outcome: Progressing     Problem: Chest Pain  Goal: Resolution of Chest Pain Symptoms  Outcome: Progressing     Problem: Comorbidity Management  Goal: Blood Pressure in Desired Range  Outcome: Progressing  Intervention: Maintain Blood Pressure Management  Recent Flowsheet Documentation  Taken 2/23/2025 0452 by Mireya Contreras RN  Medication Review/Management: medications reviewed  Taken 2/23/2025 0039 by Mireya Contreras RN  Medication Review/Management: medications reviewed   Goal Outcome Evaluation:    Pt's vital signs were stable. He was in sinus rhythm with occasional PVCs on telemetry. He c/o abdominal pain at the beginning of the night and received his PRN tylenol and oxycodone which was effective. He denied any chest pain and shortness of breath overnight. His daughter was here overnight and was able to help interpret for the pt. He is a SBA with his cane. He is able to make needs known. Bed alarm on and call light within reach.

## 2025-02-23 NOTE — PLAN OF CARE
[facility] - ICU    RN Progress Note:            Pertinent Assessments:      Please refer to flowsheet rows for full assessment     Pt alertx4, lung sounds clear, on room air with SpO2 95%+. Vascular consulted and nephrology consulted r/t stenosis of right renal artery. They will be following outpatient if needed but no stent/intervention is currently needed.    Telemetry reads Normal Sinus Rhythm with First Degree AV Block.             Key Events - This Shift:       Patient Reported Chest pain of tightness and squeezing, at 0805 notified Dr. Smith, obtained orders for SL nitroglycerin.  0824 pain 5/10, gave 1st SL nitroglycerin, re-assessment pain 3/10.  0830 pain 3/10, gave 2nd SL nitroglycerin, re-assessment pain 3/10.  0838 pain 3/10, gave 3rd SL nitroglycerin, re-assessment pain 3/10.  0850 informed Dr Smith, EKG ordered, Echo completed  Dr. Rosen (cards) and Dr Smith, spoke with pt about EKG and echo results, will continue to medically manage.  1450 Left sided chest pain 4/10, gave prn po tylenol 650 mg. Reassessment no improvement.  1551 chest pain 4/10 gave 1 SL nitroglycerin, reassessment, no improvement.   Sent Dr Smith textpg, new orders obtained for prn po dilaudid 1 mg, gave med and reassessment pt reported no pain.     RN Managed Protocols Ordered:  No  Protocols:  PRN'S:  Protocols Status: N/A                Barriers to Discharge / Downgrade:     Possible discharge tomorrow. Need to figure out plan for pain prior to discharge.         Point of Contact Update: YES-OR-NO: Yes  If No, reason:   Name:  Phone Number:  Summary of Conversation: Updated family in the room of tests done, and answered questions. Family will need to ask hospitalist about plan for pain management for discharge.          Goal Outcome Evaluation:           Overall Patient Progress: improvingOverall Patient Progress: improving    Outcome Evaluation: discussed/reviewed what all consulting docs  imformed them about, including not going to do stents per nephrology, cardiology wants pt to continue to manage htn, ckd and hld with med compliance.      Problem: Chest Pain  Goal: Resolution of Chest Pain Symptoms  Outcome: Not Progressing  Intervention: Manage Acute Chest Pain  Recent Flowsheet Documentation  Taken 2/22/2025 0838 by Ainsley Barth RN  Chest Pain Intervention: (Dr handy notified of end result, remaining 3/10 chest pain, tight and squeezing) nitroglycerin SL given  Taken 2/22/2025 0830 by Ainsley Barth RN  Chest Pain Intervention: nitroglycerin SL given  Taken 2/22/2025 0824 by Ainsley Barth RN  Chest Pain Intervention: (Dr handy notified, orders obtain for nitro.) nitroglycerin SL given     Problem: Adult Inpatient Plan of Care  Goal: Plan of Care Review  Description: The Plan of Care Review/Shift note should be completed every shift.  The Outcome Evaluation is a brief statement about your assessment that the patient is improving, declining, or no change.  This information will be displayed automatically on your shift  note.  Outcome: Progressing  Flowsheets (Taken 2/22/2025 1810)  Outcome Evaluation: discussed/reviewed what all consulting docs imformed them about, including not going to do stents per nephrology, cardiology wants pt to continue to manage htn, ckd and hld with med compliance.  Overall Patient Progress: improving     Problem: Comorbidity Management  Goal: Blood Pressure in Desired Range  Outcome: Progressing  Intervention: Maintain Blood Pressure Management  Recent Flowsheet Documentation  Taken 2/22/2025 1600 by Ainsley Barth RN  Medication Review/Management: medications reviewed  Taken 2/22/2025 1200 by Ainsley Barth RN  Medication Review/Management: medications reviewed  Taken 2/22/2025 0800 by Ainsley Barth RN  Medication Review/Management: medications reviewed

## 2025-02-24 ENCOUNTER — APPOINTMENT (OUTPATIENT)
Dept: INTERPRETER SERVICES | Facility: CLINIC | Age: 80
End: 2025-02-24
Payer: MEDICARE

## 2025-02-24 ENCOUNTER — PATIENT OUTREACH (OUTPATIENT)
Dept: CARE COORDINATION | Facility: CLINIC | Age: 80
End: 2025-02-24
Payer: MEDICARE

## 2025-02-26 LAB
ATRIAL RATE - MUSE: 202 BPM
ATRIAL RATE - MUSE: 97 BPM
DIASTOLIC BLOOD PRESSURE - MUSE: 56 MMHG
DIASTOLIC BLOOD PRESSURE - MUSE: 79 MMHG
INTERPRETATION ECG - MUSE: NORMAL
INTERPRETATION ECG - MUSE: NORMAL
P AXIS - MUSE: 41 DEGREES
P AXIS - MUSE: NORMAL DEGREES
PR INTERVAL - MUSE: 220 MS
PR INTERVAL - MUSE: NORMAL MS
QRS DURATION - MUSE: 94 MS
QRS DURATION - MUSE: 96 MS
QT - MUSE: 336 MS
QT - MUSE: 362 MS
QTC - MUSE: 459 MS
QTC - MUSE: 464 MS
R AXIS - MUSE: 37 DEGREES
R AXIS - MUSE: 71 DEGREES
SYSTOLIC BLOOD PRESSURE - MUSE: 113 MMHG
SYSTOLIC BLOOD PRESSURE - MUSE: 135 MMHG
T AXIS - MUSE: 25 DEGREES
T AXIS - MUSE: 8 DEGREES
VENTRICULAR RATE- MUSE: 115 BPM
VENTRICULAR RATE- MUSE: 97 BPM

## 2025-02-26 NOTE — PROGRESS NOTES
Clinic Care Coordination Contact  Clinic Care Coordination Contact  OUTREACH with Post Discharge Assessment    Referral Information:  Referral Source: PCP    Primary Diagnosis: Cardiovascular - other    Chief Complaint   Patient presents with    Clinic Care Coordination - Post Hospital        Universal Utilization: see below  Clinic Utilization  Difficulty keeping appointments:: No  Compliance Concerns: No  No-Show Concerns: No  No PCP office visit in Past Year: No  Utilization      No Show Count (past year)  1             ED Visits  1             Hospital Admissions  1                    Current as of: 2/26/2025 10:33 AM                Clinical Concerns:  Current Medical Concerns:    Patient Active Problem List   Diagnosis    CAD (coronary artery disease)    Hypertension goal BP (blood pressure) < 130/80    Dyslipidemia    Cerebrovascular accident (CVA), unspecified mechanism (H)    CKD (chronic kidney disease) stage 3, GFR 30-59 ml/min (H)    Kidney cyst, acquired    Stroke-like symptoms    Post herpetic neuralgia    Lower urinary tract symptoms due to benign prostatic hyperplasia    Moderate recurrent major depression (H)    Renal insufficiency    New onset atrial fibrillation (H)    ST elevation MI (STEMI) (H)    ST elevation myocardial infarction (STEMI), unspecified artery (H)    Chest pain, unspecified type    ACS (acute coronary syndrome) (H)         Current Behavioral Concerns: none    Education Provided to patient: yes   Pain  Pain (GOAL):: No  Health Maintenance Reviewed: Up to date  Clinical Pathway:     Transitions of Care Outreach    Most Recent Admission Date: 2/21/2025   Most Recent Admission Diagnosis: Renal insufficiency - N28.9  New onset atrial fibrillation (H) - I48.91  ST elevation MI (STEMI) (H) - I21.3  ST elevation myocardial infarction (STEMI), unspecified artery (H) - I21.3  Chest pain, unspecified type - R07.9     Most Recent Discharge Date: 2/23/2025   Most Recent Discharge Diagnosis: ST  elevation MI (STEMI) (H) - I21.3  ST elevation myocardial infarction (STEMI), unspecified artery (H) - I21.3  New onset atrial fibrillation (H) - I48.91  Chest pain, unspecified type - R07.9  Renal insufficiency - N28.9  ACS (acute coronary syndrome) (H) - I24.9  TIA (transient ischemic attack) - G45.9  Dyslipidemia - E78.5  Coronary artery disease due to lipid rich plaque - I25.10, I25.83  Other chest pain - R07.89     Transitions of Care Assessment    Discharge Assessment  How are you doing now that you are home?: spoke with Jessica via Language Line.  He has not picked up his prescriptions yet.  His dtr will pick them up after work tonight.  Writer reiterated the importance of the medications and stated that he may end up back in the hospital without them.  Patient stated an understanding.  Patient agreeable to this writer calling again tomorrow or Wednesday to review medications once his dtr has picked them up to ensure he has an understanding of them.  Patient is currently enrolled in [facility].  No Cardiology appointment scheduled yet.  Explained to him that he will hear from them for an appointment due in 4-8 weeks.  Additionally explained that the Zio patch will be mailed to his home.  This is 'self explanatory'.  There will be a phone number on there if he or his dtr have any questions about 'how to apply'.  He stated an understanding.  How are your symptoms? (Red Flag symptoms escalate to triage hotline per guidelines): Improved  Do you know how to contact your clinic care team if you have future questions or changes to your health status? : Yes  Does the patient have their discharge instructions? : Yes  Does the patient have questions regarding their discharge instructions? : No  Were you started on any new medications or were there changes to any of your previous medications? : Yes  Does the patient have all of their medications?: No (see comment) (see note above)  Do you have questions regarding any of your  medications? : No  Do you have all of your needed medical supplies or equipment (DME)?  (i.e. oxygen tank, CPAP, cane, etc.): Yes    Post-op ([facility] Only)  If the patient had a surgery or procedure, do they have any questions for a nurse?: No    Post-op (Clinicians Only)  Did the patient have surgery or a procedure: No        Medication Management:  Medication review status: Medications reviewed and no changes reported per patient.        Patient's dtr completes a MSU box.  She additionally comes over daily to ensure he is taking his medications per his report.     Functional Status:  Dependent ADLs:: Independent  Dependent IADLs:: Transportation, Laundry, Cooking, Shopping, Meal Preparation, Cleaning  Bed or wheelchair confined:: No  Mobility Status: Independent  Fallen 2 or more times in the past year?: No  Any fall with injury in the past year?: No    Living Situation:  Current living arrangement:: I live alone  Type of residence:: Private home - no stairs    Lifestyle & Psychosocial Needs:    Social Drivers of Health     Food Insecurity: High Risk (2/21/2025)    Food Insecurity     Within the past 12 months, did you worry that your food would run out before you got money to buy more?: Yes     Within the past 12 months, did the food you bought just not last and you didn t have money to get more?: Yes   Depression: Not at risk (2/12/2025)    PHQ-2     PHQ-2 Score: 1   Housing Stability: High Risk (2/21/2025)    Housing Stability     Do you have housing? : Yes     Are you worried about losing your housing?: Yes   Tobacco Use: Low Risk  (2/12/2025)    Patient History     Smoking Tobacco Use: Never     Smokeless Tobacco Use: Never     Passive Exposure: Never   Financial Resource Strain: High Risk (2/21/2025)    Financial Resource Strain     Within the past 12 months, have you or your family members you live with been unable to get utilities (heat, electricity) when it was really needed?: Yes   Alcohol Use: Not on  file   Transportation Needs: Unknown (2/26/2025)    Transportation Needs     Within the past 12 months, has lack of transportation kept you from medical appointments, getting your medicines, non-medical meetings or appointments, work, or from getting things that you need?: Patient declined   Recent Concern: Transportation Needs - High Risk (2/21/2025)    Transportation Needs     Within the past 12 months, has lack of transportation kept you from medical appointments, getting your medicines, non-medical meetings or appointments, work, or from getting things that you need?: Yes   Physical Activity: Sufficiently Active (9/24/2024)    Exercise Vital Sign     Days of Exercise per Week: 7 days     Minutes of Exercise per Session: 40 min   Interpersonal Safety: Low Risk  (2/21/2025)    Interpersonal Safety     Do you feel physically and emotionally safe where you currently live?: Yes     Within the past 12 months, have you been hit, slapped, kicked or otherwise physically hurt by someone?: No     Within the past 12 months, have you been humiliated or emotionally abused in other ways by your partner or ex-partner?: No   Stress: Stress Concern Present (9/24/2024)    [employer] - Occupational Stress Questionnaire     Feeling of Stress : Very much   Social Connections: Unknown (9/24/2024)    Social Connection and Isolation Panel [NHANES]     Frequency of Communication with Friends and Family: Not on file     Frequency of Social Gatherings with Friends and Family: Never     Attends Hoahaoism Services: Not on file     Active Member of Clubs or Organizations: Not on file     Attends Club or Organization Meetings: Not on file     Marital Status: Not on file   Health Literacy: Not on file     Diet:: Regular  Inadequate nutrition (GOAL):: No  Tube Feeding: No  Inadequate activity/exercise (GOAL):: No  Significant changes in sleep pattern (GOAL): No  Transportation means:: Family     Hoahaoism or  spiritual beliefs that impact treatment:: No  Mental health DX:: No  Mental health management concern (GOAL):: No  Informal Support system:: Children        79yr M PMH: as listed above.  Currently enrolled to [facility] with initial Enrollment date of 9/30/24.  Recent hospital admission patient's SDOH were all scored as high risk.  Reviewed these with Jessica.  He asked for the food resources to be mailed to his home again.  He stated his niece is his PCA and she can help assist with this.  He is legally blind and is having surgery this Friday to assist with this vision.  His dtr transports for all appointments.  He ceclined to speak with the .  Per the chart review his dtr spoke with the [facility] SW 11/1/24 for resources and has monthly outreach.  Reinforced importance of Cardiology appointment.  Offered the phone number but patient is unable to write this down due to his vision.  He stated he would let his dtr know to call and schedule.     Resources and Interventions:  Current Resources:      Community Resources: PCA  Supplies Currently Used at Home: None  Equipment Currently Used at Home: none  Employment Status: retired         Advance Care Plan/Directive  Advanced Care Plans/Directives on file:: No  Discussed with patient/caregiver:: Declined Further Information          Care Plan:   Care Plan: Health Maintenance       Problem: Health Maintenance Due or Overdue       Goal: Become up-to-date with health maintenance visit(s)       Start Date: 1/30/2025    This Visit's Progress: 20%    Note:     Barriers: numerous appointments  Strengths: [facility] worker  Patient expressed understanding of goal: per chart review  Action steps to achieve this goal:  Referrals pended for Cardiology-patient needs to schedule- I Hli let Elayne KEYS know that Jessica plans on scheduling this appointment after he heals from his eye surgery that is scheduled on 2/28/25.  Nephrology-patient needs to schedule- I Hli let Elayne RASMUSSENW know that Jessica  plans on scheduling this appointment after he heals from his eye surgery that is scheduled on 2/28/25.  Neurology-scheduled 3/6/25 @ 8:35  Pain Clinic-patient needs to schedule- I Hli let Elayne CHW know that Jessica plans on scheduling this appointment after he heals from his eye surgery that is scheduled on 2/28/25.  5. Eye surgery- 2/28/25  6. Pre op for Eye surgery with PCP-scheduled 2/12/25 @ 12:40                              Patient/Caregiver understanding: patient stated an understanding    Plan:  Will continue monthly outreach.  Will mail food resources to patient.    Outreach Frequency: monthly, more frequently as needed  Future Appointments                In 1 week Phu Lujan MD [facility]    In 3 weeks Laura Rincon PA-C [facility]            Follow up Plan     Discharge Follow-Up  Discharge follow up appointment scheduled in alignment with recommended follow up timeframe or Transitions of Risk Category? (Low = within 30 days; Moderate= within 14 days; High= within 7 days): Yes  Discharge Follow Up Appointment Date: 03/06/25  Discharge Follow Up Appointment Scheduled with?: Specialty Care Provider    Future Appointments   Date Time Provider Department Center   3/6/2025  8:50 AM Phu Lujan MD [facility]   3/21/2025  9:30 AM [facility]       Outpatient Plan as outlined on AVS reviewed with patient.    For any urgent concerns, please contact our 24 hour nurse triage line: [phone] ([phone])

## 2025-02-26 NOTE — PROGRESS NOTES
Clinic Care Coordination Contact  Transitions of Care Outreach  Chief Complaint   Patient presents with    Clinic Care Coordination - Post Hospital       Most Recent Admission Date: 2/21/2025   Most Recent Admission Diagnosis: Renal insufficiency - N28.9  New onset atrial fibrillation (H) - I48.91  ST elevation MI (STEMI) (H) - I21.3  ST elevation myocardial infarction (STEMI), unspecified artery (H) - I21.3  Chest pain, unspecified type - R07.9     Most Recent Discharge Date: 2/23/2025   Most Recent Discharge Diagnosis: ST elevation MI (STEMI) (H) - I21.3  ST elevation myocardial infarction (STEMI), unspecified artery (H) - I21.3  New onset atrial fibrillation (H) - I48.91  Chest pain, unspecified type - R07.9  Renal insufficiency - N28.9  ACS (acute coronary syndrome) (H) - I24.9  TIA (transient ischemic attack) - G45.9  Dyslipidemia - E78.5  Coronary artery disease due to lipid rich plaque - I25.10, I25.83  Other chest pain - R07.89     Transitions of Care Assessment    Discharge Assessment  How are you doing now that you are home?: spoke with Jessica via Language Line.  He has not picked up his prescriptions yet.  His dtr will pick them up after work tonight.  Writer reiterated the importance of the medications and stated that he may end up back in the hospital without them.  Patient stated an understanding.  Patient agreeable to this writer calling again tomorrow or Wednesday to review medications once his dtr has picked them up to ensure he has an understanding of them.  Patient is currently enrolled in [facility].  No Cardiology appointment scheduled yet.  Explained to him that he will hear from them for an appointment due in 4-8 weeks.  Additionally explained that the Zio patch will be mailed to his home.  This is 'self explanatory'.  There will be a phone number on there if he or his dtr have any questions about 'how to apply'.  He stated an understanding.  How are your symptoms? (Red Flag symptoms escalate to  triage hotline per guidelines): Improved  Do you know how to contact your clinic care team if you have future questions or changes to your health status? : Yes  Does the patient have their discharge instructions? : Yes  Does the patient have questions regarding their discharge instructions? : No  Were you started on any new medications or were there changes to any of your previous medications? : Yes  Does the patient have all of their medications?: No (see comment) (see note above)  Do you have questions regarding any of your medications? : No  Do you have all of your needed medical supplies or equipment (DME)?  (i.e. oxygen tank, CPAP, cane, etc.): Yes    Post-op ([facility] Only)  If the patient had a surgery or procedure, do they have any questions for a nurse?: No    Post-op (Clinicians Only)  Did the patient have surgery or a procedure: No        Follow up Plan     Discharge Follow-Up  Discharge follow up appointment scheduled in alignment with recommended follow up timeframe or Transitions of Risk Category? (Low = within 30 days; Moderate= within 14 days; High= within 7 days): Yes  Discharge Follow Up Appointment Date: 03/06/25  Discharge Follow Up Appointment Scheduled with?: Specialty Care Provider    Future Appointments   Date Time Provider Department Center   3/6/2025  8:50 AM Phu Lujan MD [facility]   3/21/2025  9:30 AM [facility]       Outpatient Plan as outlined on AVS reviewed with patient.    For any urgent concerns, please contact our 24 hour nurse triage line: [phone] ([phone])       Eva Peralta RN

## 2025-03-06 ENCOUNTER — OFFICE VISIT (OUTPATIENT)
Dept: NEUROLOGY | Facility: CLINIC | Age: 80
End: 2025-03-06
Attending: PHYSICIAN ASSISTANT
Payer: MEDICARE

## 2025-03-06 VITALS
WEIGHT: 124 LBS | DIASTOLIC BLOOD PRESSURE: 92 MMHG | HEIGHT: 63 IN | BODY MASS INDEX: 21.97 KG/M2 | SYSTOLIC BLOOD PRESSURE: 176 MMHG | HEART RATE: 64 BPM

## 2025-03-06 DIAGNOSIS — M79.662 PAIN OF LEFT LOWER LEG: Primary | ICD-10-CM

## 2025-03-06 DIAGNOSIS — Z86.73 HISTORY OF STROKE: ICD-10-CM

## 2025-03-06 DIAGNOSIS — B02.29 POST HERPETIC NEURALGIA: ICD-10-CM

## 2025-03-06 NOTE — PROGRESS NOTES
NEUROLOGY OUTPATIENT CONSULT NOTE   Mar 6, 2025     CHIEF COMPLAINT/REASON FOR VISIT/REASON FOR CONSULT  Patient presents with:  Post herpetic neuralgia : Patient states he has chronic pain on the left side of his leg/hip - this is constant. Occasionally has pain on the right hip area. Pain began April 2024.     REASON FOR CONSULTATION-left leg pain    REFERRAL SOURCE  Dr. Laura Rincon  CC Dr. Laura Rincon    HISTORY OF PRESENT ILLNESS  Jessica Healy is a 79 year old male seen today for evaluation of multiple issues  1.  Patient had an episode of right sided weakness/ataxia in 2022.  He was thought to have a stroke.  He was seen by Dr. Bender in the hospital.  He reports no ongoing symptoms at this point.  He he is on Plavix as well as Eliquis for atrial fibrillation at this point.  MRI did not show stroke at that point.    2.  He has a previous diagnosis of postherpetic neuralgia.  The rash/pain is in the gluteal folds.  He feels that this is under good control.  He is being prescribed narcotics for this though he does not take it.    3.  He further reports some pain in his left hip along with some shooting pain down the leg.  Bending and walking makes it worse.  Does have some back pain on both sides as well.  No symptoms on the right side.  Does have some left lateral leg numbness.  No foot drop.  This is more of a chronic issue.  Has not had any testing for this.    Previous history is reviewed and this is unchanged.    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 He also said he does not like to take too many medications. Our RN spoke with him via  and hopefully has convinced him to take it daily; we will recheck his lipid profile in 3 months     Patient Active Problem List   Diagnosis    CAD (coronary artery disease)    Hypertension goal BP (blood pressure) < 130/80    Dyslipidemia    Cerebrovascular accident (CVA), unspecified mechanism (H)    CKD (chronic kidney disease) stage 3, GFR 30-59 ml/min (H)    Kidney cyst, acquired    Stroke-like symptoms    Post herpetic neuralgia    Lower urinary tract symptoms due to benign prostatic hyperplasia    Moderate recurrent major depression (H)    Renal insufficiency    New onset atrial fibrillation (H)    ST elevation MI (STEMI) (H)    ST elevation myocardial infarction (STEMI), unspecified artery (H)    Chest pain, unspecified type    ACS (acute coronary syndrome) (H)   Significant for stroke, heart attack, high cholesterol, high blood pressure, vision loss, depression    FAMILY HISTORY  Family History   Problem Relation Age of Onset    Family History Negative Other         no known specifics    Kidney Disease Mother    Reviewed and positive for high cholesterol, high blood pressure    SOCIAL HISTORY  Social History     Tobacco Use    Smoking status: Never     Passive exposure: Never    Smokeless  tobacco: Never   Vaping Use    Vaping status: Former   Substance Use Topics    Alcohol use: Not Currently    Drug use: Never       SYSTEMS REVIEW  Twelve-system ROS was done and other than the HPI this was negative/positive for back pain, numbness/tingling, weakness/paralysis, difficulty walking, falling, balance/coordination problems, bladder symptoms, vision symptoms, sleeping problems, memory loss anxiety depression cardiac/heart problems, stomach pain.    MEDICATIONS  Current Outpatient Medications   Medication Sig Dispense Refill    apixaban ANTICOAGULANT (ELIQUIS ANTICOAGULANT) 2.5 MG tablet Take 1 tablet (2.5 mg) by mouth 2 times daily. 60 tablet 2    clopidogrel (PLAVIX) 75 MG tablet TAKE 1 TABLET(75 MG) BY MOUTH DAILY      ezetimibe (ZETIA) 10 MG tablet TAKE 1 TABLET(10 MG) BY MOUTH DAILY 90 tablet 3    isosorbide mononitrate CR (IMDUR) 120 MG 24 HR ER tablet TAKE 1 TABLET(120 MG) BY MOUTH DAILY 90 tablet 3    losartan (COZAAR) 100 MG tablet Take 1 tablet (100 mg) by mouth daily. 90 tablet 3    metoprolol tartrate (LOPRESSOR) 25 MG tablet Take 1 tablet (25 mg) by mouth 2 times daily. 60 tablet 1    nitroGLYcerin (NITROSTAT) 0.4 MG sublingual tablet PLACE 1 TABLET UNDER THE TONGUE AT THE ONSET OF CHEST PAIN. MAY REPEAT EVERY 5 MINUTES AS NEEDED FOR A TOTAL OF 3 DOSES. 25 tablet 3    ORDER FOR [facility] [place] [facility] Blood pressure monitor machine 1 each 0    pantoprazole (PROTONIX) 20 MG EC tablet Take 1 tablet (20 mg) by mouth every morning (before breakfast). 30 tablet 1    predniSONE (DELTASONE) 10 MG tablet Take 10 mg by mouth daily.      rosuvastatin (CRESTOR) 40 MG tablet Take 1 tablet (40 mg) by mouth daily. 30 tablet 1    tamsulosin (FLOMAX) 0.4 MG capsule Take 1 capsule (0.4 mg) by mouth daily. 90 capsule 3    gatifloxacin (ZYMAXID) 0.5 % ophthalmic solution INSTILL 1 DROP INTO THE LEFT EYE 4 TIMES DAILY. START 1 DAY PRIOR TO SURGERY UNTIL DONE // 1 ZAUG FELIBERTO 1 NCOS SHAHRAM LUB QHOV MUAG SAB LAUJ, 1 HNUB SIV 4 ZAUG  "(Patient not taking: Reported on 3/6/2025)      ketorolac (ACULAR) 0.5 % ophthalmic solution INSTILL 1 DROP INTO THE LEFT EYE TWICE DAILY. START 1 DAY PRIOR TO SURGERY AND CONTINUE UNTIL DONE // IB ZAUG  IB NCOS SHAHRAM LUB PHOV SANDEEPAG HANK VILLALBA. (Patient not taking: Reported on 3/6/2025)       No current facility-administered medications for this visit.        PHYSICAL EXAMINATION  VITALS: BP (!) 176/92 (BP Location: Left arm, Patient Position: Sitting)   Pulse 64   Ht 1.6 m (5' 3\")   Wt 56.2 kg (124 lb)   BMI 21.97 kg/m    GENERAL: Healthy appearing, alert, no acute distress, normal habitus.  CARDIOVASCULAR: Extremities warm and well perfused. Pulses present.   NEUROLOGICAL:  Patient is awake and oriented to self, place and time.  Attention span is normal.  Memory is grossly intact.  Language is fluent and follows commands appropriately.  Appropriate fund of knowledge. Cranial nerves 2-12 are intact. There is no pronator drift.  Motor exam shows 5/5 strength in all extremities.  Tone is symmetric bilaterally in upper and lower extremities.  Reflexes are symmetric and 2+ in upper extremities and lower extremities. Sensory exam is grossly intact to light touch, pin prick and vibration.  Finger to nose and heel to shin is without dysmetria.  Romberg is negative.  Gait is normal and the patient is able to do tandem walk and walk on toes and heels with some difficulty.      DIAGNOSTICS  MRI Brain (3.15.22):  FINDINGS:  INTRACRANIAL CONTENTS: No acute or subacute infarct. No mass, acute hemorrhage, or extra-axial fluid collections. Patchy and confluent nonspecific T2/FLAIR hyperintensities within the cerebral white matter most consistent with moderate chronic   microvascular ischemic change. Chronic encephalomalacia in the medial aspect of the left occipital lobe again noted consistent with a chronic ischemic infarct. Mild generalized cerebral atrophy. No hydrocephalus. Normal position of the cerebellar   tonsils. No " pathologic contrast enhancement.     SELLA: No abnormality accounting for technique.     OSSEOUS STRUCTURES/SOFT TISSUES: Normal marrow signal. The major intracranial vascular flow voids are maintained.      ORBITS: No abnormality accounting for technique.      SINUSES/MASTOIDS: No paranasal sinus mucosal disease. No middle ear or mastoid effusion.                                                                     IMPRESSION:  1.  Probable chronic ischemic infarct in the left occipital lobe again noted.  2.  No acute intracranial process.  3.  Generalized brain atrophy and presumed microvascular ischemic changes as detailed above.     CT/CTA (3.15.22):  HEAD CT:  1.  No hemorrhage, mass, or mass effect.  2.  Mild to moderate chronic encephalomalacia left occipital lobe. Superimposed small area of subacute ischemia would be difficult to exclude.  3.  Small chronic infarctions left cerebellum and anterior aspect of basal ganglia bilaterally.     HEAD CTA:   1.  Moderate atherosclerotic changes cavernous ICA on the left, mild to moderate on the right.  2.  Moderate to marked atherosclerotic changes supraclinoid ICA on the left, mild to moderate on the right.  3.  Mild to moderate areas of narrowings throughout basilar artery.  4.  Short segment moderate to marked narrowing at the distal left P2.  5.  Poorly opacified distal branches of left posterior circulation.  6.  Mild atherosclerotic changes right posterior circulation.     NECK CTA:  1.  Less than 50% narrowing proximal ICAs bilaterally.  2.  Proximal aspect of left vertebral artery is not visualized. The artery is opacified in its mid and distal portion, from approximately C5-C6 level distally. Moderate areas of narrowing in its distal aspect.  3.  Moderate to marked narrowing at the origin/proximal aspect of right vertebral artery.  4.  Moderate to marked narrowing at the very distal aspect of right vertebral artery.     Echocardiogram  (3.15.22):  Interpretation Summary     1. Left ventricular size is normal. Moderate concentric increase in wall  thickness. Systolic function is normal. The estimated left ventricular  ejection fraction is 65-70%.  2. Right ventricular size and systolic function are normal.  3. Mild left atrial enlargement.  4. No hemodynamically significant valvular abnormalities.  5. The ascending aorta is mildly dilated measuring 4.0 cm.  6. Compared to the prior study dated 3/6/2020, there has been no significant  change.    RELEVANT LABS  Component      Latest Ref Rng 9/30/2024  9:37 AM 2/22/2025  4:16 AM   WBC      4.0 - 11.0 10e3/uL  8.0    RBC Count      4.40 - 5.90 10e6/uL  3.05 (L)    Hemoglobin      13.3 - 17.7 g/dL  10.0 (L)    Hematocrit      40.0 - 53.0 %  30.3 (L)    MCV      78 - 100 fL  99    MCH      26.5 - 33.0 pg  32.8    MCHC      31.5 - 36.5 g/dL  33.0    RDW      10.0 - 15.0 %  13.5    Platelet Count      150 - 450 10e3/uL  143 (L)    % Neutrophils      %  85    % Lymphocytes      %  7    % Monocytes      %  7    % Eosinophils      %  1    % Basophils      %  0    % Immature Granulocytes      %  0    NRBCs per 100 WBC      <1 /100  0    Absolute Neutrophils      1.6 - 8.3 10e3/uL  6.8    Absolute Lymphocytes      0.8 - 5.3 10e3/uL  0.5 (L)    Absolute Monocytes      0.0 - 1.3 10e3/uL  0.6    Absolute Eosinophils      0.0 - 0.7 10e3/uL  0.1    Absolute Basophils      0.0 - 0.2 10e3/uL  0.0    Absolute Immature Granulocytes      <=0.4 10e3/uL  0.0    Absolute NRBCs      10e3/uL  0.0    Sodium      135 - 145 mmol/L  142    Potassium      3.4 - 5.3 mmol/L  4.0    Chloride      98 - 107 mmol/L  114 (H)    Carbon Dioxide (CO2)      22 - 29 mmol/L  20 (L)    Anion Gap      7 - 15 mmol/L  8    Urea Nitrogen      8.0 - 23.0 mg/dL  34.0 (H)    Creatinine      0.67 - 1.17 mg/dL  1.51 (H)    GFR Estimate      >60 mL/min/1.73m2  47 (L)    Calcium      8.8 - 10.4 mg/dL  8.6 (L)    Glucose      70 - 99 mg/dL  82     Cholesterol      <200 mg/dL 274 (H)     Triglycerides      <150 mg/dL 118     HDL Cholesterol      >=40 mg/dL 52     LDL Cholesterol Calculated      <100 mg/dL 198 (H)     Non HDL Cholesterol      <130 mg/dL 222 (H)     Patient Fasting? Yes        Legend:  (H) High  (L) Low      OUTSIDE RECORDS  Outside referral notes and chart notes were reviewed and pertinent information has been summarized (in addition to the HPI):-                IMPRESSION/REPORT/PLAN  Pain of left lower leg-rule out lumbar radiculopathy  History of post herpetic neuralgia  History of stroke    This is a 79 year old male with    1.  History of stroke:-In 2022 he presented with right leg weakness and ataxia.  Despite having symptoms his MRI brain was negative for acute stroke though did show probable chronic ischemic infarct in the left occipital lobe.  CTA showed moderate atherosclerotic changes in supraclinoid ICA on the left side and moderate on the right side.  There was less than 50% carotid stenosis.  He was put on Plavix at that time though since then he has been diagnosed with A-fib and is now on Eliquis.  Recommend pending the Eliquis.  His LDL goal would be less than 70 and is not under good control.  His blood pressure is also elevated today.  Recommend working with primary care regarding this.  Currently is asymptomatic.  Recommend exercise and healthy lifestyle    2.  He further has a previous diagnosis of postherpetic neuralgia.  The pain is located in the gluteal folds.  Currently this is under good control with narcotics per patient.  He is not using the narcotics.  Can follow-up in the pain clinic if further help is needed.    3.  He further complains of left leg pain with some shooting pain down the leg with some left lateral leg numbness.  Exam overall is noncontributory though could be related to a lumbar radiculopathy.  Will check an MRI of the L-spine/EMG.  If testing is negative possibly this is related to his hip and  could refer him to orthopedics.    I can see him back after testing.    -     EMG; Future  -     MR Lumbar Spine w/o Contrast; Future  - Work with primary care to get the cholesterol/LDL and blood pressure under better control.    Return in about 3 months (around 6/6/2025) for In-Clinic Visit (must), Add on PAOR, After testing.    Over 60 minutes were spent coordinating the care for the patient on the day of the encounter.  This includes previsit, during visit and post visit activities as documented above.  Counseling patient.  Reviewing previous notes/testing.  Multiple problems reviewed/addressed.  Use of  requires extra time.  Patient new to me.  (Activities include but not inclusive of reviewing chart, reviewing outside records, reviewing labs and imaging study results as well as the images, patient visit time including getting history and exam,  use if applicable, review of test results with the patient and coming up with a plan in a shared model, counseling patient and family, education and answering patient questions, EMR , EMR diagnosis entry and problem list management, medication reconciliation and prescription management if applicable, paperwork if applicable, printing documents and documentation of the visit activities.)        Phu Lujan MD  Neurologist  [facility]  Tel:- [phone]    This note was dictated using voice recognition software.  Any grammatical or context distortions are unintentional and inherent to the software.    The longitudinal plan of care for the diagnosis(es)/condition(s) as documented were addressed during this visit. Due to the added complexity in care, I will continue to support Estefaniwilina in the subsequent management and with ongoing continuity of care.

## 2025-03-06 NOTE — NURSING NOTE
Chief Complaint   Patient presents with    Post herpetic neuralgia      Patient states he has chronic pain on the left side of his leg/hip - this is constant. Occasionally has pain on the right hip area. Pain began April 2024.      Candice Arreguin MA

## 2025-03-06 NOTE — LETTER
3/6/2025      Jessica Healy  [address] [facility] [address]      Dear Colleague,    Thank you for referring your patient, Jessica Healy, to the [facility] NEUROLOGY [facility] [place]. Please see a copy of my visit note below.    NEUROLOGY OUTPATIENT CONSULT NOTE   Mar 6, 2025     CHIEF COMPLAINT/REASON FOR VISIT/REASON FOR CONSULT  Patient presents with:  Post herpetic neuralgia : Patient states he has chronic pain on the left side of his leg/hip - this is constant. Occasionally has pain on the right hip area. Pain began April 2024.     REASON FOR CONSULTATION-left leg pain    REFERRAL SOURCE  Dr. Laura Rincon  CC Dr. Laura Rincon    HISTORY OF PRESENT ILLNESS  Jessica Healy is a 79 year old male seen today for evaluation of multiple issues  1.  Patient had an episode of right sided weakness/ataxia in 2022.  He was thought to have a stroke.  He was seen by Dr. Bender in the hospital.  He reports no ongoing symptoms at this point.  He he is on Plavix as well as Eliquis for atrial fibrillation at this point.  MRI did not show stroke at that point.    2.  He has a previous diagnosis of postherpetic neuralgia.  The rash/pain is in the gluteal folds.  He feels that this is under good control.  He is being prescribed narcotics for this though he does not take it.    3.  He further reports some pain in his left hip along with some shooting pain down the leg.  Bending and walking makes it worse.  Does have some back pain on both sides as well.  No symptoms on the right side.  Does have some left lateral leg numbness.  No foot drop.  This is more of a chronic issue.  Has not had any testing for this.    Previous history is reviewed and this is unchanged.    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 He also said he does not like to take too many medications. Our RN spoke with him via  and hopefully has convinced him to take it daily; we will recheck his lipid profile in 3 months     Patient Active Problem List   Diagnosis     CAD (coronary artery disease)     Hypertension goal BP (blood pressure) < 130/80     Dyslipidemia     Cerebrovascular accident (CVA), unspecified mechanism (H)     CKD (chronic kidney disease) stage 3, GFR 30-59 ml/min (H)     Kidney cyst, acquired     Stroke-like symptoms     Post herpetic neuralgia     Lower urinary tract symptoms due to benign prostatic hyperplasia     Moderate recurrent major depression (H)     Renal insufficiency     New onset atrial fibrillation (H)     ST elevation MI (STEMI) (H)     ST elevation myocardial infarction (STEMI), unspecified artery (H)     Chest pain, unspecified type     ACS (acute coronary syndrome) (H)   Significant for stroke, heart attack, high cholesterol, high blood pressure, vision loss, depression    FAMILY HISTORY  Family History   Problem Relation Age of Onset      Family History Negative Other         no known specifics     Kidney Disease Mother    Reviewed and positive for high cholesterol, high blood pressure    SOCIAL HISTORY  Social History     Tobacco Use     Smoking status: Never     Passive exposure: Never     Smokeless tobacco: Never   Vaping Use     Vaping status: Former   Substance Use Topics     Alcohol use: Not Currently     Drug use: Never       SYSTEMS REVIEW  Twelve-system ROS was done and other than the HPI this was negative/positive for back pain, numbness/tingling, weakness/paralysis, difficulty walking, falling, balance/coordination problems, bladder symptoms, vision symptoms, sleeping problems, memory loss anxiety depression cardiac/heart problems, stomach pain.    MEDICATIONS  Current Outpatient Medications   Medication Sig Dispense Refill     apixaban ANTICOAGULANT (ELIQUIS ANTICOAGULANT) 2.5 MG tablet Take 1 tablet (2.5 mg) by mouth 2 times daily. 60 tablet 2     clopidogrel (PLAVIX) 75 MG tablet TAKE 1 TABLET(75 MG) BY MOUTH DAILY       ezetimibe (ZETIA) 10 MG tablet TAKE 1 TABLET(10 MG) BY MOUTH DAILY 90 tablet 3     isosorbide mononitrate CR (IMDUR) 120 MG 24 HR ER tablet TAKE 1 TABLET(120 MG) BY MOUTH DAILY 90 tablet 3     losartan (COZAAR) 100 MG tablet Take 1 tablet (100 mg) by mouth daily. 90 tablet 3     metoprolol tartrate (LOPRESSOR) 25 MG tablet Take 1 tablet (25 mg) by mouth 2 times daily. 60 tablet 1     nitroGLYcerin (NITROSTAT) 0.4 MG sublingual tablet PLACE 1 TABLET UNDER THE TONGUE AT THE ONSET OF CHEST PAIN. MAY REPEAT EVERY 5 MINUTES AS NEEDED FOR A TOTAL OF 3 DOSES. 25 tablet 3     ORDER FOR [facility] Blood pressure monitor machine 1 each 0     pantoprazole (PROTONIX) 20 MG EC tablet Take 1 tablet (20 mg) by mouth every morning (before breakfast). 30 tablet 1     predniSONE (DELTASONE) 10 MG tablet Take 10 mg by mouth daily.       rosuvastatin (CRESTOR) 40 MG tablet Take 1 tablet (40 mg) by mouth daily. 30 tablet 1     tamsulosin  "(FLOMAX) 0.4 MG capsule Take 1 capsule (0.4 mg) by mouth daily. 90 capsule 3     gatifloxacin (ZYMAXID) 0.5 % ophthalmic solution INSTILL 1 DROP INTO THE LEFT EYE 4 TIMES DAILY. START 1 DAY PRIOR TO SURGERY UNTIL DONE // 1 ZAUG FELIBERTO 1 NCOS SHAHRAM LUB QHOV MUAG SAB LAUJ, 1 HNUB SIV 4 ZAUG (Patient not taking: Reported on 3/6/2025)       ketorolac (ACULAR) 0.5 % ophthalmic solution INSTILL 1 DROP INTO THE LEFT EYE TWICE DAILY. START 1 DAY PRIOR TO SURGERY AND CONTINUE UNTIL DONE // IB ZAUG  IB NCOS SHAHRAM LUB PHOV MUAG PHAB LAUG. (Patient not taking: Reported on 3/6/2025)       No current facility-administered medications for this visit.        PHYSICAL EXAMINATION  VITALS: BP (!) 176/92 (BP Location: Left arm, Patient Position: Sitting)   Pulse 64   Ht 1.6 m (5' 3\")   Wt 56.2 kg (124 lb)   BMI 21.97 kg/m    GENERAL: Healthy appearing, alert, no acute distress, normal habitus.  CARDIOVASCULAR: Extremities warm and well perfused. Pulses present.   NEUROLOGICAL:  Patient is awake and oriented to self, place and time.  Attention span is normal.  Memory is grossly intact.  Language is fluent and follows commands appropriately.  Appropriate fund of knowledge. Cranial nerves 2-12 are intact. There is no pronator drift.  Motor exam shows 5/5 strength in all extremities.  Tone is symmetric bilaterally in upper and lower extremities.  Reflexes are symmetric and 2+ in upper extremities and lower extremities. Sensory exam is grossly intact to light touch, pin prick and vibration.  Finger to nose and heel to shin is without dysmetria.  Romberg is negative.  Gait is normal and the patient is able to do tandem walk and walk on toes and heels with some difficulty.      DIAGNOSTICS  MRI Brain (3.15.22):  FINDINGS:  INTRACRANIAL CONTENTS: No acute or subacute infarct. No mass, acute hemorrhage, or extra-axial fluid collections. Patchy and confluent nonspecific T2/FLAIR hyperintensities within the cerebral white matter most consistent " with moderate chronic   microvascular ischemic change. Chronic encephalomalacia in the medial aspect of the left occipital lobe again noted consistent with a chronic ischemic infarct. Mild generalized cerebral atrophy. No hydrocephalus. Normal position of the cerebellar   tonsils. No pathologic contrast enhancement.     SELLA: No abnormality accounting for technique.     OSSEOUS STRUCTURES/SOFT TISSUES: Normal marrow signal. The major intracranial vascular flow voids are maintained.      ORBITS: No abnormality accounting for technique.      SINUSES/MASTOIDS: No paranasal sinus mucosal disease. No middle ear or mastoid effusion.                                                                     IMPRESSION:  1.  Probable chronic ischemic infarct in the left occipital lobe again noted.  2.  No acute intracranial process.  3.  Generalized brain atrophy and presumed microvascular ischemic changes as detailed above.     CT/CTA (3.15.22):  HEAD CT:  1.  No hemorrhage, mass, or mass effect.  2.  Mild to moderate chronic encephalomalacia left occipital lobe. Superimposed small area of subacute ischemia would be difficult to exclude.  3.  Small chronic infarctions left cerebellum and anterior aspect of basal ganglia bilaterally.     HEAD CTA:   1.  Moderate atherosclerotic changes cavernous ICA on the left, mild to moderate on the right.  2.  Moderate to marked atherosclerotic changes supraclinoid ICA on the left, mild to moderate on the right.  3.  Mild to moderate areas of narrowings throughout basilar artery.  4.  Short segment moderate to marked narrowing at the distal left P2.  5.  Poorly opacified distal branches of left posterior circulation.  6.  Mild atherosclerotic changes right posterior circulation.     NECK CTA:  1.  Less than 50% narrowing proximal ICAs bilaterally.  2.  Proximal aspect of left vertebral artery is not visualized. The artery is opacified in its mid and distal portion, from approximately C5-C6  level distally. Moderate areas of narrowing in its distal aspect.  3.  Moderate to marked narrowing at the origin/proximal aspect of right vertebral artery.  4.  Moderate to marked narrowing at the very distal aspect of right vertebral artery.     Echocardiogram (3.15.22):  Interpretation Summary     1. Left ventricular size is normal. Moderate concentric increase in wall  thickness. Systolic function is normal. The estimated left ventricular  ejection fraction is 65-70%.  2. Right ventricular size and systolic function are normal.  3. Mild left atrial enlargement.  4. No hemodynamically significant valvular abnormalities.  5. The ascending aorta is mildly dilated measuring 4.0 cm.  6. Compared to the prior study dated 3/6/2020, there has been no significant  change.    RELEVANT LABS  Component      Latest Ref Rng 9/30/2024  9:37 AM 2/22/2025  4:16 AM   WBC      4.0 - 11.0 10e3/uL  8.0    RBC Count      4.40 - 5.90 10e6/uL  3.05 (L)    Hemoglobin      13.3 - 17.7 g/dL  10.0 (L)    Hematocrit      40.0 - 53.0 %  30.3 (L)    MCV      78 - 100 fL  99    MCH      26.5 - 33.0 pg  32.8    MCHC      31.5 - 36.5 g/dL  33.0    RDW      10.0 - 15.0 %  13.5    Platelet Count      150 - 450 10e3/uL  143 (L)    % Neutrophils      %  85    % Lymphocytes      %  7    % Monocytes      %  7    % Eosinophils      %  1    % Basophils      %  0    % Immature Granulocytes      %  0    NRBCs per 100 WBC      <1 /100  0    Absolute Neutrophils      1.6 - 8.3 10e3/uL  6.8    Absolute Lymphocytes      0.8 - 5.3 10e3/uL  0.5 (L)    Absolute Monocytes      0.0 - 1.3 10e3/uL  0.6    Absolute Eosinophils      0.0 - 0.7 10e3/uL  0.1    Absolute Basophils      0.0 - 0.2 10e3/uL  0.0    Absolute Immature Granulocytes      <=0.4 10e3/uL  0.0    Absolute NRBCs      10e3/uL  0.0    Sodium      135 - 145 mmol/L  142    Potassium      3.4 - 5.3 mmol/L  4.0    Chloride      98 - 107 mmol/L  114 (H)    Carbon Dioxide (CO2)      22 - 29 mmol/L  20 (L)     Anion Gap      7 - 15 mmol/L  8    Urea Nitrogen      8.0 - 23.0 mg/dL  34.0 (H)    Creatinine      0.67 - 1.17 mg/dL  1.51 (H)    GFR Estimate      >60 mL/min/1.73m2  47 (L)    Calcium      8.8 - 10.4 mg/dL  8.6 (L)    Glucose      70 - 99 mg/dL  82    Cholesterol      <200 mg/dL 274 (H)     Triglycerides      <150 mg/dL 118     HDL Cholesterol      >=40 mg/dL 52     LDL Cholesterol Calculated      <100 mg/dL 198 (H)     Non HDL Cholesterol      <130 mg/dL 222 (H)     Patient Fasting? Yes        Legend:  (H) High  (L) Low      OUTSIDE RECORDS  Outside referral notes and chart notes were reviewed and pertinent information has been summarized (in addition to the HPI):-                IMPRESSION/REPORT/PLAN  Pain of left lower leg-rule out lumbar radiculopathy  History of post herpetic neuralgia  History of stroke    This is a 79 year old male with    1.  History of stroke:-In 2022 he presented with right leg weakness and ataxia.  Despite having symptoms his MRI brain was negative for acute stroke though did show probable chronic ischemic infarct in the left occipital lobe.  CTA showed moderate atherosclerotic changes in supraclinoid ICA on the left side and moderate on the right side.  There was less than 50% carotid stenosis.  He was put on Plavix at that time though since then he has been diagnosed with A-fib and is now on Eliquis.  Recommend pending the Eliquis.  His LDL goal would be less than 70 and is not under good control.  His blood pressure is also elevated today.  Recommend working with primary care regarding this.  Currently is asymptomatic.  Recommend exercise and healthy lifestyle    2.  He further has a previous diagnosis of postherpetic neuralgia.  The pain is located in the gluteal folds.  Currently this is under good control with narcotics per patient.  He is not using the narcotics.  Can follow-up in the pain clinic if further help is needed.    3.  He further complains of left leg pain with some  shooting pain down the leg with some left lateral leg numbness.  Exam overall is noncontributory though could be related to a lumbar radiculopathy.  Will check an MRI of the L-spine/EMG.  If testing is negative possibly this is related to his hip and could refer him to orthopedics.    I can see him back after testing.    -     EMG; Future  -     MR Lumbar Spine w/o Contrast; Future  - Work with primary care to get the cholesterol/LDL and blood pressure under better control.    Return in about 3 months (around 6/6/2025) for In-Clinic Visit (must), Add on PAOR, After testing.    Over 60 minutes were spent coordinating the care for the patient on the day of the encounter.  This includes previsit, during visit and post visit activities as documented above.  Counseling patient.  Reviewing previous notes/testing.  Multiple problems reviewed/addressed.  Use of  requires extra time.  Patient new to me.  (Activities include but not inclusive of reviewing chart, reviewing outside records, reviewing labs and imaging study results as well as the images, patient visit time including getting history and exam,  use if applicable, review of test results with the patient and coming up with a plan in a shared model, counseling patient and family, education and answering patient questions, EMR , EMR diagnosis entry and problem list management, medication reconciliation and prescription management if applicable, paperwork if applicable, printing documents and documentation of the visit activities.)        Phu Lujan MD  Neurologist  [facility] Neurology [facility]  Tel:- [phone]    This note was dictated using voice recognition software.  Any grammatical or context distortions are unintentional and inherent to the software.    The longitudinal plan of care for the diagnosis(es)/condition(s) as documented were addressed during this visit. Due to the added complexity in care, I  will continue to support Estefaniwilian in the subsequent management and with ongoing continuity of care.      Again, thank you for allowing me to participate in the care of your patient.        Sincerely,        Phu Lujan MD    Electronically signed

## 2025-03-10 ENCOUNTER — TELEPHONE (OUTPATIENT)
Dept: VASCULAR SURGERY | Facility: CLINIC | Age: 80
End: 2025-03-10

## 2025-03-10 ENCOUNTER — PATIENT OUTREACH (OUTPATIENT)
Dept: CARE COORDINATION | Facility: CLINIC | Age: 80
End: 2025-03-10

## 2025-03-10 NOTE — TELEPHONE ENCOUNTER
Vascular Referral Intake    Appointment note (to be pasted into appt note. Also add where additional info is located ie: outside images pushed to PACS, in Epic, sent to HIM, etc): Renal artery stenosis >60%, ascending thoracic aortic aneurysm 4.3 cm    Message sent to cardiologist to ask if willing to follow thoracic aneurysm.    Referred by Suki Vilchis MD  for Aneurysm of ascending aorta without rupture, Renal artery stenosis     Specialty: Vascular Surgery    Specific Provider if Necessary:  Dr. Sofi Bustos or Dr. Ricardo Brown    Visit Type: New    Time Frame: Next Available    Testing/Imaging Needed Before Consult: N/A    Jac or bed needed: No    *Schedulers: Please send welcome letter to patient after appointment(s) scheduled*

## 2025-03-11 ENCOUNTER — APPOINTMENT (OUTPATIENT)
Dept: INTERPRETER SERVICES | Facility: CLINIC | Age: 80
End: 2025-03-11
Payer: MEDICARE

## 2025-03-11 ENCOUNTER — PATIENT OUTREACH (OUTPATIENT)
Dept: CARE COORDINATION | Facility: CLINIC | Age: 80
End: 2025-03-11
Payer: MEDICARE

## 2025-03-11 NOTE — PROGRESS NOTES
Clinic Care Coordination Contact  [facility]/Voicemail    Clinical Data: Care Coordinator Outreach    Outreach Documentation Number of Outreach Attempt   1/30/2025  10:50 AM 1   2/11/2025   9:21 AM 1   3/11/2025   9:26 AM 1       Left message on patient's voicemail with call back information and requested return call.      Plan: CHW to follow up per Standard Work.    Per RN's conversation with patient on 2/24, patient was looking for meal resources.  CHW to schedule patient or dtr to speak with SW for additional community, food resources.

## 2025-03-11 NOTE — Clinical Note
Please schedule patient or family member to speak with Elvia.  I tried calling him today and got his VM.  He was asking for food resources at my last outreach.  Figured it would make more sense for them to speak with Elvia to identify all needs.

## 2025-03-11 NOTE — LETTER
[facility] CARE COORDINATION  [address]  [facility] [address]    March 13, 2025        Jessica Healy  [address] [address]          Dear Davina Lopez is an updated Patient Centered Plan of Care for your continued enrollment in Care Coordination. Please let us know if you have additional questions, concerns, or goals that we can assist with.    Sincerely,    Eva Peralta RN  Clinic Care Coordination  [phone]      [facility]  Patient Centered Plan of Care  About Me:        Patient Name:  Jessica Healy    YOB: 1945  Age:         79 year old   Andrey MRN:    [id number] Telephone Information:  Home Phone [phone]   Mobile [phone]   Mobile [phone]       Address:  [address] [address] Email address:  [email or web address]      Emergency Contact(s)    Name Relationship Lgl Grd Work Phone Home Phone Mobile Phone   1. LAZARA HEALY Son No   [phone]   2. DAR BEE Daughter No  [phone]    3. THIN,HLI Other    [phone]           Primary language:  Hmong     needed? Yes   [employer]:  [phone] op. 1  Other communication barriers:Language barrier    Preferred Method of Communication:  Phone  Current living arrangement: I live alone    Mobility Status/ Medical Equipment: Independent        Health Maintenance  Health Maintenance Reviewed: Up to date      My Access Plan  Medical Emergency 911   Primary Clinic Line [facility] - [phone]   24 Hour Appointment Line [phone] or  [phone]) (toll-free)   24 Hour Nurse Line [phone] (toll-free)   Preferred Urgent Care [facility], [phone]     Preferred Hospital No data recorded   Preferred Pharmacy [facility] [facility] - [place], [place] - [address]     Behavioral Health Crisis Line The National Suicide Prevention Lifeline at  0-463-475-5013 or Text/Call 988           My Care Team Members  Patient Care Team         Relationship Specialty Notifications Start End    Suki Vilchis MD PCP - General Family Medicine  3/7/25     Phone: [phone] Fax: [phone] [address] [facility] [address]    Phu Lujan MD MD Neurology  10/18/24     Phone: [phone] Fax: [phone] [address] [address] [facility] [address]    Jenn Deng CHW Community Health Worker  Admissions 11/1/24     Phone: [phone]         Laura Rincon PA-C Assigned PCP   11/23/24     Phone: [phone] Fax: [phone] [address] [facility] [address]    Eva Peralta, RN Lead Care Coordinator Primary Care -  Admissions 12/13/24     Phone: [phone]         Kemal Jean-Baptiste MD MD Cardiology  3/4/25     Phone: [phone] Fax: [phone]         [address] [facility] [address] [facility] [address]                My Care Plans  Self Management and Treatment Plan    Care Plan  Care Plan: Health Maintenance       Problem: Health Maintenance Due or Overdue       Goal: Become up-to-date with health maintenance visit(s)       Start Date: 1/30/2025    This Visit's Progress: 20%    Note:     Barriers: numerous appointments  Strengths: [facility] worker  Patient expressed understanding of goal: per chart review  Action steps to achieve this goal:  Referrals pended for Cardiology-patient needs to schedule- I Hli let Elayne W know that Estefani plans on scheduling this appointment after he heals from his eye surgery that is scheduled on 2/28/25.  Nephrology-patient needs to schedule- I Hli let Elayne W know that Jessica plans on scheduling this appointment after he heals from his eye surgery that is scheduled on 2/28/25.  Neurology-scheduled 3/6/25 @ 8:35  Pain Clinic-patient needs to schedule- I Hli let Elayne BLAYNE know that Jessica plans on scheduling this appointment after he heals from his eye surgery that is  scheduled on 2/28/25.  5. Eye surgery- 2/28/25  6. Pre op for Eye surgery with PCP-scheduled 2/12/25 @ 12:40                            Care Plan: Food Insecurity       Problem: Unable to prepare meals               Problem: Reliable food source       Goal: Establish Options for Affordable Food Sources                             Action Plans on File:                       Advance Care Plans/Directives:   Advanced Care Plan/Directives on file: No    Discussed with patient/caregiver(s): Declined Further Information             My Medical and Care Information  Problem List   Patient Active Problem List   Diagnosis    CAD (coronary artery disease)    Hypertension goal BP (blood pressure) < 130/80    Dyslipidemia    Cerebrovascular accident (CVA), unspecified mechanism (H)    CKD (chronic kidney disease) stage 3, GFR 30-59 ml/min (H)    Kidney cyst, acquired    Stroke-like symptoms    Post herpetic neuralgia    Lower urinary tract symptoms due to benign prostatic hyperplasia    Moderate recurrent major depression (H)    Renal insufficiency    New onset atrial fibrillation (H)    ST elevation MI (STEMI) (H)    ST elevation myocardial infarction (STEMI), unspecified artery (H)    Chest pain, unspecified type    ACS (acute coronary syndrome) (H)      Current Medications:  Please refer to the most recent medication list provided to you by your medical team and reach out to your provider with any questions or to make any corrections.    Care Coordination Start Date: 9/24/2024   Frequency of Care Coordination: monthly, more frequently as needed     Form Last Updated: 03/13/2025

## 2025-03-13 ENCOUNTER — PATIENT OUTREACH (OUTPATIENT)
Dept: CARE COORDINATION | Facility: CLINIC | Age: 80
End: 2025-03-13
Payer: MEDICARE

## 2025-03-13 NOTE — TELEPHONE ENCOUNTER
[facility] [phone]  Patient is scheduled to see cardiology on Monday; following for thoracic aneurysm?

## 2025-03-13 NOTE — PROGRESS NOTES
Clinic Care Coordination Contact  [facility]/Voicemail    Clinical Data: Care Coordinator Outreach    Outreach Documentation Number of Outreach Attempt   3/13/2025  11:58 AM 1     Left message on patient's voicemail with call back information and requested return call.    Plan: Care Coordinator will try to reach patient again in 10 business days.    Jenn Deng  Critical access hospital Health Worker  [facility] Care Coordination   [place], [facility], [facility]  Office: [phone]

## 2025-03-17 ENCOUNTER — OFFICE VISIT (OUTPATIENT)
Dept: CARDIOLOGY | Facility: CLINIC | Age: 80
End: 2025-03-17
Attending: PHYSICIAN ASSISTANT
Payer: MEDICARE

## 2025-03-17 VITALS
HEART RATE: 66 BPM | WEIGHT: 130 LBS | DIASTOLIC BLOOD PRESSURE: 53 MMHG | BODY MASS INDEX: 23.03 KG/M2 | SYSTOLIC BLOOD PRESSURE: 91 MMHG | RESPIRATION RATE: 16 BRPM | OXYGEN SATURATION: 96 %

## 2025-03-17 DIAGNOSIS — E78.5 HYPERLIPIDEMIA LDL GOAL <70: ICD-10-CM

## 2025-03-17 DIAGNOSIS — I25.709 CORONARY ARTERY DISEASE INVOLVING CORONARY BYPASS GRAFT OF NATIVE HEART WITH ANGINA PECTORIS: ICD-10-CM

## 2025-03-17 DIAGNOSIS — I10 HYPERTENSION GOAL BP (BLOOD PRESSURE) < 130/80: ICD-10-CM

## 2025-03-17 PROCEDURE — 3074F SYST BP LT 130 MM HG: CPT | Performed by: INTERNAL MEDICINE

## 2025-03-17 PROCEDURE — 3078F DIAST BP <80 MM HG: CPT | Performed by: INTERNAL MEDICINE

## 2025-03-17 PROCEDURE — G2211 COMPLEX E/M VISIT ADD ON: HCPCS | Performed by: INTERNAL MEDICINE

## 2025-03-17 PROCEDURE — 99204 OFFICE O/P NEW MOD 45 MIN: CPT | Performed by: INTERNAL MEDICINE

## 2025-03-17 PROCEDURE — 1111F DSCHRG MED/CURRENT MED MERGE: CPT | Performed by: INTERNAL MEDICINE

## 2025-03-17 NOTE — LETTER
3/17/2025    Suki Vilchis MD  [address]  [facility] [address]    RE: Jessica Healy       Dear Colleague,     I had the pleasure of seeing Jessica Healy in the [facility].      Cardiology Progress Note     Assessment:  Coronary artery disease status post coronary artery bypass graft surgery, chronic stable angina, recent episodes of type II non-ST segment elevation myocardial infarction due to increased demand from atrial fibrillation, preserved LV systolic function normal, stable coronary anatomy(severe native three-vessel disease with patent LIMA to LAD and occluded vein grafts to RCA and circumflex) by angiogram in February 2025  Hypercholesterolemia, familial range, improved control with high-dose statin and Zetia  Paroxysmal atrial fibrillation, resolved with no recurrence, on Eliquis  Hypertension controlled    Plan:  We discussed the results of cardiac evaluation with the patient and his daughter.  No medication changes today    Awaiting Zio patch results    Should  be able to discontinue Plavix when the results of Zio patch available    Follow-up in 6 months  The longitudinal plan of care for the diagnosis(es)/condition(s) as documented were addressed during this visit. Due to the added complexity in care, I will continue to support Wadanny in the subsequent management and with ongoing continuity of care.   Subjective:   This is 79 year old male who comes in today for follow-up from the hospital.  He was admitted with paroxysmal atrial fibrillation and troponin elevation.  He underwent coronary angiogram and was found to have stable coronary anatomy.  Since discharge she has done well.  He denies worsening chest pain.  He has not had heart palpitation he has not passed out.  He tolerates medication well    Review of Systems:   Negative other than history of present illness    Objective:   BP 91/53 (BP Location: Left arm, Patient Position: Sitting, Cuff Size: Adult Regular)   Pulse  66   Resp 16   Wt 59 kg (130 lb)   SpO2 96%   BMI 23.03 kg/m    Physical Exam:  GENERAL: no distress  NECK: No JVD  LUNGS: Clear to auscultation.  CARDIAC: regular rhythm, S1 & S2 normal.  No heaves, thrills, gallops or murmurs.  ABDOMEN: flat, negative hepatosplenomegaly, soft and non-tender.  EXTREMITIES: No evidence of cyanosis, clubbing or edema.    Current Outpatient Medications   Medication Sig Dispense Refill     apixaban ANTICOAGULANT (ELIQUIS ANTICOAGULANT) 2.5 MG tablet Take 1 tablet (2.5 mg) by mouth 2 times daily. 60 tablet 2     clopidogrel (PLAVIX) 75 MG tablet TAKE 1 TABLET(75 MG) BY MOUTH DAILY       ezetimibe (ZETIA) 10 MG tablet TAKE 1 TABLET(10 MG) BY MOUTH DAILY 90 tablet 3     isosorbide mononitrate CR (IMDUR) 120 MG 24 HR ER tablet TAKE 1 TABLET(120 MG) BY MOUTH DAILY 90 tablet 3     losartan (COZAAR) 100 MG tablet Take 1 tablet (100 mg) by mouth daily. 90 tablet 3     metoprolol tartrate (LOPRESSOR) 25 MG tablet Take 1 tablet (25 mg) by mouth 2 times daily. 60 tablet 1     ORDER FOR [facility] Blood pressure monitor machine 1 each 0     pantoprazole (PROTONIX) 20 MG EC tablet Take 1 tablet (20 mg) by mouth every morning (before breakfast). 30 tablet 1     predniSONE (DELTASONE) 10 MG tablet Take 10 mg by mouth daily.       rosuvastatin (CRESTOR) 40 MG tablet Take 1 tablet (40 mg) by mouth daily. 30 tablet 1     tamsulosin (FLOMAX) 0.4 MG capsule Take 1 capsule (0.4 mg) by mouth daily. 90 capsule 3     nitroGLYcerin (NITROSTAT) 0.4 MG sublingual tablet PLACE 1 TABLET UNDER THE TONGUE AT THE ONSET OF CHEST PAIN. MAY REPEAT EVERY 5 MINUTES AS NEEDED FOR A TOTAL OF 3 DOSES. 25 tablet 3       Cardiographics:    Zio patch results are pending    Echocardiogram: February 2025  1. Left ventricular size is normal with moderate concentric increase in wall  thickness.  - Left ventricular function is normal.The ejection fraction is 55-60%.  - There is hypokinesis of the basal to mid anterolateral  wall.  - Global peak LV longitudinal strain is averaged at -13%. This suggests  abnormal strain (normal <-18%).  2. Right ventricular size and systolic function are normal.  3. Mild left atrial enlargement.  4. No hemodynamically significant valvular abnormalities.  5. Aortic root (4.2 cm) and ascending aorta (4.1 cm) dilatation is present.  6. Compared to the prior study dated 3/16/22 new wall motion abnormalities are  identified.    Coronary angio: February 2025  Severe multivessel coronary artery disease involving the distal left main and chronic total occlusions of the left anterior descending and right coronary artery and severe diffuse disease of the left circumflex system.  Patent LIMA to the LAD which provides collaterals to the right coronary artery and left circumflex system.   Occluded vein grafts to the right coronary artery and left circumflex system.   Lab Results    Chemistry/lipid CBC Cardiac Enzymes/BNP/TSH/INR   Recent Labs   Lab Test 02/23/25  0832   CHOL 196   HDL 42   *   TRIG 135     Recent Labs   Lab Test 02/23/25  0832 09/30/24  0937 10/13/23  1555   * 198* 157*     Recent Labs   Lab Test 03/07/25  1405      POTASSIUM 4.4   CHLORIDE 111*   CO2 22   *   BUN 31.9*   CR 1.55*   GFRESTIMATED 46*   ANISH 8.4*     Recent Labs   Lab Test 03/07/25  1405 02/23/25  0832 02/22/25  0416   CR 1.55* 1.58* 1.51*     Recent Labs   Lab Test 01/19/24  1642 07/07/23  1529 02/10/23  1522   A1C 5.9* 5.9* 5.7*          Recent Labs   Lab Test 02/23/25  0832   WBC 6.2   HGB 10.8*   HCT 33.1*   MCV 99        Recent Labs   Lab Test 02/23/25  0832 02/22/25  0416 02/21/25  1455   HGB 10.8* 10.0* 10.7*    Recent Labs   Lab Test 03/15/22  2226 03/15/22  1937 03/06/20  1948   TROPONINI 0.02 0.02 0.03     Recent Labs   Lab Test 03/06/20  0821   *     Recent Labs   Lab Test 03/07/25  1405   TSH 0.32     Recent Labs   Lab Test 02/21/25  1455 03/15/22  1937 03/07/20  0000   INR 0.94 1.05  0.98                         Thank you for allowing me to participate in the care of your patient.      Sincerely,     Brandon Jean-Baptiste MD     [facility]  cc:   Laura Rincon PA-C  [address] [place] [address]

## 2025-03-17 NOTE — PROGRESS NOTES
Cardiology Progress Note     Assessment:  Coronary artery disease status post coronary artery bypass graft surgery, chronic stable angina, recent episodes of type II non-ST segment elevation myocardial infarction due to increased demand from atrial fibrillation, preserved LV systolic function normal, stable coronary anatomy(severe native three-vessel disease with patent LIMA to LAD and occluded vein grafts to RCA and circumflex) by angiogram in February 2025  Hypercholesterolemia, familial range, improved control with high-dose statin and Zetia  Paroxysmal atrial fibrillation, resolved with no recurrence, on Eliquis  Hypertension controlled    Plan:  We discussed the results of cardiac evaluation with the patient and his daughter.  No medication changes today    Awaiting Zio patch results    Should  be able to discontinue Plavix when the results of Zio patch available    Follow-up in 6 months  The longitudinal plan of care for the diagnosis(es)/condition(s) as documented were addressed during this visit. Due to the added complexity in care, I will continue to support Jessica in the subsequent management and with ongoing continuity of care.   Subjective:   This is 79 year old male who comes in today for follow-up from the hospital.  He was admitted with paroxysmal atrial fibrillation and troponin elevation.  He underwent coronary angiogram and was found to have stable coronary anatomy.  Since discharge she has done well.  He denies worsening chest pain.  He has not had heart palpitation he has not passed out.  He tolerates medication well    Review of Systems:   Negative other than history of present illness    Objective:   BP 91/53 (BP Location: Left arm, Patient Position: Sitting, Cuff Size: Adult Regular)   Pulse 66   Resp 16   Wt 59 kg (130 lb)   SpO2 96%   BMI 23.03 kg/m    Physical Exam:  GENERAL: no distress  NECK: No JVD  LUNGS: Clear to auscultation.  CARDIAC: regular rhythm, S1 & S2 normal.  No  heaves, thrills, gallops or murmurs.  ABDOMEN: flat, negative hepatosplenomegaly, soft and non-tender.  EXTREMITIES: No evidence of cyanosis, clubbing or edema.    Current Outpatient Medications   Medication Sig Dispense Refill    apixaban ANTICOAGULANT (ELIQUIS ANTICOAGULANT) 2.5 MG tablet Take 1 tablet (2.5 mg) by mouth 2 times daily. 60 tablet 2    clopidogrel (PLAVIX) 75 MG tablet TAKE 1 TABLET(75 MG) BY MOUTH DAILY      ezetimibe (ZETIA) 10 MG tablet TAKE 1 TABLET(10 MG) BY MOUTH DAILY 90 tablet 3    isosorbide mononitrate CR (IMDUR) 120 MG 24 HR ER tablet TAKE 1 TABLET(120 MG) BY MOUTH DAILY 90 tablet 3    losartan (COZAAR) 100 MG tablet Take 1 tablet (100 mg) by mouth daily. 90 tablet 3    metoprolol tartrate (LOPRESSOR) 25 MG tablet Take 1 tablet (25 mg) by mouth 2 times daily. 60 tablet 1    ORDER FOR [facility] Home Blood pressure monitor machine 1 each 0    pantoprazole (PROTONIX) 20 MG EC tablet Take 1 tablet (20 mg) by mouth every morning (before breakfast). 30 tablet 1    predniSONE (DELTASONE) 10 MG tablet Take 10 mg by mouth daily.      rosuvastatin (CRESTOR) 40 MG tablet Take 1 tablet (40 mg) by mouth daily. 30 tablet 1    tamsulosin (FLOMAX) 0.4 MG capsule Take 1 capsule (0.4 mg) by mouth daily. 90 capsule 3    nitroGLYcerin (NITROSTAT) 0.4 MG sublingual tablet PLACE 1 TABLET UNDER THE TONGUE AT THE ONSET OF CHEST PAIN. MAY REPEAT EVERY 5 MINUTES AS NEEDED FOR A TOTAL OF 3 DOSES. 25 tablet 3       Cardiographics:    Zio patch results are pending    Echocardiogram: February 2025  1. Left ventricular size is normal with moderate concentric increase in wall  thickness.  - Left ventricular function is normal.The ejection fraction is 55-60%.  - There is hypokinesis of the basal to mid anterolateral wall.  - Global peak LV longitudinal strain is averaged at -13%. This suggests  abnormal strain (normal <-18%).  2. Right ventricular size and systolic function are normal.  3. Mild left atrial enlargement.  4. No  hemodynamically significant valvular abnormalities.  5. Aortic root (4.2 cm) and ascending aorta (4.1 cm) dilatation is present.  6. Compared to the prior study dated 3/16/22 new wall motion abnormalities are  identified.    Coronary angio: February 2025  Severe multivessel coronary artery disease involving the distal left main and chronic total occlusions of the left anterior descending and right coronary artery and severe diffuse disease of the left circumflex system.  Patent LIMA to the LAD which provides collaterals to the right coronary artery and left circumflex system.   Occluded vein grafts to the right coronary artery and left circumflex system.   Lab Results    Chemistry/lipid CBC Cardiac Enzymes/BNP/TSH/INR   Recent Labs   Lab Test 02/23/25  0832   CHOL 196   HDL 42   *   TRIG 135     Recent Labs   Lab Test 02/23/25  0832 09/30/24  0937 10/13/23  1555   * 198* 157*     Recent Labs   Lab Test 03/07/25  1405      POTASSIUM 4.4   CHLORIDE 111*   CO2 22   *   BUN 31.9*   CR 1.55*   GFRESTIMATED 46*   ANISH 8.4*     Recent Labs   Lab Test 03/07/25  1405 02/23/25  0832 02/22/25  0416   CR 1.55* 1.58* 1.51*     Recent Labs   Lab Test 01/19/24  1642 07/07/23  1529 02/10/23  1522   A1C 5.9* 5.9* 5.7*          Recent Labs   Lab Test 02/23/25  0832   WBC 6.2   HGB 10.8*   HCT 33.1*   MCV 99        Recent Labs   Lab Test 02/23/25  0832 02/22/25  0416 02/21/25  1455   HGB 10.8* 10.0* 10.7*    Recent Labs   Lab Test 03/15/22  2226 03/15/22  1937 03/06/20  1948   TROPONINI 0.02 0.02 0.03     Recent Labs   Lab Test 03/06/20  0821   *     Recent Labs   Lab Test 03/07/25  1405   TSH 0.32     Recent Labs   Lab Test 02/21/25  1455 03/15/22  1937 03/07/20  0000   INR 0.94 1.05 0.98

## 2025-04-06 LAB — CV ZIO PRELIM RESULTS: NORMAL

## 2025-04-07 ENCOUNTER — OFFICE VISIT (OUTPATIENT)
Dept: FAMILY MEDICINE | Facility: CLINIC | Age: 80
End: 2025-04-07
Payer: MEDICARE

## 2025-04-07 VITALS
HEIGHT: 63 IN | RESPIRATION RATE: 16 BRPM | OXYGEN SATURATION: 98 % | TEMPERATURE: 97.7 F | BODY MASS INDEX: 23.71 KG/M2 | SYSTOLIC BLOOD PRESSURE: 127 MMHG | DIASTOLIC BLOOD PRESSURE: 72 MMHG | WEIGHT: 133.8 LBS | HEART RATE: 71 BPM

## 2025-04-07 DIAGNOSIS — I10 HYPERTENSION GOAL BP (BLOOD PRESSURE) < 130/80: ICD-10-CM

## 2025-04-07 DIAGNOSIS — E78.5 DYSLIPIDEMIA: ICD-10-CM

## 2025-04-07 DIAGNOSIS — I48.0 PAROXYSMAL ATRIAL FIBRILLATION (H): Primary | ICD-10-CM

## 2025-04-07 DIAGNOSIS — I25.709 CORONARY ARTERY DISEASE INVOLVING CORONARY BYPASS GRAFT OF NATIVE HEART WITH ANGINA PECTORIS: ICD-10-CM

## 2025-04-07 DIAGNOSIS — N18.30 STAGE 3 CHRONIC KIDNEY DISEASE, UNSPECIFIED WHETHER STAGE 3A OR 3B CKD (H): ICD-10-CM

## 2025-04-07 PROBLEM — I21.3 ST ELEVATION MI (STEMI) (H): Status: RESOLVED | Noted: 2025-02-21 | Resolved: 2025-04-07

## 2025-04-07 PROBLEM — N28.9 RENAL INSUFFICIENCY: Status: RESOLVED | Noted: 2025-02-21 | Resolved: 2025-04-07

## 2025-04-07 PROBLEM — R29.90 STROKE-LIKE SYMPTOMS: Status: RESOLVED | Noted: 2022-03-15 | Resolved: 2025-04-07

## 2025-04-07 PROBLEM — I24.9 ACS (ACUTE CORONARY SYNDROME) (H): Status: RESOLVED | Noted: 2025-02-21 | Resolved: 2025-04-07

## 2025-04-07 LAB
BASOPHILS # BLD AUTO: 0 10E3/UL (ref 0–0.2)
BASOPHILS NFR BLD AUTO: 0 %
EOSINOPHIL # BLD AUTO: 0 10E3/UL (ref 0–0.7)
EOSINOPHIL NFR BLD AUTO: 0 %
ERYTHROCYTE [DISTWIDTH] IN BLOOD BY AUTOMATED COUNT: 12.9 % (ref 10–15)
HCT VFR BLD AUTO: 34.4 % (ref 40–53)
HGB BLD-MCNC: 10.8 G/DL (ref 13.3–17.7)
IMM GRANULOCYTES # BLD: 0 10E3/UL
IMM GRANULOCYTES NFR BLD: 0 %
LYMPHOCYTES # BLD AUTO: 0.6 10E3/UL (ref 0.8–5.3)
LYMPHOCYTES NFR BLD AUTO: 9 %
MCH RBC QN AUTO: 31 PG (ref 26.5–33)
MCHC RBC AUTO-ENTMCNC: 31.4 G/DL (ref 31.5–36.5)
MCV RBC AUTO: 99 FL (ref 78–100)
MONOCYTES # BLD AUTO: 0.2 10E3/UL (ref 0–1.3)
MONOCYTES NFR BLD AUTO: 4 %
NEUTROPHILS # BLD AUTO: 5.9 10E3/UL (ref 1.6–8.3)
NEUTROPHILS NFR BLD AUTO: 87 %
PLATELET # BLD AUTO: 158 10E3/UL (ref 150–450)
RBC # BLD AUTO: 3.48 10E6/UL (ref 4.4–5.9)
WBC # BLD AUTO: 6.8 10E3/UL (ref 4–11)

## 2025-04-07 PROCEDURE — 3074F SYST BP LT 130 MM HG: CPT | Performed by: FAMILY MEDICINE

## 2025-04-07 PROCEDURE — 85025 COMPLETE CBC W/AUTO DIFF WBC: CPT | Performed by: FAMILY MEDICINE

## 2025-04-07 PROCEDURE — 3078F DIAST BP <80 MM HG: CPT | Performed by: FAMILY MEDICINE

## 2025-04-07 PROCEDURE — 99214 OFFICE O/P EST MOD 30 MIN: CPT | Performed by: FAMILY MEDICINE

## 2025-04-07 PROCEDURE — 36415 COLL VENOUS BLD VENIPUNCTURE: CPT | Performed by: FAMILY MEDICINE

## 2025-04-07 PROCEDURE — G2211 COMPLEX E/M VISIT ADD ON: HCPCS | Performed by: FAMILY MEDICINE

## 2025-04-07 RX ORDER — ATORVASTATIN CALCIUM 80 MG/1
1 TABLET, FILM COATED ORAL DAILY
COMMUNITY
Start: 2024-09-24 | End: 2025-04-07

## 2025-04-07 RX ORDER — NITROGLYCERIN 0.4 MG/1
TABLET SUBLINGUAL
Qty: 25 TABLET | Refills: 3 | Status: SHIPPED | OUTPATIENT
Start: 2025-04-07

## 2025-04-07 RX ORDER — ACETAMINOPHEN 500 MG
500-1000 TABLET ORAL
COMMUNITY

## 2025-04-07 NOTE — Clinical Note
Patient had Zio patch testing done.  This does show paroxysmal atrial fibrillation.  I am assuming Eliquis should be continued.  Should we go ahead and discontinue Plavix?  Patient is also going to undergo an eye procedure done and please let me know if okay to hold Eliquis.

## 2025-04-07 NOTE — LETTER
My Depression Action Plan  Name: Jessica Healy   Date of Birth 1945  Date: 4/7/2025    My doctor: Suki Vilchis   My clinic: [address] [facility] [address] [phone]            GREEN    ZONE   Good Control    What it looks like:   Things are going generally well. You have normal ups and downs. You may even feel depressed from time to time, but bad moods usually last less than a day.   What you need to do:  Continue to care for yourself (see self care plan)  Check your depression survival kit and update it as needed  Follow your physician s recommendations including any medication.  Do not stop taking medication unless you consult with your physician first.             YELLOW         ZONE Getting Worse    What it looks like:   Depression is starting to interfere with your life.   It may be hard to get out of bed; you may be starting to isolate yourself from others.  Symptoms of depression are starting to last most all day and this has happened for several days.   You may have suicidal thoughts but they are not constant.   What you need to do:     Call your care team. Your response to treatment will improve if you keep your care team informed of your progress. Yellow periods are signs an adjustment may need to be made.     Continue your self-care.  Just get dressed and ready for the day.  Don't give yourself time to talk yourself out of it.    Talk to someone in your support network.    Open up your Depression Self-Care Plan/Wellness Kit.             RED    ZONE Medical Alert - Get Help    What it looks like:   Depression is seriously interfering with your life.   You may experience these or other symptoms: You can t get out of bed most days, can t work or engage in other necessary activities, you have trouble taking care of basic hygiene, or basic responsibilities, thoughts of suicide or death that will not go away, self-injurious  behavior.     What you need to do:  Call your care team and request a same-day appointment. If they are not available (weekends or after hours) call your local crisis line, emergency room or 911.          Depression Self-Care Plan / Wellness Kit    Many people find that medication and therapy are helpful treatments for managing depression. In addition, making small changes to your everyday life can help to boost your mood and improve your wellbeing. Below are some tips for you to consider. Be sure to talk with your medical provider and/or behavioral health consultant if your symptoms are worsening or not improving.     Sleep   Sleep hygiene  means all of the habits that support good, restful sleep. It includes maintaining a consistent bedtime and wake time, using your bedroom only for sleeping or sex, and keeping the bedroom dark and free of distractions like a computer, smartphone, or television.     Develop a Healthy Routine  Maintain good hygiene. Get out of bed in the morning, make your bed, brush your teeth, take a shower, and get dressed. Don t spend too much time viewing media that makes you feel stressed. Find time to relax each day.    Exercise  Get some form of exercise every day. This will help reduce pain and release endorphins, the  feel good  chemicals in your brain. It can be as simple as just going for a walk or doing some gardening, anything that will get you moving.      Diet  Strive to eat healthy foods, including fruits and vegetables. Drink plenty of water. Avoid excessive sugar, caffeine, alcohol, and other mood-altering substances.     Stay Connected with Others  Stay in touch with friends and family members.    Manage Your Mood  Try deep breathing, massage therapy, biofeedback, or meditation. Take part in fun activities when you can. Try to find something to smile about each day.     Psychotherapy  Be open to working with a therapist if your provider recommends it.     Medication  Be sure to  take your medication as prescribed. Most anti-depressants need to be taken every day. It usually takes several weeks for medications to work. Not all medicines work for all people. It is important to follow-up with your provider to make sure you have a treatment plan that is working for you. Do not stop your medication abruptly without first discussing it with your provider.    Crisis Resources   These hotlines are for both adults and children. They and are open 24 hours a day, 7 days a week unless noted otherwise.    National Suicide Prevention Lifeline   988 or 0-190-943-XAPU (7847)    Crisis Text Line    www.crisistextline.org  Text HOME to 986726 from anywhere in the [place], anytime, about any type of crisis. A live, trained crisis counselor will receive the text and respond quickly.    [employer] Lifeline for LGBTQ Youth  A national crisis intervention and suicide lifeline for LGBTQ youth under 25. Provides a safe place to talk without judgement. Call 1-384.570.8680; text START to 764658 or visit www.thetrevorproject.org to talk to a trained counselor.    For [facility] crisis numbers, visit the [facility] website at:  https://[place].gov/dhs/people-we-serve/adults/health-care/mental-health/resources/crisis-contacts.jsp

## 2025-04-07 NOTE — PROGRESS NOTES
Assessment & Plan     ICD-10-CM    1. Paroxysmal atrial fibrillation (H)  I48.0       2. Coronary artery disease involving coronary bypass graft of native heart with angina pectoris  I25.709 nitroGLYcerin (NITROSTAT) 0.4 MG sublingual tablet      3. Stage 3 chronic kidney disease, unspecified whether stage 3a or 3b CKD (H)  N18.30 CBC with platelets and differential     CBC with platelets and differential      4. Hypertension goal BP (blood pressure) < 130/80  I10       5. Dyslipidemia  E78.5         Patient presents today for follow-up.  He has paroxysmal atrial fibrillation and is now on Eliquis.  Residual patch noted revealed less than 1% load.  Cardiology had to comment on it.  Patient is on Plavix for previous CVA and now on Eliquis.  Reviewed recent visit with cardiology and neurology.  Cardiology note mentions discontinuing Plavix once Zio patch results are available.  Defer further decision to cardiology.  Neurology note reviewed and the goal for LDL is less than 70 and advise optimal blood pressure control.  If addressed following issues  1: Paroxysmal atrial fibrillation.  Continue Eliquis.  Discussed that Plavix can likely be discontinued and I will also send a message to cardiology team  2: Coronary artery disease: Patient would like refill of his Nitrostat which was given  3: Stage III CKD.  Check CBC as now patient is also on blood thinner.  4: Hypertension: Under fair control.  5: Dyslipidemia: Under inadequate control.  Patient has been changed to Crestor and Zetia.  Recheck fasting lipid panel at next visit.    Patient asking if he can schedule for eye surgery.  I think this will be reasonable.  Will need to hold Eliquis according to ophthalmology.    The longitudinal plan of care for the diagnosis(es)/condition(s) as documented were addressed during this visit. Due to the added complexity in care, I will continue to support Jessica in the subsequent management and with ongoing continuity of  care.        MEDICATIONS:  Continue current medications without change    Subjective   Jessica is a 79 year old, presenting for the following health issues:  RECHECK (Bilateral ankle's are swollen. )        4/7/2025     2:49 PM   Additional Questions   Roomed by Patience Almeida CMA   Accompanied by Daughter     HPI      Patient Active Problem List   Diagnosis    CAD (coronary artery disease)    Hypertension goal BP (blood pressure) < 130/80    Dyslipidemia    Cerebrovascular accident (CVA), unspecified mechanism (H)    CKD (chronic kidney disease) stage 3, GFR 30-59 ml/min (H)    Kidney cyst, acquired    Post herpetic neuralgia    Lower urinary tract symptoms due to benign prostatic hyperplasia    Moderate recurrent major depression (H)    New onset atrial fibrillation (H)    ST elevation myocardial infarction (STEMI), unspecified artery (H)    Chest pain, unspecified type     Current Outpatient Medications   Medication Sig Dispense Refill    acetaminophen (TYLENOL) 500 MG tablet Take 500-1,000 mg by mouth.      apixaban ANTICOAGULANT (ELIQUIS ANTICOAGULANT) 2.5 MG tablet Take 1 tablet (2.5 mg) by mouth 2 times daily. 60 tablet 2    clopidogrel (PLAVIX) 75 MG tablet TAKE 1 TABLET(75 MG) BY MOUTH DAILY      ezetimibe (ZETIA) 10 MG tablet TAKE 1 TABLET(10 MG) BY MOUTH DAILY 90 tablet 3    isosorbide mononitrate CR (IMDUR) 120 MG 24 HR ER tablet TAKE 1 TABLET(120 MG) BY MOUTH DAILY 90 tablet 3    losartan (COZAAR) 100 MG tablet Take 1 tablet (100 mg) by mouth daily. 90 tablet 3    metoprolol tartrate (LOPRESSOR) 25 MG tablet Take 1 tablet (25 mg) by mouth 2 times daily. 60 tablet 1    nitroGLYcerin (NITROSTAT) 0.4 MG sublingual tablet PLACE 1 TABLET UNDER THE TONGUE AT THE ONSET OF CHEST PAIN. MAY REPEAT EVERY 5 MINUTES AS NEEDED FOR A TOTAL OF 3 DOSES. 25 tablet 3    ORDER FOR DME Home Blood pressure monitor machine 1 each 0    pantoprazole (PROTONIX) 20 MG EC tablet Take 1 tablet (20 mg) by mouth every morning (before  "breakfast). 30 tablet 1    predniSONE (DELTASONE) 10 MG tablet Take 10 mg by mouth daily.      rosuvastatin (CRESTOR) 40 MG tablet Take 1 tablet (40 mg) by mouth daily. 30 tablet 1    tamsulosin (FLOMAX) 0.4 MG capsule Take 1 capsule (0.4 mg) by mouth daily. 90 capsule 3     No current facility-administered medications for this visit.           Review of Systems  Constitutional, neuro, ENT, endocrine, pulmonary, cardiac, gastrointestinal, genitourinary, musculoskeletal, integument and psychiatric systems are negative, except as otherwise noted.      Objective    /72 (BP Location: Left arm, Patient Position: Sitting, Cuff Size: Adult Regular)   Pulse 71   Temp 97.7  F (36.5  C) (Oral)   Resp 16   Ht 1.6 m (5' 3\")   Wt 60.7 kg (133 lb 12.8 oz)   SpO2 98%   BMI 23.70 kg/m    Body mass index is 23.7 kg/m .  Physical Exam   GENERAL: alert and no distress    Office Visit on 03/07/2025   Component Date Value Ref Range Status    TSH 03/07/2025 0.32  0.30 - 4.20 uIU/mL Final    Sodium 03/07/2025 139  135 - 145 mmol/L Final    Potassium 03/07/2025 4.4  3.4 - 5.3 mmol/L Final    Chloride 03/07/2025 111 (H)  98 - 107 mmol/L Final    Carbon Dioxide (CO2) 03/07/2025 22  22 - 29 mmol/L Final    Anion Gap 03/07/2025 6 (L)  7 - 15 mmol/L Final    Urea Nitrogen 03/07/2025 31.9 (H)  8.0 - 23.0 mg/dL Final    Creatinine 03/07/2025 1.55 (H)  0.67 - 1.17 mg/dL Final    GFR Estimate 03/07/2025 46 (L)  >60 mL/min/1.73m2 Final    eGFR calculated using 2021 CKD-EPI equation.    Calcium 03/07/2025 8.4 (L)  8.8 - 10.4 mg/dL Final    Glucose 03/07/2025 114 (H)  70 - 99 mg/dL Final     Results for orders placed or performed in visit on 04/07/25 (from the past 24 hours)   CBC with platelets and differential    Narrative    The following orders were created for panel order CBC with platelets and differential.  Procedure                               Abnormality         Status                     ---------                               " -----------         ------                     CBC with platelets and ...[[id number]]  Abnormal            Final result                 Please view results for these tests on the individual orders.   CBC with platelets and differential   Result Value Ref Range    WBC Count 6.8 4.0 - 11.0 10e3/uL    RBC Count 3.48 (L) 4.40 - 5.90 10e6/uL    Hemoglobin 10.8 (L) 13.3 - 17.7 g/dL    Hematocrit 34.4 (L) 40.0 - 53.0 %    MCV 99 78 - 100 fL    MCH 31.0 26.5 - 33.0 pg    MCHC 31.4 (L) 31.5 - 36.5 g/dL    RDW 12.9 10.0 - 15.0 %    Platelet Count 158 150 - 450 10e3/uL    % Neutrophils 87 %    % Lymphocytes 9 %    % Monocytes 4 %    % Eosinophils 0 %    % Basophils 0 %    % Immature Granulocytes 0 %    Absolute Neutrophils 5.9 1.6 - 8.3 10e3/uL    Absolute Lymphocytes 0.6 (L) 0.8 - 5.3 10e3/uL    Absolute Monocytes 0.2 0.0 - 1.3 10e3/uL    Absolute Eosinophils 0.0 0.0 - 0.7 10e3/uL    Absolute Basophils 0.0 0.0 - 0.2 10e3/uL    Absolute Immature Granulocytes 0.0 <=0.4 10e3/uL           Signed Electronically by: Suki Vilchis MD

## 2025-04-08 ENCOUNTER — PATIENT OUTREACH (OUTPATIENT)
Dept: CARE COORDINATION | Facility: CLINIC | Age: 80
End: 2025-04-08
Payer: MEDICARE

## 2025-04-08 ENCOUNTER — HOSPITAL ENCOUNTER (OUTPATIENT)
Dept: MRI IMAGING | Facility: HOSPITAL | Age: 80
Discharge: HOME | End: 2025-04-08
Attending: PSYCHIATRY & NEUROLOGY | Admitting: PSYCHIATRY & NEUROLOGY
Payer: MEDICARE

## 2025-04-08 DIAGNOSIS — M79.662 PAIN OF LEFT LOWER LEG: ICD-10-CM

## 2025-04-08 PROCEDURE — 72148 MRI LUMBAR SPINE W/O DYE: CPT

## 2025-04-08 NOTE — PROGRESS NOTES
Clinic Care Coordination Contact  [facility]/Voicemail    Clinical Data: Care Coordinator Outreach    Outreach Documentation Number of Outreach Attempt   3/21/2025  11:28 AM 1   4/8/2025  10:35 AM 2     Left message on patients daughter Aleena's voicemail with call back information and requested return call.    ** CHW will make 1 more attempt to patients daughter before sending chart to CCC RN to review for disenrollment.    Eva Peralta, RN to Me  3/11/25  9:30 AM  Please schedule patient or family member to speak with Elvia.  I tried calling him today and got his VM.  He was asking for food resources at my last outreach.  Figured it would make more sense for them to speak with Elvia to identify all needs.    Plan: Care Coordinator will try to reach patient again in 10 business days.    Jenn Deng  Community Health Worker  [facility] Care Coordination   [place], [facility], [facility], [place] and [facility]  Office: [phone]

## 2025-04-09 ENCOUNTER — TELEPHONE (OUTPATIENT)
Dept: NEUROLOGY | Facility: CLINIC | Age: 80
End: 2025-04-09
Payer: MEDICARE

## 2025-04-10 NOTE — TELEPHONE ENCOUNTER
"RN returned call to Jessica (used [employer] phone ) to relay the following from Dr. Lujan:    MRI L-spine shows moderate degeneration.  We will need to correlate with the EMG to make a final decision.     Jessica asked RN to call either of his daughters (Aleena or Deneen) and relay the message to them - \"I'm forgetful and they help me with all my medical issues.\"    RN attempted to call Aleena REINALDO and provided clinic phone number.    RN then called Deneen and relayed the above message from Dr. Lujan. Hli expressed understanding and told RN they will contact the clinic if additional questions or concerns come up before Jessica's appointment on 6/16.    Liz Olivares RN, BSN  [facility] Neurology    "
[facility] Call Center    Phone Message    May a detailed message be left on voicemail: yes     Reason for Call:  Patient just had an MRI done yesterday, asking for results when provider receives a copy    Action Taken: Other: [facility] neurology    Travel Screening: Not Applicable     Date of Service:                                                                      
[facility] Call Center    Phone Message    May a detailed message be left on voicemail: yes     Reason for Call: Other: Aleena calling back due to a missed call from Liz yesterday. Aleena stated that she is available for a call back today or after 2 tomorrow.     Action Taken: Message routed to:  [facility]    Travel Screening: Not Applicable     Date of Service:                                                                     
MRI L-spine shows moderate degeneration.  We will need to correlate with the EMG to make a final decision.  
no

## 2025-04-22 ENCOUNTER — PATIENT OUTREACH (OUTPATIENT)
Dept: CARE COORDINATION | Facility: CLINIC | Age: 80
End: 2025-04-22
Payer: MEDICARE

## 2025-04-22 NOTE — PROGRESS NOTES
Clinic Care Coordination Contact  Care Coordination Clinician Chart Review    Situation: Patient chart reviewed by Care Coordinator.       Background: Care Coordination Program started: 9/24/2024. Initial assessment completed and patient-centered care plan(s) were developed with participation from patient. Lead CC handed patient off to CHW for continued outreaches.       Assessment: Per chart review, patient outreach completed by CC CHW on 4/8/25.  Patient is actively working to accomplish goal(s). Patient's goal(s) appropriate and relevant at this time. Patient is not due for updated Plan of Care.  Assessments will be completed annually or as needed/with change of patient status.      Care Plan: Health Maintenance       Problem: Health Maintenance Due or Overdue       Goal: Become up-to-date with health maintenance visit(s)       Start Date: 1/30/2025    Recent Progress: 20%    Note:     Barriers: numerous appointments  Strengths: ARHMS worker  Patient expressed understanding of goal: per chart review  Action steps to achieve this goal:  Referrals pended for Cardiology-patient needs to schedule- [facility] on [facility] appointment after he heals from his eye surgery that is scheduled on 2/28/25.  Nephrology-patient needs to schedule- [facility] on scheduling this appointment after he heals from his eye surgery that is scheduled on 2/28/25.  Neurology-scheduled 3/6/25 @ 8:07-Qhgjwmywl-Lshwie up scheduled 6/16/25 @ 1:15  Pain Clinic-patient needs to schedule- I i let [facility] know that [facility] plans on scheduling this appointment after he heals from his eye surgery that is scheduled on 2/28/25.  5. Eye surgery- 2/28/25  6. Pre op for Eye surgery with PCP-scheduled 2/12/25 @ 12:40                             Care Plan: Food Insecurity       Problem: Unable to prepare meals               Problem: Reliable food source       Goal: Establish  Options for Affordable Food Sources                                Plan/Recommendations: The patient will continue working with Care Coordination to achieve goal(s) as above. CHW will continue outreaches at minimum every 30 days and will involve Lead CC as needed or if patient is ready to move to Maintenance. Lead CC will continue to monitor CHW outreaches and patient's progress to goal(s) every 6 weeks.     Plan of Care updated and sent to patient: No

## 2025-04-24 ENCOUNTER — PATIENT OUTREACH (OUTPATIENT)
Dept: CARE COORDINATION | Facility: CLINIC | Age: 80
End: 2025-04-24
Payer: MEDICARE

## 2025-04-24 ENCOUNTER — DOCUMENTATION ONLY (OUTPATIENT)
Dept: ANTICOAGULATION | Facility: CLINIC | Age: 80
End: 2025-04-24
Payer: MEDICARE

## 2025-04-24 NOTE — PROGRESS NOTES
ANTICOAGULATION DIRECT ORAL ANTICOAGULANT MONITORING    SUBJECTIVE     [facility] is evaluating Jessica Healy's Apixaban (Eliquis) as part of its Anticoagulation Monitoring Program.    Indication:Atrial Fibrillation  Current dose per medication list: Apixaban 2.5 mg TWICE daily  Recent hospitalizations/ED/Office Visits for bleeding/clotting concerns: No  Other bleeding or side effect concerns: No  Additional findings: New onset A-fib 2/21/25. Weight is 60.7 kg and Cr is 1.55 - lower dosing is appropriate.    OBJECTIVE     Age: 79 year old    Adherence score:0 as of     Wt Readings from Last 2 Encounters:   04/07/25 60.7 kg (133 lb 12.8 oz)   03/17/25 59 kg (130 lb)      Lab Results   Component Value Date    CR 1.55 (H) 03/07/2025    CR 1.58 (H) 02/23/2025    CR 1.51 (H) 02/22/2025     Creatinine Clearance (using actual bodyweight, mL/min):     Lab Results   Component Value Date    HGB 10.8 04/07/2025    HGB 14.2 11/30/2016     04/07/2025     11/21/2014       ASSESSMENT/PLAN     A chart review for Direct Oral Anticoagulant (DOAC) Stewardship has been completed for:     Dosing: dose appropriate for weight and renal function    Plan made Plan made per [facility] anticoagulation protocol    Rob Byrne RN  Anticoagulation Clinic

## 2025-04-24 NOTE — PROGRESS NOTES
Clinic Care Coordination Contact  [facility]/Voicemail    Clinical Data: Care Coordinator Outreach    Outreach Documentation Number of Outreach Attempt   3/21/2025  11:28 AM 1   4/8/2025  10:35 AM 2   4/24/2025  10:26 AM 3     Left message on patients daughter Aleena's voicemail with call back information and requested return call.    Plan: Care Coordinator sending chart to CCC RN to review for disenrollment.    Jenn Deng  Community Health Worker  [facility] Care Coordination   [facility], [place], [place], [place] and [facility]  Office: [phone]

## 2025-04-28 DIAGNOSIS — E78.5 DYSLIPIDEMIA: ICD-10-CM

## 2025-04-29 RX ORDER — ROSUVASTATIN CALCIUM 40 MG/1
40 TABLET, COATED ORAL DAILY
Qty: 30 TABLET | Refills: 1 | Status: SHIPPED | OUTPATIENT
Start: 2025-04-29

## 2025-05-08 ENCOUNTER — PATIENT OUTREACH (OUTPATIENT)
Dept: CARE COORDINATION | Facility: CLINIC | Age: 80
End: 2025-05-08
Payer: MEDICARE

## 2025-05-08 NOTE — PROGRESS NOTES
Clinic Care Coordination Contact  Community Health Worker Follow Up    Care Gaps:     Health Maintenance Due   Topic Date Due    ZOSTER IMMUNIZATION (1 of 2) Never done    COVID-19 Vaccine (4 - 2024-25 season) 04/28/2025       Patient accepted scheduling phone number for  [facility]  to schedule independently     Care Plan:   Care Plan: Health Maintenance       Problem: Health Maintenance Due or Overdue       Goal: Become up-to-date with health maintenance visit(s)       Start Date: 1/30/2025    This Visit's Progress: 30% Recent Progress: 20%    Note:     Barriers: numerous appointments  Strengths: ARHMS worker  Patient expressed understanding of goal: per chart review  Action steps to achieve this goal:  Referrals pended for Cardiology-patient needs to schedule- needs a follow up appointment August/Sept 2025. (Patients isabel Flood schedules appointments for patient.)  Nephrology-patient needs to schedule- [facility] plans on scheduling this appointment after he heals from his eye surgery that is scheduled on 6/13/25  Neurology-scheduled 3/6/25 @ 8:71-Cttemqsxu-Fxbqdv up scheduled 6/16/25 @ 1:15. Continuous (MB 5/8/25)- Confirmed appointment   Pain Clinic-patient needs to schedule- [facility] plans on scheduling this appointment after he heals from his eye surgery that is scheduled on 6/13/25                                Care Plan: Food Insecurity       Problem: Unable to prepare meals               Problem: Reliable food source       Goal: Establish Options for Affordable Food Sources                             Intervention and Education during outreach:  Appointments scheduling for patient- Patients isabel Flood schedules and keeps track of appointments for patients.    Food Resources- Patient receives $80 a month for [employer]. Patient receives SNAP $60 a month. Patient would like to look into other food resources that would be available to him. I scheduled patient with CCC SHIVA on 5/13/25 @ 10:00.    Nodes,  Eva, RN to Me 3/11/25  9:30 AM  Please schedule patient or family member to speak with Elvia.  I tried calling him today and got his VM.  He was asking for food resources at my last outreach.  Figured it would make more sense for them to speak with Elvia to identify all needs.    CHW Plan: CHW will follow up with patient and patients daughter Aleena in about 1 month.     Jenn Deng  [facility] Health Worker  [facility] Care Coordination   [place], [facility], [facility]  Office: [phone]

## 2025-05-13 ENCOUNTER — PATIENT OUTREACH (OUTPATIENT)
Dept: CARE COORDINATION | Facility: CLINIC | Age: 80
End: 2025-05-13

## 2025-05-13 NOTE — PROGRESS NOTES
Clinic Care Coordination Contact  [facility]/Voicemail    Clinical Data: Care Coordinator Outreach    Outreach Documentation Number of Outreach Attempt   4/8/2025  10:35 AM 2   4/24/2025  10:26 AM 3   5/13/2025  10:17 AM 3       Left message on patient's voicemail with call back information and requested return call.      Plan: Care Coordinator sent resources to pt via [employer]. [facility] Care Coordinator will do no further outreaches at this time. RN CC will follow-up with chart review in about 3 weeks, CHW CC will complete regular outreach in about 3 weeks.     [facility] - [email or web address]  [address] [place] [address]  [phone]  You are welcome to come to the food shelf one time per month, per appointment - call to schedule an appointment.     [facility]  [address] [place] [address]  [phone]  [email or web address]    [employer] [address], [address]  [place] [address]  [phone]  [email or web address]    [address] [address]  [phone]  [email or web address]-[email or web address]/[email or web address]/    [facility] Food Shelf  [address] [place] [address]  [phone]  http://[email or web address]/    [employer]  [address] [place] [address]  [phone]  [email or web address]    [employer]  [address] [place], [place] [address] [phone]  [email or web address]    [facility]  [address] [place] [address]  [phone] or [phone]  [email or web address]    [address] [id number] [phone]  http://www.[address] [place] [address].org/    [facility]  [address] [place] [address]  [phone]  [email or web address]    [address] [place] [address]  [phone]  [email or web address]    [facility]  [address] [place], [place] [id number] [phone]  [email or web address]/community/[facility]/    [employer]  [address] [place] [address] [phone]  [email or web address]://[email or web address]    [employer] - Food, shelter or clothing  [address] [place] [address] [phone]  [email or web address]://[email or web address]     You can also look up options for food pantries, community meals, food distribution events and more by visiting [email or web address]/find-help/ and entering your zip code.

## 2025-05-13 NOTE — Clinical Note
FYI - [facility]. Feel free to schedule another appointment as needed, will reach out to pt if they follow-up with me. Sent food resources via my chart.

## 2025-05-15 ENCOUNTER — PATIENT OUTREACH (OUTPATIENT)
Dept: CARE COORDINATION | Facility: CLINIC | Age: 80
End: 2025-05-15
Payer: MEDICARE

## 2025-05-15 NOTE — PROGRESS NOTES
Clinic Care Coordination Contact  [facility]/Voicemail    Clinical Data: Care Coordinator Outreach    Outreach Documentation Number of Outreach Attempt   5/15/2025   1:23 PM 1     Left message on patients daughter Aleena's voicemail with call back information and requested return call.    Eva Peralta, RN to Me 3/11/25  9:30 AM  Please schedule patient or family member to speak with Elvia.  I tried calling him today and got his VM.  He was asking for food resources at my last outreach.  Figured it would make more sense for them to speak with Elvia to identify all needs.- Appointment was scheduled with CCC SW on 5/13/25-[facility]. CHW will see if they would like to reschedule with SW.    Plan: Care Coordinator will try to reach patient again around 6/5/25.    Jenn Deng  Community Health Worker  [facility]  Clinic Care Coordination   [place], [facility], [place], [place], [place] and [facility]  Office: [phone]

## 2025-05-16 ENCOUNTER — TELEPHONE (OUTPATIENT)
Dept: FAMILY MEDICINE | Facility: CLINIC | Age: 80
End: 2025-05-16
Payer: MEDICARE

## 2025-05-16 NOTE — TELEPHONE ENCOUNTER
Forms/Letter Request    Type of form/letter: FMLA - Unknown      Is Release of Information needed?: Yes  Was an PORSCHE obtained?  Yes, however patients daughter signed PORSCHE    Do we have the form/letter: Yes: I put in out guide for Dr Vilchis    Who is the form from? [employer]     Where did/will the form come from? Patient or family brought in       When is form/letter needed by: Not Known    How would you like the form/letter returned: Fax : to [employer] at [phone]    Patient Notified form requests are processed in 5-7 business days:Yes    Could we send this information to you in FIGS or would you prefer to receive a phone call?:   Patient would prefer a phone call   Okay to leave a detailed message?: Yes at Other phone number:  Call patients isabel Flood at [phone]

## 2025-05-19 NOTE — TELEPHONE ENCOUNTER
Please inform patient and daughter that I will go over the forms at the visit on 5/23 and fill them up    Suki Vilchis MD

## 2025-05-20 NOTE — TELEPHONE ENCOUNTER
Called daughter of Jessica to tell her Dr. Vilchis's message listed below:      Please inform patient and daughter that I will go over the forms at the visit on 5/23 and fill them up     Suki Vilchis MD

## 2025-05-23 ENCOUNTER — OFFICE VISIT (OUTPATIENT)
Dept: FAMILY MEDICINE | Facility: CLINIC | Age: 80
End: 2025-05-23
Payer: MEDICARE

## 2025-05-23 VITALS
RESPIRATION RATE: 14 BRPM | WEIGHT: 129.8 LBS | HEIGHT: 63 IN | BODY MASS INDEX: 23 KG/M2 | HEART RATE: 66 BPM | DIASTOLIC BLOOD PRESSURE: 67 MMHG | OXYGEN SATURATION: 100 % | SYSTOLIC BLOOD PRESSURE: 126 MMHG | TEMPERATURE: 97.4 F

## 2025-05-23 DIAGNOSIS — I48.0 PAROXYSMAL ATRIAL FIBRILLATION (H): ICD-10-CM

## 2025-05-23 DIAGNOSIS — Z87.39 HISTORY OF GOUT: ICD-10-CM

## 2025-05-23 DIAGNOSIS — N18.30 STAGE 3 CHRONIC KIDNEY DISEASE, UNSPECIFIED WHETHER STAGE 3A OR 3B CKD (H): ICD-10-CM

## 2025-05-23 DIAGNOSIS — Z02.79 MEDICAL CERTIFICATE ISSUANCE: ICD-10-CM

## 2025-05-23 DIAGNOSIS — Z01.818 PREOP GENERAL PHYSICAL EXAM: Primary | ICD-10-CM

## 2025-05-23 DIAGNOSIS — F19.20 STEROID DEPENDENCE (H): ICD-10-CM

## 2025-05-23 DIAGNOSIS — I10 ESSENTIAL HYPERTENSION: ICD-10-CM

## 2025-05-23 PROBLEM — M1A.9XX1 TOPHACEOUS GOUT: Status: ACTIVE | Noted: 2025-01-08

## 2025-05-23 LAB
BASOPHILS # BLD AUTO: 0 10E3/UL (ref 0–0.2)
BASOPHILS NFR BLD AUTO: 0 %
EOSINOPHIL # BLD AUTO: 0 10E3/UL (ref 0–0.7)
EOSINOPHIL NFR BLD AUTO: 0 %
ERYTHROCYTE [DISTWIDTH] IN BLOOD BY AUTOMATED COUNT: 13.8 % (ref 10–15)
HCT VFR BLD AUTO: 35.7 % (ref 40–53)
HGB BLD-MCNC: 11.5 G/DL (ref 13.3–17.7)
IMM GRANULOCYTES # BLD: 0.1 10E3/UL
IMM GRANULOCYTES NFR BLD: 1 %
LYMPHOCYTES # BLD AUTO: 0.7 10E3/UL (ref 0.8–5.3)
LYMPHOCYTES NFR BLD AUTO: 7 %
MCH RBC QN AUTO: 31.7 PG (ref 26.5–33)
MCHC RBC AUTO-ENTMCNC: 32.2 G/DL (ref 31.5–36.5)
MCV RBC AUTO: 98 FL (ref 78–100)
MONOCYTES # BLD AUTO: 0.4 10E3/UL (ref 0–1.3)
MONOCYTES NFR BLD AUTO: 4 %
NEUTROPHILS # BLD AUTO: 8.9 10E3/UL (ref 1.6–8.3)
NEUTROPHILS NFR BLD AUTO: 89 %
PLATELET # BLD AUTO: 165 10E3/UL (ref 150–450)
RBC # BLD AUTO: 3.63 10E6/UL (ref 4.4–5.9)
WBC # BLD AUTO: 10 10E3/UL (ref 4–11)

## 2025-05-23 PROCEDURE — 99215 OFFICE O/P EST HI 40 MIN: CPT | Performed by: FAMILY MEDICINE

## 2025-05-23 PROCEDURE — 85025 COMPLETE CBC W/AUTO DIFF WBC: CPT | Performed by: FAMILY MEDICINE

## 2025-05-23 PROCEDURE — 3074F SYST BP LT 130 MM HG: CPT | Performed by: FAMILY MEDICINE

## 2025-05-23 PROCEDURE — 36415 COLL VENOUS BLD VENIPUNCTURE: CPT | Performed by: FAMILY MEDICINE

## 2025-05-23 PROCEDURE — 3078F DIAST BP <80 MM HG: CPT | Performed by: FAMILY MEDICINE

## 2025-05-23 PROCEDURE — 80048 BASIC METABOLIC PNL TOTAL CA: CPT | Performed by: FAMILY MEDICINE

## 2025-05-23 PROCEDURE — G2211 COMPLEX E/M VISIT ADD ON: HCPCS | Performed by: FAMILY MEDICINE

## 2025-05-23 RX ORDER — PREDNISONE 10 MG/1
5 TABLET ORAL DAILY
Status: SHIPPED
Start: 2025-05-23

## 2025-05-23 NOTE — PROGRESS NOTES
Preoperative Evaluation  [facility]  [address] [facility] [address]  Phone: [phone]  Fax: [phone]  Primary Provider: Suki Vilchis MD  Pre-op Performing Provider: Suki Vilchis MD  May 23, 2025             5/23/2025   Surgical Information   What procedure is being done? pre-op   Facility or Hospital where procedure/surgery will be performed: [facility]   Who is doing the procedure / surgery? specialist   Date of surgery / procedure: 6/13   Time of surgery / procedure: eye surgery   Where do you plan to recover after surgery? at home with family     Fax number for surgical facility: [phone]    Assessment & Plan       ICD-10-CM    1. Preop general physical exam  Z01.818       2. History of gout  Z87.39 predniSONE (DELTASONE) 10 MG tablet      3. Steroid dependence (H)  F19.20       4. Stage 3 chronic kidney disease, unspecified whether stage 3a or 3b CKD (H)  N18.30 CBC with platelets and differential     CBC with platelets and differential      5. Paroxysmal atrial fibrillation (H)  I48.0       6. Essential hypertension  I10 Basic metabolic panel  (Ca, Cl, CO2, Creat, Gluc, K, Na, BUN)     Basic metabolic panel  (Ca, Cl, CO2, Creat, Gluc, K, Na, BUN)      7. Medical certificate issuance  Z02.79           The proposed surgical procedure is considered LOW risk.          - No identified additional risk factors other than previously addressed    Preoperative Medication Instructions  Antiplatelet or Anticoagulation Medication Instructions   - apixaban (Eliquis), edoxaban (Savaysa), rivaroxaban (Xarelto): Bleeding risk is low for this procedure AND CrCl (>=) 50 mL/min. DO NOT TAKE 1 day before surgery.      Additional Medication Instructions  Take all scheduled medications on the day of surgery EXCEPT for modifications listed below:   - Herbal medications and vitamins: DO NOT TAKE 14 days prior to surgery.   - ACE/ARB/ARNI (lisinopril, enalapril,  losartan, valsartan, olmesartan, sacubritril/valsartan) : DO NOT TAKE on day of surgery (minimum 11 hours for general anesthesia).   - Beta Blockers (atenolol, metoprolol, propranolol) : Continue taking on the day of surgery.   - Statins (atorvastatin, simvastatin, pravastatin) : Continue taking on the day of surgery.    - Intraoperative stress dose steroids may be indicated due to chronic steroid use in the last 3 months (e.g. > 3 weeks of prednisone 20 mg or daily prednisone 5 mg).    Recommendation  Approval given to proceed with proposed procedure, without further diagnostic evaluation.    The longitudinal plan of care for the diagnosis(es)/condition(s) as documented were addressed during this visit. Due to the added complexity in care, I will continue to support Jessica in the subsequent management and with ongoing continuity of care.    Medical certificate: Patient was accompanied by his daughter who works for medical company.  She plans to provide cares for him after the surgery.  She is already a PCA but would like FMLA forms completed for 3 months after surgery to get him under adequate strength and health.  I have completed the forms to the best of my knowledge based on history provided by patient and his daughter.  The forms were completed and given to the daughter.    Total time spent including chart review, patient interview, discussion of plan of care, examination, pending forms with daughter and documentation exceeded 40 minutes.          Cristina Lopez is a 79 year old, presenting for the following:  Pre-Op Exam (06/13 at [facility]) and Forms  Patient is scheduled for pterygium surgery for decreasing vision according to accompanying daughter.        5/23/2025     3:06 PM   Additional Questions   Roomed by María ZALDIVAR CMA   Accompanied by Daughter         5/23/2025   Forms   Any forms needing to be completed Yes           5/23/2025   Pre-Op Questionnaire   Have you ever had a heart attack or  stroke? (!) YES  2006, [place] [facility]   Have you ever had surgery on your heart or blood vessels, such as a stent placement, a coronary artery bypass, or surgery on an artery in your head, neck, heart, or legs? (!) YES Bypass   Do you have chest pain with activity? No   Do you have a history of heart failure? No   Do you currently have a cold, bronchitis or symptoms of other infection? No   Do you have a cough, shortness of breath, or wheezing? No   Do you or anyone in your family have previous history of blood clots? (!) UNKNOWN    Do you or does anyone in your family have a serious bleeding problem such as prolonged bleeding following surgeries or cuts? (!) UNKNOWN    Have you ever had problems with anemia or been told to take iron pills? (!) UNKNOWN    Have you had any abnormal blood loss such as black, tarry or bloody stools? (!) UNKNOWN    Have you ever had a blood transfusion? No   Are you willing to have a blood transfusion if it is medically needed before, during, or after your surgery? Yes   Have you or any of your relatives ever had problems with anesthesia? No   Do you have sleep apnea, excessive snoring or daytime drowsiness? No   Do you have any artifical heart valves or other implanted medical devices like a pacemaker, defibrillator, or continuous glucose monitor? No   Do you have artificial joints? No   Are you allergic to latex? No     Advance Care Planning    Discussed advance care planning with patient; however, patient declined at this time.    Preoperative Review of    reviewed - no record of controlled substances prescribed.      Status of Chronic Conditions:  See problem list for active medical problems.  Problems all longstanding and stable, except as noted/documented.  See ROS for pertinent symptoms related to these conditions.    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     Priority: Medium    Post herpetic neuralgia 03/29/2022     Priority: Medium    Lower urinary tract symptoms due to benign prostatic hyperplasia 12/17/2020     Priority: Medium    Kidney cyst, acquired 12/16/2016     Priority: Medium    CKD (chronic kidney disease) stage 3, GFR 30-59 ml/min (H) 10/26/2016     Priority: Medium    Cerebrovascular accident (CVA), unspecified mechanism (H) 10/25/2016     Priority: Medium    Dyslipidemia 04/27/2012     Priority: Medium    CAD (coronary artery disease) 12/30/2011     Priority: Medium    Hypertension goal BP (blood pressure) < 130/80 12/30/2011     Priority: Medium      Past Medical History:   Diagnosis Date    CAD (coronary artery disease) 12/30/2011    Cerebrovascular accident (CVA), unspecified mechanism (H) 10/25/2016    CKD (chronic kidney disease) stage 3, GFR 30-59 ml/min (H) 10/26/2016    Coronary artery disease due to lipid rich plaque 12/30/2011    he transferred his care from the P team in [facility] to the [facility] team in [place] and 2021    CVA (cerebral vascular accident) (H) 10/16    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 He also  said he does not like to take too many medications. Our RN spoke with him via  and hopefully has convinced him to take it daily; we will recheck his lipid profile in 3 months     Past Surgical History:   Procedure Laterality Date    BYPASS GRAFT ARTERY CORONARY  12/22/2006    GARZA to LAD, vein graft to OM2, vein graft to PDA. This was complicated by postop afib. Done in [place], [place]    CARDIAC CATHETERIZATION  2006    in [place]    CARDIAC CATHETERIZATION  11/21/2014    by Dr. Sepulveda at [facility], no PCI was done: LMCA 90%, mid %, prox LCX 90%, prox %, SVG's to OM2 and RPDA 100%, LIMA to LAD patent with L to R collaterals    CV ANGIOGRAM CORONARY GRAFT N/A 2/21/2025    Procedure: Coronary Angiogram Graft;  Surgeon: Troy Forbes MD;  Location: [facility] CATH LAB CV    CV CORONARY ANGIOGRAM N/A 2/21/2025    Procedure: Coronary Angiogram;  Surgeon: Troy Forbes MD;  Location: [facility] CATH LAB CV    SURGICAL HISTORY OF -       CABG 2006     Current Outpatient Medications   Medication Sig Dispense Refill    acetaminophen (TYLENOL) 500 MG tablet Take 500-1,000 mg by mouth.      apixaban ANTICOAGULANT (ELIQUIS ANTICOAGULANT) 2.5 MG tablet Take 1 tablet (2.5 mg) by mouth 2 times daily. 60 tablet 2    ezetimibe (ZETIA) 10 MG tablet TAKE 1 TABLET(10 MG) BY MOUTH DAILY 90 tablet 3    isosorbide mononitrate CR (IMDUR) 120 MG 24 HR ER tablet TAKE 1 TABLET(120 MG) BY MOUTH DAILY 90 tablet 3    losartan (COZAAR) 100 MG tablet Take 1 tablet (100 mg) by mouth daily. 90 tablet 3    metoprolol tartrate (LOPRESSOR) 25 MG tablet Take 1 tablet (25 mg) by mouth 2 times daily. 60 tablet 1    nitroGLYcerin (NITROSTAT) 0.4 MG sublingual tablet PLACE 1 TABLET UNDER THE TONGUE AT THE ONSET OF CHEST PAIN. MAY REPEAT EVERY 5 MINUTES AS NEEDED FOR A TOTAL OF 3 DOSES. 25 tablet 3    ORDER FOR DME Home Blood pressure monitor machine 1 each 0    pantoprazole (PROTONIX) 20 MG EC tablet Take 1 tablet (20 mg) by  "mouth every morning (before breakfast). 30 tablet 1    predniSONE (DELTASONE) 10 MG tablet Take 0.5 tablets (5 mg) by mouth daily.      rosuvastatin (CRESTOR) 40 MG tablet Take 1 tablet (40 mg) by mouth daily. 30 tablet 1    tamsulosin (FLOMAX) 0.4 MG capsule Take 1 capsule (0.4 mg) by mouth daily. 90 capsule 3       Allergies   Allergen Reactions    Nkda [No Known Drug Allergy]     Seasonal Allergies         Social History     Tobacco Use    Smoking status: Never     Passive exposure: Never    Smokeless tobacco: Never   Substance Use Topics    Alcohol use: Not Currently     Family History   Problem Relation Age of Onset    Family History Negative Other         no known specifics    Kidney Disease Mother      History   Drug Use Unknown             Review of Systems  Constitutional, HEENT, cardiovascular, pulmonary, GI, , musculoskeletal, neuro, skin, endocrine and psych systems are negative, except as otherwise noted.    Objective    /67 (BP Location: Left arm, Patient Position: Sitting, Cuff Size: Adult Regular)   Pulse 66   Temp 97.4  F (36.3  C) (Oral)   Resp 14   Ht 1.6 m (5' 3\")   Wt 58.9 kg (129 lb 12.8 oz)   SpO2 100%   BMI 22.99 kg/m     Estimated body mass index is 22.99 kg/m  as calculated from the following:    Height as of this encounter: 1.6 m (5' 3\").    Weight as of this encounter: 58.9 kg (129 lb 12.8 oz).  Physical Exam  GENERAL: alert and no distress  NECK: no adenopathy, no asymmetry, masses, or scars  RESP: lungs clear to auscultation - no rales, rhonchi or wheezes  CV: regular rates and rhythm  ABDOMEN: soft, nontender, no hepatosplenomegaly, no masses and bowel sounds normal  MS: no gross musculoskeletal defects noted, no edema    Recent Labs   Lab Test 04/07/25  1523 03/07/25  1405 02/23/25  0832 02/21/25  1526 02/21/25  1455   HGB 10.8*  --  10.8*   < > 10.7*     --  153   < > 179   INR  --   --   --   --  0.94   NA  --  139 138   < > 141   POTASSIUM  --  4.4 3.7   < > " 4.0   CR  --  1.55* 1.58*   < > 1.80*    < > = values in this interval not displayed.        Diagnostics  Labs pending at this time.  Results will be reviewed when available.   No EKG required for low risk surgery (cataract, skin procedure, breast biopsy, etc).    Revised Cardiac Risk Index (RCRI)  The patient has the following serious cardiovascular risks for perioperative complications:   - Coronary Artery Disease (MI, positive stress test, angina, Qs on EKG) = 1 point   - Cerebrovascular Disease (TIA or CVA) = 1 point     RCRI Interpretation: 2 points: Class III (moderate risk - 6.6% complication rate)     Estimated Functional Capacity: Duke Activity Status Index (DASI) score: 4.31            Signed Electronically by: Suki Vilchis MD  A copy of this evaluation report is provided to the requesting physician.

## 2025-05-23 NOTE — PATIENT INSTRUCTIONS
How to Take Your Medication Before Surgery  Preoperative Medication Instructions   Antiplatelet or Anticoagulation Medication Instructions   - apixaban (Eliquis), edoxaban (Savaysa), rivaroxaban (Xarelto): Bleeding risk is low for this procedure AND CrCl (>=) 50 mL/min. DO NOT TAKE 1 day before surgery.      Additional Medication Instructions  Take all scheduled medications on the day of surgery EXCEPT for modifications listed below:   - Herbal medications and vitamins: DO NOT TAKE 14 days prior to surgery.   - ACE/ARB/ARNI (lisinopril, enalapril, losartan, valsartan, olmesartan, sacubritril/valsartan) : DO NOT TAKE on day of surgery (minimum 11 hours for general anesthesia).   - Beta Blockers (atenolol, metoprolol, propranolol) : Continue taking on the day of surgery.   - Statins (atorvastatin, simvastatin, pravastatin) : Continue taking on the day of surgery.    - Intraoperative stress dose steroids may be indicated due to chronic steroid use in the last 3 months (e.g. > 3 weeks of prednisone 20 mg or daily prednisone 5 mg).     TAKE 2 TABLETS i.e 10 mg 1 hour before surgery    Patient Education   Preparing for Your Surgery  For Adults  Getting started  In most cases, a nurse will call to review your health history and instructions. They will give you an arrival time based on your scheduled surgery time. Please be ready to share:  Your doctor's clinic name and phone number  Your medical, surgical, and anesthesia history  A list of allergies and sensitivities  A list of medicines, including herbal treatments and over-the-counter drugs  Whether the patient has a legal guardian (ask how to send us the papers in advance)  Note: You may not receive a call if you were seen at our PAC (Preoperative Assessment Center).  Please tell us if you're pregnant--or if there's any chance you might be pregnant. Some surgeries may injure a fetus (unborn baby), so they require a pregnancy test. Surgeries that are safe for a fetus  don't always need a test, and you can choose whether to have one.   Preparing for surgery  Within 10 to 30 days of surgery: Have a pre-op exam (sometimes called an H&P, or History and Physical). This can be done at a clinic or pre-operative center.  If you're having a , you may not need this exam. Talk to your care team.  At your pre-op exam, talk to your care team about all medicines you take. (This includes CBD oil and any drugs, such as THC, marijuana, and other forms of cannabis.) If you need to stop any medicine before surgery, ask when to start taking it again.  This is for your safety. Many medicines and drugs can make you bleed too much during surgery. Some change how well surgery (anesthesia) drugs work.  Call your insurance company to let them know you're having surgery. (If you don't have insurance, call [phone].)  Call your clinic if there's any change in your health. This includes a scrape or scratch near the surgery site, or any signs of a cold (sore throat, runny nose, cough, rash, fever).  Eating and drinking guidelines  For your safety: Unless your surgeon tells you otherwise, follow the guidelines below.  Eat and drink as normal until 8 hours before you arrive for surgery. After that, no food or milk. You can spit out gum when you arrive.  Drink clear liquids until 2 hours before you arrive. These are liquids you can see through, like water, Gatorade, and Propel Water. They also include plain black coffee and tea (no cream or milk).  No alcohol for 24 hours before you arrive. The night before surgery, stop any drinks that contain THC.  If your care team tells you to take medicine on the morning of surgery, it's okay to take it with a sip of water. No other medicines or drugs are allowed (including CBD oil)--follow your care team's instructions.  If you have questions the day of surgery, call your hospital or surgery center.   Preventing infection  Shower or bathe the night before and  the morning of surgery. Follow the instructions your clinic gave you. (If no instructions, use regular soap.)  Don't shave or clip hair near your surgery site. We'll remove the hair if needed.  Don't smoke or vape the morning of surgery. No chewing tobacco for 6 hours before you arrive. A nicotine patch is okay. You may spit out nicotine gum when you arrive.  For some surgeries, the surgeon will tell you to fully quit smoking and nicotine.  We will make every effort to keep you safe from infection. We will:  Clean our hands often with soap and water (or an alcohol-based hand rub).  Clean the skin at your surgery site with a special soap that kills germs.  Give you a special gown to keep you warm. (Cold raises the risk of infection.)  Wear hair covers, masks, gowns, and gloves during surgery.  Give antibiotic medicine, if prescribed. Not all surgeries need this medicine.  What to bring on the day of surgery  Photo ID and insurance card  Copy of your health care directive, if you have one  Glasses and hearing aids (bring cases)  You can't wear contacts during surgery  Inhaler and eye drops, if you use them (tell us about these when you arrive)  CPAP machine or breathing device, if you use them  A few personal items, if spending the night  If you have . . .  A pacemaker, ICD (cardiac defibrillator), or other implant: Bring the ID card.  An implanted stimulator: Bring the remote control.  A legal guardian: Bring a copy of the certified (court-stamped) guardianship papers.  Please remove any jewelry, including body piercings. Leave jewelry and other valuables at home.  If you're going home the day of surgery  You must have a responsible adult drive you home. They should stay with you overnight as well.  If you don't have someone to stay with you, and you aren't safe to go home alone, we may keep you overnight. Insurance often won't pay for this.  After surgery  If it's hard to control your pain or you need more pain  medicine, please call your surgeon's office.  Questions?   If you have any questions for your care team, list them here:   ____________________________________________________________________________________________________________________________________________________________________________________________________________________________________________________________  For informational purposes only. Not to replace the advice of your health care provider. Copyright   2003, 2019 [facility]. All rights reserved. Clinically reviewed by Omega Bailey MD. [employer] 259362 - REV 08/24.

## 2025-05-24 LAB
ANION GAP SERPL CALCULATED.3IONS-SCNC: 10 MMOL/L (ref 7–15)
BUN SERPL-MCNC: 37.4 MG/DL (ref 8–23)
CALCIUM SERPL-MCNC: 9.2 MG/DL (ref 8.8–10.4)
CHLORIDE SERPL-SCNC: 110 MMOL/L (ref 98–107)
CREAT SERPL-MCNC: 2.23 MG/DL (ref 0.67–1.17)
EGFRCR SERPLBLD CKD-EPI 2021: 30 ML/MIN/1.73M2
GLUCOSE SERPL-MCNC: 142 MG/DL (ref 70–99)
HCO3 SERPL-SCNC: 19 MMOL/L (ref 22–29)
POTASSIUM SERPL-SCNC: 5.2 MMOL/L (ref 3.4–5.3)
SODIUM SERPL-SCNC: 139 MMOL/L (ref 135–145)

## 2025-05-26 ENCOUNTER — PATIENT OUTREACH (OUTPATIENT)
Dept: CARE COORDINATION | Facility: CLINIC | Age: 80
End: 2025-05-26
Payer: MEDICARE

## 2025-05-28 ENCOUNTER — PATIENT OUTREACH (OUTPATIENT)
Dept: CARE COORDINATION | Facility: CLINIC | Age: 80
End: 2025-05-28
Payer: MEDICARE

## 2025-06-02 ENCOUNTER — PATIENT OUTREACH (OUTPATIENT)
Dept: CARE COORDINATION | Facility: CLINIC | Age: 80
End: 2025-06-02
Payer: MEDICARE

## 2025-06-02 NOTE — LETTER
[facility] CARE COORDINATION  [address]  [facility] [address]    June 2, 2025        Jessica Healy  [address] [address]          Dear Davina Lopez is an updated Patient Centered Plan of Care for your continued enrollment in Care Coordination. Please let us know if you have additional questions, concerns, or goals that we can assist with.    Sincerely,    Eva Peralta RN  Clinic Care Coordination  [phone]      [facility]  Patient Centered Plan of Care  About Me:        Patient Name:  Jessica Healy    YOB: 1945  Age:         79 year old   Andrey MRN:    [id number] Telephone Information:  Home Phone [phone]   Mobile [phone]   Mobile [phone]       Address:  [address] [address] Email address:  [email or web address]      Emergency Contact(s)    Name Relationship Lgl Grd Work Phone Home Phone Mobile Phone   1. LAZARA HEALY Son No   [phone]   2. DAR BEE Daughter No  [phone]    3. THIN,HLI Other    [phone]           Primary language:  Hmong     needed? Yes   [employer]:  [phone] op. 1  Other communication barriers:Language barrier    Preferred Method of Communication:  Phone  Current living arrangement: I live alone    Mobility Status/ Medical Equipment: Independent        Health Maintenance  Health Maintenance Reviewed: Up to date      My Access Plan  Medical Emergency 911   Primary Clinic Line [facility] - [phone]   24 Hour Appointment Line [phone] or  [phone]) (toll-free)   24 Hour Nurse Line [phone] (toll-free)   Preferred Urgent Care [facility], [phone]     Preferred Hospital No data recorded   Preferred Pharmacy [facility] - [place], [place] - [address] [facility]     Behavioral Health Crisis Line The National Suicide Prevention Lifeline at 1-168.194.8057 or Text/Call 430            My Care Team Members  Patient Care Team         Relationship Specialty Notifications Start End    Suki Vilchis MD PCP - General Family Medicine  3/7/25     Phone: [phone] Fax: [phone]         [address] [facility] [address]    Phu Lujan MD MD Neurology  10/18/24     Phone: [phone] Fax: [phone]         [address] [facility] [address]    Jenn Deng CHW Community Health Worker  Admissions 11/1/24     Phone: [phone]         Laura Rincon PA-C Assigned PCP   11/23/24     Phone: [phone] Fax: [phone] [address] [facility] [address]    Eva Peralta, RN Lead Care Coordinator Primary Care -  Admissions 12/13/24     Phone: [phone]         Kemal Jean-Baptiste MD MD Cardiology  3/4/25     Phone: [phone] Fax: [phone]         [address] [facility] [address]    Phu Lujan MD Assigned Neuroscience Provider   3/23/25     Phone: [phone] Fax: [phone]         [address] [facility] [address]    Kemal Jean-Baptiste MD Assigned Heart and Vascular Provider   3/23/25     Phone: [phone] Fax: [phone]         [address] [facility] [address]                My Care Plans  Self Management and Treatment Plan    Care Plan  Care Plan: Health Maintenance       Problem: Health Maintenance Due or Overdue       Goal: Become up-to-date with health maintenance visit(s)       Start Date: 1/30/2025    Recent Progress: 30%    Note:     Barriers: numerous appointments  Strengths: ARHMS worker  Patient expressed understanding of goal: per chart review  Action steps to achieve this goal:  Referrals pended for Cardiology-patient needs to schedule- needs a follow up appointment August/Sept 2025. (Patients daughter Aleena schedules appointments for patient.)  Nephrology-patient needs to schedule- Wameng plans on scheduling this appointment after he heals from his eye surgery that  is scheduled on 6/13/25  Neurology-scheduled 3/6/25 @ 8:24-Qpqbgcvcb-Qlihny up scheduled 6/16/25 @ 1:15. Continuous (MB 5/8/25)- Confirmed appointment   Pain Clinic-patient needs to schedule- Jessica plans on scheduling this appointment after he heals from his eye surgery that is scheduled on 6/13/25                                Care Plan: Food Insecurity       Problem: Reliable food source       Goal: Establish Options for Affordable Food Sources                             Action Plans on File:            Depression          Advance Care Plans/Directives:   Advanced Care Plan/Directives on file: No    Discussed with patient/caregiver(s): Declined Further Information             My Medical and Care Information  Problem List   Patient Active Problem List   Diagnosis    CAD (coronary artery disease)    Hypertension goal BP (blood pressure) < 130/80    Dyslipidemia    Cerebrovascular accident (CVA), unspecified mechanism (H)    CKD (chronic kidney disease) stage 3, GFR 30-59 ml/min (H)    Kidney cyst, acquired    Post herpetic neuralgia    Lower urinary tract symptoms due to benign prostatic hyperplasia    Moderate recurrent major depression (H)    New onset atrial fibrillation (H)    ST elevation myocardial infarction (STEMI), unspecified artery (H)    Chest pain, unspecified type    Tophaceous gout      Current Medications:  Please refer to the most recent medication list provided to you by your medical team and reach out to your provider with any questions or to make any corrections.    Care Coordination Start Date: 9/24/2024   Frequency of Care Coordination: monthly, more frequently as needed     Form Last Updated: 06/02/2025

## 2025-06-02 NOTE — PROGRESS NOTES
Clinic Care Coordination Contact  Care Coordination Clinician Chart Review    Situation: Patient chart reviewed by Care Coordinator.       Background: Care Coordination Program started: 9/24/2024. Initial assessment completed and patient-centered care plan(s) were developed with participation from patient. Lead CC handed patient off to CHW for continued outreaches.       Assessment: Per chart review, patient outreach completed by CC CHW on 5/15/25.  Patient is actively working to accomplish goal(s). Patient's goal(s) appropriate and relevant at this time. Patient is due for updated Plan of Care.  Assessments will be completed annually or as needed/with change of patient status.      Care Plan: Health Maintenance       Problem: Health Maintenance Due or Overdue       Goal: Become up-to-date with health maintenance visit(s)       Start Date: 1/30/2025    Recent Progress: 30%    Note:     Barriers: numerous appointments  Strengths: ARHMS worker  Patient expressed understanding of goal: per chart review  Action steps to achieve this goal:  Referrals pended for Cardiology-patient needs to schedule- needs a follow up appointment August/Sept 2025. (Patients daughter Aleena schedules appointments for patient.)  Nephrology-patient needs to schedule- Wameng plans on scheduling this appointment after he heals from his eye surgery that is scheduled on 6/13/25  Neurology-scheduled 3/6/25 @ 8:03-Kuuvlruzq-Oucwap up scheduled 6/16/25 @ 1:15. Continuous (MB 5/8/25)- Confirmed appointment   Pain Clinic-patient needs to schedule- Wameng plans on scheduling this appointment after he heals from his eye surgery that is scheduled on 6/13/25                                Care Plan: Food Insecurity       Problem: Reliable food source       Goal: Establish Options for Affordable Food Sources                                Plan/Recommendations: The patient will continue working with Care Coordination to achieve goal(s) as above. CHW will  continue outreaches at minimum every 30 days and will involve Lead CC as needed or if patient is ready to move to Maintenance. Lead CC will continue to monitor CHW outreaches and patient's progress to goal(s) every 6 weeks.     Plan of Care updated and sent to patient: Yes, via [email or web address]

## 2025-06-05 ENCOUNTER — PATIENT OUTREACH (OUTPATIENT)
Dept: CARE COORDINATION | Facility: CLINIC | Age: 80
End: 2025-06-05
Payer: MEDICARE

## 2025-06-05 NOTE — PROGRESS NOTES
Clinic Care Coordination Contact  Community Health Worker Follow Up    Care Gaps:     Health Maintenance Due   Topic Date Due    HEPATITIS A VACCINE (1 of 2 - Risk 2-dose series) Never done    ZOSTER VACCINE (1 of 2) Never done    COVID-19 VACCINE (4 - 2024-25 season) 04/28/2025       Patient accepted scheduling phone number for [facility]  to schedule independently     Care Plan:   Care Plan: Health Maintenance       Problem: Health Maintenance Due or Overdue       Goal: Become up-to-date with health maintenance visit(s)       Start Date: 1/30/2025    This Visit's Progress: 30% Recent Progress: 30%    Note:     Barriers: numerous appointments  Strengths: ARHMS worker  Patient expressed understanding of goal: per chart review  Action steps to achieve this goal:  Referrals pended for Cardiology-patient needs to schedule- needs a follow up appointment August/Sept 2025. (Patients daughter Aleena schedules appointments for patient.) ASMITA Hom[place] will schedule this appointment for Wameng within the next 6 weeks.  Nephrology-patient needs to schedule-  ASMITA Flood will schedule this appointment for Wameng within the next 6 weeks.  3. Neurology-follow up scheduled 12/18/25  4.Pain Clinic-patient needs to schedule-  ASMITA Flood will schedule this appointment for Wameng within the next 6 weeks.                                Care Plan: Food Insecurity       Problem: Reliable food source       Goal: Establish Options for Affordable Food Sources       Start Date: 6/5/2025    Priority: Low    Note:     Date Goal set: 6/5/25  Barriers:   Strengths: Motivated  Date to Achieve By: 8/1/25  Patient expressed understanding of goal: Yes  Action steps to achieve this goal:  1. I will look into food resources given to me by the [facility] SHIVA on 6/16/25.                                Intervention and Education during outreach: I scheduled patient with [facility] SHIVA Gan on 6/16/25 to obtain food resources.    See goals for further details. No other need at  this time.     CHW Plan: CHW will follow up with patients daughter lucia in about 1 month.    Jenn Deng  [facility] Health Worker  [facility] Care Coordination   Aileen Starks, [place], [place], [facility]  Office: [phone]

## 2025-06-10 ENCOUNTER — TELEPHONE (OUTPATIENT)
Dept: FAMILY MEDICINE | Facility: CLINIC | Age: 80
End: 2025-06-10
Payer: MEDICARE

## 2025-06-10 DIAGNOSIS — I63.9 CEREBROVASCULAR ACCIDENT (CVA), UNSPECIFIED MECHANISM (H): Primary | ICD-10-CM

## 2025-06-10 NOTE — TELEPHONE ENCOUNTER
Order/Referral Request    Who is requesting: patient    Orders being requested: handle bar in the bathroom     Reason service is needed/diagnosis: to assist him on getting in and out of the tub because he has fallen before    When are orders needed by: ASAP    Has this been discussed with Provider: No    Does patient have a preference on a Group/Provider/Facility? No preference, possibly [facility] in [place].    Does patient have an appointment scheduled?: No    Where to send orders: please send the order to the home medical facility above.    Could we send this information to you in [facility] or would you prefer to receive a phone call?:   Patient would prefer a phone call   Okay to leave a detailed message?: Yes at Other phone number:  Please call [facility] [phone]*

## 2025-06-10 NOTE — TELEPHONE ENCOUNTER
Orders for bathroom safety bar is placed.  This can also be obtained from [employer].  Please inform patient.  Otherwise [facility]    Suki Vilchis MD

## 2025-06-11 NOTE — TELEPHONE ENCOUNTER
Edison to [facility] informed her that the shower bar was placed for DME and that DME should be reaching out to her and pickup at [facility]. She also asked about the handicapp pass, I told her that Dr. TOLENTINO just received it yesterday and its being reviewed and will call them back

## 2025-06-12 ENCOUNTER — TELEPHONE (OUTPATIENT)
Dept: FAMILY MEDICINE | Facility: CLINIC | Age: 80
End: 2025-06-12
Payer: MEDICARE

## 2025-06-16 ENCOUNTER — PATIENT OUTREACH (OUTPATIENT)
Dept: CARE COORDINATION | Facility: CLINIC | Age: 80
End: 2025-06-16

## 2025-06-16 NOTE — Clinical Note
ABEI - [facility] lAeena x3; resources previously provided via my chart 5/13/25 (also [facility] for that appointment). If specific resources are needed please let me know and I will provide to you

## 2025-06-16 NOTE — PROGRESS NOTES
Clinic Care Coordination Contact  [facility]/Voicemail    Clinical Data: Care Coordinator Outreach    Outreach Documentation Number of Outreach Attempt   5/13/2025  10:17 AM 3   5/15/2025   1:23 PM 1   6/16/2025   9:05 AM 2   6/17/2025  10:13 AM 3       Left message on patient's daughter's voicemail (Aleena) with call back information and requested return call.      Plan:  Care Coordinator will do no further outreach at this time. Pt has this CC's phone number via  and my chart message sent 5/13/25. If pt reaches out, support will be provided. [facility] sent FYI to lead RN CC and CHW CC. [facility] gave update that if specific resources are needed to let this CC know and they will be provided to care team to share with pt/family.     [facility] x3

## 2025-06-26 ENCOUNTER — LAB (OUTPATIENT)
Dept: LAB | Facility: CLINIC | Age: 80
End: 2025-06-26
Payer: MEDICARE

## 2025-06-26 DIAGNOSIS — E78.00 HYPERCHOLESTEROLEMIA: ICD-10-CM

## 2025-06-26 DIAGNOSIS — N18.31 STAGE 3A CHRONIC KIDNEY DISEASE (H): ICD-10-CM

## 2025-06-26 DIAGNOSIS — I70.1 RENAL ARTERY STENOSIS: ICD-10-CM

## 2025-06-26 LAB
ANION GAP SERPL CALCULATED.3IONS-SCNC: 7 MMOL/L (ref 7–15)
BUN SERPL-MCNC: 42.3 MG/DL (ref 8–23)
CALCIUM SERPL-MCNC: 8.9 MG/DL (ref 8.8–10.4)
CHLORIDE SERPL-SCNC: 107 MMOL/L (ref 98–107)
CHOLEST SERPL-MCNC: 197 MG/DL
CREAT SERPL-MCNC: 2.32 MG/DL (ref 0.67–1.17)
EGFRCR SERPLBLD CKD-EPI 2021: 28 ML/MIN/1.73M2
FASTING STATUS PATIENT QL REPORTED: YES
FASTING STATUS PATIENT QL REPORTED: YES
GLUCOSE SERPL-MCNC: 94 MG/DL (ref 70–99)
HCO3 SERPL-SCNC: 24 MMOL/L (ref 22–29)
HDLC SERPL-MCNC: 52 MG/DL
LDLC SERPL CALC-MCNC: 130 MG/DL
NONHDLC SERPL-MCNC: 145 MG/DL
POTASSIUM SERPL-SCNC: 4.6 MMOL/L (ref 3.4–5.3)
SODIUM SERPL-SCNC: 138 MMOL/L (ref 135–145)
TRIGL SERPL-MCNC: 76 MG/DL

## 2025-07-03 ENCOUNTER — TELEPHONE (OUTPATIENT)
Dept: VASCULAR SURGERY | Facility: CLINIC | Age: 80
End: 2025-07-03
Payer: MEDICARE

## 2025-07-03 NOTE — TELEPHONE ENCOUNTER
Prior Authorization Retail Medication Request    Medication/Dose: Repatha SureClick 140 mg/mL auto-injectors  Diagnosis and ICD code (if different than what is on RX):  same as Rx  New/renewal/insurance change PA/secondary ins. PA: New  Previously Tried and Failed:  Statin and zetia  Rationale:  LDL not within acceptable limits for renal artery stenosis. LDL goal < 70. Unable to achieve goal with statin and zetia    Insurance   Primary: Medicare  Insurance ID:  [id number]    Secondary (if applicable): Medicaid [place]  Insurance ID:  [id number]    Pharmacy Information (if different than what is on RX)  Name:  [facility]  Phone:  [phone]  Fax:[phone]    Clinic Information  Preferred routing pool for dept communication: [facility]       Cover my meds key: [id number]

## 2025-07-09 ENCOUNTER — PATIENT OUTREACH (OUTPATIENT)
Dept: CARE COORDINATION | Facility: CLINIC | Age: 80
End: 2025-07-09
Payer: MEDICARE

## 2025-07-09 NOTE — PROGRESS NOTES
Clinic Care Coordination Contact  [facility]/Voicemail    Clinical Data: Care Coordinator Outreach    Outreach Documentation Number of Outreach Attempt   7/9/2025  10:37 AM 1       Left message on patients daughter Aleena's voicemail with call back information and requested return call.      Plan: Care Coordinator will try to reach patient again in 10 business days.    [facility] Health Worker  [facility] Care Coordination   [place], [facility], [facility]  Office: [phone]

## 2025-07-14 ENCOUNTER — PATIENT OUTREACH (OUTPATIENT)
Dept: CARE COORDINATION | Facility: CLINIC | Age: 80
End: 2025-07-14
Payer: MEDICARE

## 2025-07-14 NOTE — PROGRESS NOTES
Clinic Care Coordination Contact  Care Coordination Clinician Chart Review    Situation: Patient chart reviewed by Care Coordinator.       Background: Care Coordination Program started: 9/24/2024. Initial assessment completed and patient-centered care plan(s) were developed with participation from patient. Lead CC handed patient off to CHW for continued outreaches.       Assessment: Per chart review, patient outreach completed by CC CHW on 7/14/25.  Patient is actively working to accomplish goal(s). Patient's goal(s) appropriate and relevant at this time. Patient is due for updated Plan of Care.  Assessments will be completed annually or as needed/with change of patient status.      Care Plan: Health Maintenance       Problem: Health Maintenance Due or Overdue       Goal: Become up-to-date with health maintenance visit(s)       Start Date: 1/30/2025    Recent Progress: 40%    Note:     Barriers: numerous appointments  Strengths: ARHMS worker  Patient expressed understanding of goal: per chart review  Action steps to achieve this goal:    Referrals pended for Cardiology [phone]-patient needs to schedule- needs a follow up appointment August/Sept 2025. ASMITA Flood will schedule this appointment for Jessica within the next 6 weeks. Continuous (MB)  Nephrology [phone]. Elayne CHW help gladys Flood schedule this appointment for 9/17/25 @ 12:15 for Lab appt then appt with Nephrology doctor at 1:00.    3. Neurology-follow up scheduled 12/18/25. Continuous (MB)  4.Pain Clinic [phone]. -patient needs to schedule-  ASMITA Flood will schedule this appointment for Jessica within the next 6 weeks. Continuous (MB)                            Care Plan: Food Insecurity       Problem: Reliable food source       Goal: Establish Options for Affordable Food Sources       Start Date: 6/5/2025    This Visit's Progress: 10%    Priority: Low    Note:     Date Goal set: 6/5/25  Barriers:   Strengths: Motivated  Date to Achieve By:  8/1/25  Patient expressed understanding of goal: Yes  Action steps to achieve this goal:  1. I will look into food resources given to me by the [facility] SW on 7/22/25 @ 10:00.                                   Plan/Recommendations: The patient will continue working with Care Coordination to achieve goal(s) as above. CHW will continue outreaches at minimum every 30 days and will involve Lead CC as needed or if patient is ready to move to Maintenance. Lead CC will continue to monitor CHW outreaches and patient's progress to goal(s) every 6 weeks.     Plan of Care updated and sent to patient: No

## 2025-07-14 NOTE — PROGRESS NOTES
Clinic Care Coordination Contact  Community Health Worker Follow Up    Care Gaps:     Health Maintenance Due   Topic Date Due    HEPATITIS A VACCINE (1 of 2 - Risk 2-dose series) Never done    ZOSTER VACCINE (1 of 2) Never done    COVID-19 VACCINE (4 - 2024-25 season) 04/28/2025    PHQ-9  08/12/2025       Patient accepted scheduling phone number for [facility]  to schedule independently     Care Plan:   Care Plan: Health Maintenance       Problem: Health Maintenance Due or Overdue       Goal: Become up-to-date with health maintenance visit(s)       Start Date: 1/30/2025    This Visit's Progress: 40% Recent Progress: 30%    Note:     Barriers: numerous appointments  Strengths: ARHMS worker  Patient expressed understanding of goal: per chart review  Action steps to achieve this goal:    Referrals pended for Cardiology [phone]-patient needs to schedule- needs a follow up appointment August/Sept 2025. I Aleena will schedule this appointment for Jessica within the next 6 weeks. Continuous (MB)  Nephrology [phone]. Elayne CHW help gladys Flood schedule this appointment for 9/17/25 @ 12:15 for Lab appt then appt with Nephrology doctor at 1:00.    3. Neurology-follow up scheduled 12/18/25. Continuous (MB)  4.Pain Clinic [phone]. -patient needs to schedule-  ASMITA Flood will schedule this appointment for Jessica within the next 6 weeks. Continuous (MB)                            Care Plan: Food Insecurity       Problem: Reliable food source       Goal: Establish Options for Affordable Food Sources       Start Date: 6/5/2025    This Visit's Progress: 10%    Priority: Low    Note:     Date Goal set: 6/5/25  Barriers:   Strengths: Motivated  Date to Achieve By: 8/1/25  Patient expressed understanding of goal: Yes  Action steps to achieve this goal:  1. I will look into food resources given to me by the [facility] SW on 7/22/25 @ 10:00.                                Intervention and Education during outreach: CHW assisted  patient's daughter Aleena and scheduling nephrology appointment for 9/17/2025.    CHW assisted patient's daughter Aleena and schedule when follow-up appointment with  Elvia on 7/22/2025 at 10:00 to look into food resources.    No other needs at this time.    CHW Plan: CHW will follow up with patient's daughter Aleena in about 1 month.    Jenn Deng  [facility] Health Worker  [facility] Care Coordination   [place], [place], [facility]  Office: [phone]

## 2025-07-17 ENCOUNTER — TRANSFERRED RECORDS (OUTPATIENT)
Dept: HEALTH INFORMATION MANAGEMENT | Facility: CLINIC | Age: 80
End: 2025-07-17
Payer: MEDICARE

## 2025-07-22 ENCOUNTER — PATIENT OUTREACH (OUTPATIENT)
Dept: NURSING | Facility: CLINIC | Age: 80
End: 2025-07-22
Payer: MEDICARE

## 2025-07-22 NOTE — PROGRESS NOTES
Clinic Care Coordination Contact  Follow Up Progress Note      Assessment: [facility] reached out to pt's daughter Aleena for appointment set to review food resources    Care Gaps:    Health Maintenance Due   Topic Date Due    HEPATITIS A VACCINE (1 of 2 - Risk 2-dose series) Never done    ZOSTER VACCINE (1 of 2) Never done    COVID-19 VACCINE (4 - 2024-25 season) 04/28/2025    PHQ-9  08/12/2025       Postponed to next outreach      Care Plans  Care Plan: Health Maintenance       Problem: Health Maintenance Due or Overdue       Goal: Become up-to-date with health maintenance visit(s)       Start Date: 1/30/2025    Recent Progress: 40%    Note:     Barriers: numerous appointments  Strengths: [facility] worker  Patient expressed understanding of goal: per chart review  Action steps to achieve this goal:    Referrals pended for Cardiology [phone]-patient needs to schedule- needs a follow up appointment August/Sept 2025. I Aleena will schedule this appointment for [place] within the next 6 weeks. Continuous (MB)  Nephrology [phone]. Estefani CHW help gladys Flood schedule this appointment for 9/17/25 @ 12:15 for Lab appt then appt with Nephrology doctor at 1:00.    3. Neurology-follow up scheduled 12/18/25. Continuous (MB)  4.Pain Clinic [phone]. -patient needs to schedule-  ASMITA Flood will schedule this appointment for [place] within the next 6 weeks. Continuous (MB)                            Care Plan: Food Insecurity       Problem: Reliable food source       Goal: Establish Options for Affordable Food Sources       Start Date: 6/5/2025    Recent Progress: 10%    Priority: Low    Note:     Date Goal set: 6/5/25  Barriers:   Strengths: Motivated  Date to Achieve By: 8/1/25  Patient expressed understanding of goal: Yes  Action steps to achieve this goal:  1. I will look into food resources given to me by the [facility] on 7/22/25 @ 10:00. COMPLETE  2. I am getting meals on wheels covered by insurance at this time. I will consider  other meal provider companies to see if they offer meals I like better and consider switching to them.   3. I will look at free community meal locations to consider having meals picked up for me.   4. I am not interested in any food pantry, produce distrubution, ect.                                  Intervention/Education provided during outreach:      Local Home Delivered Meal Options -     [employer]: [email or web address]://optage.org/[email or web address]  [employer] is based in [place] and serves the [address] [facility] area.   You can reach out to you Medicare Supplement to see if they offer any coverage for meals. If they do and you are interested in [employer], let them know so they can switch from Meals on Wheels to [employer]. [employer] will not turn clients away based on ability to pay for meals. Funding is available through the [facility] and miscellaneous grants. Please call [phone] for more information or to set up delivery.    [employer] Meal Companies -     [employer] Meals:   You can reach out to you Medicare Supplement to see if they offer any coverage for meals. If they do and you are interested in [employer] Meals, let them know so they can switch from Meals on Wheels to Mom's Meals. For private pay clients, meals are $7.99(and up) + shipping and handling. If you would like to order private pay meals, set up an online account through the website above to start the process.     [employer]: [email or web address]  You can reach out to you Medicare Supplement to see if they offer any coverage for meals. If they do and you are interested in [employer], let them know so they can switch from Meals on Wheels to [employer]. For private pay clients, meals are $9.95 + shipping and handling. Please call our customer support team at [phone] to place an order.    Community Meals -     [employer] at [facility]  [address] [place] [address], [place]  Service Type: Dining In  2nd Thursday:  5:00 to 6:00 PM  Visit [employer] at [facility] Website       [employer] at [facility]  [address] [facility], [facility]  Grab and go meals  3rd Wednesday of the month from 5:30 - 6:30 PM  Visit [employer] at [facility] Website       [employer]  [address], [place], [place], [place]  Monday: A hot meal is served each Monday with additional produce and bakery items available to select from.  Wednesday: A bagged lunch is served each Wednesday with additional produce and bakery items available to select from.  NOTE: Dates are subject to cancellation without notice. Please check our calendar for holiday closures.  Monday and Wednesday 12:00pm-2:00pm  Visit [employer] Website       [employer] [facility] [employer]  [address], [place] [address], [facility]  Come share a hot dinner with us and build community at [facility]!  Meals are offered in-person with a limited amount of dinners available as a takeaway, we appreciate your patience and understanding.  Sundays 5:00 - 6:30 pm  Visit [employer] Website              Plan:   [facility] and pt's daughter Aleena reviewed needs and concerns. Pt is currently receiving frozen meals on wheels which is covered through insurance. [facility] and Aleena talked over how insurance will only cover one meal provider - they can change if they like but they cannot have multiple providers. [facility] provided options for meals on wheels providers which Aleena will review to see if there is a better provider option. They will reach out to insurance if they want to switch. Family is not interested in any food pantry, produce distrubution, ect - they do not want 'ingredients' only ready made meals. Pt would be home to receive hot meal daily but can also heat frozen meal. Family will also review community meal sites provided by this [facility] - they understand that they  would need to go  a meal daily if they want to use this free service. [facility] provided resources via email to Aleena as requested. Writer communicated the risks of unencrypted electronic communication and the patient and/or patient representative has agreed to accept the risks and receive unencrypted communication related to the information or resources we have discussed. We reviewed that no PHI will be included.  Email address verified with the patient.   CHW Care Coordinator will follow up in about 3 weeks for regular outreach, RN CC will complete chart review in about 5 weeks. [facility] will remain available for any outreach needs however no additional outreach at this time.

## 2025-07-24 NOTE — TELEPHONE ENCOUNTER
REASONS FOR VISIT: Chronic Kidney Disease per patient's daughter Aleena, records in epic, referred by Suki Vilchis MD    DATE RECEIVED: 9/17/2025   NOTES STATUS DETAILS   OFFICE NOTE from referring provider [facility]   5/25/2025 referral: Suki Vilchis MD    IMAGING (NEED IMAGES AND REPORTS)     KIDNEY ULTRASOUND PACS 8/20/2025 US renal *scheduled*  2/22/2025 US renal   LABS     CBC [facility] 5/23/2025, 4/7/2025   CMP Epic 9/30/2024   BMP Epic 6/26/2025, 5/23/2025   UA [facility] 2/12/2025   RENAL PANEL Epic 1/27/2021

## 2025-07-28 ENCOUNTER — TRANSFERRED RECORDS (OUTPATIENT)
Dept: HEALTH INFORMATION MANAGEMENT | Facility: CLINIC | Age: 80
End: 2025-07-28
Payer: MEDICARE

## 2025-08-06 ENCOUNTER — TRANSFERRED RECORDS (OUTPATIENT)
Dept: HEALTH INFORMATION MANAGEMENT | Facility: CLINIC | Age: 80
End: 2025-08-06
Payer: MEDICARE

## 2025-08-11 ENCOUNTER — TRANSFERRED RECORDS (OUTPATIENT)
Dept: HEALTH INFORMATION MANAGEMENT | Facility: CLINIC | Age: 80
End: 2025-08-11
Payer: MEDICARE

## 2025-08-11 ENCOUNTER — MYC MEDICAL ADVICE (OUTPATIENT)
Dept: FAMILY MEDICINE | Facility: CLINIC | Age: 80
End: 2025-08-11
Payer: MEDICARE

## 2025-08-11 DIAGNOSIS — I63.9 CEREBROVASCULAR ACCIDENT (CVA), UNSPECIFIED MECHANISM (H): Primary | ICD-10-CM

## 2025-08-12 ENCOUNTER — PATIENT OUTREACH (OUTPATIENT)
Dept: CARE COORDINATION | Facility: CLINIC | Age: 80
End: 2025-08-12
Payer: MEDICARE

## 2025-08-20 ENCOUNTER — OFFICE VISIT (OUTPATIENT)
Dept: VASCULAR SURGERY | Facility: CLINIC | Age: 80
End: 2025-08-20
Attending: HOSPITALIST
Payer: MEDICARE

## 2025-08-20 ENCOUNTER — ANCILLARY PROCEDURE (OUTPATIENT)
Dept: VASCULAR ULTRASOUND | Facility: CLINIC | Age: 80
End: 2025-08-20
Attending: HOSPITALIST
Payer: MEDICARE

## 2025-08-20 ENCOUNTER — HOSPITAL ENCOUNTER (OUTPATIENT)
Dept: ULTRASOUND IMAGING | Facility: HOSPITAL | Age: 80
Discharge: HOME | End: 2025-08-20
Attending: HOSPITALIST
Payer: MEDICARE

## 2025-08-20 VITALS — SYSTOLIC BLOOD PRESSURE: 148 MMHG | OXYGEN SATURATION: 100 % | DIASTOLIC BLOOD PRESSURE: 80 MMHG | HEART RATE: 77 BPM

## 2025-08-20 DIAGNOSIS — I73.9 PAD (PERIPHERAL ARTERY DISEASE): ICD-10-CM

## 2025-08-20 DIAGNOSIS — I70.1 RENAL ARTERY STENOSIS: Primary | ICD-10-CM

## 2025-08-20 DIAGNOSIS — I73.9 CLAUDICATION OF BOTH LOWER EXTREMITIES: ICD-10-CM

## 2025-08-20 DIAGNOSIS — I70.1 RENAL ARTERY STENOSIS: ICD-10-CM

## 2025-08-20 PROCEDURE — 93925 LOWER EXTREMITY STUDY: CPT

## 2025-08-20 PROCEDURE — 93925 LOWER EXTREMITY STUDY: CPT | Mod: 26 | Performed by: INTERNAL MEDICINE

## 2025-08-20 PROCEDURE — 1125F AMNT PAIN NOTED PAIN PRSNT: CPT | Performed by: HOSPITALIST

## 2025-08-20 PROCEDURE — 93975 VASCULAR STUDY: CPT

## 2025-08-20 PROCEDURE — 99215 OFFICE O/P EST HI 40 MIN: CPT | Performed by: HOSPITALIST

## 2025-08-20 PROCEDURE — 93923 UPR/LXTR ART STDY 3+ LVLS: CPT

## 2025-08-20 PROCEDURE — 3077F SYST BP >= 140 MM HG: CPT | Performed by: HOSPITALIST

## 2025-08-20 PROCEDURE — 3079F DIAST BP 80-89 MM HG: CPT | Performed by: HOSPITALIST

## 2025-08-20 PROCEDURE — G0463 HOSPITAL OUTPT CLINIC VISIT: HCPCS | Performed by: HOSPITALIST

## 2025-08-20 PROCEDURE — G2211 COMPLEX E/M VISIT ADD ON: HCPCS | Performed by: HOSPITALIST

## 2025-08-20 ASSESSMENT — PAIN SCALES - GENERAL: PAINLEVEL_OUTOF10: SEVERE PAIN (7)

## 2025-08-25 ENCOUNTER — PATIENT OUTREACH (OUTPATIENT)
Dept: CARE COORDINATION | Facility: CLINIC | Age: 80
End: 2025-08-25
Payer: MEDICARE

## 2025-08-26 ENCOUNTER — OFFICE VISIT (OUTPATIENT)
Dept: FAMILY MEDICINE | Facility: CLINIC | Age: 80
End: 2025-08-26
Payer: MEDICARE

## 2025-08-26 VITALS
WEIGHT: 132.8 LBS | DIASTOLIC BLOOD PRESSURE: 56 MMHG | SYSTOLIC BLOOD PRESSURE: 107 MMHG | HEIGHT: 63 IN | BODY MASS INDEX: 23.53 KG/M2 | RESPIRATION RATE: 16 BRPM | HEART RATE: 63 BPM | OXYGEN SATURATION: 96 %

## 2025-08-26 DIAGNOSIS — R39.12 BENIGN PROSTATIC HYPERPLASIA WITH WEAK URINARY STREAM: ICD-10-CM

## 2025-08-26 DIAGNOSIS — I10 HYPERTENSION GOAL BP (BLOOD PRESSURE) < 130/80: ICD-10-CM

## 2025-08-26 DIAGNOSIS — E78.5 HYPERLIPIDEMIA LDL GOAL <70: ICD-10-CM

## 2025-08-26 DIAGNOSIS — I25.709 CORONARY ARTERY DISEASE INVOLVING CORONARY BYPASS GRAFT OF NATIVE HEART WITH ANGINA PECTORIS: ICD-10-CM

## 2025-08-26 DIAGNOSIS — Z01.818 PREOP GENERAL PHYSICAL EXAM: Primary | ICD-10-CM

## 2025-08-26 DIAGNOSIS — I70.1 RENAL ARTERY STENOSIS: ICD-10-CM

## 2025-08-26 DIAGNOSIS — N40.1 BENIGN PROSTATIC HYPERPLASIA WITH WEAK URINARY STREAM: ICD-10-CM

## 2025-08-26 DIAGNOSIS — I48.91 NEW ONSET ATRIAL FIBRILLATION (H): ICD-10-CM

## 2025-08-26 DIAGNOSIS — F33.1 MODERATE RECURRENT MAJOR DEPRESSION (H): ICD-10-CM

## 2025-08-26 PROCEDURE — G2211 COMPLEX E/M VISIT ADD ON: HCPCS | Performed by: FAMILY MEDICINE

## 2025-08-26 PROCEDURE — 36415 COLL VENOUS BLD VENIPUNCTURE: CPT | Performed by: FAMILY MEDICINE

## 2025-08-26 PROCEDURE — 99214 OFFICE O/P EST MOD 30 MIN: CPT | Performed by: FAMILY MEDICINE

## 2025-08-26 PROCEDURE — 3078F DIAST BP <80 MM HG: CPT | Performed by: FAMILY MEDICINE

## 2025-08-26 PROCEDURE — 80048 BASIC METABOLIC PNL TOTAL CA: CPT | Performed by: FAMILY MEDICINE

## 2025-08-26 PROCEDURE — 3074F SYST BP LT 130 MM HG: CPT | Performed by: FAMILY MEDICINE

## 2025-08-26 PROCEDURE — 80061 LIPID PANEL: CPT | Performed by: FAMILY MEDICINE

## 2025-08-26 RX ORDER — TAMSULOSIN HYDROCHLORIDE 0.4 MG/1
0.4 CAPSULE ORAL DAILY
Qty: 90 CAPSULE | Refills: 3 | Status: SHIPPED | OUTPATIENT
Start: 2025-08-26

## 2025-08-26 RX ORDER — PREDNISOLONE ACETATE 10 MG/ML
SUSPENSION/ DROPS OPHTHALMIC
Status: CANCELLED | OUTPATIENT
Start: 2025-08-26

## 2025-08-26 RX ORDER — METOPROLOL TARTRATE 25 MG/1
25 TABLET, FILM COATED ORAL 2 TIMES DAILY
Qty: 60 TABLET | Refills: 1 | Status: SHIPPED | OUTPATIENT
Start: 2025-08-26

## 2025-08-26 RX ORDER — ISOSORBIDE MONONITRATE 120 MG/1
TABLET, EXTENDED RELEASE ORAL
Qty: 90 TABLET | Refills: 3 | Status: SHIPPED | OUTPATIENT
Start: 2025-08-26

## 2025-08-26 RX ORDER — NITROGLYCERIN 0.4 MG/1
TABLET SUBLINGUAL
Qty: 25 TABLET | Refills: 3 | Status: SHIPPED | OUTPATIENT
Start: 2025-08-26

## 2025-08-26 RX ORDER — LOSARTAN POTASSIUM 100 MG/1
100 TABLET ORAL DAILY
Qty: 90 TABLET | Refills: 3 | Status: SHIPPED | OUTPATIENT
Start: 2025-08-26

## 2025-08-26 RX ORDER — PANTOPRAZOLE SODIUM 20 MG/1
20 TABLET, DELAYED RELEASE ORAL
Qty: 30 TABLET | Refills: 1 | Status: SHIPPED | OUTPATIENT
Start: 2025-08-26

## 2025-08-26 RX ORDER — PREDNISONE 10 MG/1
5 TABLET ORAL DAILY
Status: CANCELLED | OUTPATIENT
Start: 2025-08-26

## 2025-08-26 RX ORDER — GATIFLOXACIN 5 MG/ML
SOLUTION/ DROPS OPHTHALMIC
Status: CANCELLED | OUTPATIENT
Start: 2025-08-26

## 2025-08-26 RX ORDER — EVOLOCUMAB 140 MG/ML
140 INJECTION, SOLUTION SUBCUTANEOUS
Qty: 6 ML | Refills: 3 | Status: CANCELLED | OUTPATIENT
Start: 2025-08-26

## 2025-08-26 RX ORDER — DULOXETIN HYDROCHLORIDE 20 MG/1
20 CAPSULE, DELAYED RELEASE ORAL DAILY
Qty: 90 CAPSULE | Refills: 0 | Status: SHIPPED | OUTPATIENT
Start: 2025-08-26

## 2025-08-26 RX ORDER — ROSUVASTATIN CALCIUM 40 MG/1
40 TABLET, COATED ORAL DAILY
Qty: 90 TABLET | Refills: 3 | Status: SHIPPED | OUTPATIENT
Start: 2025-08-26

## 2025-08-26 RX ORDER — KETOROLAC TROMETHAMINE 5 MG/ML
SOLUTION OPHTHALMIC
Status: CANCELLED | OUTPATIENT
Start: 2025-08-26

## 2025-08-26 RX ORDER — EZETIMIBE 10 MG/1
TABLET ORAL
Qty: 90 TABLET | Refills: 3 | Status: SHIPPED | OUTPATIENT
Start: 2025-08-26

## 2025-08-26 ASSESSMENT — COLUMBIA-SUICIDE SEVERITY RATING SCALE - C-SSRS
2. IN THE PAST MONTH, HAVE YOU ACTUALLY HAD ANY THOUGHTS OF KILLING YOURSELF?: NO
6. HAVE YOU EVER DONE ANYTHING, STARTED TO DO ANYTHING, OR PREPARED TO DO ANYTHING TO END YOUR LIFE?: NO
1. WITHIN THE PAST MONTH, HAVE YOU WISHED YOU WERE DEAD OR WISHED YOU COULD GO TO SLEEP AND NOT WAKE UP?: NO

## 2025-08-26 ASSESSMENT — PATIENT HEALTH QUESTIONNAIRE - PHQ9
SUM OF ALL RESPONSES TO PHQ QUESTIONS 1-9: 19
SUM OF ALL RESPONSES TO PHQ QUESTIONS 1-9: 19
10. IF YOU CHECKED OFF ANY PROBLEMS, HOW DIFFICULT HAVE THESE PROBLEMS MADE IT FOR YOU TO DO YOUR WORK, TAKE CARE OF THINGS AT HOME, OR GET ALONG WITH OTHER PEOPLE: EXTREMELY DIFFICULT

## 2025-08-27 LAB
ANION GAP SERPL CALCULATED.3IONS-SCNC: 14 MMOL/L (ref 7–15)
BUN SERPL-MCNC: 51.9 MG/DL (ref 8–23)
CALCIUM SERPL-MCNC: 9.1 MG/DL (ref 8.8–10.4)
CHLORIDE SERPL-SCNC: 103 MMOL/L (ref 98–107)
CHOLEST SERPL-MCNC: 140 MG/DL
CREAT SERPL-MCNC: 2.05 MG/DL (ref 0.67–1.17)
EGFRCR SERPLBLD CKD-EPI 2021: 33 ML/MIN/1.73M2
FASTING STATUS PATIENT QL REPORTED: YES
FASTING STATUS PATIENT QL REPORTED: YES
GLUCOSE SERPL-MCNC: 105 MG/DL (ref 70–99)
HCO3 SERPL-SCNC: 22 MMOL/L (ref 22–29)
HDLC SERPL-MCNC: 40 MG/DL
LDLC SERPL CALC-MCNC: 85 MG/DL
NONHDLC SERPL-MCNC: 100 MG/DL
POTASSIUM SERPL-SCNC: 4.8 MMOL/L (ref 3.4–5.3)
SODIUM SERPL-SCNC: 139 MMOL/L (ref 135–145)
TRIGL SERPL-MCNC: 76 MG/DL

## 2025-09-17 ENCOUNTER — PRE VISIT (OUTPATIENT)
Dept: NEPHROLOGY | Facility: CLINIC | Age: 80
End: 2025-09-17

## (undated) DEVICE — CATH ANGIO JUDKINS JL4 6FRX100CM INFINITI 534620T

## (undated) DEVICE — KIT HAND CONTROL ACIST 014644 AR-P54

## (undated) DEVICE — SYR ANGIOGRAPHY MULTIUSE KIT ACIST 014612

## (undated) DEVICE — ELECTRODE DEFIB CADENCE 22550R

## (undated) DEVICE — MANIFOLD KIT ANGIO AUTOMATED 014613

## (undated) DEVICE — CUSTOM PACK CORONARY SAN5BCRHEA

## (undated) DEVICE — INTRO MICRO MINI STICK 4FR STIFF NITINOL 45-753

## (undated) DEVICE — EXCHANGE WIRE .035 260 STAR/JFC/035/260/ M001491681

## (undated) DEVICE — INTRO SHEATH 6FRX10CM PINNACLE RSS602

## (undated) DEVICE — CATH ANGIO IMPULSE 6FR 100CML RIGHT 4 H7491659902

## (undated) DEVICE — CATH ANGIO INFINITI INTERNAL MAMMARY 6FRX100CM 534-660T

## (undated) DEVICE — CLOSURE DEVICE 6FR VASC PROGLIDE MEDICATED SUTURE 12673-03

## (undated) RX ORDER — FENTANYL CITRATE 50 UG/ML
INJECTION, SOLUTION INTRAMUSCULAR; INTRAVENOUS
Status: DISPENSED
Start: 2025-02-21